# Patient Record
Sex: FEMALE | Race: WHITE | NOT HISPANIC OR LATINO | Employment: FULL TIME | URBAN - METROPOLITAN AREA
[De-identification: names, ages, dates, MRNs, and addresses within clinical notes are randomized per-mention and may not be internally consistent; named-entity substitution may affect disease eponyms.]

---

## 2017-01-16 RX ORDER — CLONAZEPAM 0.5 MG/1
TABLET ORAL
Qty: 30 TABLET | Refills: 0 | Status: SHIPPED | OUTPATIENT
Start: 2017-01-16 | End: 2017-02-17 | Stop reason: SDUPTHER

## 2017-02-03 RX ORDER — MOMETASONE FUROATE 50 UG/1
SPRAY, METERED NASAL
Qty: 17 G | Refills: 3 | Status: SHIPPED | OUTPATIENT
Start: 2017-02-03 | End: 2017-05-23 | Stop reason: SDUPTHER

## 2017-02-09 ENCOUNTER — HOSPITAL ENCOUNTER (OUTPATIENT)
Dept: RADIOLOGY | Facility: HOSPITAL | Age: 47
Discharge: HOME | End: 2017-02-09
Attending: OBSTETRICS & GYNECOLOGY
Payer: COMMERCIAL

## 2017-02-09 ENCOUNTER — OFFICE VISIT (OUTPATIENT)
Dept: OBSTETRICS AND GYNECOLOGY | Facility: CLINIC | Age: 47
End: 2017-02-09
Payer: COMMERCIAL

## 2017-02-09 VITALS
SYSTOLIC BLOOD PRESSURE: 124 MMHG | WEIGHT: 141.13 LBS | BODY MASS INDEX: 25.97 KG/M2 | HEIGHT: 62 IN | DIASTOLIC BLOOD PRESSURE: 92 MMHG

## 2017-02-09 DIAGNOSIS — Z12.31 SCREENING MAMMOGRAM, ENCOUNTER FOR: ICD-10-CM

## 2017-02-09 DIAGNOSIS — Z12.31 SCREENING MAMMOGRAM, ENCOUNTER FOR: Primary | ICD-10-CM

## 2017-02-09 DIAGNOSIS — Z01.419 WELL WOMAN EXAM WITH ROUTINE GYNECOLOGICAL EXAM: Primary | ICD-10-CM

## 2017-02-09 PROCEDURE — 77063 BREAST TOMOSYNTHESIS BI: CPT | Mod: 26,,, | Performed by: RADIOLOGY

## 2017-02-09 PROCEDURE — 77067 SCR MAMMO BI INCL CAD: CPT | Mod: 26,,, | Performed by: RADIOLOGY

## 2017-02-09 PROCEDURE — 77067 SCR MAMMO BI INCL CAD: CPT | Mod: TC

## 2017-02-09 PROCEDURE — 99999 PR PBB SHADOW E&M-EST. PATIENT-LVL II: CPT | Mod: PBBFAC,,, | Performed by: OBSTETRICS & GYNECOLOGY

## 2017-02-09 PROCEDURE — 99396 PREV VISIT EST AGE 40-64: CPT | Mod: S$GLB,,, | Performed by: OBSTETRICS & GYNECOLOGY

## 2017-02-09 NOTE — MR AVS SNAPSHOT
"    [facility] [address]  [address] [facility]  [address]  [facility] [address]  Phone: [phone]  Fax: [phone]                  Shawna Mayers   2017 8:45 AM   Office Visit    Description:  Female : 1970   Provider:  Nicky Nguyen MD   Department:  [facility] [address]           Reason for Visit     Well Woman                To Do List           Goals (5 Years of Data)     None      [facility]     [facility] On Call Nurse Care Line -  Assistance  Registered nurses in the [facility] provide clinical advisement, health education, appointment booking, and other advisory services.  Call for this free service at [phone].             Medications                Verify that the below list of medications is an accurate representation of the medications you are currently taking.  If none reported, the list may be blank. If incorrect, please contact your healthcare provider. Carry this list with you in case of emergency.           Current Medications     clonazePAM (KLONOPIN) 0.5 MG tablet TAKE 1 TABLET BY MOUTH TWICE A DAY    ipratropium (ATROVENT HFA) 17 mcg/actuation inhaler Inhale 2 puffs into the lungs every 6 (six) hours.    KERYDIN 5 % Nisa APPLY TO THE AFFECTED NAILS NIGHTLY    levothyroxine (SYNTHROID) 50 MCG tablet Take 1 tablet (50 mcg total) by mouth every morning.    meclizine (ANTIVERT) 25 mg tablet Take 1 tablet (25 mg total) by mouth 3 (three) times daily as needed.    mometasone (NASONEX) 50 mcg/actuation nasal spray INSTILL 2 SPRAYS INTO EACH NOSTRIL ONCE DAILY    NAFTIN 2 % Gel     ONEXTON 1.2 %(1 % base) -3.75 % GlwP APPLY A SMALL AMOUNT TO AFFECTED AREA DAILY    tretinoin (RETIN-A) 0.025 % cream APPLY A SMALL PEA-SIZED AMOUNT TO FACE EVENLY EVERY 2 TO 3 NIGHTS           Clinical Reference Information           Your Vitals Were     BP Height Weight Last Period BMI    124/92 5' 2" (1.575 m) 64 kg (141 lb 1.5 oz) 2017 25.81 kg/m2    "   Blood Pressure          Most Recent Value    BP  (!)  124/92      Allergies as of 2/9/2017     Anucort-hc [Hydrocortisone Acetate]    Demerol [Meperidine]    Sulfa (Sulfonamide Antibiotics)    Amoxicillin      Immunizations Administered on Date of Encounter - 2/9/2017     None      Language Assistance Services     ATTENTION: Language assistance services are available, free of charge. Please call [phone].      ATENCIÓN: Si habla español, tiene a dobbs disposición servicios gratuitos de asistencia lingüística. Llame al [phone].     [facility] Ý: N?u b?n nói Ti?ng Vi?t, có các d?ch v? h? tr? ngôn ng? mi?n phí dành cho b?n. G?i s? [phone].         [facility] [address] complies with applicable Federal civil rights laws and does not discriminate on the basis of race, color, national origin, age, disability, or sex.

## 2017-02-09 NOTE — PROGRESS NOTES
History & Physical  Gynecology      SUBJECTIVE:     Chief Complaint: Well Woman       History of Present Illness:  Annual Exam-Premenopausal  Patient presents for annual exam. The patient has no complaints today. Menstrual cycles are irregular-- between 21-34 days.  For the first two days, the cycles are heavy, then transition to light.  This has happened over the past few years.  No intermenstrual bleeding.  Starting to have hot flashes and nightsweats.  Patient is taking klonapin for sleep.  The patient is sexually active. GYN screening history: last pap: approximate date  and was normal. Last mammogram was one year ago.  The patient participates in regular exercise: yes.  The patient does not smoke.      Contraception: vasectomy    FH:  Breast cancer: none  Colon cancer: none  Ovarian cancer: great grandmother    Review of patient's allergies indicates:   Allergen Reactions    Anucort-hc [hydrocortisone acetate] Hives and Nausea And Vomiting    Demerol [meperidine] Nausea And Vomiting    Sulfa (sulfonamide antibiotics) Hives    Amoxicillin Nausea And Vomiting and Rash       Past Medical History   Diagnosis Date    Allergy     Anxiety     Asthma     Focal nodular hyperplasia of liver     GERD (gastroesophageal reflux disease)     Hyperlipidemia     Thyroid disease      hypothyroidism     Past Surgical History   Procedure Laterality Date    Cholecystectomy      Foot surgery       arc    [place] tooth extraction       OB History      Para Term  AB TAB SAB Ectopic Multiple Living    0                 Family History   Problem Relation Age of Onset    Arthritis Mother     Asthma Mother     Diabetes Mother     Hearing loss Mother     Hyperlipidemia Mother     Vision loss Mother     Early death Father     Heart disease Father       of MI at 43yo    Hyperlipidemia Father     Kidney disease Father     Breast cancer Neg Hx     Colon cancer Neg Hx     Ovarian cancer Neg Hx      Melanoma Neg Hx      Social History   Substance Use Topics    Smoking status: Never Smoker    Smokeless tobacco: None    Alcohol use Yes      Comment: socially       Current Outpatient Prescriptions   Medication Sig    clonazePAM (KLONOPIN) 0.5 MG tablet TAKE 1 TABLET BY MOUTH TWICE A DAY    ipratropium (ATROVENT HFA) 17 mcg/actuation inhaler Inhale 2 puffs into the lungs every 6 (six) hours.    KERYDIN 5 % Nisa APPLY TO THE AFFECTED NAILS NIGHTLY    levothyroxine (SYNTHROID) 50 MCG tablet Take 1 tablet (50 mcg total) by mouth every morning.    meclizine (ANTIVERT) 25 mg tablet Take 1 tablet (25 mg total) by mouth 3 (three) times daily as needed.    mometasone (NASONEX) 50 mcg/actuation nasal spray INSTILL 2 SPRAYS INTO EACH NOSTRIL ONCE DAILY    NAFTIN 2 % Gel     ONEXTON 1.2 %(1 % base) -3.75 % GlwP APPLY A SMALL AMOUNT TO AFFECTED AREA DAILY    tretinoin (RETIN-A) 0.025 % cream APPLY A SMALL PEA-SIZED AMOUNT TO FACE EVENLY EVERY 2 TO 3 NIGHTS     No current facility-administered medications for this visit.          Review of Systems:  Review of Systems   Constitutional: Negative for activity change, appetite change and fever.   Respiratory: Negative for shortness of breath.    Cardiovascular: Negative for chest pain.   Gastrointestinal: Negative for abdominal pain, constipation, diarrhea, nausea and vomiting.   Genitourinary: Negative for menorrhagia, menstrual problem, pelvic pain, vaginal bleeding, vaginal discharge and vaginal pain.   Neurological: Negative for numbness and headaches.   Breast: Negative for breast pain and nipple discharge       OBJECTIVE:     Physical Exam:  Physical Exam   Constitutional: She is oriented to person, place, and time. She appears well-developed and well-nourished.   Neck: Normal range of motion. Neck supple. No tracheal deviation present. No thyromegaly present.   Cardiovascular: Normal rate, regular rhythm and normal heart sounds.    Pulmonary/Chest: Effort normal  and breath sounds normal. Right breast exhibits no inverted nipple, no mass, no nipple discharge, no skin change and no tenderness. Left breast exhibits no inverted nipple, no mass, no nipple discharge, no skin change and no tenderness. Breasts are symmetrical.   Abdominal: Soft.   Genitourinary: Vagina normal and uterus normal. No labial fusion. There is no rash, tenderness, lesion or injury on the right labia. There is no rash, tenderness, lesion or injury on the left labia. Uterus is not deviated, not enlarged, not fixed and not tender. Cervix exhibits no motion tenderness, no discharge and no friability. Right adnexum displays no mass, no tenderness and no fullness. Left adnexum displays no mass, no tenderness and no fullness. No erythema, tenderness or bleeding in the vagina. No foreign body in the vagina. No signs of injury around the vagina. No vaginal discharge found.   Neurological: She is alert and oriented to person, place, and time.   Psychiatric: She has a normal mood and affect. Her behavior is normal. Judgment and thought content normal.   Nursing note and vitals reviewed.        ASSESSMENT:       ICD-10-CM ICD-9-CM    1. Well woman exam with routine gynecological exam Z01.419 V72.31           Plan:      Shawna was seen today for well woman.    Diagnoses and all orders for this visit:    Well woman exam with routine gynecological exam        No orders of the defined types were placed in this encounter.      Well Woman:  - Pap smear due 2019  - Birth control: vasectomy  - Mammogram: ordered today  - Smoking cessation: n/a  - Labs: up to date   - Vaccines: non required  - Calcium and Vitamin D counseling; Exercise counseling      Return in about 1 year (around 2/9/2018) for annual or prn.    Nicky Nguyen

## 2017-02-12 RX ORDER — IPRATROPIUM BROMIDE 17 UG/1
AEROSOL, METERED RESPIRATORY (INHALATION)
Qty: 12.9 INHALER | Refills: 2 | Status: SHIPPED | OUTPATIENT
Start: 2017-02-12 | End: 2018-03-05 | Stop reason: SDUPTHER

## 2017-02-17 RX ORDER — CLONAZEPAM 0.5 MG/1
TABLET ORAL
Qty: 30 TABLET | Refills: 0 | Status: SHIPPED | OUTPATIENT
Start: 2017-02-17 | End: 2017-03-16 | Stop reason: SDUPTHER

## 2017-03-16 RX ORDER — CLONAZEPAM 0.5 MG/1
TABLET ORAL
Qty: 30 TABLET | Refills: 0 | Status: SHIPPED | OUTPATIENT
Start: 2017-03-16 | End: 2017-04-17 | Stop reason: SDUPTHER

## 2017-04-17 RX ORDER — CLONAZEPAM 0.5 MG/1
TABLET ORAL
Qty: 30 TABLET | Refills: 0 | Status: SHIPPED | OUTPATIENT
Start: 2017-04-17 | End: 2017-05-18 | Stop reason: SDUPTHER

## 2017-05-16 ENCOUNTER — OFFICE VISIT (OUTPATIENT)
Dept: INTERNAL MEDICINE | Facility: CLINIC | Age: 47
End: 2017-05-16
Payer: COMMERCIAL

## 2017-05-16 VITALS
DIASTOLIC BLOOD PRESSURE: 72 MMHG | SYSTOLIC BLOOD PRESSURE: 126 MMHG | HEART RATE: 55 BPM | OXYGEN SATURATION: 98 % | HEIGHT: 62 IN | WEIGHT: 135.81 LBS | BODY MASS INDEX: 24.99 KG/M2

## 2017-05-16 DIAGNOSIS — E03.9 ACQUIRED HYPOTHYROIDISM: Chronic | ICD-10-CM

## 2017-05-16 DIAGNOSIS — Z00.00 WELLNESS EXAMINATION: Primary | ICD-10-CM

## 2017-05-16 PROCEDURE — 99999 PR PBB SHADOW E&M-EST. PATIENT-LVL III: CPT | Mod: PBBFAC,,, | Performed by: INTERNAL MEDICINE

## 2017-05-16 PROCEDURE — 3074F SYST BP LT 130 MM HG: CPT | Mod: S$GLB,,, | Performed by: INTERNAL MEDICINE

## 2017-05-16 PROCEDURE — 3078F DIAST BP <80 MM HG: CPT | Mod: S$GLB,,, | Performed by: INTERNAL MEDICINE

## 2017-05-16 PROCEDURE — 99396 PREV VISIT EST AGE 40-64: CPT | Mod: 25,S$GLB,, | Performed by: INTERNAL MEDICINE

## 2017-05-16 PROCEDURE — 90715 TDAP VACCINE 7 YRS/> IM: CPT | Mod: S$GLB,,, | Performed by: INTERNAL MEDICINE

## 2017-05-16 PROCEDURE — 90471 IMMUNIZATION ADMIN: CPT | Mod: S$GLB,,, | Performed by: INTERNAL MEDICINE

## 2017-05-16 RX ORDER — TRAZODONE HYDROCHLORIDE 50 MG/1
50 TABLET ORAL NIGHTLY PRN
Qty: 10 TABLET | Refills: 0 | Status: SHIPPED | OUTPATIENT
Start: 2017-05-16 | End: 2018-03-29

## 2017-05-16 NOTE — PROGRESS NOTES
Subjective:       Patient ID: Shawna Mayers is a 46 y.o. female.    Chief Complaint: Annual Exam    HPI Comments: Pt here for annual exam. Feels well except had some burning in epigastric/RUQ region yesterday and intermittently for a few days. This was associated with nausea but no vomiting. No changes in BM. No cp/sob. no blood in stool. She admits to having taken 400-600mg motrin bid for several weeks for muscle aches associated with intense workouts. She stopped this yesterday and feeling better. No dysphagia or wt loss.     She has previously been noted to have nodules on liver which was determined to be focal nodular hyperplasia. This has been stable.     She is clinically euthyroid on synthroid 50mcg daily.     She takes klonopin most nights for sleep. We discussed trying an alternative such as trazodone to which she is agreeable.     Up to date on [employer].     Abdominal Pain   Pertinent negatives include no arthralgias, constipation, diarrhea, dysuria, fever or headaches.     Review of Systems   Constitutional: Negative for fatigue, fever and unexpected weight change.   HENT: Negative for congestion and sore throat.    Eyes: Negative for visual disturbance.   Respiratory: Negative for shortness of breath.    Cardiovascular: Negative for chest pain, palpitations and leg swelling.   Gastrointestinal: Positive for abdominal pain. Negative for blood in stool, constipation and diarrhea.   Genitourinary: Negative for dysuria.   Musculoskeletal: Negative for arthralgias.   Skin: Negative for rash.   Neurological: Negative for dizziness, syncope and headaches.   Hematological: Negative for adenopathy.   Psychiatric/Behavioral: Negative for dysphoric mood.       Objective:      Physical Exam   Constitutional: She is oriented to person, place, and time. She appears well-developed and well-nourished.   HENT:   Head: Normocephalic.   Right Ear: Tympanic membrane, external ear and ear canal normal.   Left Ear:  Tympanic membrane, external ear and ear canal normal.   Nose: Nose normal.   Mouth/Throat: Uvula is midline and oropharynx is clear and moist.   Eyes: Conjunctivae, EOM and lids are normal. Pupils are equal, round, and reactive to light. Right conjunctiva is not injected. Left conjunctiva is not injected.   Neck: Neck supple. No JVD present. Carotid bruit is not present. No thyroid mass and no thyromegaly present.   Cardiovascular: Normal rate, regular rhythm, S1 normal, S2 normal and intact distal pulses.  PMI is not displaced.    No murmur heard.  Pulses:       Posterior tibial pulses are 2+ on the right side, and 2+ on the left side.   Pulmonary/Chest: Effort normal and breath sounds normal. She has no wheezes. She has no rhonchi. She has no rales.   Abdominal: Soft. Bowel sounds are normal. She exhibits no distension and no abdominal bruit. There is no hepatosplenomegaly. There is no tenderness.   Musculoskeletal: She exhibits no edema.   Lymphadenopathy:        Head (right side): No preauricular and no posterior auricular adenopathy present.        Head (left side): No preauricular and no posterior auricular adenopathy present.     She has no cervical adenopathy.     She has no axillary adenopathy.   Neurological: She is alert and oriented to person, place, and time. She has normal strength. No cranial nerve deficit or sensory deficit.   Skin: Skin is warm. No rash noted.   Psychiatric: She has a normal mood and affect. Her speech is normal and behavior is normal. Judgment and thought content normal.       Assessment:       1. Wellness examination    2. Acquired hypothyroidism        Plan:       1. Appropriate labs  2. Stop NSAIDs and take prilosec 20mg daily otc x4 wks; proper use of NSAIDs d/w pt and she understood; she also understands to let me know if GI/abd pain symptoms return to any degree  3. Trial of trazodone to replace klonopin for insomnia; she will let me know how she does; proper use d/w pt

## 2017-05-18 RX ORDER — CLONAZEPAM 0.5 MG/1
TABLET ORAL
Qty: 30 TABLET | Refills: 0 | Status: SHIPPED | OUTPATIENT
Start: 2017-05-18 | End: 2017-06-19 | Stop reason: SDUPTHER

## 2017-05-23 RX ORDER — MOMETASONE FUROATE 50 UG/1
SPRAY, METERED NASAL
Qty: 17 G | Refills: 0 | Status: SHIPPED | OUTPATIENT
Start: 2017-05-23 | End: 2017-06-19 | Stop reason: SDUPTHER

## 2017-05-27 ENCOUNTER — LAB VISIT (OUTPATIENT)
Dept: LAB | Facility: HOSPITAL | Age: 47
End: 2017-05-27
Attending: INTERNAL MEDICINE
Payer: COMMERCIAL

## 2017-05-27 DIAGNOSIS — Z00.00 WELLNESS EXAMINATION: ICD-10-CM

## 2017-05-27 LAB
ALBUMIN SERPL BCP-MCNC: 3.4 G/DL
ALP SERPL-CCNC: 64 U/L
ALT SERPL W/O P-5'-P-CCNC: 33 U/L
ANION GAP SERPL CALC-SCNC: 6 MMOL/L
AST SERPL-CCNC: 34 U/L
BASOPHILS # BLD AUTO: 0.02 K/UL
BASOPHILS NFR BLD: 0.4 %
BILIRUB SERPL-MCNC: 0.3 MG/DL
BUN SERPL-MCNC: 9 MG/DL
CALCIUM SERPL-MCNC: 8.7 MG/DL
CHLORIDE SERPL-SCNC: 105 MMOL/L
CHOLEST/HDLC SERPL: 2.8 {RATIO}
CO2 SERPL-SCNC: 27 MMOL/L
CREAT SERPL-MCNC: 0.7 MG/DL
DIFFERENTIAL METHOD: ABNORMAL
EOSINOPHIL # BLD AUTO: 0.1 K/UL
EOSINOPHIL NFR BLD: 2.4 %
ERYTHROCYTE [DISTWIDTH] IN BLOOD BY AUTOMATED COUNT: 12.8 %
EST. GFR  (AFRICAN AMERICAN): >60 ML/MIN/1.73 M^2
EST. GFR  (NON AFRICAN AMERICAN): >60 ML/MIN/1.73 M^2
GLUCOSE SERPL-MCNC: 83 MG/DL
HCT VFR BLD AUTO: 39.3 %
HDL/CHOLESTEROL RATIO: 35.3 %
HDLC SERPL-MCNC: 153 MG/DL
HDLC SERPL-MCNC: 54 MG/DL
HGB BLD-MCNC: 13.2 G/DL
LDLC SERPL CALC-MCNC: 77 MG/DL
LYMPHOCYTES # BLD AUTO: 1.5 K/UL
LYMPHOCYTES NFR BLD: 33.6 %
MCH RBC QN AUTO: 31.4 PG
MCHC RBC AUTO-ENTMCNC: 33.6 %
MCV RBC AUTO: 94 FL
MONOCYTES # BLD AUTO: 0.3 K/UL
MONOCYTES NFR BLD: 7.3 %
NEUTROPHILS # BLD AUTO: 2.5 K/UL
NEUTROPHILS NFR BLD: 56.3 %
NONHDLC SERPL-MCNC: 99 MG/DL
PLATELET # BLD AUTO: 259 K/UL
PMV BLD AUTO: 10.1 FL
POTASSIUM SERPL-SCNC: 3.9 MMOL/L
PROT SERPL-MCNC: 6.5 G/DL
RBC # BLD AUTO: 4.2 M/UL
SODIUM SERPL-SCNC: 138 MMOL/L
TRIGL SERPL-MCNC: 110 MG/DL
TSH SERPL DL<=0.005 MIU/L-ACNC: 2.08 UIU/ML
WBC # BLD AUTO: 4.5 K/UL

## 2017-05-27 PROCEDURE — 80061 LIPID PANEL: CPT

## 2017-05-27 PROCEDURE — 84443 ASSAY THYROID STIM HORMONE: CPT

## 2017-05-27 PROCEDURE — 85025 COMPLETE CBC W/AUTO DIFF WBC: CPT

## 2017-05-27 PROCEDURE — 36415 COLL VENOUS BLD VENIPUNCTURE: CPT

## 2017-05-27 PROCEDURE — 80053 COMPREHEN METABOLIC PANEL: CPT

## 2017-06-19 RX ORDER — LEVOTHYROXINE SODIUM 50 UG/1
50 TABLET ORAL EVERY MORNING
Qty: 90 TABLET | Refills: 3 | Status: SHIPPED | OUTPATIENT
Start: 2017-06-19 | End: 2018-06-20 | Stop reason: SDUPTHER

## 2017-06-19 RX ORDER — CLONAZEPAM 0.5 MG/1
TABLET ORAL
Qty: 30 TABLET | Refills: 0 | Status: SHIPPED | OUTPATIENT
Start: 2017-06-19 | End: 2017-07-18 | Stop reason: SDUPTHER

## 2017-06-19 RX ORDER — MOMETASONE FUROATE 50 UG/1
SPRAY, METERED NASAL
Qty: 17 G | Refills: 2 | Status: SHIPPED | OUTPATIENT
Start: 2017-06-19 | End: 2017-06-20

## 2017-06-20 RX ORDER — MOMETASONE FUROATE 50 UG/1
SPRAY, METERED NASAL
Qty: 17 G | Refills: 3 | Status: SHIPPED | OUTPATIENT
Start: 2017-06-20 | End: 2017-09-15

## 2017-07-19 RX ORDER — CLONAZEPAM 0.5 MG/1
TABLET ORAL
Qty: 30 TABLET | Refills: 0 | Status: SHIPPED | OUTPATIENT
Start: 2017-07-19 | End: 2017-08-15 | Stop reason: SDUPTHER

## 2017-08-15 RX ORDER — CLONAZEPAM 0.5 MG/1
TABLET ORAL
Qty: 30 TABLET | Refills: 0 | Status: SHIPPED | OUTPATIENT
Start: 2017-08-15 | End: 2017-09-15 | Stop reason: SDUPTHER

## 2017-09-15 RX ORDER — MOMETASONE FUROATE 50 UG/1
SPRAY, METERED NASAL
Qty: 17 G | Refills: 2 | Status: SHIPPED | OUTPATIENT
Start: 2017-09-15 | End: 2017-12-12 | Stop reason: SDUPTHER

## 2017-09-15 RX ORDER — CLONAZEPAM 0.5 MG/1
0.5 TABLET ORAL DAILY PRN
Qty: 30 TABLET | Refills: 0 | Status: SHIPPED | OUTPATIENT
Start: 2017-09-15 | End: 2017-10-14

## 2017-10-14 RX ORDER — CLONAZEPAM 0.5 MG/1
TABLET ORAL
Qty: 30 TABLET | Refills: 0 | Status: SHIPPED | OUTPATIENT
Start: 2017-10-14 | End: 2017-11-14 | Stop reason: SDUPTHER

## 2017-11-14 RX ORDER — CLONAZEPAM 0.5 MG/1
TABLET ORAL
Qty: 30 TABLET | Refills: 0 | Status: SHIPPED | OUTPATIENT
Start: 2017-11-14 | End: 2017-12-12 | Stop reason: SDUPTHER

## 2017-12-12 RX ORDER — MOMETASONE FUROATE 50 UG/1
SPRAY, METERED NASAL
Qty: 17 G | Refills: 2 | Status: SHIPPED | OUTPATIENT
Start: 2017-12-12 | End: 2018-03-09 | Stop reason: SDUPTHER

## 2017-12-13 RX ORDER — CLONAZEPAM 0.5 MG/1
TABLET ORAL
Qty: 30 TABLET | Refills: 0 | Status: SHIPPED | OUTPATIENT
Start: 2017-12-13 | End: 2018-01-12 | Stop reason: SDUPTHER

## 2018-01-12 RX ORDER — CLONAZEPAM 0.5 MG/1
TABLET ORAL
Qty: 30 TABLET | Refills: 0 | Status: SHIPPED | OUTPATIENT
Start: 2018-01-12 | End: 2018-02-11 | Stop reason: SDUPTHER

## 2018-02-12 RX ORDER — CLONAZEPAM 0.5 MG/1
TABLET ORAL
Qty: 30 TABLET | Refills: 0 | Status: SHIPPED | OUTPATIENT
Start: 2018-02-12 | End: 2018-03-16

## 2018-03-05 RX ORDER — IPRATROPIUM BROMIDE 17 UG/1
AEROSOL, METERED RESPIRATORY (INHALATION)
Qty: 12.9 INHALER | Refills: 1 | Status: SHIPPED | OUTPATIENT
Start: 2018-03-05 | End: 2018-08-10 | Stop reason: SDUPTHER

## 2018-03-09 RX ORDER — MOMETASONE FUROATE 50 UG/1
SPRAY, METERED NASAL
Qty: 17 G | Refills: 2 | Status: SHIPPED | OUTPATIENT
Start: 2018-03-09 | End: 2018-06-04 | Stop reason: SDUPTHER

## 2018-03-16 RX ORDER — CLONAZEPAM 0.5 MG/1
TABLET ORAL
Qty: 30 TABLET | Refills: 0 | Status: SHIPPED | OUTPATIENT
Start: 2018-03-16 | End: 2018-04-16 | Stop reason: SDUPTHER

## 2018-03-29 ENCOUNTER — OFFICE VISIT (OUTPATIENT)
Dept: OBSTETRICS AND GYNECOLOGY | Facility: CLINIC | Age: 48
End: 2018-03-29
Payer: COMMERCIAL

## 2018-03-29 VITALS — HEIGHT: 62 IN | WEIGHT: 146.19 LBS | BODY MASS INDEX: 26.9 KG/M2

## 2018-03-29 DIAGNOSIS — Z01.419 WELL WOMAN EXAM WITH ROUTINE GYNECOLOGICAL EXAM: Primary | ICD-10-CM

## 2018-03-29 PROCEDURE — 99396 PREV VISIT EST AGE 40-64: CPT | Mod: S$GLB,,, | Performed by: OBSTETRICS & GYNECOLOGY

## 2018-03-29 NOTE — PROGRESS NOTES
History & Physical  Gynecology      SUBJECTIVE:     Chief Complaint: Advice Only       History of Present Illness:  Annual Exam-Premenopausal  Patient presents for annual exam. The patient has no complaints today. Menstrual cycles are somewhat irregular, getting lighter.  Some hot flashes/night sweats.  The patient is sexually active. GYN screening history: last pap: approximate date  and was normal. The patient participates in regular exercise: yes.  The patient does not smoke.      FH:  Breast cancer: none  Colon cancer: none  Ovarian cancer: great grandmother    Review of patient's allergies indicates:   Allergen Reactions    Anucort-hc [hydrocortisone acetate] Hives and Nausea And Vomiting    Demerol [meperidine] Nausea And Vomiting    Sulfa (sulfonamide antibiotics) Hives    Amoxicillin Nausea And Vomiting and Rash       Past Medical History:   Diagnosis Date    Allergy     Anxiety     Asthma     Focal nodular hyperplasia of liver     GERD (gastroesophageal reflux disease)     Hyperlipidemia     Thyroid disease     hypothyroidism     Past Surgical History:   Procedure Laterality Date    CHOLECYSTECTOMY      FOOT SURGERY      arc    WISDOM TOOTH EXTRACTION       OB History      Para Term  AB Living    0              SAB TAB Ectopic Multiple Live Births                     Family History   Problem Relation Age of Onset    Arthritis Mother     Asthma Mother     Diabetes Mother     Hearing loss Mother     Hyperlipidemia Mother     Vision loss Mother     Early death Father     Heart disease Father       of MI at 41yo    Hyperlipidemia Father     Kidney disease Father     Breast cancer Neg Hx     Colon cancer Neg Hx     Ovarian cancer Neg Hx     Melanoma Neg Hx      Social History   Substance Use Topics    Smoking status: Never Smoker    Smokeless tobacco: Not on file    Alcohol use Yes      Comment: socially       Current Outpatient Prescriptions   Medication Sig     ATROVENT HFA 17 mcg/actuation inhaler INHALE 2 PUFFS INTO THE LUNGS EVERY 6 (SIX) HOURS.    clonazePAM (KLONOPIN) 0.5 MG tablet TAKE 1 TABLET BY MOUTH TWICE A DAY    KERYDIN 5 % Nisa APPLY TO THE AFFECTED NAILS NIGHTLY    levothyroxine (SYNTHROID) 50 MCG tablet TAKE 1 TABLET (50 MCG TOTAL) BY MOUTH EVERY MORNING.    meclizine (ANTIVERT) 25 mg tablet Take 1 tablet (25 mg total) by mouth 3 (three) times daily as needed.    mometasone (NASONEX) 50 mcg/actuation nasal spray INSTILL 2 SPRAYS INTO EACH NOSTRIL ONCE DAILY     No current facility-administered medications for this visit.          Review of Systems:  Review of Systems   Constitutional: Negative for activity change, appetite change and fever.   Respiratory: Negative for shortness of breath.    Cardiovascular: Negative for chest pain.   Gastrointestinal: Negative for abdominal pain, constipation, diarrhea, nausea and vomiting.   Genitourinary: Negative for menorrhagia, menstrual problem, pelvic pain, vaginal bleeding, vaginal discharge and vaginal pain.   Neurological: Negative for numbness and headaches.   Breast: Negative for breast pain and nipple discharge       OBJECTIVE:     Physical Exam:  Physical Exam   Constitutional: She is oriented to person, place, and time. She appears well-developed and well-nourished.   Neck: Normal range of motion. Neck supple. No tracheal deviation present. No thyromegaly present.   Cardiovascular: Normal rate, regular rhythm and normal heart sounds.    Pulmonary/Chest: Effort normal and breath sounds normal. Right breast exhibits no inverted nipple, no mass, no nipple discharge, no skin change and no tenderness. Left breast exhibits no inverted nipple, no mass, no nipple discharge, no skin change and no tenderness. Breasts are symmetrical.   Abdominal: Soft.   Genitourinary: Vagina normal and uterus normal. No labial fusion. There is no rash, tenderness, lesion or injury on the right labia. There is no rash,  tenderness, lesion or injury on the left labia. Uterus is not deviated, not enlarged, not fixed and not tender. Cervix exhibits no motion tenderness, no discharge and no friability. Right adnexum displays no mass, no tenderness and no fullness. Left adnexum displays no mass, no tenderness and no fullness. No erythema, tenderness or bleeding in the vagina. No foreign body in the vagina. No signs of injury around the vagina. No vaginal discharge found.   Neurological: She is alert and oriented to person, place, and time.   Psychiatric: She has a normal mood and affect. Her behavior is normal. Judgment and thought content normal.   Nursing note and vitals reviewed.      Chaperoned by: Lola    ASSESSMENT:       ICD-10-CM ICD-9-CM    1. Well woman exam with routine gynecological exam Z01.419 V72.31 Mammo Digital Screening Bilat with Tomosynthesis CAD          Plan:      Shawna was seen today for advice only.    Diagnoses and all orders for this visit:    Well woman exam with routine gynecological exam  -     Mammo Digital Screening Bilat with Tomosynthesis CAD; Future        Orders Placed This Encounter   Procedures    Mammo Digital Screening Bilat with Tomosynthesis CAD       Well Woman:  - Pap smear due next year  - Birth control: n/a  - GC/CT:n/a  - Mammogram: ordered  - Smoking cessation: n/a  - Labs: up to date with PCP   - Vaccines: n/a  - Exercise counseling      Follow up in one year for annual or prn.    Nicky Nguyen

## 2018-03-30 ENCOUNTER — PATIENT MESSAGE (OUTPATIENT)
Dept: OBSTETRICS AND GYNECOLOGY | Facility: CLINIC | Age: 48
End: 2018-03-30

## 2018-04-10 ENCOUNTER — HOSPITAL ENCOUNTER (OUTPATIENT)
Dept: RADIOLOGY | Facility: HOSPITAL | Age: 48
Discharge: HOME | End: 2018-04-10
Attending: OBSTETRICS & GYNECOLOGY
Payer: COMMERCIAL

## 2018-04-10 VITALS — BODY MASS INDEX: 26.87 KG/M2 | WEIGHT: 146 LBS | HEIGHT: 62 IN

## 2018-04-10 DIAGNOSIS — Z01.419 WELL WOMAN EXAM WITH ROUTINE GYNECOLOGICAL EXAM: ICD-10-CM

## 2018-04-10 DIAGNOSIS — Z12.31 VISIT FOR SCREENING MAMMOGRAM: ICD-10-CM

## 2018-04-10 PROCEDURE — 77063 BREAST TOMOSYNTHESIS BI: CPT | Mod: 26,,, | Performed by: RADIOLOGY

## 2018-04-10 PROCEDURE — 77067 SCR MAMMO BI INCL CAD: CPT | Mod: TC

## 2018-04-10 PROCEDURE — 77067 SCR MAMMO BI INCL CAD: CPT | Mod: 26,,, | Performed by: RADIOLOGY

## 2018-04-16 RX ORDER — CLONAZEPAM 0.5 MG/1
TABLET ORAL
Qty: 30 TABLET | Refills: 0 | Status: SHIPPED | OUTPATIENT
Start: 2018-04-16 | End: 2018-05-14 | Stop reason: SDUPTHER

## 2018-05-14 RX ORDER — CLONAZEPAM 0.5 MG/1
TABLET ORAL
Qty: 30 TABLET | Refills: 0 | Status: SHIPPED | OUTPATIENT
Start: 2018-05-14 | End: 2018-06-20 | Stop reason: SDUPTHER

## 2018-06-04 RX ORDER — MOMETASONE FUROATE 50 UG/1
SPRAY, METERED NASAL
Qty: 17 G | Refills: 0 | Status: SHIPPED | OUTPATIENT
Start: 2018-06-04 | End: 2018-07-05 | Stop reason: SDUPTHER

## 2018-06-04 RX ORDER — LEVOTHYROXINE SODIUM 50 UG/1
50 TABLET ORAL EVERY MORNING
Qty: 90 TABLET | Refills: 3 | OUTPATIENT
Start: 2018-06-04

## 2018-06-13 ENCOUNTER — PATIENT MESSAGE (OUTPATIENT)
Dept: INTERNAL MEDICINE | Facility: CLINIC | Age: 48
End: 2018-06-13

## 2018-06-13 RX ORDER — CLONAZEPAM 0.5 MG/1
TABLET ORAL
Qty: 30 TABLET | Refills: 0 | OUTPATIENT
Start: 2018-06-13

## 2018-06-13 RX ORDER — LEVOTHYROXINE SODIUM 50 UG/1
50 TABLET ORAL EVERY MORNING
Qty: 90 TABLET | Refills: 3 | OUTPATIENT
Start: 2018-06-13

## 2018-06-20 ENCOUNTER — OFFICE VISIT (OUTPATIENT)
Dept: INTERNAL MEDICINE | Facility: CLINIC | Age: 48
End: 2018-06-20
Payer: COMMERCIAL

## 2018-06-20 VITALS
HEART RATE: 67 BPM | SYSTOLIC BLOOD PRESSURE: 112 MMHG | DIASTOLIC BLOOD PRESSURE: 60 MMHG | HEIGHT: 62 IN | WEIGHT: 145.75 LBS | BODY MASS INDEX: 26.82 KG/M2

## 2018-06-20 DIAGNOSIS — Z00.00 WELLNESS EXAMINATION: Primary | ICD-10-CM

## 2018-06-20 DIAGNOSIS — K76.89 FOCAL NODULAR HYPERPLASIA OF LIVER: ICD-10-CM

## 2018-06-20 DIAGNOSIS — E03.9 ACQUIRED HYPOTHYROIDISM: Chronic | ICD-10-CM

## 2018-06-20 PROCEDURE — 99999 PR PBB SHADOW E&M-EST. PATIENT-LVL III: CPT | Mod: PBBFAC,,, | Performed by: INTERNAL MEDICINE

## 2018-06-20 PROCEDURE — 3074F SYST BP LT 130 MM HG: CPT | Mod: CPTII,S$GLB,, | Performed by: INTERNAL MEDICINE

## 2018-06-20 PROCEDURE — 3078F DIAST BP <80 MM HG: CPT | Mod: CPTII,S$GLB,, | Performed by: INTERNAL MEDICINE

## 2018-06-20 PROCEDURE — 99396 PREV VISIT EST AGE 40-64: CPT | Mod: S$GLB,,, | Performed by: INTERNAL MEDICINE

## 2018-06-20 RX ORDER — LEVOTHYROXINE SODIUM 50 UG/1
50 TABLET ORAL EVERY MORNING
Qty: 90 TABLET | Refills: 3 | Status: SHIPPED | OUTPATIENT
Start: 2018-06-20 | End: 2019-06-10 | Stop reason: SDUPTHER

## 2018-06-20 RX ORDER — CLONAZEPAM 0.5 MG/1
0.5 TABLET ORAL 2 TIMES DAILY
Qty: 30 TABLET | Refills: 0 | Status: SHIPPED | OUTPATIENT
Start: 2018-06-20 | End: 2018-07-27 | Stop reason: SDUPTHER

## 2018-06-20 NOTE — PROGRESS NOTES
Subjective:       Patient ID: Shawna Mayers is a 47 y.o. female.    Chief Complaint: Annual Exam    Pt here for annual exam. Feels well. Up to date on [employer].     She has previously been noted to have nodules on liver which was determined to be focal nodular hyperplasia. This has been stable.     She is clinically euthyroid on synthroid 50mcg daily.     She takes klonopin most nights for sleep. We reviewed sleep hygiene. She tried trazodone but didn't like the way it made her feel.      Review of Systems   Constitutional: Negative for fatigue, fever and unexpected weight change.   HENT: Negative for congestion and sore throat.    Eyes: Negative for visual disturbance.   Respiratory: Negative for shortness of breath.    Cardiovascular: Negative for chest pain, palpitations and leg swelling.   Gastrointestinal: Negative for abdominal pain, blood in stool, constipation and diarrhea.   Genitourinary: Negative for dysuria.   Musculoskeletal: Negative for arthralgias.   Skin: Negative for rash.   Neurological: Negative for dizziness, syncope and headaches.   Hematological: Negative for adenopathy.   Psychiatric/Behavioral: Negative for dysphoric mood.       Objective:      Physical Exam   Constitutional: She is oriented to person, place, and time. She appears well-developed and well-nourished.   HENT:   Head: Normocephalic.   Right Ear: Tympanic membrane, external ear and ear canal normal.   Left Ear: Tympanic membrane, external ear and ear canal normal.   Nose: Nose normal.   Mouth/Throat: Uvula is midline and oropharynx is clear and moist.   Eyes: Conjunctivae, EOM and lids are normal. Pupils are equal, round, and reactive to light. Right conjunctiva is not injected. Left conjunctiva is not injected.   Neck: Neck supple. No JVD present. Carotid bruit is not present. No thyroid mass and no thyromegaly present.   Cardiovascular: Normal rate, regular rhythm, S1 normal, S2 normal and intact distal pulses.  PMI is not  displaced.    No murmur heard.  Pulses:       Posterior tibial pulses are 2+ on the right side, and 2+ on the left side.   Pulmonary/Chest: Effort normal and breath sounds normal. She has no wheezes. She has no rhonchi. She has no rales.   Abdominal: Soft. Bowel sounds are normal. She exhibits no distension and no abdominal bruit. There is no hepatosplenomegaly. There is no tenderness.   Musculoskeletal: She exhibits no edema.   Lymphadenopathy:        Head (right side): No preauricular and no posterior auricular adenopathy present.        Head (left side): No preauricular and no posterior auricular adenopathy present.     She has no cervical adenopathy.     She has no axillary adenopathy.   Neurological: She is alert and oriented to person, place, and time. She has normal strength. No cranial nerve deficit or sensory deficit.   Skin: Skin is warm. No rash noted.   Psychiatric: She has a normal mood and affect. Her speech is normal and behavior is normal. Judgment and thought content normal.       Assessment:       1. Wellness examination    2. Acquired hypothyroidism    3. Focal nodular hyperplasia of liver        Plan:       1. Appropriate labs  2. Refill requested meds

## 2018-06-30 ENCOUNTER — LAB VISIT (OUTPATIENT)
Dept: LAB | Facility: HOSPITAL | Age: 48
End: 2018-06-30
Attending: INTERNAL MEDICINE
Payer: COMMERCIAL

## 2018-06-30 DIAGNOSIS — E03.9 ACQUIRED HYPOTHYROIDISM: Chronic | ICD-10-CM

## 2018-06-30 DIAGNOSIS — Z00.00 WELLNESS EXAMINATION: ICD-10-CM

## 2018-06-30 LAB
ALBUMIN SERPL BCP-MCNC: 4 G/DL
ALP SERPL-CCNC: 51 U/L
ALT SERPL W/O P-5'-P-CCNC: 23 U/L
ANION GAP SERPL CALC-SCNC: 7 MMOL/L
AST SERPL-CCNC: 29 U/L
BASOPHILS # BLD AUTO: 0.03 K/UL
BASOPHILS NFR BLD: 0.7 %
BILIRUB SERPL-MCNC: 0.7 MG/DL
BUN SERPL-MCNC: 12 MG/DL
CALCIUM SERPL-MCNC: 9.3 MG/DL
CHLORIDE SERPL-SCNC: 106 MMOL/L
CHOLEST SERPL-MCNC: 188 MG/DL
CHOLEST/HDLC SERPL: 2.9 {RATIO}
CO2 SERPL-SCNC: 26 MMOL/L
CREAT SERPL-MCNC: 0.7 MG/DL
DIFFERENTIAL METHOD: ABNORMAL
EOSINOPHIL # BLD AUTO: 0.2 K/UL
EOSINOPHIL NFR BLD: 4.5 %
ERYTHROCYTE [DISTWIDTH] IN BLOOD BY AUTOMATED COUNT: 12.5 %
EST. GFR  (AFRICAN AMERICAN): >60 ML/MIN/1.73 M^2
EST. GFR  (NON AFRICAN AMERICAN): >60 ML/MIN/1.73 M^2
GLUCOSE SERPL-MCNC: 93 MG/DL
HCT VFR BLD AUTO: 40.4 %
HDLC SERPL-MCNC: 65 MG/DL
HDLC SERPL: 34.6 %
HGB BLD-MCNC: 14.1 G/DL
LDLC SERPL CALC-MCNC: 109 MG/DL
LYMPHOCYTES # BLD AUTO: 1.9 K/UL
LYMPHOCYTES NFR BLD: 42.8 %
MCH RBC QN AUTO: 31.6 PG
MCHC RBC AUTO-ENTMCNC: 34.9 G/DL
MCV RBC AUTO: 91 FL
MONOCYTES # BLD AUTO: 0.3 K/UL
MONOCYTES NFR BLD: 7.7 %
NEUTROPHILS # BLD AUTO: 2 K/UL
NEUTROPHILS NFR BLD: 44.1 %
NONHDLC SERPL-MCNC: 123 MG/DL
PLATELET # BLD AUTO: 278 K/UL
PMV BLD AUTO: 10 FL
POTASSIUM SERPL-SCNC: 4.3 MMOL/L
PROT SERPL-MCNC: 6.6 G/DL
RBC # BLD AUTO: 4.46 M/UL
SODIUM SERPL-SCNC: 139 MMOL/L
TRIGL SERPL-MCNC: 70 MG/DL
TSH SERPL DL<=0.005 MIU/L-ACNC: 1.28 UIU/ML
WBC # BLD AUTO: 4.44 K/UL

## 2018-06-30 PROCEDURE — 85025 COMPLETE CBC W/AUTO DIFF WBC: CPT

## 2018-06-30 PROCEDURE — 36415 COLL VENOUS BLD VENIPUNCTURE: CPT

## 2018-06-30 PROCEDURE — 80061 LIPID PANEL: CPT

## 2018-06-30 PROCEDURE — 80053 COMPREHEN METABOLIC PANEL: CPT

## 2018-06-30 PROCEDURE — 84443 ASSAY THYROID STIM HORMONE: CPT

## 2018-07-05 RX ORDER — MOMETASONE FUROATE 50 UG/1
SPRAY, METERED NASAL
Qty: 17 G | Refills: 5 | Status: SHIPPED | OUTPATIENT
Start: 2018-07-05 | End: 2019-01-15 | Stop reason: SDUPTHER

## 2018-07-27 RX ORDER — CLONAZEPAM 0.5 MG/1
0.5 TABLET ORAL 2 TIMES DAILY
Qty: 30 TABLET | Refills: 0 | Status: SHIPPED | OUTPATIENT
Start: 2018-07-27 | End: 2018-08-27 | Stop reason: SDUPTHER

## 2018-08-06 ENCOUNTER — OFFICE VISIT (OUTPATIENT)
Dept: URGENT CARE | Facility: CLINIC | Age: 48
End: 2018-08-06
Payer: COMMERCIAL

## 2018-08-06 VITALS
HEART RATE: 69 BPM | DIASTOLIC BLOOD PRESSURE: 89 MMHG | WEIGHT: 145 LBS | RESPIRATION RATE: 16 BRPM | SYSTOLIC BLOOD PRESSURE: 137 MMHG | TEMPERATURE: 98 F | OXYGEN SATURATION: 97 % | BODY MASS INDEX: 26.68 KG/M2 | HEIGHT: 62 IN

## 2018-08-06 DIAGNOSIS — R42 VERTIGO: Primary | ICD-10-CM

## 2018-08-06 PROCEDURE — 3075F SYST BP GE 130 - 139MM HG: CPT | Mod: CPTII,S$GLB,, | Performed by: NURSE PRACTITIONER

## 2018-08-06 PROCEDURE — 3008F BODY MASS INDEX DOCD: CPT | Mod: CPTII,S$GLB,, | Performed by: NURSE PRACTITIONER

## 2018-08-06 PROCEDURE — 99214 OFFICE O/P EST MOD 30 MIN: CPT | Mod: 25,S$GLB,, | Performed by: NURSE PRACTITIONER

## 2018-08-06 PROCEDURE — 3079F DIAST BP 80-89 MM HG: CPT | Mod: CPTII,S$GLB,, | Performed by: NURSE PRACTITIONER

## 2018-08-06 PROCEDURE — 96372 THER/PROPH/DIAG INJ SC/IM: CPT | Mod: S$GLB,,, | Performed by: NURSE PRACTITIONER

## 2018-08-06 RX ORDER — MECLIZINE HYDROCHLORIDE 25 MG/1
25 TABLET ORAL 3 TIMES DAILY PRN
Qty: 30 TABLET | Refills: 0 | Status: SHIPPED | OUTPATIENT
Start: 2018-08-06 | End: 2021-02-01

## 2018-08-06 RX ORDER — ONDANSETRON 4 MG/1
4 TABLET, ORALLY DISINTEGRATING ORAL EVERY 6 HOURS PRN
Qty: 30 TABLET | Refills: 0 | Status: SHIPPED | OUTPATIENT
Start: 2018-08-06 | End: 2019-06-27

## 2018-08-06 RX ORDER — PROMETHAZINE HYDROCHLORIDE 25 MG/ML
25 INJECTION, SOLUTION INTRAMUSCULAR; INTRAVENOUS
Status: COMPLETED | OUTPATIENT
Start: 2018-08-06 | End: 2018-08-06

## 2018-08-06 RX ORDER — BETAMETHASONE SODIUM PHOSPHATE AND BETAMETHASONE ACETATE 3; 3 MG/ML; MG/ML
6 INJECTION, SUSPENSION INTRA-ARTICULAR; INTRALESIONAL; INTRAMUSCULAR; SOFT TISSUE
Status: COMPLETED | OUTPATIENT
Start: 2018-08-06 | End: 2018-08-06

## 2018-08-06 RX ORDER — MECLIZINE HYDROCHLORIDE 25 MG/1
25 TABLET ORAL
Status: COMPLETED | OUTPATIENT
Start: 2018-08-06 | End: 2018-08-06

## 2018-08-06 RX ORDER — METHYLPREDNISOLONE 4 MG/1
4 TABLET ORAL DAILY
Qty: 1 PACKAGE | Refills: 0 | Status: SHIPPED | OUTPATIENT
Start: 2018-08-06 | End: 2018-08-12

## 2018-08-06 RX ADMIN — BETAMETHASONE SODIUM PHOSPHATE AND BETAMETHASONE ACETATE 6 MG: 3; 3 INJECTION, SUSPENSION INTRA-ARTICULAR; INTRALESIONAL; INTRAMUSCULAR; SOFT TISSUE at 08:08

## 2018-08-06 RX ADMIN — MECLIZINE HYDROCHLORIDE 25 MG: 25 TABLET ORAL at 08:08

## 2018-08-06 RX ADMIN — PROMETHAZINE HYDROCHLORIDE 25 MG: 25 INJECTION, SOLUTION INTRAMUSCULAR; INTRAVENOUS at 08:08

## 2018-08-06 NOTE — PROGRESS NOTES
"Subjective:       Patient ID: Shawna Mayers is a 47 y.o. female.    Vitals:  height is 5' 2" (1.575 m) and weight is 65.8 kg (145 lb). Her oral temperature is 98.1 °F (36.7 °C). Her blood pressure is 137/89 and her pulse is 69. Her respiration is 16 and oxygen saturation is 97%.     Chief Complaint: Dizziness    Dizziness that started yesterday morning       Dizziness:   Chronicity:  Recurrent  Onset:  Yesterday  Progression since onset:  Unchanged  Frequency:  Hourly  Severity:  Mild  Duration:  Very brief  Dizziness characteristics:  Sensation of movement   Associated symptoms: nausea and vomiting.no fever, no headaches and no chest pain.  Aggravated by:  Lying down, getting up and position changes  Treatments tried:  Meclizine  Improvements on treatment:  No relief    Review of Systems   Constitution: Negative for chills and fever.   HENT: Negative for sore throat.    Eyes: Negative for blurred vision.   Cardiovascular: Negative for chest pain.   Respiratory: Negative for shortness of breath.    Skin: Negative for rash.   Musculoskeletal: Negative for back pain and joint pain.   Gastrointestinal: Positive for nausea and vomiting. Negative for abdominal pain and diarrhea.   Neurological: Positive for dizziness. Negative for headaches.   Psychiatric/Behavioral: The patient is not nervous/anxious.        Objective:      Physical Exam   Constitutional: She is oriented to person, place, and time. She appears well-developed and well-nourished. She is cooperative.  Non-toxic appearance. She does not appear ill. No distress.   HENT:   Head: Normocephalic and atraumatic.   Right Ear: Ear canal normal. No middle ear effusion (CLEAR). Decreased hearing is noted.   Left Ear: Ear canal normal. A middle ear effusion (CLEAR) is present. Decreased hearing is noted.   Nose: No mucosal edema, rhinorrhea or nasal deformity. No epistaxis. Right sinus exhibits no maxillary sinus tenderness and no frontal sinus tenderness. " Left sinus exhibits no maxillary sinus tenderness and no frontal sinus tenderness.   Mouth/Throat: Uvula is midline and mucous membranes are normal. No trismus in the jaw. Normal dentition. No uvula swelling. No posterior oropharyngeal erythema.   Eyes: Conjunctivae and lids are normal. Right eye exhibits no discharge. Left eye exhibits no discharge. No scleral icterus.   Sclera clear bilat   Neck: Trachea normal, normal range of motion, full passive range of motion without pain and phonation normal. Neck supple.   Cardiovascular: Normal rate, regular rhythm, normal heart sounds, intact distal pulses and normal pulses.    Pulmonary/Chest: Effort normal and breath sounds normal. No respiratory distress.   Abdominal: Soft. Normal appearance and bowel sounds are normal. She exhibits no distension, no pulsatile midline mass and no mass. There is no tenderness.   Musculoskeletal: Normal range of motion. She exhibits no edema or deformity.   Neurological: She is alert and oriented to person, place, and time. She exhibits normal muscle tone. Coordination normal.   CN II-XII grossly intact.   Gait smooth and steady.   Speech is clear.   Follows all commands.   Rapid, alternating hand movements intact.   Finger to nose intact.   Heel to shin intact.   Face is symmetrical   Hearing intact.   Strength 5/5 to all 4 extremities.   Moves all 4 extremities equally.   +horizontal nystagmus   Pupils are 3 mm and brisk     Skin: Skin is warm, dry and intact. She is not diaphoretic. No pallor.   Psychiatric: She has a normal mood and affect. Her speech is normal and behavior is normal. Judgment and thought content normal. Cognition and memory are normal.   Nursing note and vitals reviewed.      Assessment:       1. Vertigo        Plan:       Patient Instructions     STAY HYDRATED  Follow up with PCP or ENT in 5-7 days if not improved    Vertigo (Unknown Cause)    In addition to helping with hearing, the inner ear is part of the  balance center of your body. Problems with the inner ear can a false feeling of motion. This is called vertigo. Often, it feels as if you or the room is spinning. A vertigo attack may cause sudden nausea, vomiting and heavy sweating. Severe vertigo causes a loss of balance and can cause you to fall. During vertigo, small head movements and changes in body position will often make the symptoms worse. You may also have ringing in the ears called tinnitus.  An episode of vertigo may last seconds, minutes or hours. Once you are over the first episode, it may never come back. However, symptoms may return off and on.  The cause of your vertigo is not yet known. Possible causes of vertigo include:  · Inflammation of the inner ear  · Disease of the nerves to the inner ear  · Movement of calcium particles in the inner ear  · Poor blood flow to the balance centers of the brain  · Migraine headaches  Home care  · If symptoms are severe, rest quietly in bed. Change positions very slowly. There is usually one position that will feel best, such as lying on one side or lying on your back with your head slightly raised on pillows.  · Do not drive a car or work with dangerous machinery until symptoms have been gone for at least one week.  · Take medicine as prescribed to relieve your symptoms. Unless another medicine was prescribed for symptoms of nausea, vomiting, and dizziness, you may use over-the-counter motion sickness pills. Ask your pharmacist for suggestions.  Follow-up care  Follow up with your healthcare provider or as directed. If you are referred to a specialist or for testing, make the appointment promptly.  When to seek medical advice  Call your healthcare provider if any of the following occur:  · Fever of 100.4°F (38ºC) or higher, or as directed by your healthcare provider  · Vertigo worsens or is not controlled by prescribed medicine   · Repeated vomiting not relieved by prescribed medicine   · Severe  headache  · Confusion  · Weakness of an arm or leg or one side of the face  · Difficulty with speech or vision  · Loss of consciousness   · Seizure  Date Last Reviewed: 8/16/2015 © 2000-2017 [employer]. [address] [place] [id number], [place] [address]. All rights reserved. This information is not intended as a substitute for professional medical care. Always follow your healthcare professional's instructions.            Vertigo    Other orders  -     promethazine injection 25 mg; Inject 1 mL (25 mg total) into the muscle one time.  -     meclizine tablet 25 mg; Take 1 tablet (25 mg total) by mouth one time.  -     methylPREDNISolone (MEDROL DOSEPACK) 4 mg tablet; Take 1 tablet (4 mg total) by mouth once daily. use as directed for 6 days  Dispense: 1 Package; Refill: 0  -     ondansetron (ZOFRAN-ODT) 4 MG TbDL; Take 1 tablet (4 mg total) by mouth every 6 (six) hours as needed.  Dispense: 30 tablet; Refill: 0  -     meclizine (ANTIVERT) 25 mg tablet; Take 1 tablet (25 mg total) by mouth 3 (three) times daily as needed.  Dispense: 30 tablet; Refill: 0

## 2018-08-06 NOTE — PATIENT INSTRUCTIONS
STAY HYDRATED  Follow up with PCP or ENT in 5-7 days if not improved    Vertigo (Unknown Cause)    In addition to helping with hearing, the inner ear is part of the balance center of your body. Problems with the inner ear can a false feeling of motion. This is called vertigo. Often, it feels as if you or the room is spinning. A vertigo attack may cause sudden nausea, vomiting and heavy sweating. Severe vertigo causes a loss of balance and can cause you to fall. During vertigo, small head movements and changes in body position will often make the symptoms worse. You may also have ringing in the ears called tinnitus.  An episode of vertigo may last seconds, minutes or hours. Once you are over the first episode, it may never come back. However, symptoms may return off and on.  The cause of your vertigo is not yet known. Possible causes of vertigo include:  · Inflammation of the inner ear  · Disease of the nerves to the inner ear  · Movement of calcium particles in the inner ear  · Poor blood flow to the balance centers of the brain  · Migraine headaches  Home care  · If symptoms are severe, rest quietly in bed. Change positions very slowly. There is usually one position that will feel best, such as lying on one side or lying on your back with your head slightly raised on pillows.  · Do not drive a car or work with dangerous machinery until symptoms have been gone for at least one week.  · Take medicine as prescribed to relieve your symptoms. Unless another medicine was prescribed for symptoms of nausea, vomiting, and dizziness, you may use over-the-counter motion sickness pills. Ask your pharmacist for suggestions.  Follow-up care  Follow up with your healthcare provider or as directed. If you are referred to a specialist or for testing, make the appointment promptly.  When to seek medical advice  Call your healthcare provider if any of the following occur:  · Fever of 100.4°F (38ºC) or higher, or as directed by your  healthcare provider  · Vertigo worsens or is not controlled by prescribed medicine   · Repeated vomiting not relieved by prescribed medicine   · Severe headache  · Confusion  · Weakness of an arm or leg or one side of the face  · Difficulty with speech or vision  · Loss of consciousness   · Seizure  Date Last Reviewed: 8/16/2015  © 8504-9054 [employer]. [address] [place] [address], [place] [address]. All rights reserved. This information is not intended as a substitute for professional medical care. Always follow your healthcare professional's instructions.

## 2018-08-10 RX ORDER — IPRATROPIUM BROMIDE 17 UG/1
AEROSOL, METERED RESPIRATORY (INHALATION)
Qty: 12.9 INHALER | Refills: 1 | Status: SHIPPED | OUTPATIENT
Start: 2018-08-10 | End: 2019-10-12 | Stop reason: SDUPTHER

## 2018-08-27 RX ORDER — CLONAZEPAM 0.5 MG/1
TABLET ORAL
Qty: 30 TABLET | Refills: 0 | Status: SHIPPED | OUTPATIENT
Start: 2018-08-27 | End: 2018-09-27 | Stop reason: SDUPTHER

## 2018-09-27 RX ORDER — CLONAZEPAM 0.5 MG/1
TABLET ORAL
Qty: 30 TABLET | Refills: 0 | Status: SHIPPED | OUTPATIENT
Start: 2018-09-27 | End: 2018-10-29 | Stop reason: SDUPTHER

## 2018-10-29 RX ORDER — CLONAZEPAM 0.5 MG/1
0.5 TABLET ORAL 2 TIMES DAILY PRN
Qty: 30 TABLET | Refills: 0 | Status: SHIPPED | OUTPATIENT
Start: 2018-10-29 | End: 2018-10-31 | Stop reason: SDUPTHER

## 2018-10-30 ENCOUNTER — PATIENT MESSAGE (OUTPATIENT)
Dept: INTERNAL MEDICINE | Facility: CLINIC | Age: 48
End: 2018-10-30

## 2018-10-31 ENCOUNTER — PATIENT MESSAGE (OUTPATIENT)
Dept: PRIMARY CARE CLINIC | Facility: CLINIC | Age: 48
End: 2018-10-31

## 2018-10-31 RX ORDER — CLONAZEPAM 0.5 MG/1
0.5 TABLET ORAL 2 TIMES DAILY PRN
Qty: 30 TABLET | Refills: 0 | Status: SHIPPED | OUTPATIENT
Start: 2018-10-31 | End: 2018-11-29 | Stop reason: SDUPTHER

## 2018-11-19 ENCOUNTER — OFFICE VISIT (OUTPATIENT)
Dept: INTERNAL MEDICINE | Facility: CLINIC | Age: 48
End: 2018-11-19
Payer: COMMERCIAL

## 2018-11-19 ENCOUNTER — LAB VISIT (OUTPATIENT)
Dept: LAB | Facility: OTHER | Age: 48
End: 2018-11-19
Attending: INTERNAL MEDICINE
Payer: COMMERCIAL

## 2018-11-19 VITALS
HEART RATE: 74 BPM | BODY MASS INDEX: 27.83 KG/M2 | HEIGHT: 62 IN | DIASTOLIC BLOOD PRESSURE: 68 MMHG | SYSTOLIC BLOOD PRESSURE: 130 MMHG | WEIGHT: 151.25 LBS | OXYGEN SATURATION: 97 %

## 2018-11-19 DIAGNOSIS — M25.521 RIGHT ELBOW PAIN: ICD-10-CM

## 2018-11-19 DIAGNOSIS — E03.9 ACQUIRED HYPOTHYROIDISM: Primary | Chronic | ICD-10-CM

## 2018-11-19 DIAGNOSIS — E03.9 ACQUIRED HYPOTHYROIDISM: Chronic | ICD-10-CM

## 2018-11-19 DIAGNOSIS — M25.521 RIGHT ELBOW PAIN: Primary | ICD-10-CM

## 2018-11-19 LAB — TSH SERPL DL<=0.005 MIU/L-ACNC: 1.56 UIU/ML

## 2018-11-19 PROCEDURE — 3008F BODY MASS INDEX DOCD: CPT | Mod: CPTII,S$GLB,, | Performed by: INTERNAL MEDICINE

## 2018-11-19 PROCEDURE — 3078F DIAST BP <80 MM HG: CPT | Mod: CPTII,S$GLB,, | Performed by: INTERNAL MEDICINE

## 2018-11-19 PROCEDURE — 36415 COLL VENOUS BLD VENIPUNCTURE: CPT

## 2018-11-19 PROCEDURE — 84443 ASSAY THYROID STIM HORMONE: CPT

## 2018-11-19 PROCEDURE — 3075F SYST BP GE 130 - 139MM HG: CPT | Mod: CPTII,S$GLB,, | Performed by: INTERNAL MEDICINE

## 2018-11-19 PROCEDURE — 99999 PR PBB SHADOW E&M-EST. PATIENT-LVL III: CPT | Mod: PBBFAC,,, | Performed by: INTERNAL MEDICINE

## 2018-11-19 PROCEDURE — 99213 OFFICE O/P EST LOW 20 MIN: CPT | Mod: S$GLB,,, | Performed by: INTERNAL MEDICINE

## 2018-11-19 NOTE — PROGRESS NOTES
Subjective:       Patient ID: Shawna Mayers is a 48 y.o. female.    Chief Complaint: Elbow Injury    Pt c/o R elbow pain that started 2 mos ago when lifting up on a weight when changing weight on barbell. Motrin and ice have helped but motrin hurts stomach so doesn't take it much. She did acupuncture which helped some but pain does still linger. No swelling. Lifting weight and doing bicep curls or upright rows make it hurt.     She also reports some fatigue and wt gain and was concerned her thyroid may be abnormal. She does admit to some perimenopausal symptoms including irregular menses but has not yet d/w her GYN. She o/w feels well.               Review of Systems   Constitutional: Positive for unexpected weight change. Negative for activity change and fever.   HENT: Negative for hearing loss, rhinorrhea and trouble swallowing.    Eyes: Negative for discharge and visual disturbance.   Respiratory: Negative for chest tightness, shortness of breath and wheezing.    Cardiovascular: Positive for palpitations. Negative for chest pain.        Only occurs suddenly upon awakening in AM. Does not occur during exercise or throughout day. No associated sob/cp   Gastrointestinal: Positive for constipation. Negative for blood in stool, diarrhea and vomiting.   Endocrine: Negative for polydipsia and polyuria.   Genitourinary: Positive for menstrual problem. Negative for difficulty urinating, dysuria and hematuria.   Musculoskeletal: Positive for arthralgias and joint swelling. Negative for neck pain.   Neurological: Negative for weakness and headaches.   Psychiatric/Behavioral: Negative for confusion and dysphoric mood.       Objective:      Physical Exam   Constitutional: She is oriented to person, place, and time. She appears well-developed and well-nourished.   Neck: Neck supple. No thyromegaly present.   Cardiovascular: Normal rate, regular rhythm and normal heart sounds.   Pulmonary/Chest: Effort normal and breath  sounds normal.   Musculoskeletal: She exhibits no edema.        Right elbow: She exhibits normal range of motion and no swelling. Tenderness found. Medial epicondyle and lateral epicondyle tenderness noted.        Left elbow: Normal.   Lymphadenopathy:     She has no cervical adenopathy.   Neurological: She is alert and oriented to person, place, and time.   Psychiatric: She has a normal mood and affect. Her behavior is normal. Judgment and thought content normal.       Assessment:       1. Acquired hypothyroidism    2. Right elbow pain        Plan:       1. Check TSH but expect it to be normal as it was normal 4 mos ago; she will f/u with GYN to consider perimenopausal symptoms  2. Sports med referral  3. Call/rtc if GYN does not feel they can identify menopause as etiology of fatigue

## 2018-11-26 ENCOUNTER — APPOINTMENT (OUTPATIENT)
Dept: RADIOLOGY | Facility: OTHER | Age: 48
End: 2018-11-26
Attending: NEUROMUSCULOSKELETAL MEDICINE & OMM
Payer: COMMERCIAL

## 2018-11-26 ENCOUNTER — OFFICE VISIT (OUTPATIENT)
Dept: ORTHOPEDICS | Facility: CLINIC | Age: 48
End: 2018-11-26
Payer: COMMERCIAL

## 2018-11-26 VITALS
DIASTOLIC BLOOD PRESSURE: 90 MMHG | HEIGHT: 62 IN | SYSTOLIC BLOOD PRESSURE: 130 MMHG | WEIGHT: 153 LBS | BODY MASS INDEX: 28.16 KG/M2

## 2018-11-26 DIAGNOSIS — M25.521 RIGHT ELBOW PAIN: ICD-10-CM

## 2018-11-26 DIAGNOSIS — M99.07 UPPER EXTREMITY SOMATIC DYSFUNCTION: ICD-10-CM

## 2018-11-26 DIAGNOSIS — M77.11 RIGHT LATERAL EPICONDYLITIS: Primary | ICD-10-CM

## 2018-11-26 PROCEDURE — 3075F SYST BP GE 130 - 139MM HG: CPT | Mod: CPTII,S$GLB,, | Performed by: NEUROMUSCULOSKELETAL MEDICINE & OMM

## 2018-11-26 PROCEDURE — 3080F DIAST BP >= 90 MM HG: CPT | Mod: CPTII,S$GLB,, | Performed by: NEUROMUSCULOSKELETAL MEDICINE & OMM

## 2018-11-26 PROCEDURE — 99204 OFFICE O/P NEW MOD 45 MIN: CPT | Mod: 25,S$GLB,, | Performed by: NEUROMUSCULOSKELETAL MEDICINE & OMM

## 2018-11-26 PROCEDURE — 99999 PR PBB SHADOW E&M-EST. PATIENT-LVL III: CPT | Mod: PBBFAC,,, | Performed by: NEUROMUSCULOSKELETAL MEDICINE & OMM

## 2018-11-26 PROCEDURE — 98925 OSTEOPATH MANJ 1-2 REGIONS: CPT | Mod: S$GLB,,, | Performed by: NEUROMUSCULOSKELETAL MEDICINE & OMM

## 2018-11-26 PROCEDURE — 73080 X-RAY EXAM OF ELBOW: CPT | Mod: 26,RT,, | Performed by: RADIOLOGY

## 2018-11-26 PROCEDURE — 3008F BODY MASS INDEX DOCD: CPT | Mod: CPTII,S$GLB,, | Performed by: NEUROMUSCULOSKELETAL MEDICINE & OMM

## 2018-11-26 PROCEDURE — 73080 X-RAY EXAM OF ELBOW: CPT | Mod: TC,RT

## 2018-11-26 RX ORDER — DICLOFENAC SODIUM 10 MG/G
2 GEL TOPICAL 4 TIMES DAILY
Qty: 1 TUBE | Refills: 0 | Status: SHIPPED | OUTPATIENT
Start: 2018-11-26 | End: 2020-08-20

## 2018-11-26 NOTE — PROGRESS NOTES
Subjective:     Shawna Woodson [facility]     Chief Complaint   Patient presents with    Right Elbow - Pain       HPI    Shawna is a 48 y.o. female coming in today for right elbow pain that began 2 month(s) ago, referred by Dr. Braun. Pt. Is ambidextrous, witting with her right hand but doing most other activities with her left.  She was participating in a Body Pump class in September 2018 and the weight was too heavy When she grabbed the bar and attempted to pick it up, she felt a pull and pain in her elbow. She had had some mild elbow pain prior to this. Pt. describes the pain as a 5/10 sharp pain that does not radiate. There was not a fall/injury/ or trauma associated with the onset of symptoms. She has been doing acupuncture for the past 2-3 weeks with good improvement. The pain is better with acupuncture, ibuprofen (pt notes she can't take this all the time because it hurts her stomach) and worse with gripping activities (spray bottle), body pump, strength training. Pt. Denies any other musculoskeletal complaints at this time.     Joint instability? no  Mechanical locking/clicking? no  Affecting ADL's? Yes- pain with gripping activities ie spraying a spray bottle   Affecting sleep? Yes- pain when she lays on it, sometimes 4th and 5th fingers go numb at night (this has been going on for years)    Occupation: ,  spin class instructor    Review of Systems   Constitutional: Negative for chills and fever.   HENT: Negative for hearing loss and tinnitus.    Eyes: Negative for blurred vision and photophobia.   Respiratory: Negative for cough and shortness of breath.    Cardiovascular: Negative for chest pain and leg swelling.   Gastrointestinal: Negative for abdominal pain, heartburn, nausea and vomiting.   Genitourinary: Negative for dysuria and hematuria.   Musculoskeletal: Positive for joint pain (right elbow). Negative for back pain, falls, myalgias and neck pain.   Skin: Negative for rash.    Neurological: Negative for dizziness, tingling, focal weakness, weakness and headaches.   Endo/Heme/Allergies: Negative for environmental allergies. Does not bruise/bleed easily.   Psychiatric/Behavioral: Negative for depression. The patient is not nervous/anxious.        PAST MEDICAL HISTORY:   Past Medical History:   Diagnosis Date    Allergy     Anxiety     Asthma     Focal nodular hyperplasia of liver     GERD (gastroesophageal reflux disease)     Hyperlipidemia     Thyroid disease     hypothyroidism     PAST SURGICAL HISTORY:   Past Surgical History:   Procedure Laterality Date    CHOLECYSTECTOMY      FOOT SURGERY      arc    WISDOM TOOTH EXTRACTION       FAMILY HISTORY:   Family History   Problem Relation Age of Onset    Arthritis Mother     Asthma Mother     Diabetes Mother     Hearing loss Mother     Hyperlipidemia Mother     Vision loss Mother     Early death Father     Heart disease Father          of MI at 41yo    Hyperlipidemia Father     Kidney disease Father     Breast cancer Neg Hx     Colon cancer Neg Hx     Ovarian cancer Neg Hx     Melanoma Neg Hx      SOCIAL HISTORY:   Social History     Socioeconomic History    Marital status:      Spouse name: Not on file    Number of children: Not on file    Years of education: Not on file    Highest education level: Not on file   Social Needs    Financial resource strain: Not on file    Food insecurity - worry: Not on file    Food insecurity - inability: Not on file    Transportation needs - medical: Not on file    Transportation needs - non-medical: Not on file   Occupational History     Employer: [employer]    Tobacco Use    Smoking status: Never Smoker   Substance and Sexual Activity    Alcohol use: Yes     Comment: socially    Drug use: No    Sexual activity: Yes     Partners: Male     Birth control/protection: None   Other Topics Concern    Are you pregnant or think you may be? No     "Breast-feeding Not Asked   Social History Narrative    Not on file       MEDICATIONS:   Current Outpatient Medications:     ATROVENT HFA 17 mcg/actuation inhaler, INHALE 2 PUFFS INTO THE LUNGS EVERY 6 (SIX) HOURS., Disp: 12.9 Inhaler, Rfl: 1    clonazePAM (KLONOPIN) 0.5 MG tablet, Take 1 tablet (0.5 mg total) by mouth 2 (two) times daily as needed for Anxiety (sleep)., Disp: 30 tablet, Rfl: 0    KERYDIN 5 % Nisa, APPLY TO THE AFFECTED NAILS NIGHTLY, Disp: , Rfl: 6    levothyroxine (SYNTHROID) 50 MCG tablet, Take 1 tablet (50 mcg total) by mouth every morning., Disp: 90 tablet, Rfl: 3    meclizine (ANTIVERT) 25 mg tablet, Take 1 tablet (25 mg total) by mouth 3 (three) times daily as needed., Disp: 30 tablet, Rfl: 0    mometasone (NASONEX) 50 mcg/actuation nasal spray, INSTILL 2 SPRAYS INTO EACH NOSTRIL ONCE DAILY, Disp: 17 g, Rfl: 5    ondansetron (ZOFRAN-ODT) 4 MG TbDL, Take 1 tablet (4 mg total) by mouth every 6 (six) hours as needed., Disp: 30 tablet, Rfl: 0    diclofenac sodium (VOLTAREN) 1 % Gel, Apply 2 g topically 4 (four) times daily. Apply up to 4 times a day to affected area for 10 days, Disp: 1 Tube, Rfl: 0    meclizine (ANTIVERT) 25 mg tablet, Take 1 tablet (25 mg total) by mouth 3 (three) times daily as needed., Disp: 30 tablet, Rfl: 0  ALLERGIES:   Review of patient's allergies indicates:   Allergen Reactions    Anucort-hc [hydrocortisone acetate] Hives and Nausea And Vomiting    Demerol [meperidine] Nausea And Vomiting    Sulfa (sulfonamide antibiotics) Hives    Amoxicillin Nausea And Vomiting and Rash     Reviewed Dr. Braun office visit on 11/19: sport med referral for right elbow pain- both medial and lateral epicondyle tenderness noted at that time on PE    Objective:     VITAL SIGNS: BP (!) 130/90   Ht 5' 2" (1.575 m)   Wt 69.4 kg (152 lb 16 oz)   BMI 27.98 kg/m²    General    Nursing note and vitals reviewed.  Constitutional: She is oriented to person, place, and time. She appears " well-developed and well-nourished.   HENT:   Head: Normocephalic and atraumatic.   no nasal discharge, no external ear redness or discharge   Eyes:   EOM is full and smooth  No eye redness or discharge   Neck: Neck supple. No tracheal deviation present.   Cardiovascular: Normal rate.    2+ Radial pulse bilaterally  2+ Dorsalis Pedis pulse bilaterally  No LE edema appreciated   Pulmonary/Chest: Effort normal. No respiratory distress.   Abdominal: She exhibits no distension.   No rigidity   Neurological: She is alert and oriented to person, place, and time. She exhibits normal muscle tone. Coordination normal.   See details below   Psychiatric: She has a normal mood and affect. Her behavior is normal.               MUSCULOSKELETAL EXAM  Elbow: right ELBOW  The affected elbow is compared to the contralateral elbow.    Observation:    There is no edema, erythema, or ecchymosis.  There is no obvious muscle atrophy, hypertonicity, or hypotonicity of arm muscles.  There is no abnormal carrying angle or gunstock deformity noted.    ROM (* = with pain):  Active flexion to 150° on left and 150° on right.    Active extension to 0° on left and 0° on right. Without hyperextension bilaterally.   Active pronation to 80° on left and 75° on right.  Active supination to 80° on left and 80° on right.    Active radial deviation to 20° on left and 20° on right.   Active ulnar deviation to 30° on left and 30° on right.    Tenderness To Palpation:  No tenderness at the medial epicondyle. + tenderness at lateral epicondyle.  No tenderness at the olecranon.  No tenderness at the distal humerus or proximal radius/ulna.  No tenderness at the radial head.  No tenderness over the ulnar and radial collateral ligaments.  No tenderness over the posterior interosseous nerve or distal biceps tendon.    Strength Testing (* = with pain):  Deltoid - 5/5 on left and 5/5 on right  Biceps - 5/5 on left and 5/5 on right  Triceps - 5/5 on left and 5/5 on  right  Wrist extension - 5/5 on left and 5/5 on right  Wrist flexion - 5/5 on left and 5/5 on right   - 5/5 on left and 5/5 on right  Finger extension - 5/5 on left and 5/5 on right  Finger abduction - 5/5 on left and 5/5 on right    Special Tests:  No laxity of the MCL at 0 and 30 degrees.    Milking maneuver - negative  No laxity of the LCL at 0 and 30 degrees.  No laxity with posterolateral and posteromedial rotary testing.    3rd digit extension resistance test - negative  Resisted supination - negative  Resisted pronation - negative for forearm pain or parathesias, but reproduced lateral elbow pain  Resisted wrist extension - negative  Resisted wrist flexion - negative    Neurovascular Exam:  2+ radial pulses bilaterally.  Sensation to light touch intact in the forearm and hand.  Negative Tinnels at carpal tunnel.  Negative Tinnels at cubital tunnel.  2+/4 reflexes at triceps, biceps, and brachioradialis.    Posture:  Upright    TART (Tissue texture abnormality, Asymmetry,  Restriction of motion and/or Tenderness) changes:    Upper extremity:    Region Finding/resrtiction   SC joint Neutral   AC joint Neutral   GH joint Neutral   Radial head Anterior on right   Ulna ADDucted on right   Distal Radiocapral Right   DRUJ Neutral   TFCC Neutral   Proximal carpal row Neutral   Distal carpal row Neutral   Right lateral epicondyle Trigger band (TB) fascial distortion   and continual distortion (CD) fascial distortion            Key   F= Flexed   E = Extended   R = Rotated   S = Sidebent   TTA = tissue texture abnormality     IMAGIN. X-ray ordered due to right elbow pain. (AP, lateral, and radial capitellar views) taken today.   2. X-ray images were reviewed personally by me and then directly with patient.  3. FINDINGS: X-ray images obtained demonstrate alignment  within normal limits.  No fracture.  No marrow replacement process.  No displacement of the elbow fat pads.  4. IMPRESSION: No pathology or  irregularities appreciated.       Assessment:      Encounter Diagnoses   Name Primary?    Right lateral epicondylitis Yes    Right elbow pain     Upper extremity somatic dysfunction           Plan:     1. Right elbow pain secondary to lateral epicondylitis.    - OMT performed today to address elbow biomechanic restrictions.   - Pt. Given right forearm strap to wear during the day for all activities for the next 3-4 weeks  - Rx for Voltaren 1% gel to apply to lateral elbow up to 4 times a day prn for pain control. Also recommend Ice up to 20 minutes at a time  - Avoid right arm lifting in work-out class until pain resolves  - Pt. Given HEP of eccentric strengthening exercises. Hand out also given. The patient demonstrated understanding of the exercises and proper technique of their execution.  -  X-ray images of right elbow taken today (AP, lateral, and radial capitellar views) showed no abnormalities. Images were personally reviewed with patient.    2. OMT 1-2 regions. Oral consent obtained.  Reviewed benefits and potential side effects, including bruising at site of treatment and increased soreness for the next 24-48 hours. Pt. Instructed to increased water intake by 1 L today and tomorrow to help with any residual soreness.   - OMT indicated today due to signs and symptoms as well as local and remote somatic dysfunction findings and their related neurokinetic, lymphatic, fascial and/or arteriovenous body connections.   - OMT techniques used: Muscle Energy and Fascial Distortion Model   - Treatment was tolerated well. Improvement noted in segmental mobility post-treatment in dysfunctional regions. There were no adverse events and no complications immediately following treatment.     3. Follow-up in 4 weeks for reevaluation if pain persistent or deteriorates    4. Patient agreeable to today's plan and all questions were answered

## 2018-11-26 NOTE — LETTER
November 27, 2018      Baldev Braun MD  [address]  [place] [address]           [facility]  [address] [place] [address]  Phone: [phone]  Fax: [phone]          Patient: Shawna Mayers   MR Number: [id number]   YOB: 1970   Date of Visit: 11/26/2018       Dear Dr. Baldev Braun:    Thank you for referring Shawna Mayers to me for evaluation. Attached you will find relevant portions of my assessment and plan of care.    If you have questions, please do not hesitate to call me. I look forward to following Shawna Mayers along with you.    Sincerely,    Marivel Maharaj,     Enclosure  CC:  No Recipients    If you would like to receive this communication electronically, please contact [email or web address] or [phone] to request more information on [employer] Link access.    For providers and/or their staff who would like to refer a patient to [facility], please contact us through our one-stop-shop provider referral line, [facility], at [phone].    If you feel you have received this communication in error or would no longer like to receive these types of communications, please e-mail [email or web address]

## 2018-11-29 RX ORDER — CLONAZEPAM 0.5 MG/1
0.5 TABLET ORAL 2 TIMES DAILY PRN
Qty: 30 TABLET | Refills: 0 | Status: SHIPPED | OUTPATIENT
Start: 2018-11-29 | End: 2019-01-06 | Stop reason: SDUPTHER

## 2019-01-07 RX ORDER — CLONAZEPAM 0.5 MG/1
TABLET ORAL
Qty: 30 TABLET | Refills: 0 | Status: SHIPPED | OUTPATIENT
Start: 2019-01-07 | End: 2019-02-07 | Stop reason: SDUPTHER

## 2019-01-15 RX ORDER — MOMETASONE FUROATE 50 UG/1
SPRAY, METERED NASAL
Qty: 17 G | Refills: 5 | Status: SHIPPED | OUTPATIENT
Start: 2019-01-15 | End: 2019-08-14 | Stop reason: SDUPTHER

## 2019-02-08 RX ORDER — CLONAZEPAM 0.5 MG/1
TABLET ORAL
Qty: 30 TABLET | Refills: 0 | Status: SHIPPED | OUTPATIENT
Start: 2019-02-08 | End: 2019-03-07 | Stop reason: SDUPTHER

## 2019-03-08 RX ORDER — CLONAZEPAM 0.5 MG/1
TABLET ORAL
Qty: 30 TABLET | Refills: 0 | Status: SHIPPED | OUTPATIENT
Start: 2019-03-08 | End: 2019-04-09 | Stop reason: SDUPTHER

## 2019-04-10 RX ORDER — CLONAZEPAM 0.5 MG/1
TABLET ORAL
Qty: 30 TABLET | Refills: 0 | Status: SHIPPED | OUTPATIENT
Start: 2019-04-10 | End: 2019-05-13 | Stop reason: SDUPTHER

## 2019-05-13 RX ORDER — CLONAZEPAM 0.5 MG/1
TABLET ORAL
Qty: 30 TABLET | Refills: 0 | Status: SHIPPED | OUTPATIENT
Start: 2019-05-13 | End: 2019-06-13 | Stop reason: SDUPTHER

## 2019-06-05 DIAGNOSIS — Z12.31 ENCOUNTER FOR SCREENING MAMMOGRAM FOR MALIGNANT NEOPLASM OF BREAST: Primary | ICD-10-CM

## 2019-06-10 RX ORDER — LEVOTHYROXINE SODIUM 50 UG/1
50 TABLET ORAL EVERY MORNING
Qty: 90 TABLET | Refills: 3 | Status: SHIPPED | OUTPATIENT
Start: 2019-06-10 | End: 2020-06-03

## 2019-06-13 ENCOUNTER — OFFICE VISIT (OUTPATIENT)
Dept: OBSTETRICS AND GYNECOLOGY | Facility: CLINIC | Age: 49
End: 2019-06-13
Payer: COMMERCIAL

## 2019-06-13 VITALS
WEIGHT: 148.13 LBS | BODY MASS INDEX: 27.26 KG/M2 | HEIGHT: 62 IN | SYSTOLIC BLOOD PRESSURE: 110 MMHG | DIASTOLIC BLOOD PRESSURE: 74 MMHG

## 2019-06-13 DIAGNOSIS — Z12.39 SCREENING FOR BREAST CANCER: ICD-10-CM

## 2019-06-13 DIAGNOSIS — Z01.419 WELL WOMAN EXAM WITH ROUTINE GYNECOLOGICAL EXAM: Primary | ICD-10-CM

## 2019-06-13 PROCEDURE — 87624 HPV HI-RISK TYP POOLED RSLT: CPT

## 2019-06-13 PROCEDURE — 99396 PREV VISIT EST AGE 40-64: CPT | Mod: S$GLB,,, | Performed by: OBSTETRICS & GYNECOLOGY

## 2019-06-13 PROCEDURE — 3078F PR MOST RECENT DIASTOLIC BLOOD PRESSURE < 80 MM HG: ICD-10-PCS | Mod: CPTII,S$GLB,, | Performed by: OBSTETRICS & GYNECOLOGY

## 2019-06-13 PROCEDURE — 3078F DIAST BP <80 MM HG: CPT | Mod: CPTII,S$GLB,, | Performed by: OBSTETRICS & GYNECOLOGY

## 2019-06-13 PROCEDURE — 3074F SYST BP LT 130 MM HG: CPT | Mod: CPTII,S$GLB,, | Performed by: OBSTETRICS & GYNECOLOGY

## 2019-06-13 PROCEDURE — 99396 PR PREVENTIVE VISIT,EST,40-64: ICD-10-PCS | Mod: S$GLB,,, | Performed by: OBSTETRICS & GYNECOLOGY

## 2019-06-13 PROCEDURE — 3074F PR MOST RECENT SYSTOLIC BLOOD PRESSURE < 130 MM HG: ICD-10-PCS | Mod: CPTII,S$GLB,, | Performed by: OBSTETRICS & GYNECOLOGY

## 2019-06-13 PROCEDURE — 88175 CYTOPATH C/V AUTO FLUID REDO: CPT

## 2019-06-13 RX ORDER — CLONAZEPAM 0.5 MG/1
TABLET ORAL
Qty: 30 TABLET | Refills: 1 | Status: SHIPPED | OUTPATIENT
Start: 2019-06-13 | End: 2019-08-14

## 2019-06-13 NOTE — PROGRESS NOTES
History & Physical  Gynecology      SUBJECTIVE:     Chief Complaint: Well Woman       History of Present Illness:  Annual Exam-premenopausal  Patient presents for annual exam. The patient has no complaints today. Is still having monthly, regular cycles.  The patient is sexually active.  The patient participates in regular exercise: yes.  She does not smoke.     GYN screening history: last pap: approximate date  and was normal  Mammogram history: ordered, scheduled  Colonoscopy history: n/a  Dexa history: n/a    FH:   Breast cancer: neg  Colon cancer: neg  Ovarian cancer: neg    Review of patient's allergies indicates:   Allergen Reactions    Anucort-hc [hydrocortisone acetate] Hives and Nausea And Vomiting    Demerol [meperidine] Nausea And Vomiting    Sulfa (sulfonamide antibiotics) Hives    Amoxicillin Nausea And Vomiting and Rash       Past Medical History:   Diagnosis Date    Allergy     Anxiety     Asthma     Focal nodular hyperplasia of liver     GERD (gastroesophageal reflux disease)     Hyperlipidemia     Thyroid disease     hypothyroidism     Past Surgical History:   Procedure Laterality Date    CHOLECYSTECTOMY      FOOT SURGERY      arc    WISDOM TOOTH EXTRACTION       OB History        0    Para        Term   0            AB        Living           SAB        TAB        Ectopic        Multiple        Live Births                   Family History   Problem Relation Age of Onset    Arthritis Mother     Asthma Mother     Diabetes Mother     Hearing loss Mother     Hyperlipidemia Mother     Vision loss Mother     Early death Father     Heart disease Father          of MI at 41yo    Hyperlipidemia Father     Kidney disease Father     Breast cancer Neg Hx     Colon cancer Neg Hx     Ovarian cancer Neg Hx     Melanoma Neg Hx      Social History     Tobacco Use    Smoking status: Never Smoker   Substance Use Topics    Alcohol use: Yes     Comment: socially     Drug use: No       Current Outpatient Medications   Medication Sig    ATROVENT HFA 17 mcg/actuation inhaler INHALE 2 PUFFS INTO THE LUNGS EVERY 6 (SIX) HOURS.    clonazePAM (KLONOPIN) 0.5 MG tablet TAKE 1 TABLET (0.5 MG TOTAL) BY MOUTH 2 (TWO) TIMES DAILY AS NEEDED FOR ANXIETY    levothyroxine (SYNTHROID) 50 MCG tablet TAKE 1 TABLET (50 MCG TOTAL) BY MOUTH EVERY MORNING.    meclizine (ANTIVERT) 25 mg tablet Take 1 tablet (25 mg total) by mouth 3 (three) times daily as needed.    meclizine (ANTIVERT) 25 mg tablet Take 1 tablet (25 mg total) by mouth 3 (three) times daily as needed.    mometasone (NASONEX) 50 mcg/actuation nasal spray INSTILL 2 SPRAYS INTO EACH NOSTRIL ONCE DAILY    diclofenac sodium (VOLTAREN) 1 % Gel Apply 2 g topically 4 (four) times daily. Apply up to 4 times a day to affected area for 10 days    KERYDIN 5 % Nisa APPLY TO THE AFFECTED NAILS NIGHTLY    ondansetron (ZOFRAN-ODT) 4 MG TbDL Take 1 tablet (4 mg total) by mouth every 6 (six) hours as needed.     No current facility-administered medications for this visit.        Review of Systems:  Review of Systems   Constitutional: Negative for activity change, appetite change and fever.   Respiratory: Negative for shortness of breath.    Cardiovascular: Negative for chest pain.   Gastrointestinal: Negative for abdominal pain, constipation, diarrhea, nausea and vomiting.   Genitourinary: Negative for menorrhagia, menstrual problem, pelvic pain, vaginal bleeding, vaginal discharge and vaginal pain.   Integumentary:  Negative for nipple discharge.   Neurological: Negative for numbness and headaches.   Breast: Negative for mastodynia and nipple discharge       OBJECTIVE:     Physical Exam:  Physical Exam   Constitutional: She is oriented to person, place, and time. She appears well-developed and well-nourished.   Neck: Normal range of motion. Neck supple. No tracheal deviation present. No thyromegaly present.   Cardiovascular: Normal rate,  regular rhythm and normal heart sounds.   Pulmonary/Chest: Effort normal and breath sounds normal. Right breast exhibits no inverted nipple, no mass, no nipple discharge, no skin change and no tenderness. Left breast exhibits no inverted nipple, no mass, no nipple discharge, no skin change and no tenderness. Breasts are symmetrical.   Abdominal: Soft.   Genitourinary: Vagina normal and uterus normal. No labial fusion. There is no rash, tenderness, lesion or injury on the right labia. There is no rash, tenderness, lesion or injury on the left labia. Uterus is not deviated, not enlarged, not fixed and not tender. Cervix exhibits no motion tenderness, no discharge and no friability. Right adnexum displays no mass, no tenderness and no fullness. Left adnexum displays no mass, no tenderness and no fullness. No erythema, tenderness or bleeding in the vagina. No foreign body in the vagina. No signs of injury around the vagina. No vaginal discharge found.   Genitourinary Comments: Urethra: normal appearing urethra with no masses, tenderness or lesions  Urethral meatus: normal size, anterior vaginal wall with no prolapse, no lesions   Neurological: She is alert and oriented to person, place, and time.   Psychiatric: She has a normal mood and affect. Her behavior is normal. Judgment and thought content normal.   Nursing note and vitals reviewed.      Chaperoned by: Aneesh    ASSESSMENT:       ICD-10-CM ICD-9-CM    1. Well woman exam with routine gynecological exam Z01.419 V72.31 Liquid-based pap smear, screening      HPV High Risk Genotypes, PCR   2. Screening for breast cancer Z12.31 V76.10 Mammo Digital Screening Bilat w/ Joe          Plan:      Shawna was seen today for well woman.    Diagnoses and all orders for this visit:    Well woman exam with routine gynecological exam  -     Liquid-based pap smear, screening  -     HPV High Risk Genotypes, PCR    Screening for breast cancer  -     Mammo Digital Screening Bilat w/  Joe; Future        Orders Placed This Encounter   Procedures    HPV High Risk Genotypes, PCR    Mammo Digital Screening Bilat w/ Joe       Well Woman:   - Pap smear: and hpv today  - Mammogram: ordered/schedule  - Colonoscopy: due next year  - Dexa: due age 65  - Immunizations: n/a  - Labs: none required  - Exercise counseling provided      Follow up in one year for annual, or prn.    Nicky Nguyen

## 2019-06-14 ENCOUNTER — HOSPITAL ENCOUNTER (OUTPATIENT)
Dept: RADIOLOGY | Facility: HOSPITAL | Age: 49
Discharge: HOME | End: 2019-06-14
Attending: OBSTETRICS & GYNECOLOGY
Payer: COMMERCIAL

## 2019-06-14 DIAGNOSIS — Z12.39 SCREENING FOR BREAST CANCER: ICD-10-CM

## 2019-06-14 PROCEDURE — 77067 SCR MAMMO BI INCL CAD: CPT | Mod: 26,,, | Performed by: RADIOLOGY

## 2019-06-14 PROCEDURE — 77067 SCR MAMMO BI INCL CAD: CPT | Mod: TC

## 2019-06-14 PROCEDURE — 77067 MAMMO DIGITAL SCREENING BILAT WITH TOMOSYNTHESIS_CAD: ICD-10-PCS | Mod: 26,,, | Performed by: RADIOLOGY

## 2019-06-14 PROCEDURE — 77063 BREAST TOMOSYNTHESIS BI: CPT | Mod: 26,,, | Performed by: RADIOLOGY

## 2019-06-14 PROCEDURE — 77063 MAMMO DIGITAL SCREENING BILAT WITH TOMOSYNTHESIS_CAD: ICD-10-PCS | Mod: 26,,, | Performed by: RADIOLOGY

## 2019-06-19 LAB
HPV HR 12 DNA CVX QL NAA+PROBE: NEGATIVE
HPV16 AG SPEC QL: NEGATIVE
HPV18 DNA SPEC QL NAA+PROBE: NEGATIVE

## 2019-06-27 ENCOUNTER — OFFICE VISIT (OUTPATIENT)
Dept: INTERNAL MEDICINE | Facility: CLINIC | Age: 49
End: 2019-06-27
Payer: COMMERCIAL

## 2019-06-27 ENCOUNTER — HOSPITAL ENCOUNTER (OUTPATIENT)
Dept: RADIOLOGY | Facility: OTHER | Age: 49
Discharge: HOME | End: 2019-06-27
Attending: INTERNAL MEDICINE
Payer: COMMERCIAL

## 2019-06-27 VITALS
SYSTOLIC BLOOD PRESSURE: 100 MMHG | DIASTOLIC BLOOD PRESSURE: 78 MMHG | WEIGHT: 149.06 LBS | HEIGHT: 62 IN | BODY MASS INDEX: 27.43 KG/M2 | HEART RATE: 56 BPM | OXYGEN SATURATION: 98 %

## 2019-06-27 DIAGNOSIS — Z00.00 WELLNESS EXAMINATION: Primary | ICD-10-CM

## 2019-06-27 DIAGNOSIS — E03.9 ACQUIRED HYPOTHYROIDISM: Chronic | ICD-10-CM

## 2019-06-27 DIAGNOSIS — R10.9 RIGHT FLANK PAIN: ICD-10-CM

## 2019-06-27 PROCEDURE — 3078F DIAST BP <80 MM HG: CPT | Mod: CPTII,S$GLB,, | Performed by: INTERNAL MEDICINE

## 2019-06-27 PROCEDURE — 99396 PREV VISIT EST AGE 40-64: CPT | Mod: S$GLB,,, | Performed by: INTERNAL MEDICINE

## 2019-06-27 PROCEDURE — 72070 X-RAY EXAM THORAC SPINE 2VWS: CPT | Mod: TC,FY

## 2019-06-27 PROCEDURE — 72070 XR THORACIC SPINE AP LATERAL: ICD-10-PCS | Mod: 26,,, | Performed by: RADIOLOGY

## 2019-06-27 PROCEDURE — 72070 X-RAY EXAM THORAC SPINE 2VWS: CPT | Mod: 26,,, | Performed by: RADIOLOGY

## 2019-06-27 PROCEDURE — 99999 PR PBB SHADOW E&M-EST. PATIENT-LVL III: ICD-10-PCS | Mod: PBBFAC,,, | Performed by: INTERNAL MEDICINE

## 2019-06-27 PROCEDURE — 3074F PR MOST RECENT SYSTOLIC BLOOD PRESSURE < 130 MM HG: ICD-10-PCS | Mod: CPTII,S$GLB,, | Performed by: INTERNAL MEDICINE

## 2019-06-27 PROCEDURE — 3078F PR MOST RECENT DIASTOLIC BLOOD PRESSURE < 80 MM HG: ICD-10-PCS | Mod: CPTII,S$GLB,, | Performed by: INTERNAL MEDICINE

## 2019-06-27 PROCEDURE — 99999 PR PBB SHADOW E&M-EST. PATIENT-LVL III: CPT | Mod: PBBFAC,,, | Performed by: INTERNAL MEDICINE

## 2019-06-27 PROCEDURE — 3074F SYST BP LT 130 MM HG: CPT | Mod: CPTII,S$GLB,, | Performed by: INTERNAL MEDICINE

## 2019-06-27 PROCEDURE — 99396 PR PREVENTIVE VISIT,EST,40-64: ICD-10-PCS | Mod: S$GLB,,, | Performed by: INTERNAL MEDICINE

## 2019-06-27 NOTE — PROGRESS NOTES
Subjective:       Patient ID: Shawna Mayers is a 48 y.o. female.    Chief Complaint: Annual Exam and Rib Injury (managed with OTC ibuprofen)    Pt here for annual exam. Up to date on [employer].     She c/o 1 month pain in L flank starting mid lateral thoracic area and wrapping around R lateral chest. It is burning in nature but sometimes tingly. Sitting makes it worse as does wearing purse on R shoulder. Ibuprofen helps. No SOB and no pleuritic character to pain. No abd pain/n/v. No CP. No weakness in arms/legs. No paresthesias in arms/legs. No rash. She admits to having a regular vigorous exercise routine which, if anything, she has increased since pain started.     She has previously been noted to have nodules on liver which was determined to be focal nodular hyperplasia. This has been stable.     She is clinically euthyroid on synthroid 50mcg daily.     She takes klonopin most nights for sleep. We reviewed sleep hygiene. She tried trazodone but didn't like the way it made her feel.    Review of Systems   Constitutional: Negative for fatigue, fever and unexpected weight change.   HENT: Negative for congestion and sore throat.    Eyes: Negative for visual disturbance.   Respiratory: Negative for shortness of breath.    Cardiovascular: Negative for chest pain, palpitations and leg swelling.   Gastrointestinal: Negative for abdominal pain, blood in stool, constipation and diarrhea.   Genitourinary: Negative for dysuria.   Musculoskeletal: Negative for arthralgias.   Skin: Negative for rash.   Neurological: Negative for dizziness, syncope and headaches.   Hematological: Negative for adenopathy.   Psychiatric/Behavioral: Negative for dysphoric mood.       Objective:      Physical Exam   Constitutional: She is oriented to person, place, and time. She appears well-developed and well-nourished.   HENT:   Head: Normocephalic.   Right Ear: Tympanic membrane, external ear and ear canal normal.   Left Ear: Tympanic  membrane, external ear and ear canal normal.   Nose: Nose normal.   Mouth/Throat: Uvula is midline and oropharynx is clear and moist.   Eyes: Pupils are equal, round, and reactive to light. Conjunctivae, EOM and lids are normal. Right conjunctiva is not injected. Left conjunctiva is not injected.   Neck: Neck supple. No JVD present. Carotid bruit is not present. No thyroid mass and no thyromegaly present.   Cardiovascular: Normal rate, regular rhythm, S1 normal, S2 normal and intact distal pulses. PMI is not displaced.   No murmur heard.  Pulses:       Posterior tibial pulses are 2+ on the right side, and 2+ on the left side.   Pulmonary/Chest: Effort normal and breath sounds normal. She has no wheezes. She has no rhonchi. She has no rales.   Abdominal: Soft. Bowel sounds are normal. She exhibits no distension and no abdominal bruit. There is no hepatosplenomegaly. There is no tenderness.   Musculoskeletal: She exhibits no edema.        Cervical back: She exhibits normal range of motion, no tenderness and no bony tenderness.        Thoracic back: She exhibits normal range of motion, no tenderness and no bony tenderness.        Lumbar back: She exhibits normal range of motion, no tenderness and no bony tenderness.   Lymphadenopathy:        Head (right side): No preauricular and no posterior auricular adenopathy present.        Head (left side): No preauricular and no posterior auricular adenopathy present.     She has no cervical adenopathy.     She has no axillary adenopathy.   Neurological: She is alert and oriented to person, place, and time. She has normal strength. No cranial nerve deficit or sensory deficit.   Skin: Skin is warm. No rash noted.   Psychiatric: She has a normal mood and affect. Her speech is normal and behavior is normal. Judgment and thought content normal.       Assessment:       1. Wellness examination    2. Acquired hypothyroidism    3. Right flank pain        Plan:       1. Appropriate  labs  2. Thoracic spine xrays  3. Offered trial of gabapentin as pain seems to be radicular but she declines; if UA and xrays are normal she wants to pursue acupuncture which she will seek on her own; I have also advised to cut back on intensity of exercise until pain improves; minimize NSAIDs as she has already taken 400mg motrin bid for 1 month; call/rtc if not improving over next 2-4 weeks

## 2019-07-06 ENCOUNTER — LAB VISIT (OUTPATIENT)
Dept: LAB | Facility: HOSPITAL | Age: 49
End: 2019-07-06
Attending: INTERNAL MEDICINE
Payer: COMMERCIAL

## 2019-07-06 DIAGNOSIS — E03.9 ACQUIRED HYPOTHYROIDISM: Chronic | ICD-10-CM

## 2019-07-06 DIAGNOSIS — Z00.00 WELLNESS EXAMINATION: ICD-10-CM

## 2019-07-06 LAB
ALBUMIN SERPL BCP-MCNC: 4 G/DL (ref 3.5–5.2)
ALP SERPL-CCNC: 57 U/L (ref 55–135)
ALT SERPL W/O P-5'-P-CCNC: 35 U/L (ref 10–44)
ANION GAP SERPL CALC-SCNC: 7 MMOL/L (ref 8–16)
AST SERPL-CCNC: 37 U/L (ref 10–40)
BILIRUB SERPL-MCNC: 0.6 MG/DL (ref 0.1–1)
BUN SERPL-MCNC: 13 MG/DL (ref 6–20)
CALCIUM SERPL-MCNC: 9.4 MG/DL (ref 8.7–10.5)
CHLORIDE SERPL-SCNC: 104 MMOL/L (ref 95–110)
CHOLEST SERPL-MCNC: 206 MG/DL (ref 120–199)
CHOLEST/HDLC SERPL: 3.1 {RATIO} (ref 2–5)
CO2 SERPL-SCNC: 28 MMOL/L (ref 23–29)
CREAT SERPL-MCNC: 0.7 MG/DL (ref 0.5–1.4)
EST. GFR  (AFRICAN AMERICAN): >60 ML/MIN/1.73 M^2
EST. GFR  (NON AFRICAN AMERICAN): >60 ML/MIN/1.73 M^2
GLUCOSE SERPL-MCNC: 98 MG/DL (ref 70–110)
HDLC SERPL-MCNC: 67 MG/DL (ref 40–75)
HDLC SERPL: 32.5 % (ref 20–50)
LDLC SERPL CALC-MCNC: 110.4 MG/DL (ref 63–159)
NONHDLC SERPL-MCNC: 139 MG/DL
POTASSIUM SERPL-SCNC: 5.3 MMOL/L (ref 3.5–5.1)
PROT SERPL-MCNC: 6.9 G/DL (ref 6–8.4)
SODIUM SERPL-SCNC: 139 MMOL/L (ref 136–145)
TRIGL SERPL-MCNC: 143 MG/DL (ref 30–150)
TSH SERPL DL<=0.005 MIU/L-ACNC: 1.68 UIU/ML (ref 0.4–4)

## 2019-07-06 PROCEDURE — 84443 ASSAY THYROID STIM HORMONE: CPT

## 2019-07-06 PROCEDURE — 36415 COLL VENOUS BLD VENIPUNCTURE: CPT

## 2019-07-06 PROCEDURE — 85025 COMPLETE CBC W/AUTO DIFF WBC: CPT

## 2019-07-06 PROCEDURE — 80061 LIPID PANEL: CPT

## 2019-07-06 PROCEDURE — 80053 COMPREHEN METABOLIC PANEL: CPT

## 2019-07-09 LAB
BASOPHILS # BLD AUTO: 0.03 K/UL (ref 0–0.2)
BASOPHILS NFR BLD: 0.7 % (ref 0–1.9)
DIFFERENTIAL METHOD: ABNORMAL
EOSINOPHIL # BLD AUTO: 0.1 K/UL (ref 0–0.5)
EOSINOPHIL NFR BLD: 3 % (ref 0–8)
ERYTHROCYTE [DISTWIDTH] IN BLOOD BY AUTOMATED COUNT: 12.6 % (ref 11.5–14.5)
HCT VFR BLD AUTO: 41.8 % (ref 37–48.5)
HGB BLD-MCNC: 14.2 G/DL (ref 12–16)
LYMPHOCYTES # BLD AUTO: 1.6 K/UL (ref 1–4.8)
LYMPHOCYTES NFR BLD: 37.1 % (ref 18–48)
MCH RBC QN AUTO: 31.4 PG (ref 27–31)
MCHC RBC AUTO-ENTMCNC: 34 G/DL (ref 32–36)
MCV RBC AUTO: 93 FL (ref 82–98)
MONOCYTES # BLD AUTO: 0.4 K/UL (ref 0.3–1)
MONOCYTES NFR BLD: 9 % (ref 4–15)
NEUTROPHILS # BLD AUTO: 2.2 K/UL (ref 1.8–7.7)
NEUTROPHILS NFR BLD: 50.2 % (ref 38–73)
NRBC BLD-RTO: ABNORMAL /100 WBC
PLATELET # BLD AUTO: 313 K/UL (ref 150–350)
PMV BLD AUTO: 9.6 FL (ref 9.2–12.9)
RBC # BLD AUTO: 4.52 M/UL (ref 4–5.4)
WBC # BLD AUTO: 4.34 K/UL (ref 3.9–12.7)

## 2019-08-14 RX ORDER — MOMETASONE FUROATE 50 UG/1
SPRAY, METERED NASAL
Qty: 51 G | Refills: 1 | Status: SHIPPED | OUTPATIENT
Start: 2019-08-14 | End: 2019-11-21 | Stop reason: SDUPTHER

## 2019-08-14 RX ORDER — CLONAZEPAM 0.5 MG/1
TABLET ORAL
Qty: 30 TABLET | Refills: 0 | Status: SHIPPED | OUTPATIENT
Start: 2019-08-14 | End: 2019-09-13 | Stop reason: SDUPTHER

## 2019-09-13 RX ORDER — CLONAZEPAM 0.5 MG/1
TABLET ORAL
Qty: 30 TABLET | Refills: 0 | Status: SHIPPED | OUTPATIENT
Start: 2019-09-13 | End: 2019-10-12 | Stop reason: SDUPTHER

## 2019-10-14 RX ORDER — CLONAZEPAM 0.5 MG/1
TABLET ORAL
Qty: 30 TABLET | Refills: 0 | Status: SHIPPED | OUTPATIENT
Start: 2019-10-14 | End: 2019-11-15 | Stop reason: SDUPTHER

## 2019-11-15 RX ORDER — CLONAZEPAM 0.5 MG/1
TABLET ORAL
Qty: 30 TABLET | Refills: 0 | Status: SHIPPED | OUTPATIENT
Start: 2019-11-15 | End: 2019-12-16

## 2019-11-21 RX ORDER — MOMETASONE FUROATE 50 UG/1
2 SPRAY, METERED NASAL DAILY
Qty: 51 G | Refills: 1 | Status: SHIPPED | OUTPATIENT
Start: 2019-11-21 | End: 2020-03-29 | Stop reason: SDUPTHER

## 2019-12-02 ENCOUNTER — PATIENT MESSAGE (OUTPATIENT)
Dept: INTERNAL MEDICINE | Facility: CLINIC | Age: 49
End: 2019-12-02

## 2019-12-03 ENCOUNTER — TELEPHONE (OUTPATIENT)
Dept: INTERNAL MEDICINE | Facility: CLINIC | Age: 49
End: 2019-12-03

## 2019-12-04 NOTE — TELEPHONE ENCOUNTER
----- Message from Cony Fan sent at 12/3/2019  3:05 PM CST -----  Contact: Sharif    Name of Who is Calling:Sharif    What is the request in detail:  [employer]  States patient PA can not be process through Cover my Meds. The mometasone (NASONEX) 50 mcg/actuation nasal spray PA has to go  [employer] and she will also fax a form FAX: [phone] Please contact to further discuss and advise    Can the clinic reply by [facility]: no    What Number [facility] Back [facility]: [phone]

## 2019-12-10 ENCOUNTER — TELEPHONE (OUTPATIENT)
Dept: INTERNAL MEDICINE | Facility: CLINIC | Age: 49
End: 2019-12-10

## 2019-12-10 ENCOUNTER — PATIENT MESSAGE (OUTPATIENT)
Dept: INTERNAL MEDICINE | Facility: CLINIC | Age: 49
End: 2019-12-10

## 2019-12-10 NOTE — TELEPHONE ENCOUNTER
Submitted PA request through CoverMyMeds.     Key: [id number]    Sent reply to patient through the patient portal.

## 2019-12-10 NOTE — TELEPHONE ENCOUNTER
----- Message from Kirstie Baez MA sent at 12/6/2019  3:56 PM CST -----  Contact: Nicki(Pharm)      ----- Message -----  From: Nany Jackson  Sent: 12/6/2019   1:36 PM CST  To: Aparna Martinez Staff    Name of Who is Calling:Nicki     What is the request in detail:  [employer] Benefits  States patient PA can not be process through Cover my Meds. The mometasone (NASONEX) 50 mcg/actuation nasal spray PA has to go  [employer] and she will also fax a form FAX: [phone] Please contact to further discuss and advise     Can the clinic reply by [facility]: no     What Number to Call Back [facility]: [phone]  Fax Number [phone]

## 2019-12-13 ENCOUNTER — PATIENT MESSAGE (OUTPATIENT)
Dept: INTERNAL MEDICINE | Facility: CLINIC | Age: 49
End: 2019-12-13

## 2019-12-13 NOTE — TELEPHONE ENCOUNTER
After filling out the PA form online from the given site ([email or web address]) called [phone] spoke with katerin and was transferred to PA line    Spoke with Ms Thomas at [employer] Benefits directory's office about the PA submitted and name correction needed make for my error (wrote pt last name as Heatvirgie)  Faxed PA form with correct last name along with 2 last office visit notes.

## 2019-12-16 RX ORDER — CLONAZEPAM 0.5 MG/1
TABLET ORAL
Qty: 30 TABLET | Refills: 0 | Status: SHIPPED | OUTPATIENT
Start: 2019-12-16 | End: 2020-01-16

## 2019-12-17 ENCOUNTER — PATIENT MESSAGE (OUTPATIENT)
Dept: INTERNAL MEDICINE | Facility: CLINIC | Age: 49
End: 2019-12-17

## 2019-12-17 NOTE — TELEPHONE ENCOUNTER
Went to covermy meds entered key  [id number] and submitted lov  However pt states the PA needs to be resubmitted  Went back to covermymed and started a new request  Contact plan to follow up on [id number]

## 2019-12-20 ENCOUNTER — PATIENT MESSAGE (OUTPATIENT)
Dept: INTERNAL MEDICINE | Facility: CLINIC | Age: 49
End: 2019-12-20

## 2019-12-20 NOTE — TELEPHONE ENCOUNTER
Spoke with Maral at [employer] (called them at [phone]). She advised me the rx was denied, not becausee they didn't receive the visit notes, but because there was no documentation regarding trial and failure of fluticasone. Sent message to the patient explaining this. Once she replies, we can use her portal message as an addendum to her medical chart and fax it in to [employer] for an urgent appeal. Fax number is [phone].

## 2019-12-23 ENCOUNTER — TELEPHONE (OUTPATIENT)
Dept: INTERNAL MEDICINE | Facility: CLINIC | Age: 49
End: 2019-12-23

## 2019-12-23 DIAGNOSIS — J30.9 ALLERGIC RHINITIS, UNSPECIFIED SEASONALITY, UNSPECIFIED TRIGGER: Primary | ICD-10-CM

## 2019-12-23 NOTE — TELEPHONE ENCOUNTER
Faxed documentation regarding trial and failure of flonase. Spoke with representative with [employer]. States another appeal needs to be submitted. Message sent to provider to provide diagnosis for nasonex to finish PA.

## 2019-12-31 ENCOUNTER — TELEPHONE (OUTPATIENT)
Dept: INTERNAL MEDICINE | Facility: CLINIC | Age: 49
End: 2019-12-31

## 2020-01-16 RX ORDER — CLONAZEPAM 0.5 MG/1
TABLET ORAL
Qty: 30 TABLET | Refills: 0 | Status: SHIPPED | OUTPATIENT
Start: 2020-01-16 | End: 2020-02-17

## 2020-02-17 RX ORDER — CLONAZEPAM 0.5 MG/1
TABLET ORAL
Qty: 30 TABLET | Refills: 0 | Status: SHIPPED | OUTPATIENT
Start: 2020-02-17 | End: 2020-03-16

## 2020-03-16 RX ORDER — CLONAZEPAM 0.5 MG/1
TABLET ORAL
Qty: 30 TABLET | Refills: 0 | Status: SHIPPED | OUTPATIENT
Start: 2020-03-16 | End: 2020-04-16

## 2020-03-31 ENCOUNTER — PATIENT MESSAGE (OUTPATIENT)
Dept: INTERNAL MEDICINE | Facility: CLINIC | Age: 50
End: 2020-03-31

## 2020-03-31 NOTE — TELEPHONE ENCOUNTER
[facility] $100 as well with patient co-pay

## 2020-04-01 ENCOUNTER — PATIENT MESSAGE (OUTPATIENT)
Dept: INTERNAL MEDICINE | Facility: CLINIC | Age: 50
End: 2020-04-01

## 2020-04-01 DIAGNOSIS — J30.9 ALLERGIC RHINITIS, UNSPECIFIED SEASONALITY, UNSPECIFIED TRIGGER: Primary | ICD-10-CM

## 2020-04-16 RX ORDER — CLONAZEPAM 0.5 MG/1
TABLET ORAL
Qty: 30 TABLET | Refills: 0 | Status: SHIPPED | OUTPATIENT
Start: 2020-04-16 | End: 2020-10-16

## 2020-08-02 ENCOUNTER — PATIENT OUTREACH (OUTPATIENT)
Dept: ADMINISTRATIVE | Facility: OTHER | Age: 50
End: 2020-08-02

## 2020-08-02 DIAGNOSIS — Z12.31 ENCOUNTER FOR SCREENING MAMMOGRAM FOR MALIGNANT NEOPLASM OF BREAST: Primary | ICD-10-CM

## 2020-08-02 NOTE — PROGRESS NOTES
Care Everywhere:   Immunization: updated  Health Maintenance: updated  Media Review: reviewed for outside mammogram report  Legacy Review:   Order placed: mammogram   Upcoming appts:  Mammogram scheduling ticket sent to patient's portal

## 2020-08-04 ENCOUNTER — OFFICE VISIT (OUTPATIENT)
Dept: OBSTETRICS AND GYNECOLOGY | Facility: CLINIC | Age: 50
End: 2020-08-04
Payer: COMMERCIAL

## 2020-08-04 VITALS
HEIGHT: 62 IN | DIASTOLIC BLOOD PRESSURE: 74 MMHG | BODY MASS INDEX: 29.05 KG/M2 | SYSTOLIC BLOOD PRESSURE: 126 MMHG | WEIGHT: 157.88 LBS

## 2020-08-04 DIAGNOSIS — Z01.419 WELL WOMAN EXAM WITH ROUTINE GYNECOLOGICAL EXAM: Primary | ICD-10-CM

## 2020-08-04 PROCEDURE — 99999 PR PBB SHADOW E&M-EST. PATIENT-LVL III: ICD-10-PCS | Mod: PBBFAC,,, | Performed by: OBSTETRICS & GYNECOLOGY

## 2020-08-04 PROCEDURE — 99999 PR PBB SHADOW E&M-EST. PATIENT-LVL III: CPT | Mod: PBBFAC,,, | Performed by: OBSTETRICS & GYNECOLOGY

## 2020-08-04 PROCEDURE — 3074F PR MOST RECENT SYSTOLIC BLOOD PRESSURE < 130 MM HG: ICD-10-PCS | Mod: CPTII,S$GLB,, | Performed by: OBSTETRICS & GYNECOLOGY

## 2020-08-04 PROCEDURE — 3074F SYST BP LT 130 MM HG: CPT | Mod: CPTII,S$GLB,, | Performed by: OBSTETRICS & GYNECOLOGY

## 2020-08-04 PROCEDURE — 99396 PR PREVENTIVE VISIT,EST,40-64: ICD-10-PCS | Mod: S$GLB,,, | Performed by: OBSTETRICS & GYNECOLOGY

## 2020-08-04 PROCEDURE — 3008F BODY MASS INDEX DOCD: CPT | Mod: CPTII,S$GLB,, | Performed by: OBSTETRICS & GYNECOLOGY

## 2020-08-04 PROCEDURE — 3008F PR BODY MASS INDEX (BMI) DOCUMENTED: ICD-10-PCS | Mod: CPTII,S$GLB,, | Performed by: OBSTETRICS & GYNECOLOGY

## 2020-08-04 PROCEDURE — 3078F DIAST BP <80 MM HG: CPT | Mod: CPTII,S$GLB,, | Performed by: OBSTETRICS & GYNECOLOGY

## 2020-08-04 PROCEDURE — 99396 PREV VISIT EST AGE 40-64: CPT | Mod: S$GLB,,, | Performed by: OBSTETRICS & GYNECOLOGY

## 2020-08-04 PROCEDURE — 3078F PR MOST RECENT DIASTOLIC BLOOD PRESSURE < 80 MM HG: ICD-10-PCS | Mod: CPTII,S$GLB,, | Performed by: OBSTETRICS & GYNECOLOGY

## 2020-08-04 NOTE — PROGRESS NOTES
History & Physical  Gynecology      SUBJECTIVE:     Chief Complaint: Annual Exam       History of Present Illness:  Annual Exam-Premenopausal  Patient presents for annual exam. The patient has no complaints today. Menstrual cycles are monthly, occasionally late.  The patient is sexually active. GYN screening history: last pap: approximate date 2019 paphpv and was normal. The patient participates in regular exercise: yes.  Smoking status:  no    Contraception: vasectomy    FH:  Breast cancer: neg  Colon cancer: neg  Ovarian cancer: neg    Review of patient's allergies indicates:   Allergen Reactions    Anucort-hc [hydrocortisone acetate] Hives and Nausea And Vomiting    Demerol [meperidine] Nausea And Vomiting    Sulfa (sulfonamide antibiotics) Hives    Amoxicillin Nausea And Vomiting and Rash       Past Medical History:   Diagnosis Date    Allergy     Anxiety     Asthma     Focal nodular hyperplasia of liver     GERD (gastroesophageal reflux disease)     Hyperlipidemia     Thyroid disease     hypothyroidism     Past Surgical History:   Procedure Laterality Date    CHOLECYSTECTOMY      FOOT SURGERY      arc    WISDOM TOOTH EXTRACTION       OB History        0    Para        Term   0            AB        Living           SAB        TAB        Ectopic        Multiple        Live Births                   Family History   Problem Relation Age of Onset    Arthritis Mother     Asthma Mother     Diabetes Mother     Hearing loss Mother     Hyperlipidemia Mother     Vision loss Mother     Early death Father     Heart disease Father          of MI at 41yo    Hyperlipidemia Father     Kidney disease Father     Breast cancer Neg Hx     Colon cancer Neg Hx     Ovarian cancer Neg Hx     Melanoma Neg Hx      Social History     Tobacco Use    Smoking status: Never Smoker    Smokeless tobacco: Never Used   Substance Use Topics    Alcohol use: Yes     Frequency: 2-3 times a week      Drinks per session: 1 or 2     Binge frequency: Never     Comment: socially    Drug use: No       Current Outpatient Medications   Medication Sig    clonazePAM (KLONOPIN) 0.5 MG tablet TAKE 1 TABLET BY MOUTH 2 (TWO) TIMES DAILY AS NEEDED FOR ANXIETY    ipratropium (ATROVENT HFA) 17 mcg/actuation inhaler INHALE 2 PUFFS INTO THE LUNGS EVERY 6 (SIX) HOURS    levothyroxine (SYNTHROID) 50 MCG tablet TAKE 1 TABLET (50 MCG TOTAL) BY MOUTH EVERY MORNING.    meclizine (ANTIVERT) 25 mg tablet Take 1 tablet (25 mg total) by mouth 3 (three) times daily as needed.    mometasone (NASONEX) 50 mcg/actuation nasal spray 2 sprays by Nasal route once daily.    diclofenac sodium (VOLTAREN) 1 % Gel Apply 2 g topically 4 (four) times daily. Apply up to 4 times a day to affected area for 10 days     No current facility-administered medications for this visit.          Review of Systems:  Review of Systems   Constitutional: Negative for activity change, appetite change and fever.   Respiratory: Negative for shortness of breath.    Cardiovascular: Negative for chest pain.   Gastrointestinal: Negative for abdominal pain, constipation, diarrhea, nausea and vomiting.   Genitourinary: Negative for menorrhagia, menstrual problem, pelvic pain, vaginal bleeding, vaginal discharge and vaginal pain.   Integumentary:  Negative for nipple discharge.   Neurological: Negative for numbness and headaches.   Breast: Negative for mastodynia and nipple discharge       OBJECTIVE:     Physical Exam:  Physical Exam  Vitals signs and nursing note reviewed.   Constitutional:       Appearance: She is well-developed.   Neck:      Musculoskeletal: Normal range of motion and neck supple.      Thyroid: No thyromegaly.      Trachea: No tracheal deviation.   Cardiovascular:      Rate and Rhythm: Normal rate and regular rhythm.      Heart sounds: Normal heart sounds.   Pulmonary:      Effort: Pulmonary effort is normal.      Breath sounds: Normal breath sounds.    Chest:      Breasts: Breasts are symmetrical.         Right: No inverted nipple, mass, nipple discharge, skin change or tenderness.         Left: No inverted nipple, mass, nipple discharge, skin change or tenderness.   Abdominal:      Palpations: Abdomen is soft.   Genitourinary:     Labia:         Right: No rash, tenderness, lesion or injury.         Left: No rash, tenderness, lesion or injury.       Vagina: Normal. No signs of injury and foreign body. No vaginal discharge, erythema, tenderness or bleeding.      Cervix: No cervical motion tenderness, discharge or friability.      Uterus: Not deviated, not enlarged, not fixed and not tender.       Adnexa:         Right: No mass, tenderness or fullness.          Left: No mass, tenderness or fullness.        Comments: Urethra: normal appearing urethra with no masses, tenderness or lesions  Urethral meatus: normal size, anterior vaginal wall with no prolapse, no lesions  Neurological:      Mental Status: She is alert and oriented to person, place, and time.   Psychiatric:         Behavior: Behavior normal.         Thought Content: Thought content normal.         Judgment: Judgment normal.         Chaperoned by: Bob    ASSESSMENT:       ICD-10-CM ICD-9-CM    1. Well woman exam with routine gynecological exam  Z01.419 V72.31           Plan:      Shawna was seen today for annual exam.    Diagnoses and all orders for this visit:    Well woman exam with routine gynecological exam        No orders of the defined types were placed in this encounter.      Well Woman:  - Pap smear up to date  - Birth control: none  - GC/CT:n/a  - Mammogram: scheduled  - Smoking cessation: n/a  - Labs: with pcp   - Vaccines: none required  - Exercise counseling      Follow up in one year for annual or prn.    Nicky Nguyen

## 2020-08-08 ENCOUNTER — LAB VISIT (OUTPATIENT)
Dept: LAB | Facility: HOSPITAL | Age: 50
End: 2020-08-08
Attending: INTERNAL MEDICINE
Payer: COMMERCIAL

## 2020-08-08 DIAGNOSIS — E03.9 ACQUIRED HYPOTHYROIDISM: Chronic | ICD-10-CM

## 2020-08-08 DIAGNOSIS — Z00.00 WELLNESS EXAMINATION: ICD-10-CM

## 2020-08-08 DIAGNOSIS — Z01.84 ENCOUNTER FOR ANTIBODY RESPONSE EXAMINATION: ICD-10-CM

## 2020-08-08 LAB
ALBUMIN SERPL BCP-MCNC: 4 G/DL (ref 3.5–5.2)
ALP SERPL-CCNC: 58 U/L (ref 55–135)
ALT SERPL W/O P-5'-P-CCNC: 34 U/L (ref 10–44)
ANION GAP SERPL CALC-SCNC: 6 MMOL/L (ref 8–16)
AST SERPL-CCNC: 45 U/L (ref 10–40)
BASOPHILS # BLD AUTO: 0.05 K/UL (ref 0–0.2)
BASOPHILS NFR BLD: 1 % (ref 0–1.9)
BILIRUB SERPL-MCNC: 0.5 MG/DL (ref 0.1–1)
BUN SERPL-MCNC: 13 MG/DL (ref 6–20)
CALCIUM SERPL-MCNC: 9.3 MG/DL (ref 8.7–10.5)
CHLORIDE SERPL-SCNC: 105 MMOL/L (ref 95–110)
CHOLEST SERPL-MCNC: 224 MG/DL (ref 120–199)
CHOLEST/HDLC SERPL: 3.1 {RATIO} (ref 2–5)
CO2 SERPL-SCNC: 28 MMOL/L (ref 23–29)
CREAT SERPL-MCNC: 0.8 MG/DL (ref 0.5–1.4)
DIFFERENTIAL METHOD: ABNORMAL
EOSINOPHIL # BLD AUTO: 0.4 K/UL (ref 0–0.5)
EOSINOPHIL NFR BLD: 6.9 % (ref 0–8)
ERYTHROCYTE [DISTWIDTH] IN BLOOD BY AUTOMATED COUNT: 12.1 % (ref 11.5–14.5)
EST. GFR  (AFRICAN AMERICAN): >60 ML/MIN/1.73 M^2
EST. GFR  (NON AFRICAN AMERICAN): >60 ML/MIN/1.73 M^2
GLUCOSE SERPL-MCNC: 91 MG/DL (ref 70–110)
HCT VFR BLD AUTO: 45.9 % (ref 37–48.5)
HDLC SERPL-MCNC: 73 MG/DL (ref 40–75)
HDLC SERPL: 32.6 % (ref 20–50)
HGB BLD-MCNC: 14.9 G/DL (ref 12–16)
IMM GRANULOCYTES # BLD AUTO: 0 K/UL (ref 0–0.04)
IMM GRANULOCYTES NFR BLD AUTO: 0 % (ref 0–0.5)
LDLC SERPL CALC-MCNC: 132 MG/DL (ref 63–159)
LYMPHOCYTES # BLD AUTO: 2.5 K/UL (ref 1–4.8)
LYMPHOCYTES NFR BLD: 47.2 % (ref 18–48)
MCH RBC QN AUTO: 30.6 PG (ref 27–31)
MCHC RBC AUTO-ENTMCNC: 32.5 G/DL (ref 32–36)
MCV RBC AUTO: 94 FL (ref 82–98)
MONOCYTES # BLD AUTO: 0.4 K/UL (ref 0.3–1)
MONOCYTES NFR BLD: 8.4 % (ref 4–15)
NEUTROPHILS # BLD AUTO: 1.9 K/UL (ref 1.8–7.7)
NEUTROPHILS NFR BLD: 36.5 % (ref 38–73)
NONHDLC SERPL-MCNC: 151 MG/DL
NRBC BLD-RTO: 0 /100 WBC
PLATELET # BLD AUTO: 334 K/UL (ref 150–350)
PMV BLD AUTO: 9.7 FL (ref 9.2–12.9)
POTASSIUM SERPL-SCNC: 4.4 MMOL/L (ref 3.5–5.1)
PROT SERPL-MCNC: 7.1 G/DL (ref 6–8.4)
RBC # BLD AUTO: 4.87 M/UL (ref 4–5.4)
SARS-COV-2 IGG SERPLBLD QL IA.RAPID: NEGATIVE
SODIUM SERPL-SCNC: 139 MMOL/L (ref 136–145)
TRIGL SERPL-MCNC: 95 MG/DL (ref 30–150)
TSH SERPL DL<=0.005 MIU/L-ACNC: 1.38 UIU/ML (ref 0.4–4)
WBC # BLD AUTO: 5.23 K/UL (ref 3.9–12.7)

## 2020-08-08 PROCEDURE — 80061 LIPID PANEL: CPT

## 2020-08-08 PROCEDURE — 86769 SARS-COV-2 COVID-19 ANTIBODY: CPT

## 2020-08-08 PROCEDURE — 85025 COMPLETE CBC W/AUTO DIFF WBC: CPT

## 2020-08-08 PROCEDURE — 80053 COMPREHEN METABOLIC PANEL: CPT

## 2020-08-08 PROCEDURE — 84443 ASSAY THYROID STIM HORMONE: CPT

## 2020-08-08 PROCEDURE — 36415 COLL VENOUS BLD VENIPUNCTURE: CPT

## 2020-08-14 ENCOUNTER — HOSPITAL ENCOUNTER (OUTPATIENT)
Dept: RADIOLOGY | Facility: HOSPITAL | Age: 50
Discharge: HOME | End: 2020-08-14
Attending: INTERNAL MEDICINE
Payer: COMMERCIAL

## 2020-08-14 DIAGNOSIS — Z12.31 ENCOUNTER FOR SCREENING MAMMOGRAM FOR MALIGNANT NEOPLASM OF BREAST: ICD-10-CM

## 2020-08-14 PROCEDURE — 77067 SCR MAMMO BI INCL CAD: CPT | Mod: 26,,, | Performed by: RADIOLOGY

## 2020-08-14 PROCEDURE — 77063 BREAST TOMOSYNTHESIS BI: CPT | Mod: 26,,, | Performed by: RADIOLOGY

## 2020-08-14 PROCEDURE — 77067 MAMMO DIGITAL SCREENING BILAT WITH TOMOSYNTHESIS_CAD: ICD-10-PCS | Mod: 26,,, | Performed by: RADIOLOGY

## 2020-08-14 PROCEDURE — 77063 MAMMO DIGITAL SCREENING BILAT WITH TOMOSYNTHESIS_CAD: ICD-10-PCS | Mod: 26,,, | Performed by: RADIOLOGY

## 2020-08-14 PROCEDURE — 77067 SCR MAMMO BI INCL CAD: CPT | Mod: TC

## 2020-08-20 NOTE — PROGRESS NOTES
Subjective:       Patient ID: Shawna Mayers is a 49 y.o. female.    Chief Complaint: Annual Exam    Pt here for annual exam. Up to date on [employer].     She has previously been noted to have nodules on liver which was determined to be focal nodular hyperplasia. This has been stable.     She is clinically euthyroid on synthroid 50mcg daily.     She takes klonopin most nights for sleep. We reviewed sleep hygiene. She tried trazodone but didn't like the way it made her feel.    We discussed her weight and strategies to address. She has h/o eating disorders but no issues in some time. She understands to let me know if wanting to see nutrition and/or psychology.     The 10-year ASCVD risk score (Femisadiq FRANK Jr., et al., 2013) is: 0.7%    Values used to calculate the score:      Age: 49 years      Sex: Female      Is Non- : No      Diabetic: No      Tobacco smoker: No      Systolic Blood Pressure: 110 mmHg      Is BP treated: No      HDL Cholesterol: 73 mg/dL      Total Cholesterol: 224 mg/dL      Review of Systems   Constitutional: Negative for activity change, fatigue, fever and unexpected weight change.   HENT: Negative for congestion, hearing loss, rhinorrhea, sore throat and trouble swallowing.    Eyes: Negative for discharge and visual disturbance.   Respiratory: Negative for chest tightness, shortness of breath and wheezing.    Cardiovascular: Negative for chest pain, palpitations and leg swelling.   Gastrointestinal: Negative for abdominal pain, blood in stool, constipation, diarrhea and vomiting.   Endocrine: Negative for polydipsia and polyuria.   Genitourinary: Positive for menstrual problem. Negative for difficulty urinating, dysuria and hematuria.   Musculoskeletal: Negative for arthralgias, joint swelling and neck pain.   Skin: Negative for rash.   Neurological: Negative for dizziness, syncope, weakness and headaches.   Hematological: Negative for adenopathy.   Psychiatric/Behavioral:  Negative for confusion and dysphoric mood.       Objective:      Physical Exam   Constitutional: She is oriented to person, place, and time. She appears well-developed and well-nourished.   HENT:   Head: Normocephalic.   Right Ear: Tympanic membrane, external ear and ear canal normal.   Left Ear: Tympanic membrane, external ear and ear canal normal.   Nose: Nose normal.   Mouth/Throat: Uvula is midline and oropharynx is clear and moist.   Eyes: Pupils are equal, round, and reactive to light. Conjunctivae, EOM and lids are normal. Right conjunctiva is not injected. Left conjunctiva is not injected.   Neck: Neck supple. No JVD present. Carotid bruit is not present. No thyroid mass and no thyromegaly present.   Cardiovascular: Normal rate, regular rhythm, S1 normal, S2 normal and intact distal pulses. PMI is not displaced.   No murmur heard.  Pulses:       Posterior tibial pulses are 2+ on the right side and 2+ on the left side.   Pulmonary/Chest: Effort normal and breath sounds normal. She has no wheezes. She has no rhonchi. She has no rales.   Abdominal: Soft. Bowel sounds are normal. She exhibits no distension and no abdominal bruit. There is no hepatosplenomegaly. There is no abdominal tenderness.   Musculoskeletal:         General: No edema.      Cervical back: She exhibits normal range of motion, no tenderness and no bony tenderness.      Thoracic back: She exhibits normal range of motion, no tenderness and no bony tenderness.      Lumbar back: She exhibits normal range of motion, no tenderness and no bony tenderness.   Lymphadenopathy:        Head (right side): No preauricular and no posterior auricular adenopathy present.        Head (left side): No preauricular and no posterior auricular adenopathy present.     She has no cervical adenopathy.     She has no axillary adenopathy.   Neurological: She is alert and oriented to person, place, and time. She has normal strength. No cranial nerve deficit or sensory  deficit.   Skin: Skin is warm. No rash noted.   Psychiatric: She has a normal mood and affect. Her speech is normal and behavior is normal. Judgment and thought content normal.       Assessment:       1. Wellness examination    2. Acquired hypothyroidism    3. Focal nodular hyperplasia of liver        Plan:       1. Appropriate labs

## 2020-08-21 ENCOUNTER — OFFICE VISIT (OUTPATIENT)
Dept: INTERNAL MEDICINE | Facility: CLINIC | Age: 50
End: 2020-08-21
Payer: COMMERCIAL

## 2020-08-21 VITALS
OXYGEN SATURATION: 98 % | WEIGHT: 158.94 LBS | DIASTOLIC BLOOD PRESSURE: 74 MMHG | SYSTOLIC BLOOD PRESSURE: 110 MMHG | HEART RATE: 58 BPM | HEIGHT: 62 IN | BODY MASS INDEX: 29.25 KG/M2

## 2020-08-21 DIAGNOSIS — E03.9 ACQUIRED HYPOTHYROIDISM: ICD-10-CM

## 2020-08-21 DIAGNOSIS — Z00.00 WELLNESS EXAMINATION: Primary | ICD-10-CM

## 2020-08-21 DIAGNOSIS — K76.89 FOCAL NODULAR HYPERPLASIA OF LIVER: ICD-10-CM

## 2020-08-21 PROCEDURE — 3074F PR MOST RECENT SYSTOLIC BLOOD PRESSURE < 130 MM HG: ICD-10-PCS | Mod: CPTII,S$GLB,, | Performed by: INTERNAL MEDICINE

## 2020-08-21 PROCEDURE — 99396 PR PREVENTIVE VISIT,EST,40-64: ICD-10-PCS | Mod: S$GLB,,, | Performed by: INTERNAL MEDICINE

## 2020-08-21 PROCEDURE — 99999 PR PBB SHADOW E&M-EST. PATIENT-LVL III: ICD-10-PCS | Mod: PBBFAC,,, | Performed by: INTERNAL MEDICINE

## 2020-08-21 PROCEDURE — 3078F PR MOST RECENT DIASTOLIC BLOOD PRESSURE < 80 MM HG: ICD-10-PCS | Mod: CPTII,S$GLB,, | Performed by: INTERNAL MEDICINE

## 2020-08-21 PROCEDURE — 3008F PR BODY MASS INDEX (BMI) DOCUMENTED: ICD-10-PCS | Mod: CPTII,S$GLB,, | Performed by: INTERNAL MEDICINE

## 2020-08-21 PROCEDURE — 3074F SYST BP LT 130 MM HG: CPT | Mod: CPTII,S$GLB,, | Performed by: INTERNAL MEDICINE

## 2020-08-21 PROCEDURE — 99396 PREV VISIT EST AGE 40-64: CPT | Mod: S$GLB,,, | Performed by: INTERNAL MEDICINE

## 2020-08-21 PROCEDURE — 3008F BODY MASS INDEX DOCD: CPT | Mod: CPTII,S$GLB,, | Performed by: INTERNAL MEDICINE

## 2020-08-21 PROCEDURE — 3078F DIAST BP <80 MM HG: CPT | Mod: CPTII,S$GLB,, | Performed by: INTERNAL MEDICINE

## 2020-08-21 PROCEDURE — 99999 PR PBB SHADOW E&M-EST. PATIENT-LVL III: CPT | Mod: PBBFAC,,, | Performed by: INTERNAL MEDICINE

## 2020-09-18 DIAGNOSIS — Z12.11 COLON CANCER SCREENING: ICD-10-CM

## 2020-10-02 DIAGNOSIS — J30.9 ALLERGIC RHINITIS, UNSPECIFIED SEASONALITY, UNSPECIFIED TRIGGER: ICD-10-CM

## 2020-10-06 ENCOUNTER — PATIENT MESSAGE (OUTPATIENT)
Dept: INTERNAL MEDICINE | Facility: CLINIC | Age: 50
End: 2020-10-06

## 2020-10-06 DIAGNOSIS — J30.9 ALLERGIC RHINITIS, UNSPECIFIED SEASONALITY, UNSPECIFIED TRIGGER: ICD-10-CM

## 2020-10-09 ENCOUNTER — PATIENT MESSAGE (OUTPATIENT)
Dept: INTERNAL MEDICINE | Facility: CLINIC | Age: 50
End: 2020-10-09

## 2020-10-09 RX ORDER — ALBUTEROL SULFATE 90 UG/1
2 AEROSOL, METERED RESPIRATORY (INHALATION) EVERY 6 HOURS PRN
Qty: 18 G | Refills: 1 | Status: SHIPPED | OUTPATIENT
Start: 2020-10-09 | End: 2021-02-01

## 2020-12-04 ENCOUNTER — PATIENT MESSAGE (OUTPATIENT)
Dept: INTERNAL MEDICINE | Facility: CLINIC | Age: 50
End: 2020-12-04

## 2020-12-04 DIAGNOSIS — M54.30 SCIATICA, UNSPECIFIED LATERALITY: Primary | ICD-10-CM

## 2020-12-09 ENCOUNTER — PATIENT MESSAGE (OUTPATIENT)
Dept: ORTHOPEDICS | Facility: CLINIC | Age: 50
End: 2020-12-09

## 2020-12-14 ENCOUNTER — PATIENT MESSAGE (OUTPATIENT)
Dept: SPORTS MEDICINE | Facility: CLINIC | Age: 50
End: 2020-12-14

## 2020-12-21 ENCOUNTER — PATIENT OUTREACH (OUTPATIENT)
Dept: ADMINISTRATIVE | Facility: OTHER | Age: 50
End: 2020-12-21

## 2020-12-21 NOTE — PROGRESS NOTES
Health Maintenance Due   Topic Date Due    Hepatitis C Screening  1970    HIV Screening  09/08/1985    Pneumococcal Vaccine (Medium Risk) (1 of 1 - PPSV23) 09/08/1989    Shingles Vaccine (1 of 2) 09/08/2020    Colorectal Cancer Screening  09/08/2020     Updates were requested from care everywhere.  Chart was reviewed for overdue Proactive [facility] Encounters ([facility]) topics (CRS, Breast Cancer Screening, Eye exam)  Health Maintenance has been updated.  LINKS immunization registry triggered.  Immunizations were reconciled.

## 2020-12-28 ENCOUNTER — OFFICE VISIT (OUTPATIENT)
Dept: ORTHOPEDICS | Facility: CLINIC | Age: 50
End: 2020-12-28
Payer: COMMERCIAL

## 2020-12-28 VITALS
SYSTOLIC BLOOD PRESSURE: 124 MMHG | BODY MASS INDEX: 29.08 KG/M2 | HEIGHT: 62 IN | WEIGHT: 158 LBS | DIASTOLIC BLOOD PRESSURE: 80 MMHG

## 2020-12-28 DIAGNOSIS — M99.04 SACRAL REGION SOMATIC DYSFUNCTION: ICD-10-CM

## 2020-12-28 DIAGNOSIS — M99.05 SOMATIC DYSFUNCTION OF PELVIC REGION: ICD-10-CM

## 2020-12-28 DIAGNOSIS — M99.06 SOMATIC DYSFUNCTION OF LOWER EXTREMITY: ICD-10-CM

## 2020-12-28 DIAGNOSIS — M99.03 SOMATIC DYSFUNCTION OF LUMBAR REGION: ICD-10-CM

## 2020-12-28 DIAGNOSIS — M22.2X2 PATELLOFEMORAL PAIN SYNDROME OF LEFT KNEE: ICD-10-CM

## 2020-12-28 DIAGNOSIS — M79.10 MYALGIA: ICD-10-CM

## 2020-12-28 DIAGNOSIS — M54.59 ACUTE MECHANICAL LOW BACK PAIN WITH DURATION OF LESS THAN SIX WEEKS: Primary | ICD-10-CM

## 2020-12-28 DIAGNOSIS — M99.02 SOMATIC DYSFUNCTION OF THORACIC REGION: ICD-10-CM

## 2020-12-28 PROCEDURE — 98927 OSTEOPATH MANJ 5-6 REGIONS: CPT | Mod: S$GLB,,, | Performed by: NEUROMUSCULOSKELETAL MEDICINE & OMM

## 2020-12-28 PROCEDURE — 99999 PR PBB SHADOW E&M-EST. PATIENT-LVL III: CPT | Mod: PBBFAC,,, | Performed by: NEUROMUSCULOSKELETAL MEDICINE & OMM

## 2020-12-28 PROCEDURE — 3074F SYST BP LT 130 MM HG: CPT | Mod: CPTII,S$GLB,, | Performed by: NEUROMUSCULOSKELETAL MEDICINE & OMM

## 2020-12-28 PROCEDURE — 3008F PR BODY MASS INDEX (BMI) DOCUMENTED: ICD-10-PCS | Mod: CPTII,S$GLB,, | Performed by: NEUROMUSCULOSKELETAL MEDICINE & OMM

## 2020-12-28 PROCEDURE — 97110 PR THERAPEUTIC EXERCISES: ICD-10-PCS | Mod: S$GLB,,, | Performed by: NEUROMUSCULOSKELETAL MEDICINE & OMM

## 2020-12-28 PROCEDURE — 97110 THERAPEUTIC EXERCISES: CPT | Mod: S$GLB,,, | Performed by: NEUROMUSCULOSKELETAL MEDICINE & OMM

## 2020-12-28 PROCEDURE — 99999 PR PBB SHADOW E&M-EST. PATIENT-LVL III: ICD-10-PCS | Mod: PBBFAC,,, | Performed by: NEUROMUSCULOSKELETAL MEDICINE & OMM

## 2020-12-28 PROCEDURE — 3074F PR MOST RECENT SYSTOLIC BLOOD PRESSURE < 130 MM HG: ICD-10-PCS | Mod: CPTII,S$GLB,, | Performed by: NEUROMUSCULOSKELETAL MEDICINE & OMM

## 2020-12-28 PROCEDURE — 98927 PR OSTEOPATHIC MANIP,5-6 BODY REGN: ICD-10-PCS | Mod: S$GLB,,, | Performed by: NEUROMUSCULOSKELETAL MEDICINE & OMM

## 2020-12-28 PROCEDURE — 99214 OFFICE O/P EST MOD 30 MIN: CPT | Mod: 25,S$GLB,, | Performed by: NEUROMUSCULOSKELETAL MEDICINE & OMM

## 2020-12-28 PROCEDURE — 3008F BODY MASS INDEX DOCD: CPT | Mod: CPTII,S$GLB,, | Performed by: NEUROMUSCULOSKELETAL MEDICINE & OMM

## 2020-12-28 PROCEDURE — 3079F DIAST BP 80-89 MM HG: CPT | Mod: CPTII,S$GLB,, | Performed by: NEUROMUSCULOSKELETAL MEDICINE & OMM

## 2020-12-28 PROCEDURE — 3079F PR MOST RECENT DIASTOLIC BLOOD PRESSURE 80-89 MM HG: ICD-10-PCS | Mod: CPTII,S$GLB,, | Performed by: NEUROMUSCULOSKELETAL MEDICINE & OMM

## 2020-12-28 PROCEDURE — 99214 PR OFFICE/OUTPT VISIT, EST, LEVL IV, 30-39 MIN: ICD-10-PCS | Mod: 25,S$GLB,, | Performed by: NEUROMUSCULOSKELETAL MEDICINE & OMM

## 2020-12-28 NOTE — PROGRESS NOTES
Subjective:     Shawna Mayers    Chief Complaint   Patient presents with    Low-back Pain       HPI    Shawna is coming in today for low back pain that began about 2 month(s) ago. Pt. describes the pain as a 1/10 achy pain that does not radiate. There was not a fall/injury/ or trauma associated with the onset of symptoms. Pt started running on the treadmill when her PCP advised her to lose some weight. Notes back and left lateral and posterior thigh pain that began a couple of weeks later. She saw her chiropractor and had a massage with good relief. She continues to note stiffness and weakness in her left LE as well as associated anterior left knee pain which is worse with going down stairs. The pain is better with rest, chiropractic, dry needling, massage and worse with activity. Pt teaches spin classes and weight lifting. Pt. Denies any other musculoskeletal complaints at this time.     Review of Systems   Constitutional: Negative for chills and fever.   HENT: Negative for hearing loss and tinnitus.    Eyes: Negative for blurred vision and photophobia.   Respiratory: Negative for cough and shortness of breath.    Cardiovascular: Negative for chest pain and leg swelling.   Gastrointestinal: Negative for abdominal pain, heartburn, nausea and vomiting.   Genitourinary: Negative for dysuria and hematuria.   Musculoskeletal: Positive for back pain, joint pain and myalgias. Negative for falls and neck pain.   Skin: Negative for rash.   Neurological: Negative for dizziness, tingling, focal weakness, weakness and headaches.   Endo/Heme/Allergies: Negative for environmental allergies. Does not bruise/bleed easily.   Psychiatric/Behavioral: Negative for depression. The patient is not nervous/anxious.          PAST MEDICAL HISTORY:   Past Medical History:   Diagnosis Date    Allergy     Anxiety     Asthma     Focal nodular hyperplasia of liver     GERD (gastroesophageal reflux disease)     Hyperlipidemia      Thyroid disease     hypothyroidism     PAST SURGICAL HISTORY:   Past Surgical History:   Procedure Laterality Date    CHOLECYSTECTOMY      FOOT SURGERY      arc    WISDOM TOOTH EXTRACTION       FAMILY HISTORY:   Family History   Problem Relation Age of Onset    Arthritis Mother     Asthma Mother     Diabetes Mother     Hearing loss Mother     Hyperlipidemia Mother     Vision loss Mother     Early death Father     Heart disease Father          of MI at 41yo    Hyperlipidemia Father     Kidney disease Father     Breast cancer Neg Hx     Colon cancer Neg Hx     Ovarian cancer Neg Hx     Melanoma Neg Hx      SOCIAL HISTORY:   Social History     Socioeconomic History    Marital status:      Spouse name: Not on file    Number of children: Not on file    Years of education: Not on file    Highest education level: Not on file   Occupational History     Employer: [employer]    Social Needs    Financial resource strain: Not hard at all    Food insecurity     Worry: Never true     Inability: Never true    Transportation needs     Medical: No     Non-medical: No   Tobacco Use    Smoking status: Never Smoker    Smokeless tobacco: Never Used   Substance and Sexual Activity    Alcohol use: Yes     Frequency: 2-3 times a week     Drinks per session: 1 or 2     Binge frequency: Never     Comment: socially    Drug use: No    Sexual activity: Yes     Partners: Male     Birth control/protection: None   Lifestyle    Physical activity     Days per week: 6 days     Minutes per session: 60 min    Stress: To some extent   Relationships    Social connections     Talks on phone: Three times a week     Gets together: More than three times a week     Attends Baptism service: Not on file     Active member of club or organization: No     Attends meetings of clubs or organizations: Not on file     Relationship status:    Other Topics Concern    Are you pregnant or think you  "may be? No    Breast-feeding Not Asked   Social History Narrative    Not on file       MEDICATIONS:   Current Outpatient Medications:     clonazePAM (KLONOPIN) 0.5 MG tablet, TAKE 1 TABLET BY MOUTH 2 (TWO) TIMES DAILY AS NEEDED FOR ANXIETY, Disp: 30 tablet, Rfl: 0    levothyroxine (SYNTHROID) 50 MCG tablet, TAKE 1 TABLET BY MOUTH EVERY DAY IN THE MORNING, Disp: 90 tablet, Rfl: 3    meclizine (ANTIVERT) 25 mg tablet, Take 1 tablet (25 mg total) by mouth 3 (three) times daily as needed., Disp: 30 tablet, Rfl: 0    mometasone (NASONEX) 50 mcg/actuation nasal spray, USE 2 SPRAYS BY NASAL ROUTE ONCE DAILY, Disp: 51 g, Rfl: 1    albuterol (PROAIR HFA) 90 mcg/actuation inhaler, Inhale 2 puffs into the lungs every 6 (six) hours as needed for Wheezing or Shortness of Breath. Rescue (Patient not taking: Reported on 12/28/2020), Disp: 18 g, Rfl: 1  ALLERGIES:   Review of patient's allergies indicates:   Allergen Reactions    Anucort-hc [hydrocortisone acetate] Hives and Nausea And Vomiting    Demerol [meperidine] Nausea And Vomiting    Sulfa (sulfonamide antibiotics) Hives    Amoxicillin Nausea And Vomiting and Rash         Objective:     VITAL SIGNS: /80   Ht 5' 2" (1.575 m)   Wt 71.7 kg (158 lb)   BMI 28.90 kg/m²    General    Vitals reviewed.  Constitutional: She is oriented to person, place, and time. She appears well-developed and well-nourished.   Neurological: She is alert and oriented to person, place, and time.   Psychiatric: She has a normal mood and affect. Her behavior is normal.                 MUSCULOSKELETAL EXAM:     Lumbar Spine: left lumbar region    Observation:    Posture:  Posterior pelvis tilt with loss of lumbar lordosis  No obvious pelvic obliquity while standing.    No edema, erythema, or ecchymosis noted in lumbosacral region.    No midline skin abnormalities.    No atrophy of lower limb musculature.  Leg lengths symmetric.  Gait: Non-antalgic with Neutral ankle mechanics and " Neutral medial arch. Gait without trendelenberg, heel walking, toe walking, and tandem walking.    Tenderness:  No tenderness throughout the lumbar spine, iliolumbar region, posterior pelvis.  No tenderness over the sacrum, piriformis, greater/lesser trochanters.  No bony deformities or step-offs palpated.     Range of Motion:  Active flexion to 60°.  Active extension to 25°.   Active rotation to 30° on left and 30° on right.  Active sidebending to 25° on left and 25° on right.   Passive hip flexion to 135° on left and 135° on right.    Passive hip internal rotation to 45° on left and 45° on right.   Passive hip external rotation to 45° on left and 45° on right.   All ROM testing is without pain.    Strength Testing (* = with pain):  Hip flexion - 5/5 on left and 5/5 on right  Hip extension - 5/5 on left and 5/5 on right  Knee flexion - 5/5 on left and 5/5 on right  Knee extension - 5/5 on left and 5/5 on right  Dorsiflexion - 5/5 on left and 5/5 on right  Plantarflexion - 5/5 on left and 5/5 on right  Great toe extension - 5/5 on left and 5/5 on right    Special Tests:    Seated straight leg raise - negative on left and negative on right  Supine straight leg raise - negative on left and negative on right   Slump test - negative on left and negative on right  Provocation maneuvers exhibit no worsening of symptoms.    BOB test - negative  FADIR test - negative  Log roll test - negative    left KNEE EXAMINATION   Affected side is compared to contralateral knee     Observation:  No edema, erythema, ecchymosis, or effusion noted.  No muscle atrophy of the thighs and calves noted.  No obvious bony deformities noted.   No Genu valgus/varum noted.  No recurvatum noted.    No tibial internal/external torsion.      Tenderness:  Patella - + patellofemoral pain       Lateral joint line - none  Quad tendon - none   Medial joint line - none  Patellar tendon - +   Medial plica - none  Tibial tubercle - none   Lateral plica -  none  Pes anserine - none   MCL prox - none  Distal ITB - none   MCL distal - none  MFC - none    LCL prox - none  LFC - none    LCL distal - none  Tibia - none    Fibula - +    No obvious bursae, plicae, popliteal cysts, or tendon derangement palpated.          ROM (* = with pain):   Active extension to 0° on left* without hyperextension, lag, crepitus, or patellar J sign.   Active extension to 0° on right without hyperextension, lag, crepitus, or patellar J sign.  Active flexion to 135° on left* and 135° on right    Strength(* = with pain):  Knee Flexion - 5/5 on left and 5/5 on right  Knee Extension - 5/5 on left and 5/5 on right  Hip Flexion - 5/5 on left and 5/5 on right  Hip Extension - 5/5 on left and 5/5 on right  Ankle dorsiflexion - 5/5 on left and 5/5 on right  Ankle Plantarflexion - 5/5 on left and 5/5 on right  glutaeus medius - 4+/5 on left and 5/5 on right    Patellofemoral Exam:   Patellar ballottement - negative  Bulge sign - negative  Patellar grind - negative    No patellar laxity with medial and lateral translation   No apprehension with medial and lateral patellar translation.     Meniscus Testing:     No pain with terminal extension and flexion at joint lines.  Rommels test - negative   Bounce home test - negative    Ligament Testing:  Lachman's test - negative  No laxity with anterior drawer.  No laxity with posterior drawer.    No posterior sag sign.   Prone dial testing - negative  No laxity with varus testing at 0 and 30 degrees.  No laxity with valgus testing at 0 and 30 degrees.    IT band testing:  Kels test - negative   Noble Compression test - negative    Neurovascular Examination:   Normal gait without antalgia.  Sensation intact to light touch in the obturator, lateral/intermediate/medial/posterior femoral cutaneous, saphenous, and common peroneal nerves bilaterally.  Hamstring/gluteal firing pattern abnormal and asymmetric. Compensatory muscle firing pattern to hamstrings on  left and contralateral upper thoracic and parascapular musculature bilaterally  Motor Function:    Fully intact motor function at hip, knee, foot and ankle.  DTRs: 2+/4 reflexes at L4 and S1 dermatomes.   Negative seated straight leg raise bilaterally.    Pulses intact at the DP and PT arteries bilaterally.    Capillary refill intact <2 seconds in all toes bilaterally.      Structural Exam:  TART (Tissue texture abnormality, Asymmetry,  Restriction of motion and/or Tenderness) changes:       Thoracic Spine   T1 Neutral   T2 Neutral   T3 Neutral   T4 Neutral   T5 Neutral   T6 Neutral   T7 Neutral   T8 Neutral   T9 Neutral   T10 Neutral   T11 Neutral   T12 NS-left,R-right     Rib cage: neutral     Lumbar Spine   L1 NS-left,R-right   L2 NS-left,R-right   L3 Neutral   L4 FRS RIGHT   L5 FRS RIGHT       Pelvis:  · Innominate:Right anterior rotation  · Pubic bone:Right inferior pubic shear    Sacrum:Right unilateral extension    Lower Extremity:  · Leg lengths symmetric following OMT to the pelvis    Location/joint Finding/restriction   Fibular head Posterior on left   Tibia Neutral   Talocrural joint Bilateral   Subtalar Joint Left   Cuboid Neutral   Talo-navicular joint Neutral   Navicular-cuneiform joint Neutral   1st, 2nd, 3rd, 4th, 5th Cuneiform-metatarsal joint Neutral   1st, 2nd, 3rd, 4th, 5th metatarsal Neutral   1st, 2nd, 3rd, 4th, 5th phalange Neutral       Key   F= Flexed   E = Extended   R = Rotated   S = Sidebent   TTA = tissue texture abnormality           Assessment:      Encounter Diagnoses   Name Primary?    Acute mechanical low back pain with duration of less than six weeks Yes    Patellofemoral pain syndrome of left knee     Myalgia     Somatic dysfunction of thoracic region     Somatic dysfunction of lumbar region     Sacral region somatic dysfunction     Somatic dysfunction of pelvic region     Somatic dysfunction of lower extremity           Plan:      1.  Acute left mechanical low back pain  likely secondary to biomechanical restrictions of the lower kinetic chain from recent running with underlying pelvic instability and compensatory firing patterns  - OMT performed today to address associated biomechanical restrictions  and HEP started.     2. Left knee pain secondary to patellofemoral maltracking stemming from weak gluteal muscles and poor pelvic stability with compensatory muscle firing patterns.   - HME order for left Michael-pull light patellar brace fitted for today by Isidra ANGULO - wear with work and activity  - Recommend OTC Voltaren 1% gel x 14 days then prn for pain control  - Recommend Ice up to 20 minutes at a time prn for pain control  - OMT performed today to address biomechanical contributions to maltracking   - Plan for left knee x-rays at next visit if symptoms persist    3. OMT 5-6 regions. Oral consent obtained.  Reviewed benefits and potential side effects.   - OMT indicated today due to signs and symptoms as well as local and remote somatic dysfunction findings and their related neurokinetic, lymphatic, fascial and/or arteriovenous body connections.   - OMT techniques used: Myofascial Release, Muscle Energy and Articulatory   - Treatment was tolerated well. Improvement noted in segmental mobility post-treatment in dysfunctional regions. There were no adverse events and no complications immediately following treatment.     4. Pt. Given the following HEP:  A)  Pelvic clock exercises given to do from the 6-12 o'clock positions:10-15 reps, twice daily. Hand out of exercise also given.   B) Clam shell exercises bilaterally: hold leg in abducted and externally rotated position for 5-10 seconds, repeat 5-10 times  C) Prone leg extension exercise: firing glut max, then extend leg straight 5 inches then lower leg back down, then relax fired glut max. Repeat 5-10 times on each side, BID    [address]11 HOME EXERCISE PROGRAM (HEP):  The patient was taught a homegoing physical therapy regimen as  described above. The patient demonstrated understanding of the exercises and proper technique of their execution. This interaction took 15 minutes.     5. Follow-up in 4 weeks for reevaluation    6. Patient agreeable to today's plan and all questions were answered    This note is dictated using the M*Modal Fluency Direct word recognition program. There are word recognition mistakes that are occasionally missed on review.

## 2021-01-05 ENCOUNTER — PATIENT MESSAGE (OUTPATIENT)
Dept: ADMINISTRATIVE | Facility: HOSPITAL | Age: 51
End: 2021-01-05

## 2021-01-14 ENCOUNTER — PATIENT OUTREACH (OUTPATIENT)
Dept: ADMINISTRATIVE | Facility: HOSPITAL | Age: 51
End: 2021-01-14

## 2021-01-14 RX ORDER — MOMETASONE FUROATE 50 UG/1
SPRAY, METERED NASAL
Qty: 51 G | Refills: 1 | Status: SHIPPED | OUTPATIENT
Start: 2021-01-14 | End: 2021-02-01

## 2021-01-29 ENCOUNTER — TELEPHONE (OUTPATIENT)
Dept: INTERNAL MEDICINE | Facility: CLINIC | Age: 51
End: 2021-01-29

## 2021-02-01 ENCOUNTER — OFFICE VISIT (OUTPATIENT)
Dept: SPORTS MEDICINE | Facility: CLINIC | Age: 51
End: 2021-02-01
Payer: COMMERCIAL

## 2021-02-01 ENCOUNTER — APPOINTMENT (OUTPATIENT)
Dept: RADIOLOGY | Facility: OTHER | Age: 51
End: 2021-02-01
Attending: NEUROMUSCULOSKELETAL MEDICINE & OMM
Payer: COMMERCIAL

## 2021-02-01 VITALS
HEIGHT: 62 IN | SYSTOLIC BLOOD PRESSURE: 110 MMHG | DIASTOLIC BLOOD PRESSURE: 80 MMHG | BODY MASS INDEX: 29.08 KG/M2 | WEIGHT: 158.06 LBS

## 2021-02-01 DIAGNOSIS — M54.59 MECHANICAL LOW BACK PAIN: ICD-10-CM

## 2021-02-01 DIAGNOSIS — M62.89 TIGHTNESS OF LEFT GASTROCNEMIUS MUSCLE: ICD-10-CM

## 2021-02-01 DIAGNOSIS — M22.2X2 PATELLOFEMORAL PAIN SYNDROME OF LEFT KNEE: ICD-10-CM

## 2021-02-01 DIAGNOSIS — M99.06 SOMATIC DYSFUNCTION OF LOWER EXTREMITY: ICD-10-CM

## 2021-02-01 DIAGNOSIS — M79.10 MYALGIA: ICD-10-CM

## 2021-02-01 DIAGNOSIS — M99.05 SOMATIC DYSFUNCTION OF PELVIC REGION: ICD-10-CM

## 2021-02-01 DIAGNOSIS — M99.03 SOMATIC DYSFUNCTION OF LUMBAR REGION: ICD-10-CM

## 2021-02-01 DIAGNOSIS — M22.2X2 PATELLOFEMORAL PAIN SYNDROME OF LEFT KNEE: Primary | ICD-10-CM

## 2021-02-01 PROCEDURE — 73562 X-RAY EXAM OF KNEE 3: CPT | Mod: 26,RT,, | Performed by: RADIOLOGY

## 2021-02-01 PROCEDURE — 3074F SYST BP LT 130 MM HG: CPT | Mod: CPTII,S$GLB,, | Performed by: NEUROMUSCULOSKELETAL MEDICINE & OMM

## 2021-02-01 PROCEDURE — 3008F PR BODY MASS INDEX (BMI) DOCUMENTED: ICD-10-PCS | Mod: CPTII,S$GLB,, | Performed by: NEUROMUSCULOSKELETAL MEDICINE & OMM

## 2021-02-01 PROCEDURE — 73562 XR KNEE ORTHO LEFT WITH FLEXION: ICD-10-PCS | Mod: 26,RT,, | Performed by: RADIOLOGY

## 2021-02-01 PROCEDURE — 97110 PR THERAPEUTIC EXERCISES: ICD-10-PCS | Mod: S$GLB,,, | Performed by: NEUROMUSCULOSKELETAL MEDICINE & OMM

## 2021-02-01 PROCEDURE — 73564 XR KNEE ORTHO LEFT WITH FLEXION: ICD-10-PCS | Mod: 26,LT,, | Performed by: RADIOLOGY

## 2021-02-01 PROCEDURE — 99214 PR OFFICE/OUTPT VISIT, EST, LEVL IV, 30-39 MIN: ICD-10-PCS | Mod: 25,S$GLB,, | Performed by: NEUROMUSCULOSKELETAL MEDICINE & OMM

## 2021-02-01 PROCEDURE — 99214 OFFICE O/P EST MOD 30 MIN: CPT | Mod: 25,S$GLB,, | Performed by: NEUROMUSCULOSKELETAL MEDICINE & OMM

## 2021-02-01 PROCEDURE — 73564 X-RAY EXAM KNEE 4 OR MORE: CPT | Mod: 26,LT,, | Performed by: RADIOLOGY

## 2021-02-01 PROCEDURE — 73564 X-RAY EXAM KNEE 4 OR MORE: CPT | Mod: TC,LT

## 2021-02-01 PROCEDURE — 98926 PR OSTEOPATHIC MANIP,3-4 BODY REGN: ICD-10-PCS | Mod: S$GLB,,, | Performed by: NEUROMUSCULOSKELETAL MEDICINE & OMM

## 2021-02-01 PROCEDURE — 3079F DIAST BP 80-89 MM HG: CPT | Mod: CPTII,S$GLB,, | Performed by: NEUROMUSCULOSKELETAL MEDICINE & OMM

## 2021-02-01 PROCEDURE — 97110 THERAPEUTIC EXERCISES: CPT | Mod: S$GLB,,, | Performed by: NEUROMUSCULOSKELETAL MEDICINE & OMM

## 2021-02-01 PROCEDURE — 99999 PR PBB SHADOW E&M-EST. PATIENT-LVL III: CPT | Mod: PBBFAC,,, | Performed by: NEUROMUSCULOSKELETAL MEDICINE & OMM

## 2021-02-01 PROCEDURE — 98926 OSTEOPATH MANJ 3-4 REGIONS: CPT | Mod: S$GLB,,, | Performed by: NEUROMUSCULOSKELETAL MEDICINE & OMM

## 2021-02-01 PROCEDURE — 3079F PR MOST RECENT DIASTOLIC BLOOD PRESSURE 80-89 MM HG: ICD-10-PCS | Mod: CPTII,S$GLB,, | Performed by: NEUROMUSCULOSKELETAL MEDICINE & OMM

## 2021-02-01 PROCEDURE — 99999 PR PBB SHADOW E&M-EST. PATIENT-LVL III: ICD-10-PCS | Mod: PBBFAC,,, | Performed by: NEUROMUSCULOSKELETAL MEDICINE & OMM

## 2021-02-01 PROCEDURE — 3074F PR MOST RECENT SYSTOLIC BLOOD PRESSURE < 130 MM HG: ICD-10-PCS | Mod: CPTII,S$GLB,, | Performed by: NEUROMUSCULOSKELETAL MEDICINE & OMM

## 2021-02-01 PROCEDURE — 3008F BODY MASS INDEX DOCD: CPT | Mod: CPTII,S$GLB,, | Performed by: NEUROMUSCULOSKELETAL MEDICINE & OMM

## 2021-02-04 ENCOUNTER — PATIENT MESSAGE (OUTPATIENT)
Dept: INTERNAL MEDICINE | Facility: CLINIC | Age: 51
End: 2021-02-04

## 2021-02-04 ENCOUNTER — PATIENT MESSAGE (OUTPATIENT)
Dept: SPORTS MEDICINE | Facility: CLINIC | Age: 51
End: 2021-02-04

## 2021-02-04 ENCOUNTER — PATIENT MESSAGE (OUTPATIENT)
Dept: SPINE | Facility: CLINIC | Age: 51
End: 2021-02-04

## 2021-02-11 ENCOUNTER — TELEPHONE (OUTPATIENT)
Dept: INTERNAL MEDICINE | Facility: CLINIC | Age: 51
End: 2021-02-11

## 2021-02-11 ENCOUNTER — PATIENT MESSAGE (OUTPATIENT)
Dept: FAMILY MEDICINE | Facility: CLINIC | Age: 51
End: 2021-02-11

## 2021-02-15 ENCOUNTER — OFFICE VISIT (OUTPATIENT)
Dept: SPORTS MEDICINE | Facility: CLINIC | Age: 51
End: 2021-02-15
Payer: COMMERCIAL

## 2021-02-15 VITALS — SYSTOLIC BLOOD PRESSURE: 123 MMHG | DIASTOLIC BLOOD PRESSURE: 76 MMHG

## 2021-02-15 DIAGNOSIS — M99.05 SOMATIC DYSFUNCTION OF PELVIC REGION: ICD-10-CM

## 2021-02-15 DIAGNOSIS — M79.10 MYALGIA: ICD-10-CM

## 2021-02-15 DIAGNOSIS — M54.59 MECHANICAL LOW BACK PAIN: ICD-10-CM

## 2021-02-15 DIAGNOSIS — M99.06 SOMATIC DYSFUNCTION OF LOWER EXTREMITY: ICD-10-CM

## 2021-02-15 DIAGNOSIS — M99.02 SOMATIC DYSFUNCTION OF THORACIC REGION: ICD-10-CM

## 2021-02-15 DIAGNOSIS — M22.2X2 PATELLOFEMORAL PAIN SYNDROME OF LEFT KNEE: Primary | ICD-10-CM

## 2021-02-15 DIAGNOSIS — M99.03 SOMATIC DYSFUNCTION OF LUMBAR REGION: ICD-10-CM

## 2021-02-15 DIAGNOSIS — M62.89 TIGHTNESS OF LEFT GASTROCNEMIUS MUSCLE: ICD-10-CM

## 2021-02-15 DIAGNOSIS — M99.04 SACRAL REGION SOMATIC DYSFUNCTION: ICD-10-CM

## 2021-02-15 PROCEDURE — 98927 PR OSTEOPATHIC MANIP,5-6 BODY REGN: ICD-10-PCS | Mod: S$GLB,,, | Performed by: NEUROMUSCULOSKELETAL MEDICINE & OMM

## 2021-02-15 PROCEDURE — 99999 PR PBB SHADOW E&M-EST. PATIENT-LVL II: ICD-10-PCS | Mod: PBBFAC,,, | Performed by: NEUROMUSCULOSKELETAL MEDICINE & OMM

## 2021-02-15 PROCEDURE — 3078F DIAST BP <80 MM HG: CPT | Mod: CPTII,S$GLB,, | Performed by: NEUROMUSCULOSKELETAL MEDICINE & OMM

## 2021-02-15 PROCEDURE — 97110 THERAPEUTIC EXERCISES: CPT | Mod: S$GLB,,, | Performed by: NEUROMUSCULOSKELETAL MEDICINE & OMM

## 2021-02-15 PROCEDURE — 99214 OFFICE O/P EST MOD 30 MIN: CPT | Mod: 25,S$GLB,, | Performed by: NEUROMUSCULOSKELETAL MEDICINE & OMM

## 2021-02-15 PROCEDURE — 98927 OSTEOPATH MANJ 5-6 REGIONS: CPT | Mod: S$GLB,,, | Performed by: NEUROMUSCULOSKELETAL MEDICINE & OMM

## 2021-02-15 PROCEDURE — 3078F PR MOST RECENT DIASTOLIC BLOOD PRESSURE < 80 MM HG: ICD-10-PCS | Mod: CPTII,S$GLB,, | Performed by: NEUROMUSCULOSKELETAL MEDICINE & OMM

## 2021-02-15 PROCEDURE — 3074F SYST BP LT 130 MM HG: CPT | Mod: CPTII,S$GLB,, | Performed by: NEUROMUSCULOSKELETAL MEDICINE & OMM

## 2021-02-15 PROCEDURE — 97110 PR THERAPEUTIC EXERCISES: ICD-10-PCS | Mod: S$GLB,,, | Performed by: NEUROMUSCULOSKELETAL MEDICINE & OMM

## 2021-02-15 PROCEDURE — 3074F PR MOST RECENT SYSTOLIC BLOOD PRESSURE < 130 MM HG: ICD-10-PCS | Mod: CPTII,S$GLB,, | Performed by: NEUROMUSCULOSKELETAL MEDICINE & OMM

## 2021-02-15 PROCEDURE — 99214 PR OFFICE/OUTPT VISIT, EST, LEVL IV, 30-39 MIN: ICD-10-PCS | Mod: 25,S$GLB,, | Performed by: NEUROMUSCULOSKELETAL MEDICINE & OMM

## 2021-02-15 PROCEDURE — 99999 PR PBB SHADOW E&M-EST. PATIENT-LVL II: CPT | Mod: PBBFAC,,, | Performed by: NEUROMUSCULOSKELETAL MEDICINE & OMM

## 2021-02-15 RX ORDER — MELOXICAM 7.5 MG/1
7.5 TABLET ORAL DAILY
Qty: 30 TABLET | Refills: 0 | Status: SHIPPED | OUTPATIENT
Start: 2021-02-15 | End: 2021-03-08

## 2021-02-20 ENCOUNTER — LAB VISIT (OUTPATIENT)
Dept: LAB | Facility: HOSPITAL | Age: 51
End: 2021-02-20
Attending: INTERNAL MEDICINE
Payer: COMMERCIAL

## 2021-02-20 DIAGNOSIS — Z12.11 COLON CANCER SCREENING: ICD-10-CM

## 2021-02-20 PROCEDURE — 82274 ASSAY TEST FOR BLOOD FECAL: CPT

## 2021-02-25 LAB — HEMOCCULT STL QL IA: NEGATIVE

## 2021-03-03 ENCOUNTER — PATIENT OUTREACH (OUTPATIENT)
Dept: ADMINISTRATIVE | Facility: OTHER | Age: 51
End: 2021-03-03

## 2021-03-05 ENCOUNTER — OFFICE VISIT (OUTPATIENT)
Dept: DERMATOLOGY | Facility: CLINIC | Age: 51
End: 2021-03-05
Payer: COMMERCIAL

## 2021-03-05 DIAGNOSIS — B07.9 VERRUCA VULGARIS: Primary | ICD-10-CM

## 2021-03-05 DIAGNOSIS — B35.3 TINEA PEDIS OF BOTH FEET: ICD-10-CM

## 2021-03-05 DIAGNOSIS — L60.3 ONYCHODYSTROPHY: ICD-10-CM

## 2021-03-05 PROCEDURE — 1126F AMNT PAIN NOTED NONE PRSNT: CPT | Mod: S$GLB,,, | Performed by: PHYSICIAN ASSISTANT

## 2021-03-05 PROCEDURE — 87107 FUNGI IDENTIFICATION MOLD: CPT | Mod: 59 | Performed by: PHYSICIAN ASSISTANT

## 2021-03-05 PROCEDURE — 99999 PR PBB SHADOW E&M-EST. PATIENT-LVL II: ICD-10-PCS | Mod: PBBFAC,,, | Performed by: PHYSICIAN ASSISTANT

## 2021-03-05 PROCEDURE — 87101 SKIN FUNGI CULTURE: CPT | Performed by: PHYSICIAN ASSISTANT

## 2021-03-05 PROCEDURE — 99204 OFFICE O/P NEW MOD 45 MIN: CPT | Mod: S$GLB,,, | Performed by: PHYSICIAN ASSISTANT

## 2021-03-05 PROCEDURE — 99204 PR OFFICE/OUTPT VISIT, NEW, LEVL IV, 45-59 MIN: ICD-10-PCS | Mod: S$GLB,,, | Performed by: PHYSICIAN ASSISTANT

## 2021-03-05 PROCEDURE — 1126F PR PAIN SEVERITY QUANTIFIED, NO PAIN PRESENT: ICD-10-PCS | Mod: S$GLB,,, | Performed by: PHYSICIAN ASSISTANT

## 2021-03-05 PROCEDURE — 99999 PR PBB SHADOW E&M-EST. PATIENT-LVL II: CPT | Mod: PBBFAC,,, | Performed by: PHYSICIAN ASSISTANT

## 2021-03-05 RX ORDER — KETOCONAZOLE 20 MG/G
CREAM TOPICAL
Qty: 60 G | Refills: 3 | Status: SHIPPED | OUTPATIENT
Start: 2021-03-05 | End: 2023-08-30

## 2021-03-08 ENCOUNTER — OFFICE VISIT (OUTPATIENT)
Dept: SPORTS MEDICINE | Facility: CLINIC | Age: 51
End: 2021-03-08
Payer: COMMERCIAL

## 2021-03-08 VITALS
WEIGHT: 158.06 LBS | BODY MASS INDEX: 29.08 KG/M2 | HEIGHT: 62 IN | SYSTOLIC BLOOD PRESSURE: 128 MMHG | DIASTOLIC BLOOD PRESSURE: 80 MMHG

## 2021-03-08 DIAGNOSIS — M22.2X2 PATELLOFEMORAL PAIN SYNDROME OF LEFT KNEE: Primary | ICD-10-CM

## 2021-03-08 DIAGNOSIS — M99.02 SOMATIC DYSFUNCTION OF THORACIC REGION: ICD-10-CM

## 2021-03-08 DIAGNOSIS — M79.10 MYALGIA: ICD-10-CM

## 2021-03-08 DIAGNOSIS — M54.59 MECHANICAL LOW BACK PAIN: ICD-10-CM

## 2021-03-08 DIAGNOSIS — M99.03 SOMATIC DYSFUNCTION OF LUMBAR REGION: ICD-10-CM

## 2021-03-08 DIAGNOSIS — M62.89 TIGHTNESS OF LEFT GASTROCNEMIUS MUSCLE: ICD-10-CM

## 2021-03-08 DIAGNOSIS — M99.06 SOMATIC DYSFUNCTION OF LOWER EXTREMITY: ICD-10-CM

## 2021-03-08 PROCEDURE — 99213 PR OFFICE/OUTPT VISIT, EST, LEVL III, 20-29 MIN: ICD-10-PCS | Mod: 25,S$GLB,, | Performed by: NEUROMUSCULOSKELETAL MEDICINE & OMM

## 2021-03-08 PROCEDURE — 3079F PR MOST RECENT DIASTOLIC BLOOD PRESSURE 80-89 MM HG: ICD-10-PCS | Mod: CPTII,S$GLB,, | Performed by: NEUROMUSCULOSKELETAL MEDICINE & OMM

## 2021-03-08 PROCEDURE — 3074F PR MOST RECENT SYSTOLIC BLOOD PRESSURE < 130 MM HG: ICD-10-PCS | Mod: CPTII,S$GLB,, | Performed by: NEUROMUSCULOSKELETAL MEDICINE & OMM

## 2021-03-08 PROCEDURE — 98926 PR OSTEOPATHIC MANIP,3-4 BODY REGN: ICD-10-PCS | Mod: S$GLB,,, | Performed by: NEUROMUSCULOSKELETAL MEDICINE & OMM

## 2021-03-08 PROCEDURE — 98926 OSTEOPATH MANJ 3-4 REGIONS: CPT | Mod: S$GLB,,, | Performed by: NEUROMUSCULOSKELETAL MEDICINE & OMM

## 2021-03-08 PROCEDURE — 3074F SYST BP LT 130 MM HG: CPT | Mod: CPTII,S$GLB,, | Performed by: NEUROMUSCULOSKELETAL MEDICINE & OMM

## 2021-03-08 PROCEDURE — 99213 OFFICE O/P EST LOW 20 MIN: CPT | Mod: 25,S$GLB,, | Performed by: NEUROMUSCULOSKELETAL MEDICINE & OMM

## 2021-03-08 PROCEDURE — 3008F BODY MASS INDEX DOCD: CPT | Mod: CPTII,S$GLB,, | Performed by: NEUROMUSCULOSKELETAL MEDICINE & OMM

## 2021-03-08 PROCEDURE — 3079F DIAST BP 80-89 MM HG: CPT | Mod: CPTII,S$GLB,, | Performed by: NEUROMUSCULOSKELETAL MEDICINE & OMM

## 2021-03-08 PROCEDURE — 3008F PR BODY MASS INDEX (BMI) DOCUMENTED: ICD-10-PCS | Mod: CPTII,S$GLB,, | Performed by: NEUROMUSCULOSKELETAL MEDICINE & OMM

## 2021-03-08 PROCEDURE — 99999 PR PBB SHADOW E&M-EST. PATIENT-LVL III: CPT | Mod: PBBFAC,,, | Performed by: NEUROMUSCULOSKELETAL MEDICINE & OMM

## 2021-03-08 PROCEDURE — 99999 PR PBB SHADOW E&M-EST. PATIENT-LVL III: ICD-10-PCS | Mod: PBBFAC,,, | Performed by: NEUROMUSCULOSKELETAL MEDICINE & OMM

## 2021-03-13 ENCOUNTER — IMMUNIZATION (OUTPATIENT)
Dept: PRIMARY CARE CLINIC | Facility: CLINIC | Age: 51
End: 2021-03-13
Payer: COMMERCIAL

## 2021-03-13 DIAGNOSIS — Z23 NEED FOR VACCINATION: Primary | ICD-10-CM

## 2021-03-13 PROCEDURE — 91300 PR SARS-COV- 2 COVID-19 VACCINE, NO PRSV, 30MCG/0.3ML, IM: CPT | Mod: S$GLB,,, | Performed by: INTERNAL MEDICINE

## 2021-03-13 PROCEDURE — 0001A PR IMMUNIZ ADMIN, SARS-COV-2 COVID-19 VACC, 30MCG/0.3ML, 1ST DOSE: CPT | Mod: CV19,S$GLB,, | Performed by: INTERNAL MEDICINE

## 2021-03-13 PROCEDURE — 91300 PR SARS-COV- 2 COVID-19 VACCINE, NO PRSV, 30MCG/0.3ML, IM: ICD-10-PCS | Mod: S$GLB,,, | Performed by: INTERNAL MEDICINE

## 2021-03-13 PROCEDURE — 0001A PR IMMUNIZ ADMIN, SARS-COV-2 COVID-19 VACC, 30MCG/0.3ML, 1ST DOSE: ICD-10-PCS | Mod: CV19,S$GLB,, | Performed by: INTERNAL MEDICINE

## 2021-03-13 RX ADMIN — Medication 0.3 ML: at 08:03

## 2021-03-25 ENCOUNTER — PATIENT MESSAGE (OUTPATIENT)
Dept: DERMATOLOGY | Facility: CLINIC | Age: 51
End: 2021-03-25

## 2021-03-25 DIAGNOSIS — B35.1 ONYCHOMYCOSIS DUE TO DERMATOPHYTE: Primary | ICD-10-CM

## 2021-03-25 RX ORDER — EFINACONAZOLE 100 MG/ML
SOLUTION TOPICAL
Qty: 4 ML | Refills: 3 | Status: SHIPPED | OUTPATIENT
Start: 2021-03-25 | End: 2023-08-30

## 2021-04-03 ENCOUNTER — IMMUNIZATION (OUTPATIENT)
Dept: PRIMARY CARE CLINIC | Facility: CLINIC | Age: 51
End: 2021-04-03
Payer: COMMERCIAL

## 2021-04-03 DIAGNOSIS — Z23 NEED FOR VACCINATION: Primary | ICD-10-CM

## 2021-04-03 PROCEDURE — 0002A PR IMMUNIZ ADMIN, SARS-COV-2 COVID-19 VACC, 30MCG/0.3ML, 2ND DOSE: CPT | Mod: CV19,S$GLB,, | Performed by: INTERNAL MEDICINE

## 2021-04-03 PROCEDURE — 0002A PR IMMUNIZ ADMIN, SARS-COV-2 COVID-19 VACC, 30MCG/0.3ML, 2ND DOSE: ICD-10-PCS | Mod: CV19,S$GLB,, | Performed by: INTERNAL MEDICINE

## 2021-04-03 PROCEDURE — 91300 PR SARS-COV- 2 COVID-19 VACCINE, NO PRSV, 30MCG/0.3ML, IM: ICD-10-PCS | Mod: S$GLB,,, | Performed by: INTERNAL MEDICINE

## 2021-04-03 PROCEDURE — 91300 PR SARS-COV- 2 COVID-19 VACCINE, NO PRSV, 30MCG/0.3ML, IM: CPT | Mod: S$GLB,,, | Performed by: INTERNAL MEDICINE

## 2021-04-03 RX ADMIN — Medication 0.3 ML: at 08:04

## 2021-04-06 LAB — FUNGUS BLD CULT: ABNORMAL

## 2021-04-08 ENCOUNTER — OFFICE VISIT (OUTPATIENT)
Dept: PRIMARY CARE CLINIC | Facility: CLINIC | Age: 51
End: 2021-04-08
Payer: COMMERCIAL

## 2021-04-08 VITALS
OXYGEN SATURATION: 97 % | HEART RATE: 67 BPM | SYSTOLIC BLOOD PRESSURE: 126 MMHG | BODY MASS INDEX: 28.93 KG/M2 | HEIGHT: 62 IN | WEIGHT: 157.19 LBS | TEMPERATURE: 98 F | DIASTOLIC BLOOD PRESSURE: 78 MMHG

## 2021-04-08 DIAGNOSIS — B34.9 VIRAL INFECTION: Primary | ICD-10-CM

## 2021-04-08 DIAGNOSIS — R05.9 COUGH: ICD-10-CM

## 2021-04-08 PROCEDURE — 99999 PR PBB SHADOW E&M-EST. PATIENT-LVL IV: ICD-10-PCS | Mod: PBBFAC,,, | Performed by: FAMILY MEDICINE

## 2021-04-08 PROCEDURE — 99213 PR OFFICE/OUTPT VISIT, EST, LEVL III, 20-29 MIN: ICD-10-PCS | Mod: S$GLB,,, | Performed by: FAMILY MEDICINE

## 2021-04-08 PROCEDURE — 3008F BODY MASS INDEX DOCD: CPT | Mod: CPTII,S$GLB,, | Performed by: FAMILY MEDICINE

## 2021-04-08 PROCEDURE — 3008F PR BODY MASS INDEX (BMI) DOCUMENTED: ICD-10-PCS | Mod: CPTII,S$GLB,, | Performed by: FAMILY MEDICINE

## 2021-04-08 PROCEDURE — 1125F PR PAIN SEVERITY QUANTIFIED, PAIN PRESENT: ICD-10-PCS | Mod: S$GLB,,, | Performed by: FAMILY MEDICINE

## 2021-04-08 PROCEDURE — U0003 INFECTIOUS AGENT DETECTION BY NUCLEIC ACID (DNA OR RNA); SEVERE ACUTE RESPIRATORY SYNDROME CORONAVIRUS 2 (SARS-COV-2) (CORONAVIRUS DISEASE [COVID-19]), AMPLIFIED PROBE TECHNIQUE, MAKING USE OF HIGH THROUGHPUT TECHNOLOGIES AS DESCRIBED BY CMS-2020-01-R: HCPCS | Performed by: FAMILY MEDICINE

## 2021-04-08 PROCEDURE — 99999 PR PBB SHADOW E&M-EST. PATIENT-LVL IV: CPT | Mod: PBBFAC,,, | Performed by: FAMILY MEDICINE

## 2021-04-08 PROCEDURE — 1125F AMNT PAIN NOTED PAIN PRSNT: CPT | Mod: S$GLB,,, | Performed by: FAMILY MEDICINE

## 2021-04-08 PROCEDURE — 99213 OFFICE O/P EST LOW 20 MIN: CPT | Mod: S$GLB,,, | Performed by: FAMILY MEDICINE

## 2021-04-08 PROCEDURE — U0005 INFEC AGEN DETEC AMPLI PROBE: HCPCS | Performed by: FAMILY MEDICINE

## 2021-04-08 RX ORDER — AZITHROMYCIN 250 MG/1
TABLET, FILM COATED ORAL
Qty: 6 TABLET | Refills: 0 | Status: SHIPPED | OUTPATIENT
Start: 2021-04-08 | End: 2021-04-13

## 2021-04-08 RX ORDER — IBUPROFEN 800 MG/1
800 TABLET ORAL 3 TIMES DAILY PRN
Qty: 30 TABLET | Refills: 0 | Status: SHIPPED | OUTPATIENT
Start: 2021-04-08 | End: 2021-08-27

## 2021-04-08 RX ORDER — PROMETHAZINE HYDROCHLORIDE AND CODEINE PHOSPHATE 6.25; 1 MG/5ML; MG/5ML
5 SOLUTION ORAL NIGHTLY PRN
Qty: 118 ML | Refills: 0 | Status: SHIPPED | OUTPATIENT
Start: 2021-04-08 | End: 2021-08-27

## 2021-04-08 RX ORDER — FLUCONAZOLE 150 MG/1
150 TABLET ORAL ONCE
Qty: 1 TABLET | Refills: 1 | Status: SHIPPED | OUTPATIENT
Start: 2021-04-08 | End: 2021-04-08

## 2021-04-08 RX ORDER — GUAIFENESIN 100 MG/5ML
200 SOLUTION ORAL 3 TIMES DAILY PRN
COMMUNITY
End: 2021-08-27

## 2021-04-09 ENCOUNTER — TELEPHONE (OUTPATIENT)
Dept: PRIMARY CARE CLINIC | Facility: CLINIC | Age: 51
End: 2021-04-09

## 2021-04-09 LAB — SARS-COV-2 RNA RESP QL NAA+PROBE: NOT DETECTED

## 2021-08-14 ENCOUNTER — LAB VISIT (OUTPATIENT)
Dept: LAB | Facility: HOSPITAL | Age: 51
End: 2021-08-14
Attending: INTERNAL MEDICINE
Payer: COMMERCIAL

## 2021-08-14 DIAGNOSIS — Z00.00 WELLNESS EXAMINATION: ICD-10-CM

## 2021-08-14 DIAGNOSIS — E03.9 ACQUIRED HYPOTHYROIDISM: ICD-10-CM

## 2021-08-14 LAB
ALBUMIN SERPL BCP-MCNC: 3.8 G/DL (ref 3.5–5.2)
ALP SERPL-CCNC: 54 U/L (ref 55–135)
ALT SERPL W/O P-5'-P-CCNC: 24 U/L (ref 10–44)
ANION GAP SERPL CALC-SCNC: 10 MMOL/L (ref 8–16)
AST SERPL-CCNC: 31 U/L (ref 10–40)
BASOPHILS # BLD AUTO: 0.04 K/UL (ref 0–0.2)
BASOPHILS NFR BLD: 0.8 % (ref 0–1.9)
BILIRUB SERPL-MCNC: 0.6 MG/DL (ref 0.1–1)
BUN SERPL-MCNC: 10 MG/DL (ref 6–20)
CALCIUM SERPL-MCNC: 9.4 MG/DL (ref 8.7–10.5)
CHLORIDE SERPL-SCNC: 101 MMOL/L (ref 95–110)
CHOLEST SERPL-MCNC: 187 MG/DL (ref 120–199)
CHOLEST/HDLC SERPL: 2.7 {RATIO} (ref 2–5)
CO2 SERPL-SCNC: 24 MMOL/L (ref 23–29)
CREAT SERPL-MCNC: 0.7 MG/DL (ref 0.5–1.4)
DIFFERENTIAL METHOD: NORMAL
EOSINOPHIL # BLD AUTO: 0.2 K/UL (ref 0–0.5)
EOSINOPHIL NFR BLD: 3.8 % (ref 0–8)
ERYTHROCYTE [DISTWIDTH] IN BLOOD BY AUTOMATED COUNT: 11.9 % (ref 11.5–14.5)
EST. GFR  (AFRICAN AMERICAN): >60 ML/MIN/1.73 M^2
EST. GFR  (NON AFRICAN AMERICAN): >60 ML/MIN/1.73 M^2
GLUCOSE SERPL-MCNC: 86 MG/DL (ref 70–110)
HCT VFR BLD AUTO: 42.1 % (ref 37–48.5)
HDLC SERPL-MCNC: 69 MG/DL (ref 40–75)
HDLC SERPL: 36.9 % (ref 20–50)
HGB BLD-MCNC: 13.9 G/DL (ref 12–16)
IMM GRANULOCYTES # BLD AUTO: 0.01 K/UL (ref 0–0.04)
IMM GRANULOCYTES NFR BLD AUTO: 0.2 % (ref 0–0.5)
LDLC SERPL CALC-MCNC: 104.2 MG/DL (ref 63–159)
LYMPHOCYTES # BLD AUTO: 2 K/UL (ref 1–4.8)
LYMPHOCYTES NFR BLD: 42 % (ref 18–48)
MCH RBC QN AUTO: 30.4 PG (ref 27–31)
MCHC RBC AUTO-ENTMCNC: 33 G/DL (ref 32–36)
MCV RBC AUTO: 92 FL (ref 82–98)
MONOCYTES # BLD AUTO: 0.3 K/UL (ref 0.3–1)
MONOCYTES NFR BLD: 6.8 % (ref 4–15)
NEUTROPHILS # BLD AUTO: 2.2 K/UL (ref 1.8–7.7)
NEUTROPHILS NFR BLD: 46.4 % (ref 38–73)
NONHDLC SERPL-MCNC: 118 MG/DL
NRBC BLD-RTO: 0 /100 WBC
PLATELET # BLD AUTO: 316 K/UL (ref 150–450)
PMV BLD AUTO: 10.1 FL (ref 9.2–12.9)
POTASSIUM SERPL-SCNC: 4.8 MMOL/L (ref 3.5–5.1)
PROT SERPL-MCNC: 6.8 G/DL (ref 6–8.4)
RBC # BLD AUTO: 4.57 M/UL (ref 4–5.4)
SODIUM SERPL-SCNC: 135 MMOL/L (ref 136–145)
TRIGL SERPL-MCNC: 69 MG/DL (ref 30–150)
TSH SERPL DL<=0.005 MIU/L-ACNC: 2.08 UIU/ML (ref 0.4–4)
WBC # BLD AUTO: 4.71 K/UL (ref 3.9–12.7)

## 2021-08-14 PROCEDURE — 80053 COMPREHEN METABOLIC PANEL: CPT | Performed by: INTERNAL MEDICINE

## 2021-08-14 PROCEDURE — 84443 ASSAY THYROID STIM HORMONE: CPT | Performed by: INTERNAL MEDICINE

## 2021-08-14 PROCEDURE — 86803 HEPATITIS C AB TEST: CPT | Performed by: INTERNAL MEDICINE

## 2021-08-14 PROCEDURE — 85025 COMPLETE CBC W/AUTO DIFF WBC: CPT | Performed by: INTERNAL MEDICINE

## 2021-08-14 PROCEDURE — 87389 HIV-1 AG W/HIV-1&-2 AB AG IA: CPT | Performed by: INTERNAL MEDICINE

## 2021-08-14 PROCEDURE — 80061 LIPID PANEL: CPT | Performed by: INTERNAL MEDICINE

## 2021-08-14 PROCEDURE — 36415 COLL VENOUS BLD VENIPUNCTURE: CPT | Performed by: INTERNAL MEDICINE

## 2021-08-16 LAB
HCV AB SERPL QL IA: NEGATIVE
HIV 1+2 AB+HIV1 P24 AG SERPL QL IA: NEGATIVE

## 2021-08-27 ENCOUNTER — OFFICE VISIT (OUTPATIENT)
Dept: INTERNAL MEDICINE | Facility: CLINIC | Age: 51
End: 2021-08-27
Payer: COMMERCIAL

## 2021-08-27 ENCOUNTER — TELEPHONE (OUTPATIENT)
Dept: INTERNAL MEDICINE | Facility: CLINIC | Age: 51
End: 2021-08-27

## 2021-08-27 VITALS
OXYGEN SATURATION: 98 % | HEIGHT: 62 IN | HEART RATE: 68 BPM | SYSTOLIC BLOOD PRESSURE: 118 MMHG | DIASTOLIC BLOOD PRESSURE: 82 MMHG | WEIGHT: 153.44 LBS | BODY MASS INDEX: 28.24 KG/M2

## 2021-08-27 DIAGNOSIS — K76.89 FOCAL NODULAR HYPERPLASIA OF LIVER: ICD-10-CM

## 2021-08-27 DIAGNOSIS — Z00.00 WELLNESS EXAMINATION: Primary | ICD-10-CM

## 2021-08-27 DIAGNOSIS — Z12.31 ENCOUNTER FOR SCREENING MAMMOGRAM FOR BREAST CANCER: ICD-10-CM

## 2021-08-27 DIAGNOSIS — E03.9 ACQUIRED HYPOTHYROIDISM: ICD-10-CM

## 2021-08-27 PROCEDURE — 3074F PR MOST RECENT SYSTOLIC BLOOD PRESSURE < 130 MM HG: ICD-10-PCS | Mod: CPTII,S$GLB,, | Performed by: INTERNAL MEDICINE

## 2021-08-27 PROCEDURE — 3079F DIAST BP 80-89 MM HG: CPT | Mod: CPTII,S$GLB,, | Performed by: INTERNAL MEDICINE

## 2021-08-27 PROCEDURE — 1159F PR MEDICATION LIST DOCUMENTED IN MEDICAL RECORD: ICD-10-PCS | Mod: CPTII,S$GLB,, | Performed by: INTERNAL MEDICINE

## 2021-08-27 PROCEDURE — 1159F MED LIST DOCD IN RCRD: CPT | Mod: CPTII,S$GLB,, | Performed by: INTERNAL MEDICINE

## 2021-08-27 PROCEDURE — 99396 PREV VISIT EST AGE 40-64: CPT | Mod: S$GLB,,, | Performed by: INTERNAL MEDICINE

## 2021-08-27 PROCEDURE — 1126F PR PAIN SEVERITY QUANTIFIED, NO PAIN PRESENT: ICD-10-PCS | Mod: CPTII,S$GLB,, | Performed by: INTERNAL MEDICINE

## 2021-08-27 PROCEDURE — 1126F AMNT PAIN NOTED NONE PRSNT: CPT | Mod: CPTII,S$GLB,, | Performed by: INTERNAL MEDICINE

## 2021-08-27 PROCEDURE — 99999 PR PBB SHADOW E&M-EST. PATIENT-LVL III: CPT | Mod: PBBFAC,,, | Performed by: INTERNAL MEDICINE

## 2021-08-27 PROCEDURE — 3074F SYST BP LT 130 MM HG: CPT | Mod: CPTII,S$GLB,, | Performed by: INTERNAL MEDICINE

## 2021-08-27 PROCEDURE — 3008F PR BODY MASS INDEX (BMI) DOCUMENTED: ICD-10-PCS | Mod: CPTII,S$GLB,, | Performed by: INTERNAL MEDICINE

## 2021-08-27 PROCEDURE — 3008F BODY MASS INDEX DOCD: CPT | Mod: CPTII,S$GLB,, | Performed by: INTERNAL MEDICINE

## 2021-08-27 PROCEDURE — 99396 PR PREVENTIVE VISIT,EST,40-64: ICD-10-PCS | Mod: S$GLB,,, | Performed by: INTERNAL MEDICINE

## 2021-08-27 PROCEDURE — 99999 PR PBB SHADOW E&M-EST. PATIENT-LVL III: ICD-10-PCS | Mod: PBBFAC,,, | Performed by: INTERNAL MEDICINE

## 2021-08-27 PROCEDURE — 1160F RVW MEDS BY RX/DR IN RCRD: CPT | Mod: CPTII,S$GLB,, | Performed by: INTERNAL MEDICINE

## 2021-08-27 PROCEDURE — 1160F PR REVIEW ALL MEDS BY PRESCRIBER/CLIN PHARMACIST DOCUMENTED: ICD-10-PCS | Mod: CPTII,S$GLB,, | Performed by: INTERNAL MEDICINE

## 2021-08-27 PROCEDURE — 3079F PR MOST RECENT DIASTOLIC BLOOD PRESSURE 80-89 MM HG: ICD-10-PCS | Mod: CPTII,S$GLB,, | Performed by: INTERNAL MEDICINE

## 2021-08-27 RX ORDER — FLUTICASONE PROPIONATE 50 MCG
1 SPRAY, SUSPENSION (ML) NASAL DAILY
COMMUNITY
End: 2021-08-27 | Stop reason: SDUPTHER

## 2021-08-27 RX ORDER — FLUTICASONE PROPIONATE 50 MCG
2 SPRAY, SUSPENSION (ML) NASAL DAILY
Qty: 16 G | Refills: 3 | Status: SHIPPED | OUTPATIENT
Start: 2021-08-27 | End: 2021-11-18

## 2021-09-01 ENCOUNTER — TELEPHONE (OUTPATIENT)
Dept: RADIOLOGY | Facility: HOSPITAL | Age: 51
End: 2021-09-01

## 2021-09-01 ENCOUNTER — PATIENT OUTREACH (OUTPATIENT)
Dept: ADMINISTRATIVE | Facility: OTHER | Age: 51
End: 2021-09-01

## 2021-09-03 ENCOUNTER — TELEPHONE (OUTPATIENT)
Dept: OBSTETRICS AND GYNECOLOGY | Facility: CLINIC | Age: 51
End: 2021-09-03

## 2021-09-11 ENCOUNTER — TELEPHONE (OUTPATIENT)
Dept: OBSTETRICS AND GYNECOLOGY | Facility: CLINIC | Age: 51
End: 2021-09-11

## 2021-09-16 ENCOUNTER — HOSPITAL ENCOUNTER (OUTPATIENT)
Dept: RADIOLOGY | Facility: HOSPITAL | Age: 51
Discharge: HOME | End: 2021-09-16
Attending: INTERNAL MEDICINE
Payer: COMMERCIAL

## 2021-09-16 VITALS — WEIGHT: 153 LBS | BODY MASS INDEX: 28.16 KG/M2 | HEIGHT: 62 IN

## 2021-09-16 DIAGNOSIS — Z12.31 ENCOUNTER FOR SCREENING MAMMOGRAM FOR BREAST CANCER: ICD-10-CM

## 2021-09-16 PROCEDURE — 77067 MAMMO DIGITAL SCREENING BILAT WITH TOMO: ICD-10-PCS | Mod: 26,,, | Performed by: RADIOLOGY

## 2021-09-16 PROCEDURE — 77067 SCR MAMMO BI INCL CAD: CPT | Mod: TC

## 2021-09-16 PROCEDURE — 77063 BREAST TOMOSYNTHESIS BI: CPT | Mod: 26,,, | Performed by: RADIOLOGY

## 2021-09-16 PROCEDURE — 77063 MAMMO DIGITAL SCREENING BILAT WITH TOMO: ICD-10-PCS | Mod: 26,,, | Performed by: RADIOLOGY

## 2021-09-16 PROCEDURE — 77067 SCR MAMMO BI INCL CAD: CPT | Mod: 26,,, | Performed by: RADIOLOGY

## 2021-09-28 ENCOUNTER — PATIENT MESSAGE (OUTPATIENT)
Dept: INTERNAL MEDICINE | Facility: CLINIC | Age: 51
End: 2021-09-28

## 2021-09-28 ENCOUNTER — OFFICE VISIT (OUTPATIENT)
Dept: OBSTETRICS AND GYNECOLOGY | Facility: CLINIC | Age: 51
End: 2021-09-28
Payer: COMMERCIAL

## 2021-09-28 VITALS
DIASTOLIC BLOOD PRESSURE: 72 MMHG | HEIGHT: 62 IN | BODY MASS INDEX: 28.32 KG/M2 | WEIGHT: 153.88 LBS | SYSTOLIC BLOOD PRESSURE: 118 MMHG

## 2021-09-28 DIAGNOSIS — Z01.419 WELL WOMAN EXAM WITH ROUTINE GYNECOLOGICAL EXAM: Primary | ICD-10-CM

## 2021-09-28 PROCEDURE — 99999 PR PBB SHADOW E&M-EST. PATIENT-LVL III: CPT | Mod: PBBFAC,,, | Performed by: OBSTETRICS & GYNECOLOGY

## 2021-09-28 PROCEDURE — 1160F RVW MEDS BY RX/DR IN RCRD: CPT | Mod: CPTII,S$GLB,, | Performed by: OBSTETRICS & GYNECOLOGY

## 2021-09-28 PROCEDURE — 1160F PR REVIEW ALL MEDS BY PRESCRIBER/CLIN PHARMACIST DOCUMENTED: ICD-10-PCS | Mod: CPTII,S$GLB,, | Performed by: OBSTETRICS & GYNECOLOGY

## 2021-09-28 PROCEDURE — 1159F MED LIST DOCD IN RCRD: CPT | Mod: CPTII,S$GLB,, | Performed by: OBSTETRICS & GYNECOLOGY

## 2021-09-28 PROCEDURE — 99999 PR PBB SHADOW E&M-EST. PATIENT-LVL III: ICD-10-PCS | Mod: PBBFAC,,, | Performed by: OBSTETRICS & GYNECOLOGY

## 2021-09-28 PROCEDURE — 3074F PR MOST RECENT SYSTOLIC BLOOD PRESSURE < 130 MM HG: ICD-10-PCS | Mod: CPTII,S$GLB,, | Performed by: OBSTETRICS & GYNECOLOGY

## 2021-09-28 PROCEDURE — 3008F PR BODY MASS INDEX (BMI) DOCUMENTED: ICD-10-PCS | Mod: CPTII,S$GLB,, | Performed by: OBSTETRICS & GYNECOLOGY

## 2021-09-28 PROCEDURE — 3008F BODY MASS INDEX DOCD: CPT | Mod: CPTII,S$GLB,, | Performed by: OBSTETRICS & GYNECOLOGY

## 2021-09-28 PROCEDURE — 3078F DIAST BP <80 MM HG: CPT | Mod: CPTII,S$GLB,, | Performed by: OBSTETRICS & GYNECOLOGY

## 2021-09-28 PROCEDURE — 3074F SYST BP LT 130 MM HG: CPT | Mod: CPTII,S$GLB,, | Performed by: OBSTETRICS & GYNECOLOGY

## 2021-09-28 PROCEDURE — 99396 PREV VISIT EST AGE 40-64: CPT | Mod: S$GLB,,, | Performed by: OBSTETRICS & GYNECOLOGY

## 2021-09-28 PROCEDURE — 1159F PR MEDICATION LIST DOCUMENTED IN MEDICAL RECORD: ICD-10-PCS | Mod: CPTII,S$GLB,, | Performed by: OBSTETRICS & GYNECOLOGY

## 2021-09-28 PROCEDURE — 99396 PR PREVENTIVE VISIT,EST,40-64: ICD-10-PCS | Mod: S$GLB,,, | Performed by: OBSTETRICS & GYNECOLOGY

## 2021-09-28 PROCEDURE — 3078F PR MOST RECENT DIASTOLIC BLOOD PRESSURE < 80 MM HG: ICD-10-PCS | Mod: CPTII,S$GLB,, | Performed by: OBSTETRICS & GYNECOLOGY

## 2021-10-15 ENCOUNTER — IMMUNIZATION (OUTPATIENT)
Dept: INTERNAL MEDICINE | Facility: CLINIC | Age: 51
End: 2021-10-15
Payer: COMMERCIAL

## 2021-10-15 DIAGNOSIS — Z23 NEED FOR VACCINATION: Primary | ICD-10-CM

## 2021-10-15 PROCEDURE — 91300 COVID-19, MRNA, LNP-S, PF, 30 MCG/0.3 ML DOSE VACCINE: CPT | Mod: PBBFAC | Performed by: INTERNAL MEDICINE

## 2021-10-15 PROCEDURE — 0003A COVID-19, MRNA, LNP-S, PF, 30 MCG/0.3 ML DOSE VACCINE: CPT | Mod: CV19,PBBFAC | Performed by: INTERNAL MEDICINE

## 2021-10-23 ENCOUNTER — CLINICAL SUPPORT (OUTPATIENT)
Dept: URGENT CARE | Facility: CLINIC | Age: 51
End: 2021-10-23
Payer: COMMERCIAL

## 2021-10-23 ENCOUNTER — PATIENT MESSAGE (OUTPATIENT)
Dept: INTERNAL MEDICINE | Facility: CLINIC | Age: 51
End: 2021-10-23
Payer: COMMERCIAL

## 2021-10-23 DIAGNOSIS — Z11.59 ENCOUNTER FOR SCREENING FOR OTHER VIRAL DISEASES: Primary | ICD-10-CM

## 2021-10-23 LAB
CTP QC/QA: YES
SARS-COV-2 RDRP RESP QL NAA+PROBE: NEGATIVE

## 2021-10-23 PROCEDURE — U0002 COVID-19 LAB TEST NON-CDC: HCPCS | Mod: QW,S$GLB,, | Performed by: NURSE PRACTITIONER

## 2021-10-23 PROCEDURE — U0002: ICD-10-PCS | Mod: QW,S$GLB,, | Performed by: NURSE PRACTITIONER

## 2021-10-24 ENCOUNTER — PATIENT MESSAGE (OUTPATIENT)
Dept: INTERNAL MEDICINE | Facility: CLINIC | Age: 51
End: 2021-10-24
Payer: COMMERCIAL

## 2021-10-24 DIAGNOSIS — U07.1 COVID-19 VIRUS INFECTION: Primary | ICD-10-CM

## 2021-10-26 ENCOUNTER — INFUSION (OUTPATIENT)
Dept: INFECTIOUS DISEASES | Facility: HOSPITAL | Age: 51
End: 2021-10-26
Attending: INTERNAL MEDICINE
Payer: COMMERCIAL

## 2021-10-26 VITALS
TEMPERATURE: 98 F | WEIGHT: 150 LBS | SYSTOLIC BLOOD PRESSURE: 157 MMHG | HEART RATE: 60 BPM | OXYGEN SATURATION: 99 % | RESPIRATION RATE: 16 BRPM | HEIGHT: 62 IN | BODY MASS INDEX: 27.6 KG/M2 | DIASTOLIC BLOOD PRESSURE: 81 MMHG

## 2021-10-26 DIAGNOSIS — U07.1 COVID-19 VIRUS INFECTION: Primary | ICD-10-CM

## 2021-10-26 PROCEDURE — 25000003 PHARM REV CODE 250: Performed by: INTERNAL MEDICINE

## 2021-10-26 PROCEDURE — 63600175 PHARM REV CODE 636 W HCPCS: Performed by: INTERNAL MEDICINE

## 2021-10-26 PROCEDURE — M0243 CASIRIVI AND IMDEVI INFUSION: HCPCS | Performed by: INTERNAL MEDICINE

## 2021-10-26 RX ORDER — ONDANSETRON 4 MG/1
4 TABLET, ORALLY DISINTEGRATING ORAL ONCE AS NEEDED
Status: DISCONTINUED | OUTPATIENT
Start: 2021-10-26 | End: 2023-08-30

## 2021-10-26 RX ORDER — ALBUTEROL SULFATE 90 UG/1
2 AEROSOL, METERED RESPIRATORY (INHALATION)
Status: DISCONTINUED | OUTPATIENT
Start: 2021-10-26 | End: 2023-08-30

## 2021-10-26 RX ORDER — SODIUM CHLORIDE 0.9 % (FLUSH) 0.9 %
10 SYRINGE (ML) INJECTION
Status: DISCONTINUED | OUTPATIENT
Start: 2021-10-26 | End: 2023-08-30

## 2021-10-26 RX ORDER — EPINEPHRINE 0.3 MG/.3ML
0.3 INJECTION SUBCUTANEOUS
Status: DISCONTINUED | OUTPATIENT
Start: 2021-10-26 | End: 2023-08-30

## 2021-10-26 RX ORDER — ACETAMINOPHEN 325 MG/1
650 TABLET ORAL ONCE AS NEEDED
Status: DISCONTINUED | OUTPATIENT
Start: 2021-10-26 | End: 2023-08-30

## 2021-10-26 RX ORDER — DIPHENHYDRAMINE HYDROCHLORIDE 50 MG/ML
25 INJECTION INTRAMUSCULAR; INTRAVENOUS ONCE AS NEEDED
Status: DISCONTINUED | OUTPATIENT
Start: 2021-10-26 | End: 2023-08-30

## 2021-10-26 RX ADMIN — CASIRIVIMAB AND IMDEVIMAB 600 MG: 600; 600 INJECTION, SOLUTION, CONCENTRATE INTRAVENOUS at 09:10

## 2022-01-20 ENCOUNTER — PATIENT OUTREACH (OUTPATIENT)
Dept: ADMINISTRATIVE | Facility: OTHER | Age: 52
End: 2022-01-20
Payer: COMMERCIAL

## 2022-01-21 ENCOUNTER — OFFICE VISIT (OUTPATIENT)
Dept: DERMATOLOGY | Facility: CLINIC | Age: 52
End: 2022-01-21
Payer: COMMERCIAL

## 2022-01-21 DIAGNOSIS — Z12.83 SCREENING EXAM FOR SKIN CANCER: ICD-10-CM

## 2022-01-21 DIAGNOSIS — D22.70 MULTIPLE BENIGN MELANOCYTIC NEVI OF UPPER EXTREMITY, LOWER EXTREMITY, AND TRUNK: ICD-10-CM

## 2022-01-21 DIAGNOSIS — B07.9 VERRUCA VULGARIS: Primary | ICD-10-CM

## 2022-01-21 DIAGNOSIS — D22.5 MULTIPLE BENIGN MELANOCYTIC NEVI OF UPPER EXTREMITY, LOWER EXTREMITY, AND TRUNK: ICD-10-CM

## 2022-01-21 DIAGNOSIS — B35.1 ONYCHOMYCOSIS: ICD-10-CM

## 2022-01-21 DIAGNOSIS — D22.60 MULTIPLE BENIGN MELANOCYTIC NEVI OF UPPER EXTREMITY, LOWER EXTREMITY, AND TRUNK: ICD-10-CM

## 2022-01-21 DIAGNOSIS — L81.4 LENTIGINES: ICD-10-CM

## 2022-01-21 PROCEDURE — 99214 PR OFFICE/OUTPT VISIT, EST, LEVL IV, 30-39 MIN: ICD-10-PCS | Mod: 25,S$GLB,, | Performed by: DERMATOLOGY

## 2022-01-21 PROCEDURE — 17110 PR DESTRUCTION BENIGN LESIONS UP TO 14: ICD-10-PCS | Mod: S$GLB,,, | Performed by: DERMATOLOGY

## 2022-01-21 PROCEDURE — 1159F MED LIST DOCD IN RCRD: CPT | Mod: CPTII,S$GLB,, | Performed by: DERMATOLOGY

## 2022-01-21 PROCEDURE — 17110 DESTRUCTION B9 LES UP TO 14: CPT | Mod: S$GLB,,, | Performed by: DERMATOLOGY

## 2022-01-21 PROCEDURE — 99214 OFFICE O/P EST MOD 30 MIN: CPT | Mod: 25,S$GLB,, | Performed by: DERMATOLOGY

## 2022-01-21 PROCEDURE — 1160F RVW MEDS BY RX/DR IN RCRD: CPT | Mod: CPTII,S$GLB,, | Performed by: DERMATOLOGY

## 2022-01-21 PROCEDURE — 1159F PR MEDICATION LIST DOCUMENTED IN MEDICAL RECORD: ICD-10-PCS | Mod: CPTII,S$GLB,, | Performed by: DERMATOLOGY

## 2022-01-21 PROCEDURE — 1160F PR REVIEW ALL MEDS BY PRESCRIBER/CLIN PHARMACIST DOCUMENTED: ICD-10-PCS | Mod: CPTII,S$GLB,, | Performed by: DERMATOLOGY

## 2022-01-21 RX ORDER — SALICYLIC ACID
POWDER (GRAM) MISCELLANEOUS
Qty: 15 G | Refills: 1 | Status: SHIPPED | OUTPATIENT
Start: 2022-01-21 | End: 2022-03-07 | Stop reason: SDUPTHER

## 2022-01-21 NOTE — PROGRESS NOTES
"  Patient Information  Name: Shawna Mayers  : 1970  MRN: [id number]     Referring Physician:  Self   Primary Care Physician:  Baldev Braun MD   Date of Visit: 2022      Subjective:     History of Present lllness:    Shawna Mayers is a 51 y.o. female who presents with a chief complaint of moles, wart, and scar.    Patient is here today for a "mole" check. Spots around shoulders have gotten darker over the past year.  Pt has a history of moderate sun exposure in the past.   Pt recalls several blistering sunburns in the past: yes  Pt has history of tanning bed use: yes  Pt has had moles removed in the past: yes-atypical  Pt has personal history of skin cancer: no  Pt has family history of melanoma in first degree relatives: no    Today, patient complains of lesion(s):  Location: left foot  Duration: 1 year  Symptoms: looks like a scar  Relieving factors/Previous treatments: area was previously treated with cryo for a wart    Location: right foot  Duration: months  Signs/Symptoms: possible wart  Relieving factors/Prior treatments: none    Location: left big toenail  Duration: over a year  Symptoms: discolored, infected  Relieving factors/Previous treatments: Jublia helped at first, now seeing minimal change; penlac did not help in past    Clinical documentation obtained by nursing staff reviewed.    Review of Systems   Skin: Positive for daily sunscreen use and activity-related sunscreen use. Negative for itching, rash and wears hat.   Hematologic/Lymphatic: Does not bruise/bleed easily.       Objective:   Physical Exam   Constitutional: She appears well-developed and well-nourished. No distress.   Neurological: She is alert and oriented to person, place, and time. She is not disoriented.   Psychiatric: She has a normal mood and affect.   Skin:   Areas Examined (abnormalities noted in diagram):   Scalp / Hair Palpated and Inspected  Head / Face Inspection Performed  Neck Inspection " Performed  Chest / Axilla Inspection Performed  Abdomen Inspection Performed  Genitals / Buttocks / Groin Inspection Performed  Back Inspection Performed  RUE Inspected  LUE Inspection Performed  RLE Inspected  LLE Inspection Performed  Nails and Digits Inspection Performed                Diagram Legend     Erythematous scaling macule/papule c/w actinic keratosis       Vascular papule c/w angioma      Pigmented verrucoid papule/plaque c/w seborrheic keratosis      Yellow umbilicated papule c/w sebaceous hyperplasia      Irregularly shaped tan macule c/w lentigo     1-2 mm smooth white papules consistent with Milia      Movable subcutaneous cyst with punctum c/w epidermal inclusion cyst      Subcutaneous movable cyst c/w pilar cyst      Firm pink to brown papule c/w dermatofibroma      Pedunculated fleshy papule(s) c/w skin tag(s)      Evenly pigmented macule c/w junctional nevus     Mildly variegated pigmented, slightly irregular-bordered macule c/w mildly atypical nevus      Flesh colored to evenly pigmented papule c/w intradermal nevus       Pink pearly papule/plaque c/w basal cell carcinoma      Erythematous hyperkeratotic cursted plaque c/w SCC      Surgical scar with no sign of skin cancer recurrence      Open and closed comedones      Inflammatory papules and pustules      Verrucoid papule consistent consistent with wart     Erythematous eczematous patches and plaques     Dystrophic onycholytic nail with subungual debris c/w onychomycosis     Umbilicated papule    Erythematous-base heme-crusted tan verrucoid plaque consistent with inflamed seborrheic keratosis     Erythematous Silvery Scaling Plaque c/w Psoriasis     See annotation    No images are attached to the encounter or orders placed in the encounter.      [] Data reviewed  [] Prior external notes reviewed  [] Independent review of test  [] Management discussed with another provider  [] Independent historian    Assessment / Plan:        Ian  vulgaris  Discussed the viral etiology of warts and their treatment options. Discussed the potential need for multiple treatments.  Recommend starting an Rx product containing Salicylic Acid to treat warts at home. Instructions are provided in AVS.  -     salicylic acid, bulk, Powd; Compound salicylic acid 60% paste. Apply a thin layer to warts qhs and occlude with tape.  Dispense: 15 g; Refill: 1    Cryosurgery procedure note:  Risk, benefits, and alternatives of cryosurgery are discussed with the patient, including but not limited to the risks of hypopigmentation, hyperpigmentation, scar, infection, recurrence of lesion(s), development of new lesion(s), and need for additional treatment of the lesion(s). Verbal consent obtained from patient. Liquid nitrogen cryosurgery applied to 2 lesion(s) to produce a freeze injury. Counseled patient that blisters may form, and instructed patient on wound care with gentle cleansing and use of Vaseline ointment to keep moist until healed. Handout was provided, and patient was instructed to return to clinic in 1-2 months if lesions do not completely resolve.    Onychomycosis  - chronic problem, not at treatment goal  Discussed treatment options with the patient, including risks, benefits, and side effects of oral fluconazole vs oral Lamisil  vs. topical treatments.   Discussed common side effects may include headache, skin rash, GI upset, and taste disturbances. Rare side effects may include severe skin rash or hepatitis. Discussed with patient that condition may recur after clearance with the medication.  Fluconazole may increase levels of clonazepam which she takes nightly to sleep, so will avoid. Pt does not want to take Lamisil due to liver nodules.  Patient elected to proceed with topical treatment with Rx Jublia and with home tx with listerine/vinegar/water (instructions in AVS).    Lentigines  These are benign sun spots which should be monitored for changes. Daily sun  protection will reduce the number of new lesions.   Recommend using a broad-spectrum, water-resistant sunscreen with SPF of 30 or higher--reapply every 2 hours. Seek shade, wear sun-protective clothing, and perform regular skin self-exams.    Multiple benign melanocytic nevi of upper extremity, lower extremity, and trunk  Multiple benign-appearing nevi present on exam today. Reassurance provided. Counseled patient to periodically examine moles and return to clinic if any changes in size, shape, or color are noted or if it becomes symptomatic (bleeding, itching, pain, etc).  Recommend using a broad-spectrum, water-resistant sunscreen with SPF of 30 or higher--reapply every 2 hours. Seek shade, wear sun-protective clothing, and perform regular skin self-exams.    Screening exam for skin cancer  Total body skin examination performed today as noted in physical exam. No lesions suspicious for malignancy were seen.  Recommend using a broad-spectrum, water-resistant sunscreen with SPF of 30 or higher--reapply every 2 hours. Seek shade, wear sun-protective clothing, and perform regular skin self-exams.    Follow up in about 6 weeks (around 3/4/2022).      Salma Adair MD, FAAD  [facility]

## 2022-01-21 NOTE — PATIENT INSTRUCTIONS
Mix 1/3 part yellow Listerine (blue turns nails green), 1/3 part vinegar, and 1/3 part water in a Tupperware.  Soak toenails for 15 minutes 2 times per week. Put the top on the Tupperware and re-use for 2 weeks.   Mix a fresh batch every 2 weeks.  ---------------------------------------------    Your prescription has been sent to the compounding [facility]  They are located in [place] at [address] just before the [address].  If you have any questions, please call the pharmacy at [phone].  Website: [email or web address]      Home treatment for warts    Topical products containing Salicylic Acid are recommended to treat warts at home. Treatment with these products can makes the wart smaller and less uncomfortable.     Application:  1. First, soften the wart by soaking in a bath or bowl of hot soapy water for at least 5 minutes.  2. Protect surrounding normal skin and nails if necessary, with Vaseline, to avoid spreading the wart virus.  3. Pare the wart by rubbing the wart surface with an emery board or a pumice stone.  4. Apply the salicylic acid product to the affected area and allow to dry.  5. Cover with a simple dressing (such as medical tape or duct tape) or a bandaid overnight.  6. Next day remove the old salicylic acid product and dead surface skin layer with a pumice stone, and reapply the salicylic acid product.  7. This can be applied initially for 3 nights and then used every night if it is not causing any problems.  8. If the wart becomes too sore, stop treatment for a few days and then restart using it every other night.

## 2022-01-29 ENCOUNTER — OFFICE VISIT (OUTPATIENT)
Dept: URGENT CARE | Facility: CLINIC | Age: 52
End: 2022-01-29
Payer: COMMERCIAL

## 2022-01-29 VITALS
WEIGHT: 150 LBS | HEART RATE: 77 BPM | HEIGHT: 62 IN | DIASTOLIC BLOOD PRESSURE: 90 MMHG | SYSTOLIC BLOOD PRESSURE: 147 MMHG | BODY MASS INDEX: 27.6 KG/M2 | OXYGEN SATURATION: 98 % | TEMPERATURE: 98 F | RESPIRATION RATE: 18 BRPM

## 2022-01-29 DIAGNOSIS — S92.515A CLOSED NONDISPLACED FRACTURE OF PROXIMAL PHALANX OF LESSER TOE OF LEFT FOOT, INITIAL ENCOUNTER: Primary | ICD-10-CM

## 2022-01-29 DIAGNOSIS — S99.922A TOE INJURY, LEFT, INITIAL ENCOUNTER: ICD-10-CM

## 2022-01-29 PROCEDURE — 3008F BODY MASS INDEX DOCD: CPT | Mod: CPTII,S$GLB,, | Performed by: NURSE PRACTITIONER

## 2022-01-29 PROCEDURE — 99213 OFFICE O/P EST LOW 20 MIN: CPT | Mod: S$GLB,,, | Performed by: NURSE PRACTITIONER

## 2022-01-29 PROCEDURE — 3080F DIAST BP >= 90 MM HG: CPT | Mod: CPTII,S$GLB,, | Performed by: NURSE PRACTITIONER

## 2022-01-29 PROCEDURE — 3080F PR MOST RECENT DIASTOLIC BLOOD PRESSURE >= 90 MM HG: ICD-10-PCS | Mod: CPTII,S$GLB,, | Performed by: NURSE PRACTITIONER

## 2022-01-29 PROCEDURE — 3077F SYST BP >= 140 MM HG: CPT | Mod: CPTII,S$GLB,, | Performed by: NURSE PRACTITIONER

## 2022-01-29 PROCEDURE — 73630 X-RAY EXAM OF FOOT: CPT | Mod: LT,S$GLB,, | Performed by: RADIOLOGY

## 2022-01-29 PROCEDURE — 99213 PR OFFICE/OUTPT VISIT, EST, LEVL III, 20-29 MIN: ICD-10-PCS | Mod: S$GLB,,, | Performed by: NURSE PRACTITIONER

## 2022-01-29 PROCEDURE — 1159F PR MEDICATION LIST DOCUMENTED IN MEDICAL RECORD: ICD-10-PCS | Mod: CPTII,S$GLB,, | Performed by: NURSE PRACTITIONER

## 2022-01-29 PROCEDURE — 3008F PR BODY MASS INDEX (BMI) DOCUMENTED: ICD-10-PCS | Mod: CPTII,S$GLB,, | Performed by: NURSE PRACTITIONER

## 2022-01-29 PROCEDURE — 3077F PR MOST RECENT SYSTOLIC BLOOD PRESSURE >= 140 MM HG: ICD-10-PCS | Mod: CPTII,S$GLB,, | Performed by: NURSE PRACTITIONER

## 2022-01-29 PROCEDURE — 1159F MED LIST DOCD IN RCRD: CPT | Mod: CPTII,S$GLB,, | Performed by: NURSE PRACTITIONER

## 2022-01-29 PROCEDURE — 73630 XR FOOT COMPLETE 3 VIEW LEFT: ICD-10-PCS | Mod: LT,S$GLB,, | Performed by: RADIOLOGY

## 2022-01-29 NOTE — PROGRESS NOTES
"Subjective:       Patient ID: Shawna Mayers is a 51 y.o. female.    Vitals:  height is 5' 2" (1.575 m) and weight is 68 kg (150 lb). Her temperature is 98.1 °F (36.7 °C). Her blood pressure is 147/90 (abnormal) and her pulse is 77. Her respiration is 18 and oxygen saturation is 98%.     Chief Complaint: Toe Injury (left)    Patient states she hit left foot on a spin cycle machine.    Toe Injury  This is a new problem. The current episode started in the past 7 days. Pertinent negatives include no abdominal pain, arthralgias, chest pain, chills, fatigue, rash, sore throat, swollen glands, urinary symptoms, visual change or vomiting. The symptoms are aggravated by bending. She has tried nothing for the symptoms.       Constitution: Negative for chills and fatigue.   HENT: Negative for sore throat.    Cardiovascular: Negative for chest pain.   Gastrointestinal: Negative for abdominal pain and vomiting.   Musculoskeletal: Negative for joint pain.   Skin: Negative for rash.       Objective:      Physical Exam   Musculoskeletal:        Feet:        XR FOOT COMPLETE 3 VIEW LEFT    Result Date: 1/29/2022  EXAMINATION: XR FOOT COMPLETE 3 VIEW LEFT CLINICAL HISTORY: .  Unspecified injury of left foot, initial encounter TECHNIQUE: AP, lateral and oblique views of the left foot were performed. COMPARISON: None FINDINGS: Bones are well mineralized.  Acute minimally displaced and minimally angulated fracture involving the proximal and mid shaft of the 5th proximal phalanx.  Lisfranc articulation is congruent.  No dislocation or destructive osseous process.  Minimal baseline DJD.  No subcutaneous emphysema or radiodense retained foreign body.     Fifth proximal phalanx acute fracture, as above. Electronically signed by: Amadou Em MD Date:    01/29/2022 Time:    13:53  Assessment:       1. Closed nondisplaced fracture of proximal phalanx of lesser toe of left foot, initial encounter    2. Toe injury, left, initial " "encounter          Plan:         Closed nondisplaced fracture of proximal phalanx of lesser toe of left foot, initial encounter  -     Ambulatory referral/consult to Orthopedics    Toe injury, left, initial encounter  -     XR FOOT COMPLETE 3 VIEW LEFT; Future; Expected date: 01/29/2022      Patient Instructions   Rest, ice, and elevation    Keep buddy taped for stabilization. May obtain surgical shoe.     Over the counter you can use Tylenol (acetominophen) or Ibuprofen for your minor aches and pains as long as you have no contraindications.    Follow up with orthopedics if symptoms persist or worsen.     You must understand that you've received an Urgent Care treatment only and that you may be released before all your medical problems are known or treated. You, the patient, will arrange for follow up care as instructed.  Follow up with your PCP or specialty clinic as directed in the next 1-2 weeks if not improved or as needed.  You can call [phone] to schedule an appointment with the appropriate provider.  If your condition worsens we recommend that you receive another evaluation at the emergency room immediately or contact your primary medical clinics after hours call service to discuss your concerns.  Please return here or go to the Emergency Department for any concerns or worsening of condition.    Patient Education       Toe Fracture   The Basics   Written by the doctors and editors at [facility]   What is a toe fracture? -- A "fracture" is another word for a broken bone. A toe fracture is when a person breaks a bone in the toe (figure 1).  There are different types of toe fractures. The type of toe fracture depends on which toe bone breaks and how it breaks.  What are the symptoms of a toe fracture? -- Symptoms of a toe fracture can include:  · Pain  · Swelling  · Bruising  · Trouble walking  · A crooked-looking toe  Is there a test for a toe fracture? -- Yes. Your doctor or nurse will ask about your " symptoms, do an exam, and order an X-ray of your toe.  How is a toe fracture treated? -- Treatment depends on the type of toe fracture you have and how severe it is.  Treatment for a toe fracture usually involves:  · Resting your foot  · Raising your foot above the level of your heart, for example, by propping it up on pillows - This is helpful only for the first few days after an injury.  · Putting ice on your toe - Put a cold gel pack, bag of ice, or bag of frozen vegetables on the toe every 1 to 2 hours, for 15 minutes each time. Put a thin towel between the ice (or other cold object) and the skin. Use the ice (or other cold object) for at least 6 hours after the injury. Some people find it helpful to ice up to 2 days after the injury.  · Theodore taping (figure 2) - This involves taping the injured toe to the toe next to it. The doctor or nurse will put some cotton or other soft material between your toes so they don't rub together.  · Wearing a cast or special hard-soled shoe - Most people can put weight on their foot and walk around while wearing a cast. Before your doctor puts a cast on your foot, they will make sure your toe bones are in the correct position. If your bones are not in the correct position, they might need to do a procedure to put your bones back in the correct position.  Your doctor will also treat your pain. If you have a lot of pain or a severe fracture, your doctor will prescribe a strong pain medicine. If your fracture is mild, your doctor might recommend that you take an over-the-counter pain medicine. Over-the-counter pain medicines include acetaminophen (sample brand name: Tylenol), ibuprofen (sample brand names: Advil, Motrin), and naproxen (sample brand name: Aleve).  How long does a toe fracture take to heal? -- A toe fracture usually takes weeks to heal, depending on the type of fracture.  Healing time also depends on the person. Healthy children usually heal much more quickly than  "older adults or adults with other medical problems.  Can I do anything to improve the healing process? -- Yes. It's important to follow all of your doctor's instructions while your fracture is healing. This includes instructions about when you can put weight on your foot.  Doctors also usually recommend that people with a fracture:  · Eat a healthy diet that includes getting enough calcium, vitamin D, and protein (figure 3).  · Avoid getting the cast wet, if it's a cast that shouldn't get wet.  · Stop smoking. Fractures can take longer to heal if people smoke.  When should I call my doctor or nurse? -- After treatment, your doctor or nurse will tell you when to call them. In general, you should call them if:  · You have severe pain.  · Your pain, swelling, or bruising gets worse.  · You have numbness or tingling in your toes.  · You damage your cast or get it wet, and it's not supposed to get wet.  · Your cast is too tight or too loose.  All topics are updated as new evidence becomes available and our peer review process is complete.  This topic retrieved from [employer] on: Sep 21, 2021.  Topic 61150 Version 5.0  Release: 29.4.2 - C29.263  © 2021 UpToDate, Inc. and/or its affiliates. All rights reserved.  figure 1: Foot and toe bones     This drawing shows the foot and toe bones. The foot bones include the tarsals and metatarsals.  Graphic 84217 Version 1.0    figure 2: Theodore taping of the toes     Theodore taping involves taping the injured toe to the toe next to it. Some cotton or other soft material should be put between the toes so they don't rub together.  Graphic 47035 Version 1.0    figure 3: Foods and drinks with calcium and vitamin D     Foods rich in calcium include ice cream, soy milk, breads, kale, broccoli, milk, cheese, cottage cheese, almonds, yogurt, ready-to-eat cereals, beans, and tofu. Foods rich in vitamin D include milk, fortified plant-based "milks" (soy, almond), canned tuna fish, cod liver oil, " yogurt, ready-to-eat-cereals, cooked salmon, canned sardines, mackerel, and eggs. Some of these foods are rich in both.  Graphic 43222 Version 4.0    Consumer Information Use and Disclaimer   This information is not specific medical advice and does not replace information you receive from your health care provider. This is only a brief summary of general information. It does NOT include all information about conditions, illnesses, injuries, tests, procedures, treatments, therapies, discharge instructions or life-style choices that may apply to you. You must talk with your health care provider for complete information about your health and treatment options. This information should not be used to decide whether or not to accept your health care provider's advice, instructions or recommendations. Only your health care provider has the knowledge and training to provide advice that is right for you. The use of this information is governed by the [employer] End User License Agreement, available at https://www.[employer]os.ScaleArc/en/solutions/InThrMa/about/farrah.The use of [employer] content is governed by the [employer] Terms of Use. ©2021 UpToDate, Inc. All rights reserved.  Copyright   © 2021 UpToDate, Inc. and/or its affiliates. All rights reserved.

## 2022-01-29 NOTE — PATIENT INSTRUCTIONS
"Rest, ice, and elevation    Keep buddy taped for stabilization. May obtain surgical shoe.     Over the counter you can use Tylenol (acetominophen) or Ibuprofen for your minor aches and pains as long as you have no contraindications.    Follow up with orthopedics if symptoms persist or worsen. [phone]    You must understand that you've received an Urgent Care treatment only and that you may be released before all your medical problems are known or treated. You, the patient, will arrange for follow up care as instructed.  Follow up with your PCP or specialty clinic as directed in the next 1-2 weeks if not improved or as needed.  You can call [phone] to schedule an appointment with the appropriate provider.  If your condition worsens we recommend that you receive another evaluation at the emergency room immediately or contact your primary medical clinics after hours call service to discuss your concerns.  Please return here or go to the Emergency Department for any concerns or worsening of condition.    Patient Education       Toe Fracture   The Basics   Written by the doctors and editors at [facility]   What is a toe fracture? -- A "fracture" is another word for a broken bone. A toe fracture is when a person breaks a bone in the toe (figure 1).  There are different types of toe fractures. The type of toe fracture depends on which toe bone breaks and how it breaks.  What are the symptoms of a toe fracture? -- Symptoms of a toe fracture can include:  · Pain  · Swelling  · Bruising  · Trouble walking  · A crooked-looking toe  Is there a test for a toe fracture? -- Yes. Your doctor or nurse will ask about your symptoms, do an exam, and order an X-ray of your toe.  How is a toe fracture treated? -- Treatment depends on the type of toe fracture you have and how severe it is.  Treatment for a toe fracture usually involves:  · Resting your foot  · Raising your foot above the level of your heart, for example, by " propping it up on pillows - This is helpful only for the first few days after an injury.  · Putting ice on your toe - Put a cold gel pack, bag of ice, or bag of frozen vegetables on the toe every 1 to 2 hours, for 15 minutes each time. Put a thin towel between the ice (or other cold object) and the skin. Use the ice (or other cold object) for at least 6 hours after the injury. Some people find it helpful to ice up to 2 days after the injury.  · Theodore taping (figure 2) - This involves taping the injured toe to the toe next to it. The doctor or nurse will put some cotton or other soft material between your toes so they don't rub together.  · Wearing a cast or special hard-soled shoe - Most people can put weight on their foot and walk around while wearing a cast. Before your doctor puts a cast on your foot, they will make sure your toe bones are in the correct position. If your bones are not in the correct position, they might need to do a procedure to put your bones back in the correct position.  Your doctor will also treat your pain. If you have a lot of pain or a severe fracture, your doctor will prescribe a strong pain medicine. If your fracture is mild, your doctor might recommend that you take an over-the-counter pain medicine. Over-the-counter pain medicines include acetaminophen (sample brand name: Tylenol), ibuprofen (sample brand names: Advil, Motrin), and naproxen (sample brand name: Aleve).  How long does a toe fracture take to heal? -- A toe fracture usually takes weeks to heal, depending on the type of fracture.  Healing time also depends on the person. Healthy children usually heal much more quickly than older adults or adults with other medical problems.  Can I do anything to improve the healing process? -- Yes. It's important to follow all of your doctor's instructions while your fracture is healing. This includes instructions about when you can put weight on your foot.  Doctors also usually recommend  "that people with a fracture:  · Eat a healthy diet that includes getting enough calcium, vitamin D, and protein (figure 3).  · Avoid getting the cast wet, if it's a cast that shouldn't get wet.  · Stop smoking. Fractures can take longer to heal if people smoke.  When should I call my doctor or nurse? -- After treatment, your doctor or nurse will tell you when to call them. In general, you should call them if:  · You have severe pain.  · Your pain, swelling, or bruising gets worse.  · You have numbness or tingling in your toes.  · You damage your cast or get it wet, and it's not supposed to get wet.  · Your cast is too tight or too loose.  All topics are updated as new evidence becomes available and our peer review process is complete.  This topic retrieved from [employer] on: Sep 21, 2021.  Topic 32022 Version 5.0  Release: 29.4.2 - C29.263  © 2021 UpToDate, Inc. and/or its affiliates. All rights reserved.  figure 1: Foot and toe bones     This drawing shows the foot and toe bones. The foot bones include the tarsals and metatarsals.  Graphic 50788 Version 1.0    figure 2: Theodore taping of the toes     Theodore taping involves taping the injured toe to the toe next to it. Some cotton or other soft material should be put between the toes so they don't rub together.  Graphic 72545 Version 1.0    figure 3: Foods and drinks with calcium and vitamin D     Foods rich in calcium include ice cream, soy milk, breads, kale, broccoli, milk, cheese, cottage cheese, almonds, yogurt, ready-to-eat cereals, beans, and tofu. Foods rich in vitamin D include milk, fortified plant-based "milks" (soy, almond), canned tuna fish, cod liver oil, yogurt, ready-to-eat-cereals, cooked salmon, canned sardines, mackerel, and eggs. Some of these foods are rich in both.  Graphic 00221 Version 4.0    Consumer Information Use and Disclaimer   This information is not specific medical advice and does not replace information you receive from your health care " provider. This is only a brief summary of general information. It does NOT include all information about conditions, illnesses, injuries, tests, procedures, treatments, therapies, discharge instructions or life-style choices that may apply to you. You must talk with your health care provider for complete information about your health and treatment options. This information should not be used to decide whether or not to accept your health care provider's advice, instructions or recommendations. Only your health care provider has the knowledge and training to provide advice that is right for you. The use of this information is governed by the [employer] End User License Agreement, available at [email or web address]://[email or web address]/en/[email or web address].The use of [employer] content is governed by the [employer] Terms of Use. ©2021 [employer] All rights reserved.  Copyright   © 2021 UpToDate, Inc. and/or its affiliates. All rights reserved.

## 2022-02-07 ENCOUNTER — OFFICE VISIT (OUTPATIENT)
Dept: ORTHOPEDICS | Facility: CLINIC | Age: 52
End: 2022-02-07
Payer: COMMERCIAL

## 2022-02-07 VITALS — BODY MASS INDEX: 29.01 KG/M2 | WEIGHT: 157.63 LBS | HEIGHT: 62 IN

## 2022-02-07 DIAGNOSIS — S92.512A CLOSED DISPLACED FRACTURE OF PROXIMAL PHALANX OF LESSER TOE OF LEFT FOOT, INITIAL ENCOUNTER: Primary | ICD-10-CM

## 2022-02-07 PROCEDURE — 1160F RVW MEDS BY RX/DR IN RCRD: CPT | Mod: CPTII,S$GLB,, | Performed by: PHYSICIAN ASSISTANT

## 2022-02-07 PROCEDURE — 1160F PR REVIEW ALL MEDS BY PRESCRIBER/CLIN PHARMACIST DOCUMENTED: ICD-10-PCS | Mod: CPTII,S$GLB,, | Performed by: PHYSICIAN ASSISTANT

## 2022-02-07 PROCEDURE — 99999 PR PBB SHADOW E&M-EST. PATIENT-LVL III: CPT | Mod: PBBFAC,,, | Performed by: PHYSICIAN ASSISTANT

## 2022-02-07 PROCEDURE — 99999 PR PBB SHADOW E&M-EST. PATIENT-LVL III: ICD-10-PCS | Mod: PBBFAC,,, | Performed by: PHYSICIAN ASSISTANT

## 2022-02-07 PROCEDURE — 1159F MED LIST DOCD IN RCRD: CPT | Mod: CPTII,S$GLB,, | Performed by: PHYSICIAN ASSISTANT

## 2022-02-07 PROCEDURE — 99203 PR OFFICE/OUTPT VISIT, NEW, LEVL III, 30-44 MIN: ICD-10-PCS | Mod: S$GLB,,, | Performed by: PHYSICIAN ASSISTANT

## 2022-02-07 PROCEDURE — 99203 OFFICE O/P NEW LOW 30 MIN: CPT | Mod: S$GLB,,, | Performed by: PHYSICIAN ASSISTANT

## 2022-02-07 PROCEDURE — 1159F PR MEDICATION LIST DOCUMENTED IN MEDICAL RECORD: ICD-10-PCS | Mod: CPTII,S$GLB,, | Performed by: PHYSICIAN ASSISTANT

## 2022-02-07 PROCEDURE — 3008F BODY MASS INDEX DOCD: CPT | Mod: CPTII,S$GLB,, | Performed by: PHYSICIAN ASSISTANT

## 2022-02-07 PROCEDURE — 3008F PR BODY MASS INDEX (BMI) DOCUMENTED: ICD-10-PCS | Mod: CPTII,S$GLB,, | Performed by: PHYSICIAN ASSISTANT
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
## 2022-02-28 ENCOUNTER — HOSPITAL ENCOUNTER (OUTPATIENT)
Dept: RADIOLOGY | Facility: HOSPITAL | Age: 52
Discharge: HOME | End: 2022-02-28
Attending: PHYSICIAN ASSISTANT
Payer: COMMERCIAL

## 2022-02-28 ENCOUNTER — OFFICE VISIT (OUTPATIENT)
Dept: ORTHOPEDICS | Facility: CLINIC | Age: 52
End: 2022-02-28
Payer: COMMERCIAL

## 2022-02-28 VITALS — WEIGHT: 159.06 LBS | HEIGHT: 62 IN | BODY MASS INDEX: 29.27 KG/M2

## 2022-02-28 DIAGNOSIS — S92.512A CLOSED DISPLACED FRACTURE OF PROXIMAL PHALANX OF LESSER TOE OF LEFT FOOT, INITIAL ENCOUNTER: Primary | ICD-10-CM

## 2022-02-28 DIAGNOSIS — S92.512A CLOSED DISPLACED FRACTURE OF PROXIMAL PHALANX OF LESSER TOE OF LEFT FOOT, INITIAL ENCOUNTER: ICD-10-CM

## 2022-02-28 PROCEDURE — 1159F PR MEDICATION LIST DOCUMENTED IN MEDICAL RECORD: ICD-10-PCS | Mod: CPTII,S$GLB,, | Performed by: PHYSICIAN ASSISTANT

## 2022-02-28 PROCEDURE — 73630 X-RAY EXAM OF FOOT: CPT | Mod: 26,LT,, | Performed by: RADIOLOGY

## 2022-02-28 PROCEDURE — 99213 PR OFFICE/OUTPT VISIT, EST, LEVL III, 20-29 MIN: ICD-10-PCS | Mod: S$GLB,,, | Performed by: PHYSICIAN ASSISTANT

## 2022-02-28 PROCEDURE — 1159F MED LIST DOCD IN RCRD: CPT | Mod: CPTII,S$GLB,, | Performed by: PHYSICIAN ASSISTANT

## 2022-02-28 PROCEDURE — 3008F BODY MASS INDEX DOCD: CPT | Mod: CPTII,S$GLB,, | Performed by: PHYSICIAN ASSISTANT

## 2022-02-28 PROCEDURE — 73630 XR FOOT COMPLETE 3 VIEW LEFT: ICD-10-PCS | Mod: 26,LT,, | Performed by: RADIOLOGY

## 2022-02-28 PROCEDURE — 99999 PR PBB SHADOW E&M-EST. PATIENT-LVL IV: CPT | Mod: PBBFAC,,, | Performed by: PHYSICIAN ASSISTANT

## 2022-02-28 PROCEDURE — 73630 X-RAY EXAM OF FOOT: CPT | Mod: TC,LT

## 2022-02-28 PROCEDURE — 99999 PR PBB SHADOW E&M-EST. PATIENT-LVL IV: ICD-10-PCS | Mod: PBBFAC,,, | Performed by: PHYSICIAN ASSISTANT

## 2022-02-28 PROCEDURE — 3008F PR BODY MASS INDEX (BMI) DOCUMENTED: ICD-10-PCS | Mod: CPTII,S$GLB,, | Performed by: PHYSICIAN ASSISTANT

## 2022-02-28 PROCEDURE — 99213 OFFICE O/P EST LOW 20 MIN: CPT | Mod: S$GLB,,, | Performed by: PHYSICIAN ASSISTANT

## 2022-02-28 PROCEDURE — 1160F RVW MEDS BY RX/DR IN RCRD: CPT | Mod: CPTII,S$GLB,, | Performed by: PHYSICIAN ASSISTANT

## 2022-02-28 PROCEDURE — 1160F PR REVIEW ALL MEDS BY PRESCRIBER/CLIN PHARMACIST DOCUMENTED: ICD-10-PCS | Mod: CPTII,S$GLB,, | Performed by: PHYSICIAN ASSISTANT

## 2022-02-28 NOTE — PROGRESS NOTES
"Patient ID: Shawna Mayers is a 51 y.o. female.    Chief Complaint: Pain of the Left Foot and Foot Injury (pinky left)      HISTORY:  Shawna Mayers is a 51 y.o. female who returns to me today for follow up of left toe pain. DOI 1/29/22.  She was last seen by me 2/7/2022.  Today she is doing well and reports her pain has improved.  There is no numbness or tingling. She still has some tenderness and soreness in the little toe.  She has been doing low impact activity with no problem.  She has no other complaints at this time.       PMH/PSH/FamHx/SocHx:    Unchanged from prior visit.    ROS:  Constitution: Negative for chills, fever and weakness.   Respiratory: Negative for cough and shortness of breath.   Musculoskeletal: Positive for left foot pain  Psychiatric/Behavioral: The patient is not nervous/anxious.       PHYSICAL EXAM:   Ht 5' 2" (1.575 m)   Wt 72.1 kg (159 lb 1 oz)   BMI 29.09 kg/m²   Left foot  Skin intact  No swelling or warmth  Mild TTP base fifth toe  FROM foot and ankle  Sensation intact  2+ DP    IMAGING: X-rays of the left foot, personally reviewed by me, demonstrate healing fracture proximal phalanx fifth toe, satisfactory position.     ASSESSMENT/PLAN:    Shawna was seen today for foot injury and pain.    Diagnoses and all orders for this visit:    Closed displaced fracture of proximal phalanx of lesser toe of left foot, initial encounter  -     Cancel: X-Ray Foot Complete Left; Future  -     X-Ray Foot Complete Left; Future    - Activity as tolerated  - Follow up as needed        "

## 2022-03-04 ENCOUNTER — PATIENT OUTREACH (OUTPATIENT)
Dept: ADMINISTRATIVE | Facility: OTHER | Age: 52
End: 2022-03-04
Payer: COMMERCIAL

## 2022-03-04 DIAGNOSIS — Z12.11 ENCOUNTER FOR FIT (FECAL IMMUNOCHEMICAL TEST) SCREENING: Primary | ICD-10-CM

## 2022-03-04 NOTE — PROGRESS NOTES
Care Everywhere: updated  Immunization: updated  Health Maintenance: updated  Media Review: review for outside colon cancer report   Legacy Review:   DIS:  Order placed: fit kit   Upcoming appts:  EFAX:  Task Tickets:  Referrals:

## 2022-03-07 ENCOUNTER — OFFICE VISIT (OUTPATIENT)
Dept: DERMATOLOGY | Facility: CLINIC | Age: 52
End: 2022-03-07
Payer: COMMERCIAL

## 2022-03-07 DIAGNOSIS — B07.9 VERRUCA VULGARIS: Primary | ICD-10-CM

## 2022-03-07 DIAGNOSIS — B35.1 ONYCHOMYCOSIS: ICD-10-CM

## 2022-03-07 PROCEDURE — 99213 OFFICE O/P EST LOW 20 MIN: CPT | Mod: 25,S$GLB,, | Performed by: DERMATOLOGY

## 2022-03-07 PROCEDURE — 99213 PR OFFICE/OUTPT VISIT, EST, LEVL III, 20-29 MIN: ICD-10-PCS | Mod: 25,S$GLB,, | Performed by: DERMATOLOGY

## 2022-03-07 PROCEDURE — 1160F PR REVIEW ALL MEDS BY PRESCRIBER/CLIN PHARMACIST DOCUMENTED: ICD-10-PCS | Mod: CPTII,S$GLB,, | Performed by: DERMATOLOGY

## 2022-03-07 PROCEDURE — 1160F RVW MEDS BY RX/DR IN RCRD: CPT | Mod: CPTII,S$GLB,, | Performed by: DERMATOLOGY

## 2022-03-07 PROCEDURE — 17110 PR DESTRUCTION BENIGN LESIONS UP TO 14: ICD-10-PCS | Mod: S$GLB,,, | Performed by: DERMATOLOGY

## 2022-03-07 PROCEDURE — 17110 DESTRUCTION B9 LES UP TO 14: CPT | Mod: S$GLB,,, | Performed by: DERMATOLOGY

## 2022-03-07 PROCEDURE — 1159F PR MEDICATION LIST DOCUMENTED IN MEDICAL RECORD: ICD-10-PCS | Mod: CPTII,S$GLB,, | Performed by: DERMATOLOGY

## 2022-03-07 PROCEDURE — 1159F MED LIST DOCD IN RCRD: CPT | Mod: CPTII,S$GLB,, | Performed by: DERMATOLOGY

## 2022-03-07 RX ORDER — SALICYLIC ACID
POWDER (GRAM) MISCELLANEOUS
Qty: 15 G | Refills: 1 | Status: SHIPPED | OUTPATIENT
Start: 2022-03-07 | End: 2022-08-30

## 2022-03-07 NOTE — PROGRESS NOTES
Patient Information  Name: Shawna Mayers  : 1970  MRN: [id number]     Referring Physician:  No ref. provider found   Primary Care Physician:  Baldev Braun MD   Date of Visit: 2022      Subjective:     History of Present lllness:    Shawna Mayers is a 51 y.o. female who presents with a chief complaint of warts and nail issue.    Diagnosis: Verruca vulgaris  Location: left ankle  Signs/Symptoms: seem to be cleared  Symptom course: improving  Current treatment: Cryo    Diagnosis: Onychomycosis  Location: right big toe  Signs/Symptoms: clearing at base of nail  Symptom course: improving  Current treatment: Jublia solution    Patient was last seen: 2022.  Prior notes by myself reviewed.   Clinical documentation obtained by nursing staff reviewed.    Review of Systems   Skin: Negative for itching and rash.       Objective:   Physical Exam   Constitutional: She appears well-developed and well-nourished. No distress.   Neurological: She is alert and oriented to person, place, and time. She is not disoriented.   Psychiatric: She has a normal mood and affect.   Skin:   Areas Examined (abnormalities noted in diagram):   RLE Inspected  LLE Inspection Performed  Nails and Digits Inspection Performed           Diagram Legend     Erythematous scaling macule/papule c/w actinic keratosis       Vascular papule c/w angioma      Pigmented verrucoid papule/plaque c/w seborrheic keratosis      Yellow umbilicated papule c/w sebaceous hyperplasia      Irregularly shaped tan macule c/w lentigo     1-2 mm smooth white papules consistent with Milia      Movable subcutaneous cyst with punctum c/w epidermal inclusion cyst      Subcutaneous movable cyst c/w pilar cyst      Firm pink to brown papule c/w dermatofibroma      Pedunculated fleshy papule(s) c/w skin tag(s)      Evenly pigmented macule c/w junctional nevus     Mildly variegated pigmented, slightly irregular-bordered macule c/w mildly atypical  nevus      Flesh colored to evenly pigmented papule c/w intradermal nevus       Pink pearly papule/plaque c/w basal cell carcinoma      Erythematous hyperkeratotic cursted plaque c/w SCC      Surgical scar with no sign of skin cancer recurrence      Open and closed comedones      Inflammatory papules and pustules      Verrucoid papule consistent consistent with wart     Erythematous eczematous patches and plaques     Dystrophic onycholytic nail with subungual debris c/w onychomycosis     Umbilicated papule    Erythematous-base heme-crusted tan verrucoid plaque consistent with inflamed seborrheic keratosis     Erythematous Silvery Scaling Plaque c/w Psoriasis     See annotation    No images are attached to the encounter or orders placed in the encounter.      [] Data reviewed  [] Prior external notes reviewed  [] Independent review of test  [] Management discussed with another provider  [] Independent historian    Assessment / Plan:        Verruca vulgaris  Cryosurgery procedure note:  Risk, benefits, and alternatives of cryosurgery are discussed with the patient, including but not limited to the risks of hypopigmentation, hyperpigmentation, scar, infection, recurrence of lesion(s), development of new lesion(s), and need for additional treatment of the lesion(s). Verbal consent obtained from patient. Liquid nitrogen cryosurgery applied to 1 lesion(s) to produce a freeze injury. Counseled patient that blisters may form, and instructed patient on wound care with gentle cleansing and use of Vaseline ointment to keep moist until healed. Handout was provided, and patient was instructed to return to clinic in 1-2 months if lesions do not completely resolve.    -     salicylic acid, bulk, Powd; Compound salicylic acid 60% paste. Apply a thin layer to warts qhs and occlude with tape.  Dispense: 15 g; Refill: 1    Onychomycosis   - stable and chronic  Improving. Continue Jublia.    Follow up if symptoms worsen or fail to  improve.      Salma Adair MD, FAAD  [facility]

## 2022-03-21 ENCOUNTER — PATIENT MESSAGE (OUTPATIENT)
Dept: ADMINISTRATIVE | Facility: HOSPITAL | Age: 52
End: 2022-03-21
Payer: COMMERCIAL

## 2022-03-23 DIAGNOSIS — Z12.11 SCREENING FOR COLON CANCER: ICD-10-CM

## 2022-04-08 ENCOUNTER — IMMUNIZATION (OUTPATIENT)
Dept: INTERNAL MEDICINE | Facility: CLINIC | Age: 52
End: 2022-04-08
Payer: COMMERCIAL

## 2022-04-08 DIAGNOSIS — Z23 NEED FOR VACCINATION: Primary | ICD-10-CM

## 2022-04-08 PROCEDURE — 91305 COVID-19, MRNA, LNP-S, PF, 30 MCG/0.3 ML DOSE VACCINE (PFIZER): CPT | Mod: PBBFAC | Performed by: INTERNAL MEDICINE

## 2022-05-01 ENCOUNTER — PATIENT MESSAGE (OUTPATIENT)
Dept: INTERNAL MEDICINE | Facility: CLINIC | Age: 52
End: 2022-05-01
Payer: COMMERCIAL

## 2022-05-09 RX ORDER — FLUTICASONE PROPIONATE 50 MCG
SPRAY, SUSPENSION (ML) NASAL
Qty: 48 ML | Refills: 1 | Status: SHIPPED | OUTPATIENT
Start: 2022-05-09 | End: 2022-11-02

## 2022-05-09 NOTE — TELEPHONE ENCOUNTER
No new care gaps identified.  [facility] Embedded Care Gaps. Reference number: [id number]. 5/09/2022   10:50:21 AM CDT

## 2022-05-10 RX ORDER — CLONAZEPAM 0.5 MG/1
TABLET ORAL
Qty: 30 TABLET | Refills: 1 | Status: SHIPPED | OUTPATIENT
Start: 2022-05-10 | End: 2022-07-11

## 2022-05-10 NOTE — TELEPHONE ENCOUNTER
Refill Routing Note   Medication(s) are not appropriate for processing by [facility] for the following reason(s):      - Outside of protocol    [facility] action(s):  Route  Approve       Medication Therapy Plan: Route klonopin - OOS. Approve flonase - sig matches.  Medication reconciliation completed: No     Appointments  past 12m or future 3m with PCP    Date Provider   Last Visit   8/27/2021 Baldev Braun MD   Next Visit   8/30/2022 Baldev Braun MD   ED visits in past 90 days: 0        Note composed:8:02 PM 05/09/2022

## 2022-05-30 ENCOUNTER — PATIENT MESSAGE (OUTPATIENT)
Dept: ADMINISTRATIVE | Facility: HOSPITAL | Age: 52
End: 2022-05-30
Payer: COMMERCIAL

## 2022-06-10 LAB — HEMOCCULT STL QL IA: NEGATIVE

## 2022-07-21 ENCOUNTER — LAB VISIT (OUTPATIENT)
Dept: LAB | Facility: HOSPITAL | Age: 52
End: 2022-07-21
Attending: INTERNAL MEDICINE
Payer: COMMERCIAL

## 2022-07-21 ENCOUNTER — PATIENT MESSAGE (OUTPATIENT)
Dept: INTERNAL MEDICINE | Facility: CLINIC | Age: 52
End: 2022-07-21
Payer: COMMERCIAL

## 2022-07-21 DIAGNOSIS — R30.0 DYSURIA: ICD-10-CM

## 2022-07-21 DIAGNOSIS — R30.0 DYSURIA: Primary | ICD-10-CM

## 2022-07-21 LAB
BACTERIA #/AREA URNS AUTO: ABNORMAL /HPF
BILIRUB UR QL STRIP: NEGATIVE
CLARITY UR REFRACT.AUTO: ABNORMAL
COLOR UR AUTO: YELLOW
GLUCOSE UR QL STRIP: NEGATIVE
HGB UR QL STRIP: NEGATIVE
KETONES UR QL STRIP: ABNORMAL
LEUKOCYTE ESTERASE UR QL STRIP: ABNORMAL
MICROSCOPIC COMMENT: ABNORMAL
NITRITE UR QL STRIP: NEGATIVE
NON-SQ EPI CELLS #/AREA URNS AUTO: 1 /HPF
PH UR STRIP: 6 [PH] (ref 5–8)
PROT UR QL STRIP: NEGATIVE
RBC #/AREA URNS AUTO: 2 /HPF (ref 0–4)
SP GR UR STRIP: 1.02 (ref 1–1.03)
SQUAMOUS #/AREA URNS AUTO: 2 /HPF
URN SPEC COLLECT METH UR: ABNORMAL
WBC #/AREA URNS AUTO: 23 /HPF (ref 0–5)
WBC CLUMPS UR QL AUTO: ABNORMAL

## 2022-07-21 PROCEDURE — 87088 URINE BACTERIA CULTURE: CPT | Performed by: INTERNAL MEDICINE

## 2022-07-21 PROCEDURE — 87186 SC STD MICRODIL/AGAR DIL: CPT | Performed by: INTERNAL MEDICINE

## 2022-07-21 PROCEDURE — 87077 CULTURE AEROBIC IDENTIFY: CPT | Performed by: INTERNAL MEDICINE

## 2022-07-21 PROCEDURE — 81001 URINALYSIS AUTO W/SCOPE: CPT | Performed by: INTERNAL MEDICINE

## 2022-07-21 PROCEDURE — 87086 URINE CULTURE/COLONY COUNT: CPT | Performed by: INTERNAL MEDICINE

## 2022-07-21 RX ORDER — FLUCONAZOLE 150 MG/1
150 TABLET ORAL DAILY
Qty: 1 TABLET | Refills: 0 | Status: SHIPPED | OUTPATIENT
Start: 2022-07-21 | End: 2022-07-22

## 2022-07-21 RX ORDER — NITROFURANTOIN 25; 75 MG/1; MG/1
100 CAPSULE ORAL 2 TIMES DAILY
Qty: 14 CAPSULE | Refills: 0 | Status: SHIPPED | OUTPATIENT
Start: 2022-07-21 | End: 2022-08-30

## 2022-07-25 LAB — BACTERIA UR CULT: ABNORMAL

## 2022-07-29 RX ORDER — LEVOTHYROXINE SODIUM 50 UG/1
TABLET ORAL
Qty: 90 TABLET | Refills: 0 | Status: SHIPPED | OUTPATIENT
Start: 2022-07-29 | End: 2022-10-24

## 2022-07-29 NOTE — TELEPHONE ENCOUNTER
No new care gaps identified.  [facility] Embedded Care Gaps. Reference number: [id number]. 7/29/2022   1:52:50 AM CDT

## 2022-07-29 NOTE — TELEPHONE ENCOUNTER
Refill Decision Note   Shawna Mayers  is requesting a refill authorization.  Brief Assessment and Rationale for Refill:  Approve     Medication Therapy Plan:       Medication Reconciliation Completed: No   Comments:     No Care Gaps recommended.     Note composed:11:14 AM 07/29/2022

## 2022-08-20 ENCOUNTER — LAB VISIT (OUTPATIENT)
Dept: LAB | Facility: HOSPITAL | Age: 52
End: 2022-08-20
Attending: INTERNAL MEDICINE
Payer: COMMERCIAL

## 2022-08-20 DIAGNOSIS — Z00.00 WELLNESS EXAMINATION: ICD-10-CM

## 2022-08-20 DIAGNOSIS — E03.9 ACQUIRED HYPOTHYROIDISM: ICD-10-CM

## 2022-08-20 LAB
ALBUMIN SERPL BCP-MCNC: 4 G/DL (ref 3.5–5.2)
ALP SERPL-CCNC: 53 U/L (ref 55–135)
ALT SERPL W/O P-5'-P-CCNC: 33 U/L (ref 10–44)
ANION GAP SERPL CALC-SCNC: 11 MMOL/L (ref 8–16)
AST SERPL-CCNC: 51 U/L (ref 10–40)
BASOPHILS # BLD AUTO: 0.04 K/UL (ref 0–0.2)
BASOPHILS NFR BLD: 0.9 % (ref 0–1.9)
BILIRUB SERPL-MCNC: 0.6 MG/DL (ref 0.1–1)
BUN SERPL-MCNC: 13 MG/DL (ref 6–20)
CALCIUM SERPL-MCNC: 9.6 MG/DL (ref 8.7–10.5)
CHLORIDE SERPL-SCNC: 102 MMOL/L (ref 95–110)
CHOLEST SERPL-MCNC: 205 MG/DL (ref 120–199)
CHOLEST/HDLC SERPL: 3 {RATIO} (ref 2–5)
CO2 SERPL-SCNC: 22 MMOL/L (ref 23–29)
CREAT SERPL-MCNC: 0.7 MG/DL (ref 0.5–1.4)
DIFFERENTIAL METHOD: NORMAL
EOSINOPHIL # BLD AUTO: 0.2 K/UL (ref 0–0.5)
EOSINOPHIL NFR BLD: 3.3 % (ref 0–8)
ERYTHROCYTE [DISTWIDTH] IN BLOOD BY AUTOMATED COUNT: 12.4 % (ref 11.5–14.5)
EST. GFR  (NO RACE VARIABLE): >60 ML/MIN/1.73 M^2
GLUCOSE SERPL-MCNC: 83 MG/DL (ref 70–110)
HCT VFR BLD AUTO: 39.9 % (ref 37–48.5)
HDLC SERPL-MCNC: 68 MG/DL (ref 40–75)
HDLC SERPL: 33.2 % (ref 20–50)
HGB BLD-MCNC: 13 G/DL (ref 12–16)
IMM GRANULOCYTES # BLD AUTO: 0.01 K/UL (ref 0–0.04)
IMM GRANULOCYTES NFR BLD AUTO: 0.2 % (ref 0–0.5)
LDLC SERPL CALC-MCNC: 122.4 MG/DL (ref 63–159)
LYMPHOCYTES # BLD AUTO: 2.1 K/UL (ref 1–4.8)
LYMPHOCYTES NFR BLD: 46 % (ref 18–48)
MCH RBC QN AUTO: 30.9 PG (ref 27–31)
MCHC RBC AUTO-ENTMCNC: 32.6 G/DL (ref 32–36)
MCV RBC AUTO: 95 FL (ref 82–98)
MONOCYTES # BLD AUTO: 0.3 K/UL (ref 0.3–1)
MONOCYTES NFR BLD: 7 % (ref 4–15)
NEUTROPHILS # BLD AUTO: 1.9 K/UL (ref 1.8–7.7)
NEUTROPHILS NFR BLD: 42.6 % (ref 38–73)
NONHDLC SERPL-MCNC: 137 MG/DL
NRBC BLD-RTO: 0 /100 WBC
PLATELET # BLD AUTO: 360 K/UL (ref 150–450)
PMV BLD AUTO: 10.4 FL (ref 9.2–12.9)
POTASSIUM SERPL-SCNC: 4.8 MMOL/L (ref 3.5–5.1)
PROT SERPL-MCNC: 6.8 G/DL (ref 6–8.4)
RBC # BLD AUTO: 4.21 M/UL (ref 4–5.4)
SODIUM SERPL-SCNC: 135 MMOL/L (ref 136–145)
TRIGL SERPL-MCNC: 73 MG/DL (ref 30–150)
TSH SERPL DL<=0.005 MIU/L-ACNC: 0.99 UIU/ML (ref 0.4–4)
WBC # BLD AUTO: 4.54 K/UL (ref 3.9–12.7)

## 2022-08-20 PROCEDURE — 80061 LIPID PANEL: CPT | Performed by: INTERNAL MEDICINE

## 2022-08-20 PROCEDURE — 84443 ASSAY THYROID STIM HORMONE: CPT | Performed by: INTERNAL MEDICINE

## 2022-08-20 PROCEDURE — 85025 COMPLETE CBC W/AUTO DIFF WBC: CPT | Performed by: INTERNAL MEDICINE

## 2022-08-20 PROCEDURE — 80053 COMPREHEN METABOLIC PANEL: CPT | Performed by: INTERNAL MEDICINE

## 2022-08-20 PROCEDURE — 36415 COLL VENOUS BLD VENIPUNCTURE: CPT | Performed by: INTERNAL MEDICINE

## 2022-08-22 ENCOUNTER — PATIENT MESSAGE (OUTPATIENT)
Dept: INTERNAL MEDICINE | Facility: CLINIC | Age: 52
End: 2022-08-22
Payer: COMMERCIAL

## 2022-08-22 DIAGNOSIS — R30.0 DYSURIA: Primary | ICD-10-CM

## 2022-08-22 NOTE — TELEPHONE ENCOUNTER
Repeat UA. Re: her questions about labs, there are no major concerns that warrant any attention before we see her.

## 2022-08-23 ENCOUNTER — LAB VISIT (OUTPATIENT)
Dept: LAB | Facility: HOSPITAL | Age: 52
End: 2022-08-23
Attending: INTERNAL MEDICINE
Payer: COMMERCIAL

## 2022-08-23 DIAGNOSIS — R30.0 DYSURIA: ICD-10-CM

## 2022-08-23 LAB
BACTERIA #/AREA URNS AUTO: NORMAL /HPF
BILIRUB UR QL STRIP: NEGATIVE
CLARITY UR REFRACT.AUTO: CLEAR
COLOR UR AUTO: COLORLESS
GLUCOSE UR QL STRIP: NEGATIVE
HGB UR QL STRIP: ABNORMAL
KETONES UR QL STRIP: NEGATIVE
LEUKOCYTE ESTERASE UR QL STRIP: NEGATIVE
MICROSCOPIC COMMENT: NORMAL
NITRITE UR QL STRIP: NEGATIVE
PH UR STRIP: 7 [PH] (ref 5–8)
PROT UR QL STRIP: NEGATIVE
RBC #/AREA URNS AUTO: 0 /HPF (ref 0–4)
SP GR UR STRIP: 1 (ref 1–1.03)
SQUAMOUS #/AREA URNS AUTO: 0 /HPF
URN SPEC COLLECT METH UR: ABNORMAL
WBC #/AREA URNS AUTO: 0 /HPF (ref 0–5)

## 2022-08-23 PROCEDURE — 81001 URINALYSIS AUTO W/SCOPE: CPT | Performed by: INTERNAL MEDICINE

## 2022-08-26 NOTE — PROGRESS NOTES
Subjective:       Patient ID: Shawna Mayers is a 51 y.o. female.    Chief Complaint: Annual Exam    Pt here for annual exam. Up to date on WWE with GYN.     She has previously been noted to have nodules on liver which was determined to be focal nodular hyperplasia. This has been stable.     She is clinically euthyroid on synthroid.     She takes klonopin most nights for sleep. We reviewed sleep hygiene. She tried trazodone but didn't like the way it made her feel.    She has h/o eating disorders but no issues in some time. She understands to let me know if wanting to see nutrition and/or psychology.         Review of Systems   Constitutional:  Negative for activity change, fatigue, fever and unexpected weight change.   HENT:  Negative for congestion, hearing loss, rhinorrhea, sore throat and trouble swallowing.    Eyes:  Negative for discharge and visual disturbance.   Respiratory:  Negative for chest tightness, shortness of breath and wheezing.    Cardiovascular:  Negative for chest pain, palpitations and leg swelling.   Gastrointestinal:  Negative for abdominal pain, blood in stool, constipation, diarrhea and vomiting.   Endocrine: Negative for polydipsia and polyuria.   Genitourinary:  Negative for difficulty urinating, dysuria, hematuria and menstrual problem.   Musculoskeletal:  Negative for arthralgias, joint swelling and neck pain.   Skin:  Negative for rash.   Neurological:  Negative for dizziness, syncope, weakness and headaches.   Hematological:  Negative for adenopathy.   Psychiatric/Behavioral:  Negative for confusion and dysphoric mood.      Objective:      Physical Exam  Constitutional:       Appearance: Normal appearance. She is well-developed.   HENT:      Head: Normocephalic.      Right Ear: Tympanic membrane, ear canal and external ear normal.      Left Ear: Tympanic membrane, ear canal and external ear normal.      Nose: Nose normal.      Mouth/Throat:      Pharynx: Uvula midline.   Eyes:       General: Lids are normal.      Extraocular Movements: Extraocular movements intact.      Conjunctiva/sclera: Conjunctivae normal.      Right eye: Right conjunctiva is not injected.      Left eye: Left conjunctiva is not injected.      Pupils: Pupils are equal, round, and reactive to light.   Neck:      Thyroid: No thyroid mass or thyromegaly.      Vascular: No carotid bruit or JVD.   Cardiovascular:      Rate and Rhythm: Normal rate and regular rhythm.      Chest Wall: PMI is not displaced.      Pulses:           Posterior tibial pulses are 2+ on the right side and 2+ on the left side.      Heart sounds: S1 normal and S2 normal. No murmur heard.  Pulmonary:      Effort: Pulmonary effort is normal.      Breath sounds: Normal breath sounds. No wheezing, rhonchi or rales.   Abdominal:      General: Bowel sounds are normal. There is no distension or abdominal bruit.      Palpations: Abdomen is soft.      Tenderness: There is no abdominal tenderness.   Musculoskeletal:      Cervical back: Neck supple. No tenderness or bony tenderness.      Thoracic back: No tenderness or bony tenderness. Normal range of motion.      Lumbar back: No tenderness or bony tenderness. Normal range of motion.      Right lower leg: No edema.      Left lower leg: No edema.   Lymphadenopathy:      Head:      Right side of head: No preauricular or posterior auricular adenopathy.      Left side of head: No preauricular or posterior auricular adenopathy.      Cervical: No cervical adenopathy.   Skin:     General: Skin is warm.      Findings: No rash.   Neurological:      Mental Status: She is alert and oriented to person, place, and time.      Cranial Nerves: No cranial nerve deficit.      Sensory: No sensory deficit.   Psychiatric:         Mood and Affect: Mood normal.         Speech: Speech normal.         Behavior: Behavior normal.         Thought Content: Thought content normal.         Judgment: Judgment normal.       Assessment:       1.  Wellness examination    2. Acquired hypothyroidism    3. Focal nodular hyperplasia of liver    4. Encounter for screening mammogram for breast cancer        Plan:       1. Recent labs reviewed  2. mammogram

## 2022-08-30 ENCOUNTER — OFFICE VISIT (OUTPATIENT)
Dept: INTERNAL MEDICINE | Facility: CLINIC | Age: 52
End: 2022-08-30
Payer: COMMERCIAL

## 2022-08-30 VITALS
OXYGEN SATURATION: 98 % | BODY MASS INDEX: 29.4 KG/M2 | DIASTOLIC BLOOD PRESSURE: 86 MMHG | HEART RATE: 75 BPM | SYSTOLIC BLOOD PRESSURE: 126 MMHG | WEIGHT: 159.75 LBS | HEIGHT: 62 IN

## 2022-08-30 DIAGNOSIS — Z00.00 WELLNESS EXAMINATION: Primary | ICD-10-CM

## 2022-08-30 DIAGNOSIS — K76.89 FOCAL NODULAR HYPERPLASIA OF LIVER: ICD-10-CM

## 2022-08-30 DIAGNOSIS — E03.9 ACQUIRED HYPOTHYROIDISM: ICD-10-CM

## 2022-08-30 DIAGNOSIS — Z12.31 ENCOUNTER FOR SCREENING MAMMOGRAM FOR BREAST CANCER: ICD-10-CM

## 2022-08-30 PROCEDURE — 1160F PR REVIEW ALL MEDS BY PRESCRIBER/CLIN PHARMACIST DOCUMENTED: ICD-10-PCS | Mod: CPTII,S$GLB,, | Performed by: INTERNAL MEDICINE

## 2022-08-30 PROCEDURE — 99396 PREV VISIT EST AGE 40-64: CPT | Mod: S$GLB,,, | Performed by: INTERNAL MEDICINE

## 2022-08-30 PROCEDURE — 3008F BODY MASS INDEX DOCD: CPT | Mod: CPTII,S$GLB,, | Performed by: INTERNAL MEDICINE

## 2022-08-30 PROCEDURE — 3074F SYST BP LT 130 MM HG: CPT | Mod: CPTII,S$GLB,, | Performed by: INTERNAL MEDICINE

## 2022-08-30 PROCEDURE — 3074F PR MOST RECENT SYSTOLIC BLOOD PRESSURE < 130 MM HG: ICD-10-PCS | Mod: CPTII,S$GLB,, | Performed by: INTERNAL MEDICINE

## 2022-08-30 PROCEDURE — 1160F RVW MEDS BY RX/DR IN RCRD: CPT | Mod: CPTII,S$GLB,, | Performed by: INTERNAL MEDICINE

## 2022-08-30 PROCEDURE — 3079F PR MOST RECENT DIASTOLIC BLOOD PRESSURE 80-89 MM HG: ICD-10-PCS | Mod: CPTII,S$GLB,, | Performed by: INTERNAL MEDICINE

## 2022-08-30 PROCEDURE — 1159F PR MEDICATION LIST DOCUMENTED IN MEDICAL RECORD: ICD-10-PCS | Mod: CPTII,S$GLB,, | Performed by: INTERNAL MEDICINE

## 2022-08-30 PROCEDURE — 3079F DIAST BP 80-89 MM HG: CPT | Mod: CPTII,S$GLB,, | Performed by: INTERNAL MEDICINE

## 2022-08-30 PROCEDURE — 3008F PR BODY MASS INDEX (BMI) DOCUMENTED: ICD-10-PCS | Mod: CPTII,S$GLB,, | Performed by: INTERNAL MEDICINE

## 2022-08-30 PROCEDURE — 99999 PR PBB SHADOW E&M-EST. PATIENT-LVL V: CPT | Mod: PBBFAC,,, | Performed by: INTERNAL MEDICINE

## 2022-08-30 PROCEDURE — 1159F MED LIST DOCD IN RCRD: CPT | Mod: CPTII,S$GLB,, | Performed by: INTERNAL MEDICINE

## 2022-08-30 PROCEDURE — 99999 PR PBB SHADOW E&M-EST. PATIENT-LVL V: ICD-10-PCS | Mod: PBBFAC,,, | Performed by: INTERNAL MEDICINE

## 2022-08-30 PROCEDURE — 99396 PR PREVENTIVE VISIT,EST,40-64: ICD-10-PCS | Mod: S$GLB,,, | Performed by: INTERNAL MEDICINE

## 2022-08-30 RX ORDER — AMOXICILLIN 500 MG
CAPSULE ORAL DAILY
COMMUNITY
End: 2023-08-30

## 2022-09-01 NOTE — MR AVS SNAPSHOT
[facility]  [address]  [place] [address]  Phone: [phone]  Fax: [phone]                  Shawna Mayers   2017 2:40 PM   Office Visit    Description:  Female : 1970   Provider:  Baldev Braun MD   Department:  [facility]           Reason for Visit     Annual Exam           Diagnoses this Visit        Comments    Wellness examination    -  Primary     Acquired hypothyroidism                To Do List           Future Appointments        Provider Department Dept Phone    2017 8:30 AM LAB, APPOINTMENT [facility] [phone]      Goals (5 Years of Data)     None      Follow-Up and Disposition     Return in about 1 year (around 2018) for PE.       These Medications        Disp Refills Start End    trazodone (DESYREL) 50 MG tablet 10 tablet 0 2017    Take 1 tablet (50 mg total) by mouth nightly as needed for Insomnia. - Oral    Pharmacy: [id number] [place] [facility] [address] Ph #: [phone]         [facility]     [facility] -  Assistance  Unless otherwise directed by your provider, please contact [facility], our nurse care line that is available for  assistance.     Registered nurses in the [facility] provide: appointment scheduling, clinical advisement, health education, and other advisory services.  Call: [phone] (toll free)               Medications           START taking these NEW medications        Refills    trazodone (DESYREL) 50 MG tablet 0    Sig: Take 1 tablet (50 mg total) by mouth nightly as needed for Insomnia.    Class: Normal    Route: Oral      STOP taking these medications     FLUARIX QUAD 3909-1775, PF, 60 mcg (15 mcg x 4)/0.5 mL Syrg TO BE ADMINISTERED BY PHARMACIST FOR IMMUNIZATION           Verify that the below list of medications is an accurate representation of the  "medications you are currently taking.  If none reported, the list may be blank. If incorrect, please contact your healthcare provider. Carry this list with you in case of emergency.           Current Medications     ATROVENT HFA 17 mcg/actuation inhaler INHALE 2 PUFFS INTO THE LUNGS EVERY 6 (SIX) HOURS.    clonazePAM (KLONOPIN) 0.5 MG tablet TAKE 1 TABLET BY MOUTH TWICE A DAY    KERYDIN 5 % Nisa APPLY TO THE AFFECTED NAILS NIGHTLY    levothyroxine (SYNTHROID) 50 MCG tablet Take 1 tablet (50 mcg total) by mouth every morning.    meclizine (ANTIVERT) 25 mg tablet Take 1 tablet (25 mg total) by mouth 3 (three) times daily as needed.    mometasone (NASONEX) 50 mcg/actuation nasal spray INSTILL 2 SPRAYS INTO EACH NOSTRIL ONCE DAILY    NAFTIN 2 % Gel     ONEXTON 1.2 %(1 % base) -3.75 % GlwP APPLY A SMALL AMOUNT TO AFFECTED AREA DAILY    tretinoin (RETIN-A) 0.025 % cream APPLY A SMALL PEA-SIZED AMOUNT TO FACE EVENLY EVERY 2 TO 3 NIGHTS    trazodone (DESYREL) 50 MG tablet Take 1 tablet (50 mg total) by mouth nightly as needed for Insomnia.           Clinical Reference Information           Your Vitals Were     BP Pulse Height Weight SpO2 BMI    126/72 55 5' 2" (1.575 m) 61.6 kg (135 lb 12.9 oz) 98% 24.84 kg/m2      Blood Pressure          Most Recent Value    BP  126/72      Allergies as of 5/16/2017     Anucort-hc [Hydrocortisone Acetate]    Demerol [Meperidine]    Sulfa (Sulfonamide Antibiotics)    Amoxicillin      Immunizations Administered on Date of Encounter - 5/16/2017     Name Date Dose VIS Date Route    TDAP 5/16/2017 0.5 mL 2/24/2015 Intramuscular      Orders Placed During Today's Visit      Normal Orders This Visit    Tdap Vaccine     Future Labs/Procedures Expected by Expires    CBC auto differential  5/16/2017 5/16/2018    Comprehensive metabolic panel  5/16/2017 5/16/2018    Lipid panel  5/16/2017 5/16/2018    TSH  5/16/2017 5/16/2018      Language Assistance Services     ATTENTION: Language assistance " services are available, free of charge. Please call [phone].      ATENCIÓN: Si habla joselyn, tiene a dobbs disposición servicios gratuitos de asistencia lingüística. Llame al [phone].     CHÚ Ý: N?u b?n nói Ti?ng Vi?t, có các d?ch v? h? tr? ngôn ng? mi?n phí dành cho b?n. G?i s? [phone].         [facility] complies with applicable Federal civil rights laws and does not discriminate on the basis of race, color, national origin, age, disability, or sex.         Ivermectin Pregnancy And Lactation Text: This medication is Pregnancy Category C and it isn't known if it is safe during pregnancy. It is also excreted in breast milk.

## 2022-09-17 ENCOUNTER — IMMUNIZATION (OUTPATIENT)
Dept: INTERNAL MEDICINE | Facility: CLINIC | Age: 52
End: 2022-09-17
Payer: COMMERCIAL

## 2022-09-17 DIAGNOSIS — Z23 NEED FOR VACCINATION: Primary | ICD-10-CM

## 2022-09-17 PROCEDURE — 0124A PR IMMUNIZ ADMIN, SARS-COV- 2 COVID-19 VACCINE, 30MCG/0.3ML, BIVALENT, BOOSTER DOSE: ICD-10-PCS | Mod: S$GLB,,, | Performed by: INTERNAL MEDICINE

## 2022-09-17 PROCEDURE — 0124A COVID-19, MRNA, LNP-S, BIVALENT BOOSTER, PF, 30 MCG/0.3 ML DOSE: CPT | Mod: CV19,PBBFAC | Performed by: INTERNAL MEDICINE

## 2022-09-17 PROCEDURE — 91312 COVID-19, MRNA, LNP-S, BIVALENT BOOSTER, PF, 30 MCG/0.3 ML DOSE: ICD-10-PCS | Mod: S$GLB,,, | Performed by: INTERNAL MEDICINE

## 2022-09-17 PROCEDURE — 0124A PR IMMUNIZ ADMIN, SARS-COV- 2 COVID-19 VACCINE, 30MCG/0.3ML, BIVALENT, BOOSTER DOSE: CPT | Mod: S$GLB,,, | Performed by: INTERNAL MEDICINE

## 2022-09-17 PROCEDURE — 91312 COVID-19, MRNA, LNP-S, BIVALENT BOOSTER, PF, 30 MCG/0.3 ML DOSE: CPT | Mod: S$GLB,,, | Performed by: INTERNAL MEDICINE

## 2022-10-18 ENCOUNTER — HOSPITAL ENCOUNTER (OUTPATIENT)
Dept: RADIOLOGY | Facility: HOSPITAL | Age: 52
Discharge: HOME | End: 2022-10-18
Attending: INTERNAL MEDICINE
Payer: COMMERCIAL

## 2022-10-18 VITALS — WEIGHT: 155 LBS | BODY MASS INDEX: 28.35 KG/M2

## 2022-10-18 DIAGNOSIS — Z12.31 ENCOUNTER FOR SCREENING MAMMOGRAM FOR BREAST CANCER: ICD-10-CM

## 2022-10-18 PROCEDURE — 77067 SCR MAMMO BI INCL CAD: CPT | Mod: TC

## 2022-10-18 PROCEDURE — 77063 MAMMO DIGITAL SCREENING BILAT WITH TOMO: ICD-10-PCS | Mod: 26,,, | Performed by: RADIOLOGY

## 2022-10-18 PROCEDURE — 77063 BREAST TOMOSYNTHESIS BI: CPT | Mod: 26,,, | Performed by: RADIOLOGY

## 2022-10-18 PROCEDURE — 77063 BREAST TOMOSYNTHESIS BI: CPT | Mod: TC

## 2022-10-18 PROCEDURE — 77067 SCR MAMMO BI INCL CAD: CPT | Mod: 26,,, | Performed by: RADIOLOGY

## 2022-10-18 PROCEDURE — 77067 MAMMO DIGITAL SCREENING BILAT WITH TOMO: ICD-10-PCS | Mod: 26,,, | Performed by: RADIOLOGY

## 2022-10-21 ENCOUNTER — PATIENT MESSAGE (OUTPATIENT)
Dept: INTERNAL MEDICINE | Facility: CLINIC | Age: 52
End: 2022-10-21
Payer: COMMERCIAL

## 2022-10-24 ENCOUNTER — PATIENT MESSAGE (OUTPATIENT)
Dept: INTERNAL MEDICINE | Facility: CLINIC | Age: 52
End: 2022-10-24
Payer: COMMERCIAL

## 2022-11-02 ENCOUNTER — OFFICE VISIT (OUTPATIENT)
Dept: OBSTETRICS AND GYNECOLOGY | Facility: CLINIC | Age: 52
End: 2022-11-02
Payer: COMMERCIAL

## 2022-11-02 VITALS
WEIGHT: 160.69 LBS | BODY MASS INDEX: 29.57 KG/M2 | SYSTOLIC BLOOD PRESSURE: 136 MMHG | DIASTOLIC BLOOD PRESSURE: 78 MMHG | HEIGHT: 62 IN

## 2022-11-02 DIAGNOSIS — Z01.419 WELL WOMAN EXAM WITH ROUTINE GYNECOLOGICAL EXAM: Primary | ICD-10-CM

## 2022-11-02 PROCEDURE — 99999 PR PBB SHADOW E&M-EST. PATIENT-LVL III: CPT | Mod: PBBFAC,,, | Performed by: OBSTETRICS & GYNECOLOGY

## 2022-11-02 PROCEDURE — 99396 PR PREVENTIVE VISIT,EST,40-64: ICD-10-PCS | Mod: S$GLB,,, | Performed by: OBSTETRICS & GYNECOLOGY

## 2022-11-02 PROCEDURE — 3008F BODY MASS INDEX DOCD: CPT | Mod: CPTII,S$GLB,, | Performed by: OBSTETRICS & GYNECOLOGY

## 2022-11-02 PROCEDURE — 3078F PR MOST RECENT DIASTOLIC BLOOD PRESSURE < 80 MM HG: ICD-10-PCS | Mod: CPTII,S$GLB,, | Performed by: OBSTETRICS & GYNECOLOGY

## 2022-11-02 PROCEDURE — 3008F PR BODY MASS INDEX (BMI) DOCUMENTED: ICD-10-PCS | Mod: CPTII,S$GLB,, | Performed by: OBSTETRICS & GYNECOLOGY

## 2022-11-02 PROCEDURE — 1160F RVW MEDS BY RX/DR IN RCRD: CPT | Mod: CPTII,S$GLB,, | Performed by: OBSTETRICS & GYNECOLOGY

## 2022-11-02 PROCEDURE — 1159F MED LIST DOCD IN RCRD: CPT | Mod: CPTII,S$GLB,, | Performed by: OBSTETRICS & GYNECOLOGY

## 2022-11-02 PROCEDURE — 1159F PR MEDICATION LIST DOCUMENTED IN MEDICAL RECORD: ICD-10-PCS | Mod: CPTII,S$GLB,, | Performed by: OBSTETRICS & GYNECOLOGY

## 2022-11-02 PROCEDURE — 3075F PR MOST RECENT SYSTOLIC BLOOD PRESS GE 130-139MM HG: ICD-10-PCS | Mod: CPTII,S$GLB,, | Performed by: OBSTETRICS & GYNECOLOGY

## 2022-11-02 PROCEDURE — 3075F SYST BP GE 130 - 139MM HG: CPT | Mod: CPTII,S$GLB,, | Performed by: OBSTETRICS & GYNECOLOGY

## 2022-11-02 PROCEDURE — 99999 PR PBB SHADOW E&M-EST. PATIENT-LVL III: ICD-10-PCS | Mod: PBBFAC,,, | Performed by: OBSTETRICS & GYNECOLOGY

## 2022-11-02 PROCEDURE — 1160F PR REVIEW ALL MEDS BY PRESCRIBER/CLIN PHARMACIST DOCUMENTED: ICD-10-PCS | Mod: CPTII,S$GLB,, | Performed by: OBSTETRICS & GYNECOLOGY

## 2022-11-02 PROCEDURE — 3078F DIAST BP <80 MM HG: CPT | Mod: CPTII,S$GLB,, | Performed by: OBSTETRICS & GYNECOLOGY

## 2022-11-02 PROCEDURE — 99396 PREV VISIT EST AGE 40-64: CPT | Mod: S$GLB,,, | Performed by: OBSTETRICS & GYNECOLOGY

## 2022-11-02 NOTE — PROGRESS NOTES
History & Physical  Gynecology      SUBJECTIVE:     Chief Complaint: Well Woman       History of Present Illness:  Annual Exam-Postmenopausal  Patient presents for annual exam. She has no complaints today. She is perimenopausal.  Last regular periods were earlier this year.  Now she has occasional one day of full periods, and occasional spotting.  She is having some hot flashes and night sweats.  Does have problems with sleep lately.  Stopped clonazepam a few months ago and this has not helped.   She is sexually active. She is not taking hormone replacement therapy.  She participates in regular exercise: yes.  She does not smoke.     GYN screening history:  paphpv  Mammogram history:   Colonoscopy history: - FIT  Dexa history: n/a    FH:   Breast cancer: neg  Colon cancer: neg  Ovarian cancer: neg    Review of patient's allergies indicates:   Allergen Reactions    Anucort-hc [hydrocortisone acetate] Hives and Nausea And Vomiting    Demerol [meperidine] Nausea And Vomiting    Sulfa (sulfonamide antibiotics) Hives    Amoxicillin Nausea And Vomiting and Rash    Macrobid [nitrofurantoin monohyd/m-cryst] Nausea Only     dizziness       Past Medical History:   Diagnosis Date    Allergy     Anxiety     Asthma     Focal nodular hyperplasia of liver     GERD (gastroesophageal reflux disease)     Hyperlipidemia     Thyroid disease     hypothyroidism     Past Surgical History:   Procedure Laterality Date    CHOLECYSTECTOMY      FOOT SURGERY      arc    WISDOM TOOTH EXTRACTION       OB History          0    Para        Term   0            AB        Living             SAB        IAB        Ectopic        Multiple        Live Births                   Family History   Problem Relation Age of Onset    Arthritis Mother     Asthma Mother     Diabetes Mother     Hearing loss Mother     Hyperlipidemia Mother     Vision loss Mother     Early death Father         42yrs old    Heart disease Father          of  MI at 41yo    Hyperlipidemia Father     Kidney disease Father     Alcohol abuse Brother     Diabetes Maternal Grandmother     Breast cancer Neg Hx     Colon cancer Neg Hx     Ovarian cancer Neg Hx     Melanoma Neg Hx      Social History     Tobacco Use    Smoking status: Never    Smokeless tobacco: Never   Substance Use Topics    Alcohol use: Yes     Alcohol/week: 4.0 standard drinks     Types: 2 Glasses of wine, 2 Cans of beer per week     Comment: weekend/social drinker    Drug use: Never       Current Outpatient Medications   Medication Sig    efinaconazole (JUBLIA) 10 % Nisa Apply to affected nail(s) qday    fluticasone propionate (FLONASE) 50 mcg/actuation nasal spray SPRAY 2 SPRAYS BY EACH NOSTRIL ROUTE ONCE DAILY.    ketoconazole (NIZORAL) 2 % cream AAA feet bid x 2-4 wks.    Lactobacillus rhamnosus GG (CULTURELLE) 10 billion cell capsule Take 1 capsule by mouth once daily.    levothyroxine (SYNTHROID) 50 MCG tablet TAKE 1 TABLET BY MOUTH EVERY DAY IN THE MORNING    multivitamin capsule Take 1 capsule by mouth once daily.    omega-3 fatty acids/fish oil (FISH OIL-OMEGA-3 FATTY ACIDS) 300-1,000 mg capsule Take by mouth once daily.     Current Facility-Administered Medications   Medication    acetaminophen tablet 650 mg    albuterol inhaler 2 puff    diphenhydrAMINE injection 25 mg    EPINEPHrine (EPIPEN) 0.3 mg/0.3 mL pen injection 0.3 mg    methylPREDNISolone sodium succinate injection 40 mg    ondansetron disintegrating tablet 4 mg    sodium chloride 0.9% 500 mL flush bag    sodium chloride 0.9% flush 10 mL       Review of Systems:  Review of Systems   Constitutional:  Negative for appetite change, fever and unexpected weight change.   Respiratory:  Negative for shortness of breath.    Cardiovascular:  Negative for chest pain.   Gastrointestinal:  Negative for nausea and vomiting.   Genitourinary:  Positive for hot flashes and menstrual problem (perimenopausal bleeding). Negative for menorrhagia, pelvic  pain, vaginal bleeding, vaginal discharge and vaginal pain.   Integumentary:  Negative for breast mass.   Psychiatric/Behavioral:  Positive for sleep disturbance.    Breast: Negative for lump and mass     OBJECTIVE:     Physical Exam:  Physical Exam  Vitals and nursing note reviewed.   Constitutional:       Appearance: She is well-developed.   Cardiovascular:      Rate and Rhythm: Normal rate and regular rhythm.      Heart sounds: Normal heart sounds.   Pulmonary:      Effort: Pulmonary effort is normal.      Breath sounds: Normal breath sounds.   Chest:   Breasts:     Breasts are symmetrical.      Right: No inverted nipple, mass, nipple discharge, skin change or tenderness.      Left: No inverted nipple, mass, nipple discharge, skin change or tenderness.   Abdominal:      Palpations: Abdomen is soft.   Genitourinary:     General: Normal vulva.      Labia:         Right: No rash, tenderness, lesion or injury.         Left: No rash, tenderness, lesion or injury.       Urethra: No prolapse, urethral pain, urethral swelling or urethral lesion.      Vagina: Normal. No signs of injury and foreign body. No vaginal discharge, erythema, tenderness or bleeding.      Cervix: No cervical motion tenderness, discharge or friability.      Uterus: Not deviated, not enlarged, not fixed and not tender.       Adnexa:         Right: No mass, tenderness or fullness.          Left: No mass, tenderness or fullness.        Rectum: No anal fissure or external hemorrhoid.      Comments: Urethral meatus: normal size, anterior vaginal wall with no prolapse, no lesions  Bladder: no fullness, masses or tenderness  Musculoskeletal:      Cervical back: Normal range of motion.   Neurological:      Mental Status: She is alert and oriented to person, place, and time.   Psychiatric:         Behavior: Behavior normal.         Thought Content: Thought content normal.         Judgment: Judgment normal.       Chaperoned by: Reshanda    ASSESSMENT:        ICD-10-CM ICD-9-CM    1. Well woman exam with routine gynecological exam  Z01.419 V72.31              Plan:      Shawna was seen today for well woman.    Diagnoses and all orders for this visit:    Well woman exam with routine gynecological exam        No orders of the defined types were placed in this encounter.      Well Woman:   - Pap smear: up to date  - Mammogram: up to date  - Colonoscopy: FIT kit done  - Dexa: due age 65  - Immunizations: n/a  - Labs: none required  - Exercise recommended  - discussed unisom as an option for sleep    Follow up in one year for annual, or prn.    Nicky Nguyen

## 2023-04-09 ENCOUNTER — PATIENT MESSAGE (OUTPATIENT)
Dept: INTERNAL MEDICINE | Facility: CLINIC | Age: 53
End: 2023-04-09
Payer: COMMERCIAL

## 2023-04-10 NOTE — TELEPHONE ENCOUNTER
Routing to provider to advise if pt should be seen by us via VV to discuss sleep, or should wait for new GYN appt

## 2023-04-11 ENCOUNTER — PATIENT MESSAGE (OUTPATIENT)
Dept: INTERNAL MEDICINE | Facility: CLINIC | Age: 53
End: 2023-04-11
Payer: COMMERCIAL

## 2023-05-06 ENCOUNTER — PATIENT MESSAGE (OUTPATIENT)
Dept: ADMINISTRATIVE | Facility: HOSPITAL | Age: 53
End: 2023-05-06
Payer: COMMERCIAL

## 2023-05-13 DIAGNOSIS — Z12.11 SCREENING FOR COLON CANCER: ICD-10-CM

## 2023-05-30 ENCOUNTER — TELEPHONE (OUTPATIENT)
Dept: SPORTS MEDICINE | Facility: CLINIC | Age: 53
End: 2023-05-30
Payer: COMMERCIAL

## 2023-05-30 DIAGNOSIS — M25.561 PAIN IN BOTH KNEES, UNSPECIFIED CHRONICITY: Primary | ICD-10-CM

## 2023-05-30 DIAGNOSIS — M25.562 PAIN IN BOTH KNEES, UNSPECIFIED CHRONICITY: Primary | ICD-10-CM

## 2023-05-30 NOTE — TELEPHONE ENCOUNTER
Spoke to the Pt about her upcoming appt and discussed with her that this is a new condition and the Dr will need more time for XR and eval.  Asked her to come in 30 mins early so that we will have enough time.  She stated understanding and confirmed the appt details.

## 2023-06-01 ENCOUNTER — OFFICE VISIT (OUTPATIENT)
Dept: SPORTS MEDICINE | Facility: CLINIC | Age: 53
End: 2023-06-01
Payer: COMMERCIAL

## 2023-06-01 ENCOUNTER — HOSPITAL ENCOUNTER (OUTPATIENT)
Dept: RADIOLOGY | Facility: HOSPITAL | Age: 53
Discharge: HOME | End: 2023-06-01
Attending: NEUROMUSCULOSKELETAL MEDICINE & OMM
Payer: COMMERCIAL

## 2023-06-01 VITALS — DIASTOLIC BLOOD PRESSURE: 82 MMHG | SYSTOLIC BLOOD PRESSURE: 140 MMHG | HEART RATE: 84 BPM

## 2023-06-01 DIAGNOSIS — M25.561 MECHANICAL KNEE PAIN, RIGHT: Primary | ICD-10-CM

## 2023-06-01 DIAGNOSIS — S83.411A SPRAIN OF MEDIAL COLLATERAL LIGAMENT OF RIGHT KNEE, INITIAL ENCOUNTER: ICD-10-CM

## 2023-06-01 DIAGNOSIS — M25.562 PAIN IN BOTH KNEES, UNSPECIFIED CHRONICITY: ICD-10-CM

## 2023-06-01 DIAGNOSIS — M25.561 PAIN IN BOTH KNEES, UNSPECIFIED CHRONICITY: ICD-10-CM

## 2023-06-01 DIAGNOSIS — M99.04 SACRAL REGION SOMATIC DYSFUNCTION: ICD-10-CM

## 2023-06-01 DIAGNOSIS — M99.03 SOMATIC DYSFUNCTION OF LUMBAR REGION: ICD-10-CM

## 2023-06-01 DIAGNOSIS — M25.461 EFFUSION OF RIGHT KNEE: ICD-10-CM

## 2023-06-01 DIAGNOSIS — M99.05 SOMATIC DYSFUNCTION OF PELVIC REGION: ICD-10-CM

## 2023-06-01 PROCEDURE — 3079F PR MOST RECENT DIASTOLIC BLOOD PRESSURE 80-89 MM HG: ICD-10-PCS | Mod: CPTII,S$GLB,, | Performed by: NEUROMUSCULOSKELETAL MEDICINE & OMM

## 2023-06-01 PROCEDURE — 99214 PR OFFICE/OUTPT VISIT, EST, LEVL IV, 30-39 MIN: ICD-10-PCS | Mod: 25,S$GLB,, | Performed by: NEUROMUSCULOSKELETAL MEDICINE & OMM

## 2023-06-01 PROCEDURE — 1159F MED LIST DOCD IN RCRD: CPT | Mod: CPTII,S$GLB,, | Performed by: NEUROMUSCULOSKELETAL MEDICINE & OMM

## 2023-06-01 PROCEDURE — 1160F RVW MEDS BY RX/DR IN RCRD: CPT | Mod: CPTII,S$GLB,, | Performed by: NEUROMUSCULOSKELETAL MEDICINE & OMM

## 2023-06-01 PROCEDURE — 99999 PR PBB SHADOW E&M-EST. PATIENT-LVL III: ICD-10-PCS | Mod: PBBFAC,,, | Performed by: NEUROMUSCULOSKELETAL MEDICINE & OMM

## 2023-06-01 PROCEDURE — 3077F PR MOST RECENT SYSTOLIC BLOOD PRESSURE >= 140 MM HG: ICD-10-PCS | Mod: CPTII,S$GLB,, | Performed by: NEUROMUSCULOSKELETAL MEDICINE & OMM

## 2023-06-01 PROCEDURE — 73564 X-RAY EXAM KNEE 4 OR MORE: CPT | Mod: TC,50,PO

## 2023-06-01 PROCEDURE — 1160F PR REVIEW ALL MEDS BY PRESCRIBER/CLIN PHARMACIST DOCUMENTED: ICD-10-PCS | Mod: CPTII,S$GLB,, | Performed by: NEUROMUSCULOSKELETAL MEDICINE & OMM

## 2023-06-01 PROCEDURE — 3079F DIAST BP 80-89 MM HG: CPT | Mod: CPTII,S$GLB,, | Performed by: NEUROMUSCULOSKELETAL MEDICINE & OMM

## 2023-06-01 PROCEDURE — 99214 OFFICE O/P EST MOD 30 MIN: CPT | Mod: 25,S$GLB,, | Performed by: NEUROMUSCULOSKELETAL MEDICINE & OMM

## 2023-06-01 PROCEDURE — 1159F PR MEDICATION LIST DOCUMENTED IN MEDICAL RECORD: ICD-10-PCS | Mod: CPTII,S$GLB,, | Performed by: NEUROMUSCULOSKELETAL MEDICINE & OMM

## 2023-06-01 PROCEDURE — 73564 X-RAY EXAM KNEE 4 OR MORE: CPT | Mod: 26,,, | Performed by: RADIOLOGY

## 2023-06-01 PROCEDURE — 99999 PR PBB SHADOW E&M-EST. PATIENT-LVL III: CPT | Mod: PBBFAC,,, | Performed by: NEUROMUSCULOSKELETAL MEDICINE & OMM

## 2023-06-01 PROCEDURE — 73564 XR KNEE ORTHO BILAT WITH FLEXION: ICD-10-PCS | Mod: 26,,, | Performed by: RADIOLOGY

## 2023-06-01 PROCEDURE — 3077F SYST BP >= 140 MM HG: CPT | Mod: CPTII,S$GLB,, | Performed by: NEUROMUSCULOSKELETAL MEDICINE & OMM

## 2023-06-01 RX ORDER — CELECOXIB 200 MG/1
200 CAPSULE ORAL DAILY
Qty: 30 CAPSULE | Refills: 1 | Status: SHIPPED | OUTPATIENT
Start: 2023-06-01 | End: 2023-07-31

## 2023-06-01 NOTE — PROGRESS NOTES
"Subjective:     Shawna Mayers     Chief Complaint   Patient presents with    Right Knee - Pain         HPI    Shawna is a 52 y.o. female coming in today for right knee pain that began about 5-6 month(s) ago, referred by self. She was seen by me previously for left knee pain. Pt. describes the pain as a 5/10 achy pain that does radiate to her medial knee and heel. There was not a fall/injury/ or trauma associated with the onset of symptoms. Pt reports she started a "couch to 5k" and noted pain in her posterior knee that eventually started radiating to her heel and medial knee. Treating with rest and ice. The pain is better with rest, ice, ibuprofen (limits NSAIDs due to gastric upset) and worse with stairs, lunges, squats, standing from sitting, mornings. Pt. Denies any other musculoskeletal complaints at this time.     Joint instability? no  Mechanical locking/clicking? yes  Affecting ADL's? yes  Affecting sleep? yes    Occupation: spin/body pump teacher    PAST MEDICAL HISTORY:   Past Medical History:   Diagnosis Date    Allergy     Anxiety     Asthma     Focal nodular hyperplasia of liver     GERD (gastroesophageal reflux disease)     Hyperlipidemia     Thyroid disease     hypothyroidism     PAST SURGICAL HISTORY:   Past Surgical History:   Procedure Laterality Date    CHOLECYSTECTOMY      FOOT SURGERY      arc    WISDOM TOOTH EXTRACTION       FAMILY HISTORY:   Family History   Problem Relation Age of Onset    Arthritis Mother     Asthma Mother     Diabetes Mother     Hearing loss Mother     Hyperlipidemia Mother     Vision loss Mother     Early death Father         42yrs old    Heart disease Father          of MI at 41yo    Hyperlipidemia Father     Kidney disease Father     Alcohol abuse Brother     Diabetes Maternal Grandmother     Breast cancer Neg Hx     Colon cancer Neg Hx     Ovarian cancer Neg Hx     Melanoma Neg Hx      SOCIAL HISTORY:   Social History     Socioeconomic History    Marital " status:    Occupational History     Employer: [employer]    Tobacco Use    Smoking status: Never    Smokeless tobacco: Never   Substance and Sexual Activity    Alcohol use: Yes     Alcohol/week: 4.0 standard drinks     Types: 2 Glasses of wine, 2 Cans of beer per week     Comment: weekend/social drinker    Drug use: Never    Sexual activity: Yes     Partners: Male     Birth control/protection: Partner-Vasectomy, None   Other Topics Concern    Are you pregnant or think you may be? No     Social Determinants of Health     Financial Resource Strain: Low Risk     Difficulty of Paying Living Expenses: Not hard at all   Food Insecurity: No Food Insecurity    Worried About Running Out of Food in the Last Year: Never true    Ran Out of Food in the Last Year: Never true   Transportation Needs: No Transportation Needs    Lack of Transportation (Medical): No    Lack of Transportation (Non-Medical): No   Physical Activity: Sufficiently Active    Days of Exercise per Week: 5 days    Minutes of Exercise per Session: 60 min   Stress: No Stress Concern Present    Feeling of Stress : Only a little   Social Connections: Unknown    Frequency of Communication with Friends and Family: Three times a week    Frequency of Social Gatherings with Friends and Family: More than three times a week    Active Member of Clubs or Organizations: No    Attends Club or Organization Meetings: Never    Marital Status:    Housing Stability: Low Risk     Unable to Pay for Housing in the Last Year: No    Number of Places Lived in the Last Year: 1    Unstable Housing in the Last Year: No     MEDICATIONS:     Current Outpatient Medications:     celecoxib (CELEBREX) 200 MG capsule, Take 1 capsule (200 mg total) by mouth once daily. With food for 60 doses, Disp: 30 capsule, Rfl: 1    efinaconazole (JUBLIA) 10 % Nisa, Apply to affected nail(s) qday, Disp: 4 mL, Rfl: 3    fluticasone propionate (FLONASE) 50 mcg/actuation nasal  spray, SPRAY 2 SPRAYS BY EACH NOSTRIL ROUTE ONCE DAILY., Disp: 48 g, Rfl: 3    ketoconazole (NIZORAL) 2 % cream, AAA feet bid x 2-4 wks., Disp: 60 g, Rfl: 3    Lactobacillus rhamnosus GG (CULTURELLE) 10 billion cell capsule, Take 1 capsule by mouth once daily., Disp: , Rfl:     levothyroxine (SYNTHROID) 50 MCG tablet, TAKE 1 TABLET BY MOUTH EVERY DAY IN THE MORNING, Disp: 90 tablet, Rfl: 3    multivitamin capsule, Take 1 capsule by mouth once daily., Disp: , Rfl:     omega-3 fatty acids/fish oil (FISH OIL-OMEGA-3 FATTY ACIDS) 300-1,000 mg capsule, Take by mouth once daily., Disp: , Rfl:     Current Facility-Administered Medications:     acetaminophen tablet 650 mg, 650 mg, Oral, Once PRN, Silvestre Sylvester MD    albuterol inhaler 2 puff, 2 puff, Inhalation, Q20 Min PRN, Silvestre Sylvester MD    diphenhydrAMINE injection 25 mg, 25 mg, Intravenous, Once PRN, Silvestre Sylvester MD    EPINEPHrine (EPIPEN) 0.3 mg/0.3 mL pen injection 0.3 mg, 0.3 mg, Intramuscular, PRN, Silvestre Sylvester MD    methylPREDNISolone sodium succinate injection 40 mg, 40 mg, Intravenous, Once PRN, Silvestre Sylvester MD    ondansetron disintegrating tablet 4 mg, 4 mg, Oral, Once PRN, Silvestre Sylvester MD    sodium chloride 0.9% 500 mL flush bag, , Intravenous, PRN, Silvestre Sylvester MD    sodium chloride 0.9% flush 10 mL, 10 mL, Intravenous, PRN, Silvestre Sylvester MD  ALLERGIES:   Review of patient's allergies indicates:   Allergen Reactions    Anucort-hc [hydrocortisone acetate] Hives and Nausea And Vomiting    Demerol [meperidine] Nausea And Vomiting    Sulfa (sulfonamide antibiotics) Hives    Amoxicillin Nausea And Vomiting and Rash    Macrobid [nitrofurantoin monohyd/m-cryst] Nausea Only     dizziness       Objective:     VITAL SIGNS: BP (!) 140/82   Pulse 84    General    Vitals reviewed.  Constitutional: She is oriented to person, place, and time. She appears well-developed and well-nourished.   Neurological: She is alert and  oriented to person, place, and time.   Psychiatric: She has a normal mood and affect. Her behavior is normal.           MUSCULOSKELETAL EXAM    right KNEE EXAMINATION   Affected side is compared to contralateral knee     Observation:  No edema, erythema, ecchymosis, or effusion noted.  No muscle atrophy of the thighs and calves noted.  No obvious bony deformities noted.   No Genu valgus/varum noted.  No recurvatum noted.    No tibial internal/external torsion.    Posture:  Upright  Gait: Right antalgic with Neutral ankle mechanics and Neutral medial arch    Tenderness:  Patella - none   Lateral joint line - none  Quad tendon - none   Medial joint line - +  Patellar tendon - none   Medial plica - none  Tibial tubercle - none   Lateral plica - none  Pes anserine - none   MCL prox - +  Distal ITB - none   MCL distal - +  MFC - none    LCL prox - none  LFC - none    LCL distal - none  Tibia - none    Fibula - none    No obvious bursae, plicae, popliteal cysts, or tendon derangement palpated.          ROM (* = with pain):   Active extension to 0° on left without hyperextension, lag, crepitus, or patellar J sign.   Active extension to 0° on right without hyperextension, lag, crepitus, or patellar J sign.  Active flexion to 135° on left and 125° on right*    Strength(* = with pain):  Knee Flexion - 5/5 on left and 5/5 on right  Knee Extension - 5/5 on left and 5/5 on right  Hip Flexion - 5/5 on left and 5/5 on right  Hip Extension - 5/5 on left and 5/5 on right  Ankle dorsiflexion - 5/5 on left and 5/5 on right  Ankle Plantarflexion - 5/5 on left and 5/5 on right    Patellofemoral Exam:   Patellar ballottement -positive  Bulge sign - positive  Patellar grind - negative    No patellar laxity with medial and lateral translation   No apprehension with medial and lateral patellar translation.     Meniscus Testing:     + pain with terminal extension and flexion at medial joint line.  Rommels test - positive  Thesaly test -  positive  Bounce home test - negative    Ligament Testing:  Lachman's test - negative  No laxity with anterior drawer.  No laxity with posterior drawer.    No posterior sag sign.   No laxity with varus testing at 0 and 30 degrees.  No laxity with valgus testing at 0* and mild laxity at 30 degrees* compared to left.    IT band testing:  Kels test - negative   Noble Compression test - negative    Neurovascular Examination:   Normal gait without antalgia.  Sensation intact to light touch in the obturator, lateral/intermediate/medial/posterior femoral cutaneous, saphenous, and common peroneal nerves bilaterally.  Hamstring/gluteal firing pattern normal and symmetric. Compensatory muscle firing pattern to    Motor Function:    Fully intact motor function at hip, knee, foot and ankle.  DTRs: 2+/4 reflexes at L4 and S1 dermatomes. No clonus. Downgoing Babinski.   Negative seated straight leg raise bilaterally.    Pulses intact at the DP and PT arteries bilaterally.    Capillary refill intact <2 seconds in all toes bilaterally.    TART (Tissue texture abnormality, Asymmetry,  Restriction of motion and/or Tenderness) changes:     Lumbar Spine   L1 Neutral   L2 Neutral   L3 FRS RIGHT   L4 FRS RIGHT   L5 FRS RIGHT       Pelvis:  Innominate:Right anterior rotation  Pubic bone:Right inferior pubic shear    Sacrum:Right on Right sacral torsion    Key   F= Flexed   E = Extended   R = Rotated   S = Sidebent   TTA = tissue texture abnormality     IMAGIN. X-ray ordered due to right knee pain. (AP bilateral standing, PA bilateral standing in flexion, bilateral merchants, and  bilateral lateral views) taken today.   2. X-ray images were reviewed personally by me and then directly with patient.  3. FINDINGS: X-ray images obtained demonstrate that the joint spaces are maintained.  Bony structures are intact.  No joint effusion is seen.  4. IMPRESSION: No pathology or irregularities appreciated.     Assessment:      Encounter  Diagnoses   Name Primary?    Mechanical knee pain, right Yes    Effusion of right knee     Sprain of medial collateral ligament of right knee, initial encounter     Somatic dysfunction of lumbar region     Sacral region somatic dysfunction     Somatic dysfunction of pelvic region           Plan:   1. Persistent right knee pain with associated effusion and mechanical symptoms concerning for internal derangement, specifically a medial meniscus tear and a MCL sprain.  Right knee MRI ordered for further evaluation  - HME order for right hinged knee brace fitted for today by Isidra ANGULO - wear with work and activity  - Rx given for Celebrex 200 mg po qday x 14 days then prn for pain control. Pt. Advised to avoid all other NSAIDS while on this medication.  - Recommend Ice up to 20 minutes at a time prn for pain control  - OMT performed today to address biomechanical contributions     -  X-ray images of bilateral knee taken today (AP bilateral standing, PA bilateral standing in flexion, bilateral merchants, and  bilateral lateral views) showed no abnormalities. Images were personally reviewed with patient.    2. OMT 3-4 regions. Oral consent obtained.  Reviewed benefits and potential side effects.   - OMT indicated today due to signs and symptoms as well as local and remote somatic dysfunction findings and their related neurokinetic, lymphatic, fascial and/or arteriovenous body connections.   - OMT techniques used: Myofascial Release and Muscle Energy   - Treatment was tolerated well. Improvement noted in segmental mobility post-treatment in dysfunctional regions. There were no adverse events and no complications immediately following treatment.     3. Follow-up in 2 weeks for reevaluation and review of right knee MRI    4. Patient agreeable to today's plan and all questions were answered    This note is dictated using the M*Modal Fluency Direct word recognition program. There are word recognition mistakes that are  occasionally missed on review.

## 2023-06-09 ENCOUNTER — PATIENT MESSAGE (OUTPATIENT)
Dept: SPORTS MEDICINE | Facility: CLINIC | Age: 53
End: 2023-06-09
Payer: COMMERCIAL

## 2023-06-13 LAB — HEMOCCULT STL QL IA: NEGATIVE

## 2023-06-15 ENCOUNTER — HOSPITAL ENCOUNTER (OUTPATIENT)
Dept: RADIOLOGY | Facility: HOSPITAL | Age: 53
Discharge: HOME | End: 2023-06-15
Attending: NEUROMUSCULOSKELETAL MEDICINE & OMM
Payer: COMMERCIAL

## 2023-06-15 ENCOUNTER — TELEPHONE (OUTPATIENT)
Dept: SPORTS MEDICINE | Facility: CLINIC | Age: 53
End: 2023-06-15
Payer: COMMERCIAL

## 2023-06-15 DIAGNOSIS — S83.411A SPRAIN OF MEDIAL COLLATERAL LIGAMENT OF RIGHT KNEE, INITIAL ENCOUNTER: ICD-10-CM

## 2023-06-15 DIAGNOSIS — M25.461 EFFUSION OF RIGHT KNEE: ICD-10-CM

## 2023-06-15 DIAGNOSIS — M25.561 MECHANICAL KNEE PAIN, RIGHT: ICD-10-CM

## 2023-06-15 PROCEDURE — 73721 MRI KNEE WITHOUT CONTRAST RIGHT: ICD-10-PCS | Mod: 26,RT,, | Performed by: INTERNAL MEDICINE

## 2023-06-15 PROCEDURE — 73721 MRI JNT OF LWR EXTRE W/O DYE: CPT | Mod: TC,RT

## 2023-06-15 PROCEDURE — 73721 MRI JNT OF LWR EXTRE W/O DYE: CPT | Mod: 26,RT,, | Performed by: INTERNAL MEDICINE

## 2023-06-15 NOTE — TELEPHONE ENCOUNTER
Teodoro f/u appt. Confirmed appt date, time and location and patient states understanding.     Isidra Patricia MS, OTC  Clinical Assistant to Dr. Marivel Maharaj

## 2023-06-15 NOTE — TELEPHONE ENCOUNTER
----- Message from Sarah Moncada sent at 6/15/2023  1:35 PM CDT -----  Regarding: returning call  Contact: pt  Pt returning call to office , please call     Confirmed patient's contact info below:  Contact Name: Shawna Mayers  Phone Number: [phone]

## 2023-06-15 NOTE — PROGRESS NOTES
Subjective:     Shawna Woodson Hermigel     Chief Complaint   Patient presents with    Right Knee - Pain         HPI      Shawna is a 52 y.o. female coming in today for right knee pain. Since last visit the pain has remained unchanged.  The pain is better with rest, ice, Voltaren 1% gel, ibuprofen (limits NSAIDs due to gastric upset) and worse with stairs, lunges, squats, standing from sitting, mornings.  Pt. describes the pain as a 6/10 achy pain that does not radiate. There has not been any new a fall/injury/ or traumas since last visit.  Pt. denies any new musculoskeletal complaints at this time.     Office note from 6/15/23 reviewed    Joint instability? no  Mechanical locking/clicking? yes  Affecting ADL's? yes  Affecting sleep? yes    Occupation: spin/body pump teacher    PAST MEDICAL HISTORY:   Past Medical History:   Diagnosis Date    Allergy     Anxiety     Asthma     Focal nodular hyperplasia of liver     GERD (gastroesophageal reflux disease)     Hyperlipidemia     Thyroid disease     hypothyroidism     PAST SURGICAL HISTORY:   Past Surgical History:   Procedure Laterality Date    CHOLECYSTECTOMY      FOOT SURGERY      arc    WISDOM TOOTH EXTRACTION       FAMILY HISTORY:   Family History   Problem Relation Age of Onset    Arthritis Mother     Asthma Mother     Diabetes Mother     Hearing loss Mother     Hyperlipidemia Mother     Vision loss Mother     Early death Father         42yrs old    Heart disease Father          of MI at 43yo    Hyperlipidemia Father     Kidney disease Father     Alcohol abuse Brother     Diabetes Maternal Grandmother     Breast cancer Neg Hx     Colon cancer Neg Hx     Ovarian cancer Neg Hx     Melanoma Neg Hx      SOCIAL HISTORY:   Social History     Socioeconomic History    Marital status:    Occupational History     Employer: [employer]    Tobacco Use    Smoking status: Never    Smokeless tobacco: Never   Substance and Sexual Activity    Alcohol use:  Yes     Alcohol/week: 4.0 standard drinks     Types: 2 Glasses of wine, 2 Cans of beer per week     Comment: weekend/social drinker    Drug use: Never    Sexual activity: Yes     Partners: Male     Birth control/protection: Partner-Vasectomy, None   Other Topics Concern    Are you pregnant or think you may be? No     Social Determinants of Health     Financial Resource Strain: Low Risk     Difficulty of Paying Living Expenses: Not hard at all   Food Insecurity: No Food Insecurity    Worried About Running Out of Food in the Last Year: Never true    Ran Out of Food in the Last Year: Never true   Transportation Needs: No Transportation Needs    Lack of Transportation (Medical): No    Lack of Transportation (Non-Medical): No   Physical Activity: Sufficiently Active    Days of Exercise per Week: 5 days    Minutes of Exercise per Session: 60 min   Stress: No Stress Concern Present    Feeling of Stress : Only a little   Social Connections: Unknown    Frequency of Communication with Friends and Family: Three times a week    Frequency of Social Gatherings with Friends and Family: More than three times a week    Active Member of Clubs or Organizations: No    Attends Club or Organization Meetings: Never    Marital Status:    Housing Stability: Low Risk     Unable to Pay for Housing in the Last Year: No    Number of Places Lived in the Last Year: 1    Unstable Housing in the Last Year: No     MEDICATIONS:     Current Outpatient Medications:     celecoxib (CELEBREX) 200 MG capsule, Take 1 capsule (200 mg total) by mouth once daily. With food for 60 doses, Disp: 30 capsule, Rfl: 1    efinaconazole (JUBLIA) 10 % Nisa, Apply to affected nail(s) qday, Disp: 4 mL, Rfl: 3    fluticasone propionate (FLONASE) 50 mcg/actuation nasal spray, SPRAY 2 SPRAYS BY EACH NOSTRIL ROUTE ONCE DAILY., Disp: 48 g, Rfl: 3    ketoconazole (NIZORAL) 2 % cream, AAA feet bid x 2-4 wks., Disp: 60 g, Rfl: 3    Lactobacillus rhamnosus GG (CULTURELLE)  "10 billion cell capsule, Take 1 capsule by mouth once daily., Disp: , Rfl:     levothyroxine (SYNTHROID) 50 MCG tablet, TAKE 1 TABLET BY MOUTH EVERY DAY IN THE MORNING, Disp: 90 tablet, Rfl: 3    multivitamin capsule, Take 1 capsule by mouth once daily., Disp: , Rfl:     omega-3 fatty acids/fish oil (FISH OIL-OMEGA-3 FATTY ACIDS) 300-1,000 mg capsule, Take by mouth once daily., Disp: , Rfl:     Current Facility-Administered Medications:     acetaminophen tablet 650 mg, 650 mg, Oral, Once PRN, Silvestre Sylvester MD    albuterol inhaler 2 puff, 2 puff, Inhalation, Q20 Min PRN, Silvestre Sylvester MD    diphenhydrAMINE injection 25 mg, 25 mg, Intravenous, Once PRN, Silvestre Sylvester MD    EPINEPHrine (EPIPEN) 0.3 mg/0.3 mL pen injection 0.3 mg, 0.3 mg, Intramuscular, PRN, Silvestre Sylvester MD    methylPREDNISolone sodium succinate injection 40 mg, 40 mg, Intravenous, Once PRN, Silvestre Sylvester MD    ondansetron disintegrating tablet 4 mg, 4 mg, Oral, Once PRN, Silvestre Sylvester MD    sodium chloride 0.9% 500 mL flush bag, , Intravenous, PRN, Silvestre Sylvester MD    sodium chloride 0.9% flush 10 mL, 10 mL, Intravenous, PRN, Silvestre Sylvester MD  ALLERGIES:   Review of patient's allergies indicates:   Allergen Reactions    Anucort-hc [hydrocortisone acetate] Hives and Nausea And Vomiting    Demerol [meperidine] Nausea And Vomiting    Sulfa (sulfonamide antibiotics) Hives    Amoxicillin Nausea And Vomiting and Rash    Macrobid [nitrofurantoin monohyd/m-cryst] Nausea Only     dizziness       Objective:     VITAL SIGNS: Ht 5' 2" (1.575 m)   Wt 74.8 kg (165 lb)   BMI 30.18 kg/m²    General    Vitals reviewed.  Constitutional: She is oriented to person, place, and time. She appears well-developed and well-nourished.   Neurological: She is alert and oriented to person, place, and time.   Psychiatric: She has a normal mood and affect. Her behavior is normal.           MUSCULOSKELETAL EXAM    right KNEE EXAMINATION "   Affected side is compared to contralateral knee     Observation:  No edema, erythema, ecchymosis, or effusion noted.  No muscle atrophy of the thighs and calves noted.  No obvious bony deformities noted.   No Genu valgus/varum noted.  No recurvatum noted.    No tibial internal/external torsion.    Posture:  Upright  Gait: Right antalgic with Neutral ankle mechanics and Neutral medial arch    Tenderness:  Patella - none   Lateral joint line - none  Quad tendon - none   Medial joint line - +  Patellar tendon - none   Medial plica - none  Tibial tubercle - none   Lateral plica - none  Pes anserine - none   MCL prox - +  Distal ITB - none   MCL distal - +  MFC - none    LCL prox - none  LFC - none    LCL distal - none  Tibia - none    Fibula - none    No obvious bursae, plicae, popliteal cysts, or tendon derangement palpated.          ROM (* = with pain):   Active extension to 0° on left without hyperextension, lag, crepitus, or patellar J sign.   Active extension to +1° on right* without hyperextension, lag, crepitus, or patellar J sign.  Active flexion to 135° on left and 125° on right*    Strength(* = with pain):  Knee Flexion - 5/5 on left and 5/5 on right  Knee Extension - 5/5 on left and 5/5 on right  Hip Flexion - 5/5 on left and 5/5 on right  Hip Extension - 5/5 on left and 5/5 on right  Ankle dorsiflexion - 5/5 on left and 5/5 on right  Ankle Plantarflexion - 5/5 on left and 5/5 on right    Patellofemoral Exam:   Patellar ballottement -mildly positive  Bulge sign - mildly positive  Patellar grind - negative    No patellar laxity with medial and lateral translation   No apprehension with medial and lateral patellar translation.     Meniscus Testing:     + pain with terminal extension and flexion at medial joint line.  Rommels test - positive  Thesaly test - positive  Bounce home test - positive    Ligament Testing:  Lachman's test - negative  No laxity with anterior drawer.  No laxity with posterior drawer.     No posterior sag sign.   No laxity with varus testing at 0 and 30 degrees.  No laxity with valgus testing at 0* and 30 degrees* but medial joint line pain reproduced    IT band testing:  Kels test - negative   Noble Compression test - negative    Neurovascular Examination:   Normal gait without antalgia.  Sensation intact to light touch in the obturator, lateral/intermediate/medial/posterior femoral cutaneous, saphenous, and common peroneal nerves bilaterally.  Hamstring/gluteal firing pattern normal and symmetric. Compensatory muscle firing pattern to    Motor Function:    Fully intact motor function at hip, knee, foot and ankle.  DTRs: 2+/4 reflexes at L4 and S1 dermatomes. No clonus. Downgoing Babinski.   Negative seated straight leg raise bilaterally.    Pulses intact at the DP and PT arteries bilaterally.    Capillary refill intact <2 seconds in all toes bilaterally.    IMAGIN. MRI ordered due to right knee pain taken 6/15/23.   2. MRI images were reviewed personally by me and then directly with patient.  3. FINDINGS:   Medial compartment: Oblique vertical tear involving the inner 3rd of the posterior horn medial meniscus (images 18-19 of series 6).  No cartilage defect.  Mild subchondral bone marrow edema in the posteromedial tibial plateau.  Lateral compartment: No meniscal tear. Small full-thickness cartilage defect over the weight-bearing femoral condyle (08:18).  Partial-thickness cartilage fissures over the opposing tibial plateau.  No subchondral bone marrow edema.  Patellofemoral compartment: Large region of cartilage heterogeneity, partial-thickness fissuring, and possible flap formation centered over the patellar ridge.  Full-thickness cartilage fissures in the medial trochlea, with mild subchondral bone marrow edema.  Tendons: Quadriceps, patellar, and popliteus tendons are intact.  Ligaments: Cruciate ligaments are intact.  There is posteromedial joint line edema extending along MCL in  the posterior oblique ligament.  The LCL complex is intact.  Bone: No fracture, osteonecrosis, or focal lesion.  Joint: Small joint effusions/synovitis.  Small Baker's cyst.  4. IMPRESSION: Medial meniscal tear, with reactive bone marrow and joint line edema. Posteromedial capsuloligamentous sprain cannot be excluded. Mild tricompartmental chondromalacia. Small joint effusion/synovitis and small Baker's cyst.    Assessment:      Encounter Diagnoses   Name Primary?    Other tear of medial meniscus of right knee as current injury, initial encounter Yes    Mechanical knee pain, right     Effusion of right knee     Sprain of medial collateral ligament of right knee, subsequent encounter             Plan:   1. Persistent right knee pain with associated improving effusion and persistent mechanical symptoms from an oblique vertical  of the posterior horn of the medial meniscus tear, as noted on recent MRI  - referral placed to Dr. Michelle for surgical consultation  - continue right hinged knee brace  wear as needed for comfort  - continue Voltaren 1% gel up to 4 times a day prn for pain control  - Recommend Ice up to 20 minutes at a time prn for pain control  - discussed conservative underlying mild OA options with future injection therapy (CSI vs. VSI vs. PRP vs. Adipose) and formal PT if knee pain persistent following possible meniscectomy.   -  X-ray images of bilateral knee taken 6/15/23 (AP bilateral standing, PA bilateral standing in flexion, bilateral merchants, and  bilateral lateral views) showed no abnormalities. Images were personally reviewed with patient.  - Right knee MRI from 6/15/23 showed a medial meniscal tear, with reactive bone marrow and joint line edema. Posteromedial capsuloligamentous sprain cannot be excluded. Mild tricompartmental chondromalacia. Small joint effusion/synovitis and small Baker's cyst.Images were personally reviewed with patient.    2.  Follow-up in 1-2 weeks for surgical  consultation with Dr. Michelle.     3. Patient agreeable to today's plan and all questions were answered    This note is dictated using the M*Modal Fluency Direct word recognition program. There are word recognition mistakes that are occasionally missed on review.

## 2023-06-17 ENCOUNTER — OFFICE VISIT (OUTPATIENT)
Dept: SPORTS MEDICINE | Facility: CLINIC | Age: 53
End: 2023-06-17
Payer: COMMERCIAL

## 2023-06-17 VITALS — BODY MASS INDEX: 30.36 KG/M2 | WEIGHT: 165 LBS | HEIGHT: 62 IN

## 2023-06-17 DIAGNOSIS — S83.241A OTHER TEAR OF MEDIAL MENISCUS OF RIGHT KNEE AS CURRENT INJURY, INITIAL ENCOUNTER: Primary | ICD-10-CM

## 2023-06-17 DIAGNOSIS — M25.461 EFFUSION OF RIGHT KNEE: ICD-10-CM

## 2023-06-17 DIAGNOSIS — M25.561 MECHANICAL KNEE PAIN, RIGHT: ICD-10-CM

## 2023-06-17 DIAGNOSIS — S83.411D SPRAIN OF MEDIAL COLLATERAL LIGAMENT OF RIGHT KNEE, SUBSEQUENT ENCOUNTER: ICD-10-CM

## 2023-06-17 PROCEDURE — 1159F MED LIST DOCD IN RCRD: CPT | Mod: CPTII,S$GLB,, | Performed by: NEUROMUSCULOSKELETAL MEDICINE & OMM

## 2023-06-17 PROCEDURE — 99214 PR OFFICE/OUTPT VISIT, EST, LEVL IV, 30-39 MIN: ICD-10-PCS | Mod: S$GLB,,, | Performed by: NEUROMUSCULOSKELETAL MEDICINE & OMM

## 2023-06-17 PROCEDURE — 3008F BODY MASS INDEX DOCD: CPT | Mod: CPTII,S$GLB,, | Performed by: NEUROMUSCULOSKELETAL MEDICINE & OMM

## 2023-06-17 PROCEDURE — 1159F PR MEDICATION LIST DOCUMENTED IN MEDICAL RECORD: ICD-10-PCS | Mod: CPTII,S$GLB,, | Performed by: NEUROMUSCULOSKELETAL MEDICINE & OMM

## 2023-06-17 PROCEDURE — 99999 PR PBB SHADOW E&M-EST. PATIENT-LVL IV: CPT | Mod: PBBFAC,,, | Performed by: NEUROMUSCULOSKELETAL MEDICINE & OMM

## 2023-06-17 PROCEDURE — 99214 OFFICE O/P EST MOD 30 MIN: CPT | Mod: S$GLB,,, | Performed by: NEUROMUSCULOSKELETAL MEDICINE & OMM

## 2023-06-17 PROCEDURE — 1160F PR REVIEW ALL MEDS BY PRESCRIBER/CLIN PHARMACIST DOCUMENTED: ICD-10-PCS | Mod: CPTII,S$GLB,, | Performed by: NEUROMUSCULOSKELETAL MEDICINE & OMM

## 2023-06-17 PROCEDURE — 99999 PR PBB SHADOW E&M-EST. PATIENT-LVL IV: ICD-10-PCS | Mod: PBBFAC,,, | Performed by: NEUROMUSCULOSKELETAL MEDICINE & OMM

## 2023-06-17 PROCEDURE — 1160F RVW MEDS BY RX/DR IN RCRD: CPT | Mod: CPTII,S$GLB,, | Performed by: NEUROMUSCULOSKELETAL MEDICINE & OMM

## 2023-06-17 PROCEDURE — 3008F PR BODY MASS INDEX (BMI) DOCUMENTED: ICD-10-PCS | Mod: CPTII,S$GLB,, | Performed by: NEUROMUSCULOSKELETAL MEDICINE & OMM

## 2023-06-22 ENCOUNTER — OFFICE VISIT (OUTPATIENT)
Dept: SPORTS MEDICINE | Facility: CLINIC | Age: 53
End: 2023-06-22
Payer: COMMERCIAL

## 2023-06-22 VITALS — BODY MASS INDEX: 30.18 KG/M2 | WEIGHT: 165 LBS

## 2023-06-22 DIAGNOSIS — S83.241A OTHER TEAR OF MEDIAL MENISCUS OF RIGHT KNEE AS CURRENT INJURY, INITIAL ENCOUNTER: ICD-10-CM

## 2023-06-22 DIAGNOSIS — M25.561 MECHANICAL KNEE PAIN, RIGHT: ICD-10-CM

## 2023-06-22 PROCEDURE — 99999 PR PBB SHADOW E&M-EST. PATIENT-LVL II: ICD-10-PCS | Mod: PBBFAC,,, | Performed by: ORTHOPAEDIC SURGERY

## 2023-06-22 PROCEDURE — 99214 OFFICE O/P EST MOD 30 MIN: CPT | Mod: S$GLB,,, | Performed by: ORTHOPAEDIC SURGERY

## 2023-06-22 PROCEDURE — 99999 PR PBB SHADOW E&M-EST. PATIENT-LVL II: CPT | Mod: PBBFAC,,, | Performed by: ORTHOPAEDIC SURGERY

## 2023-06-22 PROCEDURE — 3008F PR BODY MASS INDEX (BMI) DOCUMENTED: ICD-10-PCS | Mod: CPTII,S$GLB,, | Performed by: ORTHOPAEDIC SURGERY

## 2023-06-22 PROCEDURE — 3008F BODY MASS INDEX DOCD: CPT | Mod: CPTII,S$GLB,, | Performed by: ORTHOPAEDIC SURGERY

## 2023-06-22 PROCEDURE — 99214 PR OFFICE/OUTPT VISIT, EST, LEVL IV, 30-39 MIN: ICD-10-PCS | Mod: S$GLB,,, | Performed by: ORTHOPAEDIC SURGERY

## 2023-06-22 NOTE — PROGRESS NOTES
"CC: Right knee pain    52 y.o.female presents to clinic today with right knee pain. She is referred to clinic by Dr. Marivel Maharaj with an MRI and is here to discuss surgical options. See history below.     Initial Hx (Dr. Marivel Maharaj):   Shawna is a 52 y.o. female coming in today for right knee pain that began about 5-6 month(s) ago, referred by self. She was seen by me previously for left knee pain. Pt. describes the pain as a 5/10 achy pain that does radiate to her medial knee and heel. There was not a fall/injury/ or trauma associated with the onset of symptoms. Pt reports she started a "couch to 5k" and noted pain in her posterior knee that eventually started radiating to her heel and medial knee. Treating with rest and ice. The pain is better with rest, ice, ibuprofen (limits NSAIDs due to gastric upset) and worse with stairs, lunges, squats, standing from sitting, mornings. Pt. Denies any other musculoskeletal complaints at this time.      Joint instability? no  Mechanical locking/clicking? yes  Affecting ADL's? yes  Affecting sleep? yes     Occupation: spin/body pump teacher    REVIEW OF SYSTEMS:   Constitution: Negative. Negative for chills, fever and night sweats.   HENT: Negative for congestion and headaches.    Eyes: Negative for blurred vision, left vision loss and right vision loss.   Cardiovascular: Negative for chest pain and syncope.   Respiratory: Negative for cough and shortness of breath.    Endocrine: Negative for polydipsia, polyphagia and polyuria.   Hematologic/Lymphatic: Negative for bleeding problem. Does not bruise/bleed easily.   Skin: Negative for dry skin, itching and rash.   Musculoskeletal: Negative for falls. Positive for right knee pain and  muscle weakness.   Gastrointestinal: Negative for abdominal pain and bowel incontinence.   Genitourinary: Negative for bladder incontinence and nocturia.   Neurological: Negative for disturbances in coordination, loss of balance and seizures. "   Psychiatric/Behavioral: Negative for depression. The patient does not have insomnia.    Allergic/Immunologic: Negative for hives and persistent infections.     PAST MEDICAL HISTORY:   Past Medical History:   Diagnosis Date    Allergy     Anxiety     Asthma     Focal nodular hyperplasia of liver     GERD (gastroesophageal reflux disease)     Hyperlipidemia     Thyroid disease     hypothyroidism       PAST SURGICAL HISTORY:   Past Surgical History:   Procedure Laterality Date    CHOLECYSTECTOMY      FOOT SURGERY      arc    WISDOM TOOTH EXTRACTION         FAMILY HISTORY:   Family History   Problem Relation Age of Onset    Arthritis Mother     Asthma Mother     Diabetes Mother     Hearing loss Mother     Hyperlipidemia Mother     Vision loss Mother     Early death Father         42yrs old    Heart disease Father          of MI at 43yo    Hyperlipidemia Father     Kidney disease Father     Alcohol abuse Brother     Diabetes Maternal Grandmother     Breast cancer Neg Hx     Colon cancer Neg Hx     Ovarian cancer Neg Hx     Melanoma Neg Hx        SOCIAL HISTORY:   Social History     Socioeconomic History    Marital status:    Occupational History     Employer: [employer]    Tobacco Use    Smoking status: Never    Smokeless tobacco: Never   Substance and Sexual Activity    Alcohol use: Yes     Alcohol/week: 4.0 standard drinks     Types: 2 Glasses of wine, 2 Cans of beer per week     Comment: weekend/social drinker    Drug use: Never    Sexual activity: Yes     Partners: Male     Birth control/protection: Partner-Vasectomy, None   Other Topics Concern    Are you pregnant or think you may be? No     Social Determinants of Health     Financial Resource Strain: Low Risk     Difficulty of Paying Living Expenses: Not hard at all   Food Insecurity: No Food Insecurity    Worried About Running Out of Food in the Last Year: Never true    Ran Out of Food in the Last Year: Never true   Transportation  Needs: No Transportation Needs    Lack of Transportation (Medical): No    Lack of Transportation (Non-Medical): No   Physical Activity: Sufficiently Active    Days of Exercise per Week: 5 days    Minutes of Exercise per Session: 60 min   Stress: No Stress Concern Present    Feeling of Stress : Only a little   Social Connections: Unknown    Frequency of Communication with Friends and Family: More than three times a week    Frequency of Social Gatherings with Friends and Family: More than three times a week    Active Member of Clubs or Organizations: No    Attends Club or Organization Meetings: Never    Marital Status:    Housing Stability: Low Risk     Unable to Pay for Housing in the Last Year: No    Number of Places Lived in the Last Year: 1    Unstable Housing in the Last Year: No       MEDICATIONS:     Current Outpatient Medications:     celecoxib (CELEBREX) 200 MG capsule, Take 1 capsule (200 mg total) by mouth once daily. With food for 60 doses, Disp: 30 capsule, Rfl: 1    efinaconazole (JUBLIA) 10 % Nisa, Apply to affected nail(s) qday, Disp: 4 mL, Rfl: 3    fluticasone propionate (FLONASE) 50 mcg/actuation nasal spray, SPRAY 2 SPRAYS BY EACH NOSTRIL ROUTE ONCE DAILY., Disp: 48 g, Rfl: 3    ketoconazole (NIZORAL) 2 % cream, AAA feet bid x 2-4 wks., Disp: 60 g, Rfl: 3    Lactobacillus rhamnosus GG (CULTURELLE) 10 billion cell capsule, Take 1 capsule by mouth once daily., Disp: , Rfl:     levothyroxine (SYNTHROID) 50 MCG tablet, TAKE 1 TABLET BY MOUTH EVERY DAY IN THE MORNING, Disp: 90 tablet, Rfl: 3    multivitamin capsule, Take 1 capsule by mouth once daily., Disp: , Rfl:     omega-3 fatty acids/fish oil (FISH OIL-OMEGA-3 FATTY ACIDS) 300-1,000 mg capsule, Take by mouth once daily., Disp: , Rfl:     Current Facility-Administered Medications:     acetaminophen tablet 650 mg, 650 mg, Oral, Once PRN, Silvestre Sylvester MD    albuterol inhaler 2 puff, 2 puff, Inhalation, Q20 Min PRN, Silvestre Sylvester MD     diphenhydrAMINE injection 25 mg, 25 mg, Intravenous, Once PRN, Silvestre Sylvester MD    EPINEPHrine (EPIPEN) 0.3 mg/0.3 mL pen injection 0.3 mg, 0.3 mg, Intramuscular, PRN, Silvestre Sylvester MD    methylPREDNISolone sodium succinate injection 40 mg, 40 mg, Intravenous, Once PRN, Silvestre Sylvester MD    ondansetron disintegrating tablet 4 mg, 4 mg, Oral, Once PRN, Silvestre Sylvester MD    sodium chloride 0.9% 500 mL flush bag, , Intravenous, PRN, Silvestre Sylvester MD    sodium chloride 0.9% flush 10 mL, 10 mL, Intravenous, PRN, Silvestre Sylvester MD    ALLERGIES:   Review of patient's allergies indicates:   Allergen Reactions    Anucort-hc [hydrocortisone acetate] Hives and Nausea And Vomiting    Demerol [meperidine] Nausea And Vomiting    Sulfa (sulfonamide antibiotics) Hives    Amoxicillin Nausea And Vomiting and Rash    Macrobid [nitrofurantoin monohyd/m-cryst] Nausea Only     dizziness       VITAL SIGNS:   There were no vitals taken for this visit.     PHYSICAL EXAMINATION:  General:  The patient is alert and oriented x 3.  Mood is pleasant.  Observation of ears, eyes and nose reveal no gross abnormalities.  No labored breathing observed.    RIGHT KNEE EXAMINATION     OBSERVATION / INSPECTION   Gait:   Nonantalgic   Alignment:  Neutral   Scars:   None   Muscle atrophy: Mild  Effusion:  None   Warmth:  None   Discoloration:   none     TENDERNESS / CREPITUS (T / C):          T / C      T / C   Patella   - / -   Lateral joint line   - / -   Peripatellar medial  -  Medial joint line     + / -   Peripatellar lateral -  Medial plica   - / -   Patellar tendon -   Popliteal fossa  - / -   Quad tendon   -   Gastrocnemius   -   Prepatellar Bursa - / -   Quadricep   -   Tibial tubercle  -  Thigh/hamstring  -   Pes anserine/HS -  Fibula    -   ITB   - / -  Tibia     -   Tib/fib joint  - / -  LCL    -     MFC   - / -   MCL: Proximal  -    LFC   - / -    Distal   -          ROM: (* = pain)  PASSIVE   ACTIVE    Left :   5  / 0 / 145   5 / 0 / 145     Right :    5 / 0 / 145   5 / 0 / 145    Patellofemoral examination:  See above noted areas of tenderness.   Patella position    Subluxation / dislocation: Centered           Sup. / Inf;   Normal   Crepitus (PF):    Absent   Patellar Mobility:       Medial-lateral:   Normal    Superior-inferior:  Normal    Inferior tilt   Normal    Patellar tendon:  Normal   Lateral tilt:    Normal   J-sign:     None   Patellofemoral grind:   No pain       MENISCAL SIGNS:     Pain on terminal extension:  +  Pain on terminal flexion:  +  Rommels maneuver:  + (for pain)  Squat     + (for pain)    LIGAMENT EXAMINATION:  ACL / Lachman:  normal (-1 to 2mm)    PCL-Post.  drawer: normal 0 to 2mm  MCL- Valgus:  normal 0 to 2mm  LCL- Varus:  normal 0 to 2mm  Pivot shift: normal (Equal)   Dial Test: difference c/w other side   At 30° flexion: normal (< 5°)    At 90° flexion: normal (< 5°)   Reverse Pivot Shift:   normal (Equal)     STRENGTH: (* = with pain) PAINFUL SIDE   Quadricep   5/5   Hamstrin/5    EXTREMITY NEURO-VASCULAR EXAMINATION:   Sensation:  Grossly intact to light touch all dermatomal regions.   Motor Function:  Fully intact motor function at hip, knee, foot and ankle    DTRs;  quadriceps and  achilles 2+.  No clonus and downgoing Babinski.    Vascular status:  DP and PT pulses 2+, brisk capillary refill, symmetric.     OTHER FINDINGS:      IMAGING:    X-rays including standing, weight bearing AP and flexion bilateral knees, lateral and merchant views ordered and images reviewed by me show:    No fracture, dislocation or other pathology   Medial compartment: no degenerative changes   Lateral compartment: no degenerative changes   Patellofemoral compartment: no degenerative changes    MRI KNEE WITHOUT CONTRAST RIGHT     CLINICAL HISTORY:  Meniscal tear, untreated, new symptoms;right knee effusion, MCL sprain, mechanical symptoms;Effusion, right knee     TECHNIQUE:  Multiplanar, multisequence  images were performed about the right knee.  No contrast was administered.     COMPARISON:  Knee radiographs 06/01/2023     FINDINGS:  Medial compartment: Oblique vertical tear involving the inner 3rd of the posterior horn medial meniscus (images 18-19 of series 6).  No cartilage defect.  Mild subchondral bone marrow edema in the posteromedial tibial plateau.     Lateral compartment: No meniscal tear. Small full-thickness cartilage defect over the weight-bearing femoral condyle (08:18).  Partial-thickness cartilage fissures over the opposing tibial plateau.  No subchondral bone marrow edema.     Patellofemoral compartment: Large region of cartilage heterogeneity, partial-thickness fissuring, and possible flap formation centered over the patellar ridge.  Full-thickness cartilage fissures in the medial trochlea, with mild subchondral bone marrow edema.     Tendons: Quadriceps, patellar, and popliteus tendons are intact.     Ligaments: Cruciate ligaments are intact.  There is posteromedial joint line edema extending along MCL in the posterior oblique ligament.  The LCL complex is intact.     Bone: No fracture, osteonecrosis, or focal lesion.     Joint: Small joint effusions/synovitis.  Small Baker's cyst.     Impression:     Medial meniscal tear, with reactive bone marrow and joint line edema.     Posteromedial capsuloligamentous sprain cannot be excluded.     Mild tricompartmental chondromalacia.     Small joint effusion/synovitis and small Baker's cyst.      ASSESSMENT:    Right Knee Pain  1. Mechanical knee pain, right    2. Other tear of medial meniscus of right knee as current injury, initial encounter        PLAN:   Treatment options were discussed with the patient about his knee.  I reviewed the MRI images with her, including the relevant anatomy, and what this means for his knee.    We discussed both non-operative and operative options for her knee and the risks and benefits of each.    I feel like she would  benefit from surgery for her knee.  After a discussion of specific risks and benefits, she would like to proceed with setting this up.    These risks include: bleeding, infection, scarring, damage to neurovascular structures, damage to cartilage, stiffness, blood clots, pulmonary embolism, swelling, compartment syndrome, need for further surgery, and the risks of anesthesia.      She verbalized her understanding of these risks and wished to proceed with surgery.    A total of 25 minutes were spent face-to-face with the patient during this encounter and over half of that time was spent on counseling about treatment options including his choice for surgery and coordination of her care for her preoperative visits, surgery and post-operative rehab.  We also had a detailed discussion of her expectation level for this procedure as well as any limitations at home or work and with exercising following surgery.     The operative plan is:    right   1. Arthroscopic partial meniscectomy  2. Possible arthroscopic synovectomy  3. Possible arthroscopic loose body removal  4. Possible arthroscopic chondroplasty    Position: Supine    Patient will not  need medical clearance prior to the pre-operative appointment.    If she wishes to proceed, we'll get her scheduled for this at her convenience around her work schedule.        All questions were answered, pt will contact us for questions or concerns in the interim.

## 2023-06-23 DIAGNOSIS — M23.203 OLD TEAR OF MEDIAL MENISCUS OF RIGHT KNEE, UNSPECIFIED TEAR TYPE: Primary | ICD-10-CM

## 2023-06-23 DIAGNOSIS — M94.261 CHONDROMALACIA OF RIGHT KNEE: ICD-10-CM

## 2023-06-23 DIAGNOSIS — M94.269 TIBIAL PLATEAU CHONDROMALACIA: ICD-10-CM

## 2023-06-26 ENCOUNTER — PATIENT MESSAGE (OUTPATIENT)
Dept: INTERNAL MEDICINE | Facility: CLINIC | Age: 53
End: 2023-06-26
Payer: COMMERCIAL

## 2023-06-26 ENCOUNTER — PATIENT MESSAGE (OUTPATIENT)
Dept: SURGERY | Facility: HOSPITAL | Age: 53
End: 2023-06-26
Payer: COMMERCIAL

## 2023-07-06 ENCOUNTER — TELEPHONE (OUTPATIENT)
Dept: SPORTS MEDICINE | Facility: CLINIC | Age: 53
End: 2023-07-06
Payer: COMMERCIAL

## 2023-07-06 DIAGNOSIS — M23.203 OLD TEAR OF MEDIAL MENISCUS OF RIGHT KNEE, UNSPECIFIED TEAR TYPE: Primary | ICD-10-CM

## 2023-07-06 NOTE — TELEPHONE ENCOUNTER
POST OP PT -     MRI Impression:   MRI Knee Without Contrast Right  Narrative: EXAMINATION:  MRI KNEE WITHOUT CONTRAST RIGHT    CLINICAL HISTORY:  Meniscal tear, untreated, new symptoms;right knee effusion, MCL sprain, mechanical symptoms;Effusion, right knee    TECHNIQUE:  Multiplanar, multisequence images were performed about the right knee.  No contrast was administered.    COMPARISON:  Knee radiographs 06/01/2023    FINDINGS:  Medial compartment: Oblique vertical tear involving the inner 3rd of the posterior horn medial meniscus (images 18-19 of series 6).  No cartilage defect.  Mild subchondral bone marrow edema in the posteromedial tibial plateau.    Lateral compartment: No meniscal tear. Small full-thickness cartilage defect over the weight-bearing femoral condyle (08:18).  Partial-thickness cartilage fissures over the opposing tibial plateau.  No subchondral bone marrow edema.    Patellofemoral compartment: Large region of cartilage heterogeneity, partial-thickness fissuring, and possible flap formation centered over the patellar ridge.  Full-thickness cartilage fissures in the medial trochlea, with mild subchondral bone marrow edema.    Tendons: Quadriceps, patellar, and popliteus tendons are intact.    Ligaments: Cruciate ligaments are intact.  There is posteromedial joint line edema extending along MCL in the posterior oblique ligament.  The LCL complex is intact.    Bone: No fracture, osteonecrosis, or focal lesion.    Joint: Small joint effusions/synovitis.  Small Baker's cyst.  Impression: Medial meniscal tear, with reactive bone marrow and joint line edema.    Posteromedial capsuloligamentous sprain cannot be excluded.    Mild tricompartmental chondromalacia.    Small joint effusion/synovitis and small Baker's cyst.    Electronically signed by: Efrain Andino  Date:    06/15/2023  Time:    11:32      Surgical Plan:   right   1. Arthroscopic partial meniscectomy  2. Possible arthroscopic synovectomy  3.  Possible arthroscopic loose body removal  4. Possible arthroscopic chondroplasty    DOS 7/26/23    Start PT on 7/27/23      ----- Message from Ellen Ko MA sent at 7/6/2023 10:37 AM CDT -----  Patient will do PT at [facility]    Pre op - 7/25/23    Date of surgery - 7/26/23

## 2023-07-10 ENCOUNTER — HOSPITAL ENCOUNTER (OUTPATIENT)
Dept: CARDIOLOGY | Facility: OTHER | Age: 53
Discharge: HOME | End: 2023-07-10
Attending: PHYSICIAN ASSISTANT
Payer: COMMERCIAL

## 2023-07-10 ENCOUNTER — OFFICE VISIT (OUTPATIENT)
Dept: INTERNAL MEDICINE | Facility: CLINIC | Age: 53
End: 2023-07-10
Payer: COMMERCIAL

## 2023-07-10 VITALS
WEIGHT: 166.44 LBS | HEART RATE: 65 BPM | BODY MASS INDEX: 30.63 KG/M2 | HEIGHT: 62 IN | OXYGEN SATURATION: 95 % | SYSTOLIC BLOOD PRESSURE: 130 MMHG | DIASTOLIC BLOOD PRESSURE: 78 MMHG

## 2023-07-10 DIAGNOSIS — E03.9 ACQUIRED HYPOTHYROIDISM: ICD-10-CM

## 2023-07-10 DIAGNOSIS — Z01.818 PRE-OP EVALUATION: Primary | ICD-10-CM

## 2023-07-10 DIAGNOSIS — Z01.818 PRE-OP EVALUATION: ICD-10-CM

## 2023-07-10 PROCEDURE — 99999 PR PBB SHADOW E&M-EST. PATIENT-LVL IV: CPT | Mod: PBBFAC,,, | Performed by: PHYSICIAN ASSISTANT

## 2023-07-10 PROCEDURE — 99213 OFFICE O/P EST LOW 20 MIN: CPT | Mod: S$GLB,,, | Performed by: PHYSICIAN ASSISTANT

## 2023-07-10 PROCEDURE — 1159F MED LIST DOCD IN RCRD: CPT | Mod: CPTII,S$GLB,, | Performed by: PHYSICIAN ASSISTANT

## 2023-07-10 PROCEDURE — 99999 PR PBB SHADOW E&M-EST. PATIENT-LVL IV: ICD-10-PCS | Mod: PBBFAC,,, | Performed by: PHYSICIAN ASSISTANT

## 2023-07-10 PROCEDURE — 3078F PR MOST RECENT DIASTOLIC BLOOD PRESSURE < 80 MM HG: ICD-10-PCS | Mod: CPTII,S$GLB,, | Performed by: PHYSICIAN ASSISTANT

## 2023-07-10 PROCEDURE — 3075F PR MOST RECENT SYSTOLIC BLOOD PRESS GE 130-139MM HG: ICD-10-PCS | Mod: CPTII,S$GLB,, | Performed by: PHYSICIAN ASSISTANT

## 2023-07-10 PROCEDURE — 3075F SYST BP GE 130 - 139MM HG: CPT | Mod: CPTII,S$GLB,, | Performed by: PHYSICIAN ASSISTANT

## 2023-07-10 PROCEDURE — 3078F DIAST BP <80 MM HG: CPT | Mod: CPTII,S$GLB,, | Performed by: PHYSICIAN ASSISTANT

## 2023-07-10 PROCEDURE — 99213 PR OFFICE/OUTPT VISIT, EST, LEVL III, 20-29 MIN: ICD-10-PCS | Mod: S$GLB,,, | Performed by: PHYSICIAN ASSISTANT

## 2023-07-10 PROCEDURE — 3008F BODY MASS INDEX DOCD: CPT | Mod: CPTII,S$GLB,, | Performed by: PHYSICIAN ASSISTANT

## 2023-07-10 PROCEDURE — 93010 EKG 12-LEAD: ICD-10-PCS | Mod: ,,, | Performed by: INTERNAL MEDICINE

## 2023-07-10 PROCEDURE — 93005 ELECTROCARDIOGRAM TRACING: CPT

## 2023-07-10 PROCEDURE — 1159F PR MEDICATION LIST DOCUMENTED IN MEDICAL RECORD: ICD-10-PCS | Mod: CPTII,S$GLB,, | Performed by: PHYSICIAN ASSISTANT

## 2023-07-10 PROCEDURE — 3008F PR BODY MASS INDEX (BMI) DOCUMENTED: ICD-10-PCS | Mod: CPTII,S$GLB,, | Performed by: PHYSICIAN ASSISTANT

## 2023-07-10 PROCEDURE — 93010 ELECTROCARDIOGRAM REPORT: CPT | Mod: ,,, | Performed by: INTERNAL MEDICINE

## 2023-07-10 NOTE — PROGRESS NOTES
INTERNAL MEDICINE PROGRESS NOTE  Pre-Op    CHIEF COMPLAINT     Chief Complaint   Patient presents with    Pre-op Exam       HPI     Shawna Mayers is a 52 y.o. female who presents for a follow-up visit today.    PCP is Baldev Braun MD, patient is new to me.     She will be having right knee arthroscopy on 7/26/2023 by Dr. Michelle.   Today in clinic she is feeling well -no complaints.       Cr: normal baseline   DM: no history   CHF: no history   MI: no history   CVA: no history   TIA: no history   Trouble with anesthesia in the past?  Family history of sudden cardiac death? Father with MI 41  ADLs: able to ascend a flight of stairs without CP or SOB.   Anticoagulation: none   SOULEYMANE: no history   Cigarettes: never  Opioids: none   Dental implants/hardware that is removable: none     RCRI% 3.9%        Past Medical History:  Past Medical History:   Diagnosis Date    Allergy     Anxiety     Asthma     Focal nodular hyperplasia of liver     GERD (gastroesophageal reflux disease)     Hyperlipidemia     Thyroid disease     hypothyroidism       Home Medications:  Prior to Admission medications    Medication Sig Start Date End Date Taking? Authorizing Provider   celecoxib (CELEBREX) 200 MG capsule Take 1 capsule (200 mg total) by mouth once daily. With food for 60 doses 6/1/23 7/31/23  Marivel Maharaj,    efinaconazole (JUBLIA) 10 % Nisa Apply to affected nail(s) qday 3/25/21   Sumi Villegas PA-C   fluticasone propionate (FLONASE) 50 mcg/actuation nasal spray SPRAY 2 SPRAYS BY EACH NOSTRIL ROUTE ONCE DAILY. 11/2/22   Baldev Braun MD   ketoconazole (NIZORAL) 2 % cream AAA feet bid x 2-4 wks. 3/5/21   Sumi Villegas PA-C   Lactobacillus rhamnosus GG (CULTURELLE) 10 billion cell capsule Take 1 capsule by mouth once daily.    Historical Provider   levothyroxine (SYNTHROID) 50 MCG tablet TAKE 1 TABLET BY MOUTH EVERY DAY IN THE MORNING 10/24/22   Baldev Braun MD   multivitamin capsule Take 1  "capsule by mouth once daily.    Historical Provider   omega-3 fatty acids/fish oil (FISH OIL-OMEGA-3 FATTY ACIDS) 300-1,000 mg capsule Take by mouth once daily.    Historical Provider       Review of Systems:  Review of Systems   Constitutional:  Negative for chills and fever.   HENT:  Negative for sore throat and trouble swallowing.    Eyes:  Negative for visual disturbance.   Respiratory:  Negative for cough and shortness of breath.    Cardiovascular:  Negative for chest pain.   Gastrointestinal:  Negative for abdominal pain, constipation, diarrhea, nausea and vomiting.   Genitourinary:  Negative for dysuria and flank pain.   Musculoskeletal:  Negative for back pain, neck pain and neck stiffness.   Skin:  Negative for rash.   Neurological:  Negative for dizziness, syncope, weakness and headaches.   Psychiatric/Behavioral:  Negative for confusion.      Health Maintainence:   Immunizations:  Health Maintenance         Date Due Completion Date    Pneumococcal Vaccines (Age 0-64) (1 - PCV) Never done ---    Hemoglobin A1c (Diabetic Prevention Screening) 04/25/2018 4/25/2015    Influenza Vaccine (1) 09/01/2023 9/17/2022    Mammogram 10/18/2023 10/18/2022    Colorectal Cancer Screening 06/05/2024 6/5/2023    Cervical Cancer Screening 06/13/2024 6/13/2019    TETANUS VACCINE 05/16/2027 5/16/2017    Lipid Panel 08/20/2027 8/20/2022             PHYSICAL EXAM     /78 (BP Location: Left arm, Patient Position: Sitting, BP Method: Large (Manual))   Pulse 65   Ht 5' 2" (1.575 m)   Wt 75.5 kg (166 lb 7.2 oz)   SpO2 95%   BMI 30.44 kg/m²     Physical Exam  Vitals and nursing note reviewed.   Constitutional:       Appearance: Normal appearance.      Comments: Healthy appearing female in NAD or apparent pain. She makes good eye contact, speaks in clear full sentences and ambulates with ease.        HENT:      Head: Normocephalic and atraumatic.      Nose: Nose normal.      Mouth/Throat:      Pharynx: Oropharynx is clear. "   Eyes:      Conjunctiva/sclera: Conjunctivae normal.   Cardiovascular:      Rate and Rhythm: Normal rate and regular rhythm.      Pulses: Normal pulses.   Pulmonary:      Effort: No respiratory distress.   Abdominal:      Tenderness: There is no abdominal tenderness.   Musculoskeletal:         General: Normal range of motion.      Cervical back: No rigidity.      Comments: Wearing knee brace on the right knee    Skin:     General: Skin is warm and dry.      Capillary Refill: Capillary refill takes less than 2 seconds.      Findings: No rash.   Neurological:      General: No focal deficit present.      Mental Status: She is alert.      Gait: Gait normal.   Psychiatric:         Mood and Affect: Mood normal.       LABS     Lab Results   Component Value Date    HGBA1C 5.3 04/25/2015     CMP  Sodium   Date Value Ref Range Status   08/20/2022 135 (L) 136 - 145 mmol/L Final     Potassium   Date Value Ref Range Status   08/20/2022 4.8 3.5 - 5.1 mmol/L Final     Chloride   Date Value Ref Range Status   08/20/2022 102 95 - 110 mmol/L Final     CO2   Date Value Ref Range Status   08/20/2022 22 (L) 23 - 29 mmol/L Final     Glucose   Date Value Ref Range Status   08/20/2022 83 70 - 110 mg/dL Final     BUN   Date Value Ref Range Status   08/20/2022 13 6 - 20 mg/dL Final     Creatinine   Date Value Ref Range Status   08/20/2022 0.7 0.5 - 1.4 mg/dL Final     Calcium   Date Value Ref Range Status   08/20/2022 9.6 8.7 - 10.5 mg/dL Final     Total Protein   Date Value Ref Range Status   08/20/2022 6.8 6.0 - 8.4 g/dL Final     Albumin   Date Value Ref Range Status   08/20/2022 4.0 3.5 - 5.2 g/dL Final     Total Bilirubin   Date Value Ref Range Status   08/20/2022 0.6 0.1 - 1.0 mg/dL Final     Comment:     For infants and newborns, interpretation of results should be based  on gestational age, weight and in agreement with clinical  observations.    Premature Infant recommended reference ranges:  Up to 24 hours.............<8.0  mg/dL  Up to 48 hours............<12.0 mg/dL  3-5 days..................<15.0 mg/dL  6-29 days.................<15.0 mg/dL       Alkaline Phosphatase   Date Value Ref Range Status   08/20/2022 53 (L) 55 - 135 U/L Final     AST   Date Value Ref Range Status   08/20/2022 51 (H) 10 - 40 U/L Final     Comment:     *Result may be interfered by visible hemolysis     ALT   Date Value Ref Range Status   08/20/2022 33 10 - 44 U/L Final     Anion Gap   Date Value Ref Range Status   08/20/2022 11 8 - 16 mmol/L Final     eGFR if    Date Value Ref Range Status   08/14/2021 >60.0 >60 mL/min/1.73 m^2 Final     eGFR if non    Date Value Ref Range Status   08/14/2021 >60.0 >60 mL/min/1.73 m^2 Final     Comment:     Calculation used to obtain the estimated glomerular filtration  rate (eGFR) is the CKD-EPI equation.        Lab Results   Component Value Date    WBC 4.54 08/20/2022    HGB 13.0 08/20/2022    HCT 39.9 08/20/2022    MCV 95 08/20/2022     08/20/2022     Lab Results   Component Value Date    CHOL 205 (H) 08/20/2022    CHOL 187 08/14/2021    CHOL 224 (H) 08/08/2020     Lab Results   Component Value Date    HDL 68 08/20/2022    HDL 69 08/14/2021    HDL 73 08/08/2020     Lab Results   Component Value Date    LDLCALC 122.4 08/20/2022    LDLCALC 104.2 08/14/2021    LDLCALC 132.0 08/08/2020     Lab Results   Component Value Date    TRIG 73 08/20/2022    TRIG 69 08/14/2021    TRIG 95 08/08/2020     Lab Results   Component Value Date    CHOLHDL 33.2 08/20/2022    CHOLHDL 36.9 08/14/2021    CHOLHDL 32.6 08/08/2020     Lab Results   Component Value Date    TSH 0.995 08/20/2022       ASSESSMENT/PLAN     Shawna Mayers is a 52 y.o. female     Shawna was seen today for pre-op exam. She is doing well, no complaints. Will get pre-op labs, UA and EKG. Expect that she will be medically optimized with RCRI of 3.9%.     Diagnoses and all orders for this visit:    Pre-op evaluation  -     CBC Auto  Differential; Future  -     COMPREHENSIVE METABOLIC PANEL; Future  -     Urinalysis, Reflex to Urine Culture; Future  -     EKG 12-lead; Future    Acquired hypothyroidism          Isidra Ovalle PA-C   Department of Internal Medicine - [facility]   8:06 AM      UPDATE:   Patient is medically optimized for surgery with RCRI of 3.9%.   -ACM

## 2023-07-12 ENCOUNTER — PATIENT MESSAGE (OUTPATIENT)
Dept: SURGERY | Facility: HOSPITAL | Age: 53
End: 2023-07-12
Payer: COMMERCIAL

## 2023-07-14 ENCOUNTER — PATIENT MESSAGE (OUTPATIENT)
Dept: PREADMISSION TESTING | Facility: HOSPITAL | Age: 53
End: 2023-07-14
Payer: COMMERCIAL

## 2023-07-17 NOTE — ANESTHESIA PAT ROS NOTE
07/17/2023  Shawna Mayers is a 52 y.o., female.      Pre-op Assessment    I have reviewed the Patient Summary Reports.       I have reviewed the Medications.     Review of Systems  Anesthesia Hx:  No problems with previous Anesthesia               Denies Personal Hx of Anesthesia complications.                    Social:  Non-Smoker, Social Alcohol Use       Hematology/Oncology:  Hematology Normal   Oncology Normal                                   EENT/Dental:  chronic allergic rhinitis Allergy, Harrison Tooth Extraction          Cardiovascular:  Exercise tolerance: good       Denies CAD.       Denies Angina.     hyperlipidemia  Denies CHI.  ECG has been reviewed.                          Pulmonary:    Asthma asymptomatic  Denies Shortness of breath.                  Renal/:  Renal/ Normal  Denies Chronic Renal Disease.                Hepatic/GI:     GERD, well controlled Liver Disease,  Focal nodular hyperplasia of liver, cholecystectomy          Musculoskeletal:     Old tear of medial meniscus of right knee,   Tibial plateau chondromalacia,  Chondromalacia of right knee,  H/O Foot surgery              Neurological:  Neurology Normal      Denies Headaches. Denies Seizures.                                Endocrine:  Denies Diabetes. Hypothyroidism        Obesity / BMI > 30  Psych:   anxiety                Past Medical History:   Diagnosis Date    Allergy     Anxiety     Asthma     Focal nodular hyperplasia of liver     GERD (gastroesophageal reflux disease)     Hyperlipidemia     Thyroid disease     hypothyroidism     Past Surgical History:   Procedure Laterality Date    CHOLECYSTECTOMY      FOOT SURGERY      arc    WISDOM TOOTH EXTRACTION         Anesthesia Assessment: Preoperative EQUATION    Planned Procedure: Procedure(s) (LRB):  ARTHROSCOPY, KNEE, WITH MENISCECTOMY (Right)  ARTHROSCOPY, KNEE,  WITH CHONDROPLASTY (Right)  Requested Anesthesia Type:General  Surgeon: Fidel Michelle MD  Service: Orthopedics  Known or anticipated Date of Surgery:7/26/2023    Surgeon notes: reviewed    Electronic QUestionnaire Assessment completed via nurse interview with patient.        Triage considerations:     The patient has no apparent active cardiac condition (No unstable coronary Syndrome such as severe unstable angina or recent [<1 month] myocardial infarction, decompensated CHF, severe valvular   disease or significant arrhythmia)    Previous anesthesia records:No problems and Not available    Last PCP note: within 1 month , within [facility]     Patient is cleared/ maximized by PCP for surgery.     Other important co-morbidities: GERD, HLD, Hypothyroid, and Obesity       EKG 7/10/2023:  Vent. Rate : 078 BPM     Atrial Rate : 078 BPM      P-R Int : 156 ms          QRS Dur : 106 ms       QT Int : 380 ms       P-R-T Axes : 067 071 062 degrees      QTc Int : 433 ms   Sinus rhythm with sinus arrhythmia with occasional Premature ventricular   complexes   Otherwise normal ECG   Confirmed by Claudine NUNEZ, [facility] ([id number]) on 7/11/2023 9:56:21 PM                Instructions given. (See in Nurse's note)      Ht: 5'2  Wt: 166 lb  BMI: 30.44  Vaccinated

## 2023-07-25 ENCOUNTER — ANESTHESIA EVENT (OUTPATIENT)
Dept: SURGERY | Facility: HOSPITAL | Age: 53
End: 2023-07-25
Payer: COMMERCIAL

## 2023-07-25 ENCOUNTER — OFFICE VISIT (OUTPATIENT)
Dept: SPORTS MEDICINE | Facility: CLINIC | Age: 53
End: 2023-07-25
Payer: COMMERCIAL

## 2023-07-25 VITALS
WEIGHT: 164.38 LBS | BODY MASS INDEX: 30.06 KG/M2 | DIASTOLIC BLOOD PRESSURE: 91 MMHG | HEART RATE: 72 BPM | SYSTOLIC BLOOD PRESSURE: 140 MMHG

## 2023-07-25 DIAGNOSIS — M23.203 OLD TEAR OF MEDIAL MENISCUS OF RIGHT KNEE, UNSPECIFIED TEAR TYPE: Primary | ICD-10-CM

## 2023-07-25 PROCEDURE — 99999 PR PBB SHADOW E&M-EST. PATIENT-LVL III: ICD-10-PCS | Mod: PBBFAC,,, | Performed by: PHYSICIAN ASSISTANT

## 2023-07-25 PROCEDURE — 99999 PR PBB SHADOW E&M-EST. PATIENT-LVL III: CPT | Mod: PBBFAC,,, | Performed by: PHYSICIAN ASSISTANT

## 2023-07-25 PROCEDURE — 99499 UNLISTED E&M SERVICE: CPT | Mod: S$GLB,,, | Performed by: PHYSICIAN ASSISTANT

## 2023-07-25 PROCEDURE — 99499 NO LOS: ICD-10-PCS | Mod: S$GLB,,, | Performed by: PHYSICIAN ASSISTANT

## 2023-07-25 RX ORDER — TRAMADOL HYDROCHLORIDE 50 MG/1
50 TABLET ORAL EVERY 6 HOURS PRN
Qty: 20 TABLET | Refills: 0 | Status: SHIPPED | OUTPATIENT
Start: 2023-07-25 | End: 2023-08-30

## 2023-07-25 RX ORDER — HYDROCODONE BITARTRATE AND ACETAMINOPHEN 7.5; 325 MG/1; MG/1
1 TABLET ORAL EVERY 6 HOURS PRN
Qty: 20 TABLET | Refills: 0 | Status: SHIPPED | OUTPATIENT
Start: 2023-07-25 | End: 2023-08-30

## 2023-07-25 RX ORDER — ASPIRIN 325 MG
325 TABLET ORAL DAILY
Qty: 21 TABLET | Refills: 0 | Status: SHIPPED | OUTPATIENT
Start: 2023-07-25 | End: 2023-08-30

## 2023-07-25 RX ORDER — SODIUM CHLORIDE 9 MG/ML
INJECTION, SOLUTION INTRAVENOUS CONTINUOUS
Status: CANCELLED | OUTPATIENT
Start: 2023-07-25

## 2023-07-25 RX ORDER — CLINDAMYCIN PHOSPHATE 900 MG/50ML
900 INJECTION, SOLUTION INTRAVENOUS
Status: CANCELLED | OUTPATIENT
Start: 2023-07-25

## 2023-07-25 RX ORDER — PROMETHAZINE HYDROCHLORIDE 25 MG/1
25 TABLET ORAL EVERY 6 HOURS PRN
Qty: 20 TABLET | Refills: 0 | Status: SHIPPED | OUTPATIENT
Start: 2023-07-25 | End: 2023-08-30

## 2023-07-25 NOTE — H&P (VIEW-ONLY)
Shawna Mayers  is here for a completion of her perioperative paperwork. she  Is scheduled to undergo:    right   1. Arthroscopic partial meniscectomy  2. Possible arthroscopic synovectomy  3. Possible arthroscopic loose body removal  4. Possible arthroscopic chondroplasty    on 2023.      She is a healthy individual but does need clearance for this procedure.    Patient has been cleared to proceed with surgery.      PAST MEDICAL HISTORY:   Past Medical History:   Diagnosis Date    Allergy     Anxiety     Asthma     Focal nodular hyperplasia of liver     GERD (gastroesophageal reflux disease)     Hyperlipidemia     Thyroid disease     hypothyroidism     PAST SURGICAL HISTORY:   Past Surgical History:   Procedure Laterality Date    CHOLECYSTECTOMY      FOOT SURGERY      arc    WISDOM TOOTH EXTRACTION       FAMILY HISTORY:   Family History   Problem Relation Age of Onset    Arthritis Mother     Asthma Mother     Diabetes Mother     Hearing loss Mother     Hyperlipidemia Mother     Vision loss Mother     Early death Father         42yrs old    Heart disease Father          of MI at 43yo    Hyperlipidemia Father     Kidney disease Father     Alcohol abuse Brother     Diabetes Maternal Grandmother     Breast cancer Neg Hx     Colon cancer Neg Hx     Ovarian cancer Neg Hx     Melanoma Neg Hx      SOCIAL HISTORY:   Social History     Socioeconomic History    Marital status:    Occupational History     Employer: [employer]    Tobacco Use    Smoking status: Never    Smokeless tobacco: Never   Substance and Sexual Activity    Alcohol use: Yes     Alcohol/week: 4.0 standard drinks     Types: 2 Glasses of wine, 2 Cans of beer per week     Comment: weekend/social drinker    Drug use: Never    Sexual activity: Yes     Partners: Male     Birth control/protection: Partner-Vasectomy, None   Other Topics Concern    Are you pregnant or think you may be? No     Social Determinants of Health      Financial Resource Strain: Low Risk     Difficulty of Paying Living Expenses: Not hard at all   Food Insecurity: No Food Insecurity    Worried About Running Out of Food in the Last Year: Never true    Ran Out of Food in the Last Year: Never true   Transportation Needs: No Transportation Needs    Lack of Transportation (Medical): No    Lack of Transportation (Non-Medical): No   Physical Activity: Sufficiently Active    Days of Exercise per Week: 6 days    Minutes of Exercise per Session: 60 min   Stress: No Stress Concern Present    Feeling of Stress : Only a little   Social Connections: Unknown    Frequency of Communication with Friends and Family: More than three times a week    Frequency of Social Gatherings with Friends and Family: Once a week    Active Member of Clubs or Organizations: No    Attends Club or Organization Meetings: Never    Marital Status:    Housing Stability: Low Risk     Unable to Pay for Housing in the Last Year: No    Number of Places Lived in the Last Year: 1    Unstable Housing in the Last Year: No       MEDICATIONS:   Current Outpatient Medications:     aspirin 325 MG tablet, Take 1 tablet (325 mg total) by mouth once daily. for 21 days, Disp: 21 tablet, Rfl: 0    celecoxib (CELEBREX) 200 MG capsule, Take 1 capsule (200 mg total) by mouth once daily. With food for 60 doses, Disp: 30 capsule, Rfl: 1    efinaconazole (JUBLIA) 10 % Nisa, Apply to affected nail(s) qday, Disp: 4 mL, Rfl: 3    fluticasone propionate (FLONASE) 50 mcg/actuation nasal spray, SPRAY 2 SPRAYS BY EACH NOSTRIL ROUTE ONCE DAILY., Disp: 48 g, Rfl: 3    HYDROcodone-acetaminophen (NORCO) 7.5-325 mg per tablet, Take 1 tablet by mouth every 6 (six) hours as needed for Pain., Disp: 20 tablet, Rfl: 0    ketoconazole (NIZORAL) 2 % cream, AAA feet bid x 2-4 wks., Disp: 60 g, Rfl: 3    Lactobacillus rhamnosus GG (CULTURELLE) 10 billion cell capsule, Take 1 capsule by mouth once daily., Disp: , Rfl:     levothyroxine  (SYNTHROID) 50 MCG tablet, TAKE 1 TABLET BY MOUTH EVERY DAY IN THE MORNING, Disp: 90 tablet, Rfl: 3    multivitamin capsule, Take 1 capsule by mouth once daily., Disp: , Rfl:     omega-3 fatty acids/fish oil (FISH OIL-OMEGA-3 FATTY ACIDS) 300-1,000 mg capsule, Take by mouth once daily., Disp: , Rfl:     promethazine (PHENERGAN) 25 MG tablet, Take 1 tablet (25 mg total) by mouth every 6 (six) hours as needed for Nausea., Disp: 20 tablet, Rfl: 0    traMADoL (ULTRAM) 50 mg tablet, Take 1 tablet (50 mg total) by mouth every 6 (six) hours as needed for Pain., Disp: 20 tablet, Rfl: 0    Current Facility-Administered Medications:     acetaminophen tablet 650 mg, 650 mg, Oral, Once PRN, Silvestre Sylvester MD    albuterol inhaler 2 puff, 2 puff, Inhalation, Q20 Min PRN, Silvestre Sylvester MD    diphenhydrAMINE injection 25 mg, 25 mg, Intravenous, Once PRN, Silvestre Sylvester MD    EPINEPHrine (EPIPEN) 0.3 mg/0.3 mL pen injection 0.3 mg, 0.3 mg, Intramuscular, PRN, Silvestre Sylvester MD    methylPREDNISolone sodium succinate injection 40 mg, 40 mg, Intravenous, Once PRN, Silvestre Sylvester MD    ondansetron disintegrating tablet 4 mg, 4 mg, Oral, Once PRN, Silvestre Sylvester MD    sodium chloride 0.9% 500 mL flush bag, , Intravenous, PRN, Silvestre Sylvester MD    sodium chloride 0.9% flush 10 mL, 10 mL, Intravenous, PRN, Silvestre Sylvester MD  ALLERGIES:   Review of patient's allergies indicates:   Allergen Reactions    Anucort-hc [hydrocortisone acetate] Hives and Nausea And Vomiting    Demerol [meperidine] Nausea And Vomiting    Sulfa (sulfonamide antibiotics) Hives    Amoxicillin Nausea And Vomiting and Rash    Macrobid [nitrofurantoin monohyd/m-cryst] Nausea Only     dizziness       VITAL SIGNS: BP (!) 140/91   Pulse 72   Wt 74.6 kg (164 lb 5.7 oz)   BMI 30.06 kg/m²      Risks, indications and benefits of the surgical procedure were discussed with the patient. All questions with regard to surgery, rehab, expected  return to functional activities, activities of daily living and recreational endeavors were answered to her satisfaction.    It was explained to the patient that there may be an increase in surgical risks if the patient has certain co-morbidities such as but not limited to: Obesity, Cardiovascular issues (CHF, CAD, Arrhythmias), chronic pulmonary issues, previous or current neurovascular/neurological issues, previous strokes, diabetes mellitus, previous wound healing issues, previous wound or skin infections, PVD, clotting disorders, if the patient uses chronic steroids, if the patient takes or has immune compromising medications or diseases, or has previously or currently used tobacco products.     The patient verbalized that he/she does not have any additional clotting, bleeding, or blood disorders, other than what is list in her chart on today's review.     Then a brief history and physical exam were performed.    Review of Systems   Constitution: Negative. Negative for chills, fever and night sweats.   HENT: Negative for congestion and headaches.    Eyes: Negative for blurred vision, left vision loss and right vision loss.   Cardiovascular: Negative for chest pain and syncope.   Respiratory: Negative for cough and shortness of breath.    Endocrine: Negative for polydipsia, polyphagia and polyuria.   Hematologic/Lymphatic: Negative for bleeding problem. Does not bruise/bleed easily.   Skin: Negative for dry skin, itching and rash.   Musculoskeletal: Negative for falls and muscle weakness.   Gastrointestinal: Negative for abdominal pain and bowel incontinence.   Genitourinary: Negative for bladder incontinence and nocturia.   Neurological: Negative for disturbances in coordination, loss of balance and seizures.   Psychiatric/Behavioral: Negative for depression. The patient does not have insomnia.    Allergic/Immunologic: Negative for hives and persistent infections.     PHYSICAL EXAM:  GEN: A&Ox3, WD WN  NAD  HEENT: WNL  CHEST: CTAB, no W/R/R  HEART: RRR, no M/R/G  ABD: Soft, NT ND, BS x4 QUADS  MS; See Epic  NEURO: CN II-XII intact       The surgical consent was then reviewed with the patient, who agreed with all the contents of the consent form and it was signed. she was then given the [facility] surgery packet to bring with her to surgery for the anesthesia portion of her perioperative paperwork.   For all physicians except for Dr. Michelle, we will email and possibly fax the consent forms and booking sheets to [facility] pre-admit.    The patient was given the opportunity to ask questions about the surgical plan and consent form, and once no other questions were asked, I proceeded with the pre-op appointment.    PHYSICAL THERAPY:  She was also instructed regarding physical therapy and will begin on POD#1. She was given a copy of the original prescription to schedule. Another copy of this prescription was also faxed to [facility].    POST OP CARE:instructions were reviewed including care of the wound and dressing after surgery and when she can shower.     CRUTCHES OR WALKER: It was explained to the patient that if they are having a lower extremity surgery that they will require either a walker or crutches to ambulate safely with after surgery. It was explained that a cane or other assistive devices are not sufficient to safely ambulate with after surgery. I explained to the patient that I will place an order for them to receive either crutches or a walker after surgery to go home with. It was explained that if they have crutches or a walker at home already, that they are REQUIRED to bring them to the hospital on the day of surgery. It was explained that if they do not have them at the hospital on the day of surgery that they WILL be provided a new pair or crutches or a walker to go home with to ensure ambulation will be safe if the patient needs to stop somewhere on the way  home.      PAIN MANAGEMENT: Shawna Mayers was also given their pain management regimen, which includes the TENS unit given to her by [facility] along with the education required for its use.     PAIN MEDICATION:  Norco 7.5/325mg 1 po q 4-6 hours prn pain  Ultram 50 mg Take 1-2 p.o. q.6 hours p.r.n. breakthrough pain,   Phenergan 25 mg one p.o. q.6 hours p.r.n. nausea and vomiting.    Post op meds to be delivered bedside prior to discharge. Deliver to family if patient is in surgery at 5pm.    The patient was told that narcotic pain medications may make them drowsy and instructions were given to not sign legal documents, drive or operate heavy machinery, cars, or equipment while under the influence of narcotic medications. The patient was told and understands that narcotic pain medications should only be used as needed to control pain and that other options of pain control include TENs unit and ice packs/unit.     Patient was instructed to purchase and take Colace to counter possible GI side effects of taking opiates.     DVT prophylaxis was discussed with the patient today including risk factors for developing DVTs and history of DVTs. The patient was asked if any specific recommendations were given from the doctor/s that did pre-operative surgical clearance. The patient was then given an education sheet about DVTs and PE with warning signs and symptoms of both and steps to take if they suspect either of these.    Patient was asked if they were taking or using OCP pills or devices. If they answered yes, then they were instructed to stop using OCPs at this pre-operative appointment until 2 months post-op to help prevent DVT development. They understand that there are other forms of birth control that do not involve hormones. They expressed understanding that ignoring/not following this instruction could result in a DVT which could turn into a deadly pulmonary embolism.     This along with the Modified  Caprini risk assessment model for VTE in general surgical patients was used to determine the patient's DVT risk.     From: Desire MK, Emil DA, Daphne SM, et al. Prevention of VTE in nonorthopedic surgical patients: antithrombotic therapy and prevention of thrombosis, 9th ed: American College of Chest Physicians evidence-based clinical practical guidelines. Chest 2012; 141:e227S. Copyright © 2012. Reproduced with permission from the American College of Chest Physicians.    The below listed DVT prophylaxis regimen was discussed along with SCDs during surgery and bilateral SUSIE compression stockings to be used post-op. Length of treatment has been determined to be 10-42 days post-op by the above noted Caprini assessment model. Early ambulation post-op was also discussed and emphasized with the patient.     Patient was instructed to buy and take:  Aspirin 325mg QD x 3 weeks for DVT prophylaxis starting on the evening after surgery.  Patient will also use bilateral TEDs on lower extremities, SCDs during surgery, and early ambulation post-op. If the patient was previously taking 81mg baby aspirin, they were told to not take it will using the above stated aspirin and to restart the 81mg aspirin after completion of the aspirin dose.      Patient was also told to buy over the counter Prilosec medication and take it once daily for GI protection as long as they are taking NSAIDs or Aspirin.     Published data in Wyatt SEBASTIAN, et al. J Arthroplasty. Oct; 31(10):2237-40, 2016; showed that aspirin use as prophylaxis during revision total joint arthroplasty was more effective than warfarin in preventing symptomatic venous thromboembolic events and was associated with lower complications.  Patients in the study were also treated with intermittent pneumatic compression devices. Compression stockings would be our method of mechanical prophylaxis, which has been shown to be similar to pneumatic compression in the systematic review,  Noble BARAHONA, et al. Sada Surg. Feb; 239(2): 162-171, 2004.     Results showed a significantly higher incidence of symptomatic venous thromboembolic events among patients in the warfarin group vs. the aspirin group (1.75% vs. 0.56%). Researchers also noted a bleeding event rate of 1.5% among patients who received warfarin compared with a rate of 0.4% among patients who received aspirin.    Patient denies history of seizures.     I explained to following and the patient expressed understanding:  The patient is currently aware of the COVID19 pandemic and that proceeding with their surgical procedure could potentially increase exposure to coronavirus in the community. The patient understands that there is the possibility of delayed or cancelled appts or PT visits in the future. They understand that infection with the coronavirus could complicate their surgery recovery. They are aware of the current policies and procedures of [facility] and the government regarding the pandemic and they were given the option of delaying my surgery. The patient elects to proceed with surgery at this time.     The patient was instructed to practice strict social distancing, hand washing/hygiene, respiratory hygiene, and cough etiquette from now until 6 weeks following surgery to reduce the risk of pelon coronavirus.    As there were no other questions to be asked, she was given my business card along with Fidel Michelle MD business card if she has any questions or concerns prior to surgery or in the postop period.

## 2023-07-25 NOTE — H&P
Shawna Mayers  is here for a completion of her perioperative paperwork. she  Is scheduled to undergo:    right   1. Arthroscopic partial meniscectomy  2. Possible arthroscopic synovectomy  3. Possible arthroscopic loose body removal  4. Possible arthroscopic chondroplasty    on 2023.      She is a healthy individual but does need clearance for this procedure.    Patient has been cleared to proceed with surgery.      PAST MEDICAL HISTORY:   Past Medical History:   Diagnosis Date    Allergy     Anxiety     Asthma     Focal nodular hyperplasia of liver     GERD (gastroesophageal reflux disease)     Hyperlipidemia     Thyroid disease     hypothyroidism     PAST SURGICAL HISTORY:   Past Surgical History:   Procedure Laterality Date    CHOLECYSTECTOMY      FOOT SURGERY      arc    WISDOM TOOTH EXTRACTION       FAMILY HISTORY:   Family History   Problem Relation Age of Onset    Arthritis Mother     Asthma Mother     Diabetes Mother     Hearing loss Mother     Hyperlipidemia Mother     Vision loss Mother     Early death Father         42yrs old    Heart disease Father          of MI at 41yo    Hyperlipidemia Father     Kidney disease Father     Alcohol abuse Brother     Diabetes Maternal Grandmother     Breast cancer Neg Hx     Colon cancer Neg Hx     Ovarian cancer Neg Hx     Melanoma Neg Hx      SOCIAL HISTORY:   Social History     Socioeconomic History    Marital status:    Occupational History     Employer: [employer]    Tobacco Use    Smoking status: Never    Smokeless tobacco: Never   Substance and Sexual Activity    Alcohol use: Yes     Alcohol/week: 4.0 standard drinks     Types: 2 Glasses of wine, 2 Cans of beer per week     Comment: weekend/social drinker    Drug use: Never    Sexual activity: Yes     Partners: Male     Birth control/protection: Partner-Vasectomy, None   Other Topics Concern    Are you pregnant or think you may be? No     Social Determinants of Health      Financial Resource Strain: Low Risk     Difficulty of Paying Living Expenses: Not hard at all   Food Insecurity: No Food Insecurity    Worried About Running Out of Food in the Last Year: Never true    Ran Out of Food in the Last Year: Never true   Transportation Needs: No Transportation Needs    Lack of Transportation (Medical): No    Lack of Transportation (Non-Medical): No   Physical Activity: Sufficiently Active    Days of Exercise per Week: 6 days    Minutes of Exercise per Session: 60 min   Stress: No Stress Concern Present    Feeling of Stress : Only a little   Social Connections: Unknown    Frequency of Communication with Friends and Family: More than three times a week    Frequency of Social Gatherings with Friends and Family: Once a week    Active Member of Clubs or Organizations: No    Attends Club or Organization Meetings: Never    Marital Status:    Housing Stability: Low Risk     Unable to Pay for Housing in the Last Year: No    Number of Places Lived in the Last Year: 1    Unstable Housing in the Last Year: No       MEDICATIONS:   Current Outpatient Medications:     aspirin 325 MG tablet, Take 1 tablet (325 mg total) by mouth once daily. for 21 days, Disp: 21 tablet, Rfl: 0    celecoxib (CELEBREX) 200 MG capsule, Take 1 capsule (200 mg total) by mouth once daily. With food for 60 doses, Disp: 30 capsule, Rfl: 1    efinaconazole (JUBLIA) 10 % Nisa, Apply to affected nail(s) qday, Disp: 4 mL, Rfl: 3    fluticasone propionate (FLONASE) 50 mcg/actuation nasal spray, SPRAY 2 SPRAYS BY EACH NOSTRIL ROUTE ONCE DAILY., Disp: 48 g, Rfl: 3    HYDROcodone-acetaminophen (NORCO) 7.5-325 mg per tablet, Take 1 tablet by mouth every 6 (six) hours as needed for Pain., Disp: 20 tablet, Rfl: 0    ketoconazole (NIZORAL) 2 % cream, AAA feet bid x 2-4 wks., Disp: 60 g, Rfl: 3    Lactobacillus rhamnosus GG (CULTURELLE) 10 billion cell capsule, Take 1 capsule by mouth once daily., Disp: , Rfl:     levothyroxine  (SYNTHROID) 50 MCG tablet, TAKE 1 TABLET BY MOUTH EVERY DAY IN THE MORNING, Disp: 90 tablet, Rfl: 3    multivitamin capsule, Take 1 capsule by mouth once daily., Disp: , Rfl:     omega-3 fatty acids/fish oil (FISH OIL-OMEGA-3 FATTY ACIDS) 300-1,000 mg capsule, Take by mouth once daily., Disp: , Rfl:     promethazine (PHENERGAN) 25 MG tablet, Take 1 tablet (25 mg total) by mouth every 6 (six) hours as needed for Nausea., Disp: 20 tablet, Rfl: 0    traMADoL (ULTRAM) 50 mg tablet, Take 1 tablet (50 mg total) by mouth every 6 (six) hours as needed for Pain., Disp: 20 tablet, Rfl: 0    Current Facility-Administered Medications:     acetaminophen tablet 650 mg, 650 mg, Oral, Once PRN, Silvestre Sylvester MD    albuterol inhaler 2 puff, 2 puff, Inhalation, Q20 Min PRN, Silvestre Sylvester MD    diphenhydrAMINE injection 25 mg, 25 mg, Intravenous, Once PRN, Silvestre Sylvester MD    EPINEPHrine (EPIPEN) 0.3 mg/0.3 mL pen injection 0.3 mg, 0.3 mg, Intramuscular, PRN, Silvestre Sylvester MD    methylPREDNISolone sodium succinate injection 40 mg, 40 mg, Intravenous, Once PRN, Silvestre Sylvester MD    ondansetron disintegrating tablet 4 mg, 4 mg, Oral, Once PRN, Silvestre Sylvester MD    sodium chloride 0.9% 500 mL flush bag, , Intravenous, PRN, Silvestre Sylvester MD    sodium chloride 0.9% flush 10 mL, 10 mL, Intravenous, PRN, Silvestre Sylvester MD  ALLERGIES:   Review of patient's allergies indicates:   Allergen Reactions    Anucort-hc [hydrocortisone acetate] Hives and Nausea And Vomiting    Demerol [meperidine] Nausea And Vomiting    Sulfa (sulfonamide antibiotics) Hives    Amoxicillin Nausea And Vomiting and Rash    Macrobid [nitrofurantoin monohyd/m-cryst] Nausea Only     dizziness       VITAL SIGNS: BP (!) 140/91   Pulse 72   Wt 74.6 kg (164 lb 5.7 oz)   BMI 30.06 kg/m²      Risks, indications and benefits of the surgical procedure were discussed with the patient. All questions with regard to surgery, rehab, expected  return to functional activities, activities of daily living and recreational endeavors were answered to her satisfaction.    It was explained to the patient that there may be an increase in surgical risks if the patient has certain co-morbidities such as but not limited to: Obesity, Cardiovascular issues (CHF, CAD, Arrhythmias), chronic pulmonary issues, previous or current neurovascular/neurological issues, previous strokes, diabetes mellitus, previous wound healing issues, previous wound or skin infections, PVD, clotting disorders, if the patient uses chronic steroids, if the patient takes or has immune compromising medications or diseases, or has previously or currently used tobacco products.     The patient verbalized that he/she does not have any additional clotting, bleeding, or blood disorders, other than what is list in her chart on today's review.     Then a brief history and physical exam were performed.    Review of Systems   Constitution: Negative. Negative for chills, fever and night sweats.   HENT: Negative for congestion and headaches.    Eyes: Negative for blurred vision, left vision loss and right vision loss.   Cardiovascular: Negative for chest pain and syncope.   Respiratory: Negative for cough and shortness of breath.    Endocrine: Negative for polydipsia, polyphagia and polyuria.   Hematologic/Lymphatic: Negative for bleeding problem. Does not bruise/bleed easily.   Skin: Negative for dry skin, itching and rash.   Musculoskeletal: Negative for falls and muscle weakness.   Gastrointestinal: Negative for abdominal pain and bowel incontinence.   Genitourinary: Negative for bladder incontinence and nocturia.   Neurological: Negative for disturbances in coordination, loss of balance and seizures.   Psychiatric/Behavioral: Negative for depression. The patient does not have insomnia.    Allergic/Immunologic: Negative for hives and persistent infections.     PHYSICAL EXAM:  GEN: A&Ox3, WD WN  NAD  HEENT: WNL  CHEST: CTAB, no W/R/R  HEART: RRR, no M/R/G  ABD: Soft, NT ND, BS x4 QUADS  MS; See Epic  NEURO: CN II-XII intact       The surgical consent was then reviewed with the patient, who agreed with all the contents of the consent form and it was signed. she was then given the [facility] surgery packet to bring with her to surgery for the anesthesia portion of her perioperative paperwork.   For all physicians except for Dr. Michelle, we will email and possibly fax the consent forms and booking sheets to [facility] pre-admit.    The patient was given the opportunity to ask questions about the surgical plan and consent form, and once no other questions were asked, I proceeded with the pre-op appointment.    PHYSICAL THERAPY:  She was also instructed regarding physical therapy and will begin on POD#1. She was given a copy of the original prescription to schedule. Another copy of this prescription was also faxed to [facility].    POST OP CARE:instructions were reviewed including care of the wound and dressing after surgery and when she can shower.     CRUTCHES OR WALKER: It was explained to the patient that if they are having a lower extremity surgery that they will require either a walker or crutches to ambulate safely with after surgery. It was explained that a cane or other assistive devices are not sufficient to safely ambulate with after surgery. I explained to the patient that I will place an order for them to receive either crutches or a walker after surgery to go home with. It was explained that if they have crutches or a walker at home already, that they are REQUIRED to bring them to the hospital on the day of surgery. It was explained that if they do not have them at the hospital on the day of surgery that they WILL be provided a new pair or crutches or a walker to go home with to ensure ambulation will be safe if the patient needs to stop somewhere on the way  home.      PAIN MANAGEMENT: Shawna Mayers was also given their pain management regimen, which includes the TENS unit given to her by [facility] along with the education required for its use.     PAIN MEDICATION:  Norco 7.5/325mg 1 po q 4-6 hours prn pain  Ultram 50 mg Take 1-2 p.o. q.6 hours p.r.n. breakthrough pain,   Phenergan 25 mg one p.o. q.6 hours p.r.n. nausea and vomiting.    Post op meds to be delivered bedside prior to discharge. Deliver to family if patient is in surgery at 5pm.    The patient was told that narcotic pain medications may make them drowsy and instructions were given to not sign legal documents, drive or operate heavy machinery, cars, or equipment while under the influence of narcotic medications. The patient was told and understands that narcotic pain medications should only be used as needed to control pain and that other options of pain control include TENs unit and ice packs/unit.     Patient was instructed to purchase and take Colace to counter possible GI side effects of taking opiates.     DVT prophylaxis was discussed with the patient today including risk factors for developing DVTs and history of DVTs. The patient was asked if any specific recommendations were given from the doctor/s that did pre-operative surgical clearance. The patient was then given an education sheet about DVTs and PE with warning signs and symptoms of both and steps to take if they suspect either of these.    Patient was asked if they were taking or using OCP pills or devices. If they answered yes, then they were instructed to stop using OCPs at this pre-operative appointment until 2 months post-op to help prevent DVT development. They understand that there are other forms of birth control that do not involve hormones. They expressed understanding that ignoring/not following this instruction could result in a DVT which could turn into a deadly pulmonary embolism.     This along with the Modified  Caprini risk assessment model for VTE in general surgical patients was used to determine the patient's DVT risk.     From: Desire MK, Emil DA, Daphne SM, et al. Prevention of VTE in nonorthopedic surgical patients: antithrombotic therapy and prevention of thrombosis, 9th ed: American College of Chest Physicians evidence-based clinical practical guidelines. Chest 2012; 141:e227S. Copyright © 2012. Reproduced with permission from the American College of Chest Physicians.    The below listed DVT prophylaxis regimen was discussed along with SCDs during surgery and bilateral SUSIE compression stockings to be used post-op. Length of treatment has been determined to be 10-42 days post-op by the above noted Caprini assessment model. Early ambulation post-op was also discussed and emphasized with the patient.     Patient was instructed to buy and take:  Aspirin 325mg QD x 3 weeks for DVT prophylaxis starting on the evening after surgery.  Patient will also use bilateral TEDs on lower extremities, SCDs during surgery, and early ambulation post-op. If the patient was previously taking 81mg baby aspirin, they were told to not take it will using the above stated aspirin and to restart the 81mg aspirin after completion of the aspirin dose.      Patient was also told to buy over the counter Prilosec medication and take it once daily for GI protection as long as they are taking NSAIDs or Aspirin.     Published data in Wyatt SEBASTIAN, et al. J Arthroplasty. Oct; 31(10):2237-40, 2016; showed that aspirin use as prophylaxis during revision total joint arthroplasty was more effective than warfarin in preventing symptomatic venous thromboembolic events and was associated with lower complications.  Patients in the study were also treated with intermittent pneumatic compression devices. Compression stockings would be our method of mechanical prophylaxis, which has been shown to be similar to pneumatic compression in the systematic review,  Noble BARAHONA, et al. Sada Surg. Feb; 239(2): 162-171, 2004.     Results showed a significantly higher incidence of symptomatic venous thromboembolic events among patients in the warfarin group vs. the aspirin group (1.75% vs. 0.56%). Researchers also noted a bleeding event rate of 1.5% among patients who received warfarin compared with a rate of 0.4% among patients who received aspirin.    Patient denies history of seizures.     I explained to following and the patient expressed understanding:  The patient is currently aware of the COVID19 pandemic and that proceeding with their surgical procedure could potentially increase exposure to coronavirus in the community. The patient understands that there is the possibility of delayed or cancelled appts or PT visits in the future. They understand that infection with the coronavirus could complicate their surgery recovery. They are aware of the current policies and procedures of [facility] and the government regarding the pandemic and they were given the option of delaying my surgery. The patient elects to proceed with surgery at this time.     The patient was instructed to practice strict social distancing, hand washing/hygiene, respiratory hygiene, and cough etiquette from now until 6 weeks following surgery to reduce the risk of pelon coronavirus.    As there were no other questions to be asked, she was given my business card along with Fidel Michelle MD business card if she has any questions or concerns prior to surgery or in the postop period.

## 2023-07-26 ENCOUNTER — HOSPITAL ENCOUNTER (OUTPATIENT)
Facility: HOSPITAL | Age: 53
Discharge: HOME | End: 2023-07-26
Attending: ORTHOPAEDIC SURGERY | Admitting: ORTHOPAEDIC SURGERY
Payer: COMMERCIAL

## 2023-07-26 ENCOUNTER — ANESTHESIA (OUTPATIENT)
Dept: SURGERY | Facility: HOSPITAL | Age: 53
End: 2023-07-26
Payer: COMMERCIAL

## 2023-07-26 VITALS
TEMPERATURE: 98 F | SYSTOLIC BLOOD PRESSURE: 128 MMHG | OXYGEN SATURATION: 96 % | DIASTOLIC BLOOD PRESSURE: 86 MMHG | HEART RATE: 64 BPM | BODY MASS INDEX: 30.18 KG/M2 | RESPIRATION RATE: 16 BRPM | HEIGHT: 62 IN | WEIGHT: 164 LBS

## 2023-07-26 DIAGNOSIS — M23.203 OLD TEAR OF MEDIAL MENISCUS OF RIGHT KNEE, UNSPECIFIED TEAR TYPE: Primary | ICD-10-CM

## 2023-07-26 LAB
B-HCG UR QL: NEGATIVE
CTP QC/QA: YES

## 2023-07-26 PROCEDURE — 63600175 PHARM REV CODE 636 W HCPCS: Performed by: SURGERY

## 2023-07-26 PROCEDURE — 63600175 PHARM REV CODE 636 W HCPCS: Performed by: ORTHOPAEDIC SURGERY

## 2023-07-26 PROCEDURE — 25000003 PHARM REV CODE 250: Performed by: STUDENT IN AN ORGANIZED HEALTH CARE EDUCATION/TRAINING PROGRAM

## 2023-07-26 PROCEDURE — 25000003 PHARM REV CODE 250: Performed by: NURSE ANESTHETIST, CERTIFIED REGISTERED

## 2023-07-26 PROCEDURE — 81025 URINE PREGNANCY TEST: CPT | Performed by: ORTHOPAEDIC SURGERY

## 2023-07-26 PROCEDURE — D9220A PRA ANESTHESIA: Mod: CRNA,,, | Performed by: NURSE ANESTHETIST, CERTIFIED REGISTERED

## 2023-07-26 PROCEDURE — 71000033 HC RECOVERY, INTIAL HOUR: Performed by: ORTHOPAEDIC SURGERY

## 2023-07-26 PROCEDURE — 25000003 PHARM REV CODE 250: Performed by: SURGERY

## 2023-07-26 PROCEDURE — 27201423 OPTIME MED/SURG SUP & DEVICES STERILE SUPPLY: Performed by: ORTHOPAEDIC SURGERY

## 2023-07-26 PROCEDURE — 36000711: Performed by: ORTHOPAEDIC SURGERY

## 2023-07-26 PROCEDURE — D9220A PRA ANESTHESIA: ICD-10-PCS | Mod: CRNA,,, | Performed by: NURSE ANESTHETIST, CERTIFIED REGISTERED

## 2023-07-26 PROCEDURE — D9220A PRA ANESTHESIA: ICD-10-PCS | Mod: ANES,,, | Performed by: SURGERY

## 2023-07-26 PROCEDURE — 37000008 HC ANESTHESIA 1ST 15 MINUTES: Performed by: ORTHOPAEDIC SURGERY

## 2023-07-26 PROCEDURE — 64447 NJX AA&/STRD FEMORAL NRV IMG: CPT | Performed by: SURGERY

## 2023-07-26 PROCEDURE — 63600175 PHARM REV CODE 636 W HCPCS: Performed by: NURSE ANESTHETIST, CERTIFIED REGISTERED

## 2023-07-26 PROCEDURE — 63600175 PHARM REV CODE 636 W HCPCS: Performed by: STUDENT IN AN ORGANIZED HEALTH CARE EDUCATION/TRAINING PROGRAM

## 2023-07-26 PROCEDURE — 94761 N-INVAS EAR/PLS OXIMETRY MLT: CPT

## 2023-07-26 PROCEDURE — 29880 PR KNEE SCOPE MED/LAT MENISCECTOMY: ICD-10-PCS | Mod: RT,,, | Performed by: ORTHOPAEDIC SURGERY

## 2023-07-26 PROCEDURE — 64447 ADDUCTOR CANAL SINGLE INJECTION: ICD-10-PCS | Mod: 59,RT,, | Performed by: SURGERY

## 2023-07-26 PROCEDURE — 99900035 HC TECH TIME PER 15 MIN (STAT)

## 2023-07-26 PROCEDURE — D9220A PRA ANESTHESIA: Mod: ANES,,, | Performed by: SURGERY

## 2023-07-26 PROCEDURE — 25000003 PHARM REV CODE 250: Performed by: PHYSICIAN ASSISTANT

## 2023-07-26 PROCEDURE — 71000015 HC POSTOP RECOV 1ST HR: Performed by: ORTHOPAEDIC SURGERY

## 2023-07-26 PROCEDURE — 29880 ARTHRS KNE SRG MNISECTMY M&L: CPT | Mod: RT,,, | Performed by: ORTHOPAEDIC SURGERY

## 2023-07-26 PROCEDURE — 37000009 HC ANESTHESIA EA ADD 15 MINS: Performed by: ORTHOPAEDIC SURGERY

## 2023-07-26 PROCEDURE — 36000710: Performed by: ORTHOPAEDIC SURGERY

## 2023-07-26 RX ORDER — SCOLOPAMINE TRANSDERMAL SYSTEM 1 MG/1
1 PATCH, EXTENDED RELEASE TRANSDERMAL
Status: DISCONTINUED | OUTPATIENT
Start: 2023-07-26 | End: 2023-07-26 | Stop reason: HOSPADM

## 2023-07-26 RX ORDER — EPINEPHRINE 1 MG/ML
INJECTION, SOLUTION, CONCENTRATE INTRAVENOUS
Status: DISCONTINUED | OUTPATIENT
Start: 2023-07-26 | End: 2023-07-26 | Stop reason: HOSPADM

## 2023-07-26 RX ORDER — BUPIVACAINE HYDROCHLORIDE 2.5 MG/ML
INJECTION, SOLUTION EPIDURAL; INFILTRATION; INTRACAUDAL
Status: COMPLETED | OUTPATIENT
Start: 2023-07-26 | End: 2023-07-26

## 2023-07-26 RX ORDER — ONDANSETRON 2 MG/ML
INJECTION INTRAMUSCULAR; INTRAVENOUS
Status: DISCONTINUED | OUTPATIENT
Start: 2023-07-26 | End: 2023-07-26

## 2023-07-26 RX ORDER — HALOPERIDOL 5 MG/ML
0.5 INJECTION INTRAMUSCULAR EVERY 10 MIN PRN
Status: DISCONTINUED | OUTPATIENT
Start: 2023-07-26 | End: 2023-07-26 | Stop reason: HOSPADM

## 2023-07-26 RX ORDER — FAMOTIDINE 10 MG/ML
INJECTION INTRAVENOUS
Status: DISCONTINUED | OUTPATIENT
Start: 2023-07-26 | End: 2023-07-26

## 2023-07-26 RX ORDER — SODIUM CHLORIDE 9 MG/ML
INJECTION, SOLUTION INTRAVENOUS CONTINUOUS
Status: DISCONTINUED | OUTPATIENT
Start: 2023-07-26 | End: 2023-07-26 | Stop reason: HOSPADM

## 2023-07-26 RX ORDER — CLINDAMYCIN PHOSPHATE 900 MG/50ML
900 INJECTION, SOLUTION INTRAVENOUS
Status: DISCONTINUED | OUTPATIENT
Start: 2023-07-26 | End: 2023-07-26 | Stop reason: HOSPADM

## 2023-07-26 RX ORDER — HYDROMORPHONE HYDROCHLORIDE 1 MG/ML
0.2 INJECTION, SOLUTION INTRAMUSCULAR; INTRAVENOUS; SUBCUTANEOUS EVERY 5 MIN PRN
Status: DISCONTINUED | OUTPATIENT
Start: 2023-07-26 | End: 2023-07-26 | Stop reason: HOSPADM

## 2023-07-26 RX ORDER — FENTANYL CITRATE 50 UG/ML
25-200 INJECTION, SOLUTION INTRAMUSCULAR; INTRAVENOUS
Status: DISCONTINUED | OUTPATIENT
Start: 2023-07-26 | End: 2023-07-26 | Stop reason: HOSPADM

## 2023-07-26 RX ORDER — METHOCARBAMOL 500 MG/1
1000 TABLET, FILM COATED ORAL ONCE
Status: COMPLETED | OUTPATIENT
Start: 2023-07-26 | End: 2023-07-26

## 2023-07-26 RX ORDER — CARBOXYMETHYLCELLULOSE SODIUM 10 MG/ML
GEL OPHTHALMIC
Status: DISCONTINUED | OUTPATIENT
Start: 2023-07-26 | End: 2023-07-26

## 2023-07-26 RX ORDER — OXYCODONE HYDROCHLORIDE 5 MG/1
5 TABLET ORAL
Status: DISCONTINUED | OUTPATIENT
Start: 2023-07-26 | End: 2023-07-26 | Stop reason: HOSPADM

## 2023-07-26 RX ORDER — SODIUM CHLORIDE 0.9 % (FLUSH) 0.9 %
10 SYRINGE (ML) INJECTION
Status: DISCONTINUED | OUTPATIENT
Start: 2023-07-26 | End: 2023-07-26 | Stop reason: HOSPADM

## 2023-07-26 RX ORDER — MIDAZOLAM HYDROCHLORIDE 1 MG/ML
.5-4 INJECTION INTRAMUSCULAR; INTRAVENOUS
Status: DISCONTINUED | OUTPATIENT
Start: 2023-07-26 | End: 2023-07-26 | Stop reason: HOSPADM

## 2023-07-26 RX ORDER — KETAMINE HCL IN 0.9 % NACL 50 MG/5 ML
SYRINGE (ML) INTRAVENOUS
Status: DISCONTINUED | OUTPATIENT
Start: 2023-07-26 | End: 2023-07-26

## 2023-07-26 RX ORDER — ACETAMINOPHEN 500 MG
1000 TABLET ORAL
Status: COMPLETED | OUTPATIENT
Start: 2023-07-26 | End: 2023-07-26

## 2023-07-26 RX ORDER — BUPIVACAINE HYDROCHLORIDE 5 MG/ML
INJECTION, SOLUTION EPIDURAL; INTRACAUDAL
Status: DISCONTINUED
Start: 2023-07-26 | End: 2023-07-26 | Stop reason: HOSPADM

## 2023-07-26 RX ORDER — PROPOFOL 10 MG/ML
VIAL (ML) INTRAVENOUS
Status: DISCONTINUED | OUTPATIENT
Start: 2023-07-26 | End: 2023-07-26

## 2023-07-26 RX ORDER — MIDAZOLAM HYDROCHLORIDE 1 MG/ML
INJECTION, SOLUTION INTRAMUSCULAR; INTRAVENOUS
Status: DISCONTINUED | OUTPATIENT
Start: 2023-07-26 | End: 2023-07-26

## 2023-07-26 RX ORDER — DEXAMETHASONE SODIUM PHOSPHATE 4 MG/ML
INJECTION, SOLUTION INTRA-ARTICULAR; INTRALESIONAL; INTRAMUSCULAR; INTRAVENOUS; SOFT TISSUE
Status: DISCONTINUED | OUTPATIENT
Start: 2023-07-26 | End: 2023-07-26

## 2023-07-26 RX ORDER — ROPIVACAINE HYDROCHLORIDE 5 MG/ML
INJECTION, SOLUTION EPIDURAL; INFILTRATION; PERINEURAL
Status: DISCONTINUED
Start: 2023-07-26 | End: 2023-07-26 | Stop reason: HOSPADM

## 2023-07-26 RX ORDER — FENTANYL CITRATE 50 UG/ML
INJECTION, SOLUTION INTRAMUSCULAR; INTRAVENOUS
Status: DISCONTINUED | OUTPATIENT
Start: 2023-07-26 | End: 2023-07-26

## 2023-07-26 RX ADMIN — GLYCOPYRROLATE 0.2 MG: 0.2 INJECTION, SOLUTION INTRAMUSCULAR; INTRAVENOUS at 12:07

## 2023-07-26 RX ADMIN — SODIUM CHLORIDE: 9 INJECTION, SOLUTION INTRAVENOUS at 09:07

## 2023-07-26 RX ADMIN — FENTANYL CITRATE 100 MCG: 50 INJECTION INTRAMUSCULAR; INTRAVENOUS at 09:07

## 2023-07-26 RX ADMIN — CARBOXYMETHYLCELLULOSE SODIUM 2 DROP: 10 GEL OPHTHALMIC at 11:07

## 2023-07-26 RX ADMIN — MIDAZOLAM HYDROCHLORIDE 2 MG: 1 INJECTION, SOLUTION INTRAMUSCULAR; INTRAVENOUS at 11:07

## 2023-07-26 RX ADMIN — MIDAZOLAM 2 MG: 1 INJECTION INTRAMUSCULAR; INTRAVENOUS at 09:07

## 2023-07-26 RX ADMIN — SCOPALAMINE 1 PATCH: 1 PATCH, EXTENDED RELEASE TRANSDERMAL at 09:07

## 2023-07-26 RX ADMIN — SODIUM CHLORIDE: 0.9 INJECTION, SOLUTION INTRAVENOUS at 11:07

## 2023-07-26 RX ADMIN — BUPIVACAINE HYDROCHLORIDE 20 ML: 2.5 INJECTION, SOLUTION EPIDURAL; INFILTRATION; INTRACAUDAL; PERINEURAL at 09:07

## 2023-07-26 RX ADMIN — Medication 30 MG: at 11:07

## 2023-07-26 RX ADMIN — METHOCARBAMOL 1000 MG: 500 TABLET ORAL at 01:07

## 2023-07-26 RX ADMIN — ONDANSETRON 4 MG: 2 INJECTION INTRAMUSCULAR; INTRAVENOUS at 11:07

## 2023-07-26 RX ADMIN — FENTANYL CITRATE 100 MCG: 50 INJECTION, SOLUTION INTRAMUSCULAR; INTRAVENOUS at 11:07

## 2023-07-26 RX ADMIN — GLYCOPYRROLATE 0.2 MG: 0.2 INJECTION, SOLUTION INTRAMUSCULAR; INTRAVENOUS at 11:07

## 2023-07-26 RX ADMIN — SODIUM CHLORIDE, SODIUM GLUCONATE, SODIUM ACETATE, POTASSIUM CHLORIDE, MAGNESIUM CHLORIDE, SODIUM PHOSPHATE, DIBASIC, AND POTASSIUM PHOSPHATE: .53; .5; .37; .037; .03; .012; .00082 INJECTION, SOLUTION INTRAVENOUS at 11:07

## 2023-07-26 RX ADMIN — DEXAMETHASONE SODIUM PHOSPHATE 8 MG: 4 INJECTION, SOLUTION INTRAMUSCULAR; INTRAVENOUS at 11:07

## 2023-07-26 RX ADMIN — OXYCODONE HYDROCHLORIDE 5 MG: 5 TABLET ORAL at 01:07

## 2023-07-26 RX ADMIN — ACETAMINOPHEN 1000 MG: 500 TABLET ORAL at 08:07

## 2023-07-26 RX ADMIN — PROPOFOL 200 MG: 10 INJECTION, EMULSION INTRAVENOUS at 11:07

## 2023-07-26 RX ADMIN — FAMOTIDINE 20 MG: 10 INJECTION, SOLUTION INTRAVENOUS at 11:07

## 2023-07-26 NOTE — PLAN OF CARE
VSS. Patient able to tolerate oral liquids. Patient reports tolerable pain level for discharge. Patient/family received home medication per bedside delivery. Dressing intact. Accu therm intact. Thigh TEDs intact. Patient instructed not to wear SUSIE hose without wearing closed-back shoes or  socks due to increased risk of falls, verbalized understanding. Crutches at bedside for home use. No distress noted. Patient states she is ready for discharge. Discharge instructions reviewed with patient and family, verbalized understanding. IV discontinued with catheter tip intact. Family at bedside to help patient dress. Patient voided before d/c. Patient wheeled to lobby via staff.

## 2023-07-26 NOTE — PATIENT INSTRUCTIONS
[address], [place]                                                                                          [phone]                   Postoperative Instructions for Knee Surgery                 Your Surgery Included:   Open               Arthroscopic   [] Ligament Repair       [] Diagnostic           [] ACL     [] PCL     [] MCL     [] PLLC      [] Synovectomy / Plica Removal [] Meniscal Cartilage Repair / Transplantation      [] Lysis of Adhesions / Manipulation [] Articular Cartilage Repair      [] Interval Release           [] Microfracture       [] OATS   [] ACI      [x] Meniscectomy           [] Osteochondral Allograft      [] Meniscal Cartilage Repair  [] Patellar Realignment       [x] Debridement / Chondroplasty         [] Lateral Release   [] Ligament Repair      [] Articular Cartilage Repair          [] Extensor Mechanism             []   Microfracture  []  OATS         []  Cartilage Biopsy [] Tendon Repair          [] Ligament Reconstruction          [] Patella                  [] Quadriceps             []   ACL    []   PCL  [] High Tibial Osteotomy       [] PRP Arthrocentesis  [] Joint Replacement         [] Amniox Arthrocentesis           [] Unicompartmental   [] Patellofemoral                    [] Total Knee                  Call our office ([phone]) immediately if you experience any of the following:       Excessive bleeding or pus like drainage at the incision site       Uncontrollable pain not relieved by pain medication       Excessive swelling or redness at the incision site       Fever above 101.5 degrees not controlled with Tylenol or Motrin       Shortness of Breath       Any foul odor or blistering from the surgery site    FOR EMERGENCIES: If any unusual problems or difficulties occur, call our office at [phone], or page the  at [phone] who will direct your call appropriately    1.   Pain Management: A cold therapy cuff, pain  medications, local injections, and in some cases, regional anesthesia injections are used to manage your post-operative pain. The decision to use each of these options is based on their risks and benefits.     Medications: You were given one or more of the following medication prescriptions before leaving the hospital. Have the prescriptions filled at a pharmacy on your way home and follow the instructions on the bottles. If you need a refill, please call your pharmacy.      Narcotic Medication (usually Norco/Hydrocodone APAP, Percocet/Oxycodone APAP, Roxicodone, or Tramadol): Begin taking the medication before your knee starts to hurt. Some patients do not like to take any medication, but if you wait until your pain is severe before taking, you will be very uncomfortable for several hours waiting for the narcotic to work. Always take with food.     Nausea / Vomiting: For this issue, we prescribe Phenergan, use this medication as directed.     Cold Therapy: You may have been sent home with a [facility] cold therapy unit and wrap for your knee. Fill with ice and water, or frozen water bottles with water, to the indicated fill line and use throughout the day for the first two days and then as needed to help relieve pain and control swelling. Ice the knee in 30 minute intervals, and do not place the wrap directly onto the skin.     Regional Anesthesia Injections (Blocks): You may have been given a regional nerve block either before or after surgery. This may make your entire leg numb for 24-36 hours.                            * Proceed with caution when bearing weight on your leg.     2.   Diet: Eat a bland diet for the first day after surgery. Progress your diet as tolerated. Constipation may occur with Narcotic usage, contact our office if you are experiencing constipation.    3.   Activity: Limit your activity during the first 48 hours, keep your leg elevated with pillows under your heel. After the first 48 hours  "at home, increase your activity level based on your symptoms.    4.   Dressing Change: Remove the soft dressing on the 3rd day. IF YOU HAVE A TAN AQUACEL BANDAGE ON YOUR KNEE, DO NOT REMOVE THIS. It is normal for some blood to be seen on the dressings. It is also normal for you to see apparent bruising on the skin around your knee when you remove the dressing. If present, leave the steri-strip tape across the incisions. If you are concerned by the drainage or the appearance of your knee, please call one of the numbers listed below.    5.   Showering: You may shower on the 3rd day after surgery with waterproof bandages over small incisions. If you have an incision with Prineo (clear mesh), it does not need to be covered. Do not submerge in any water until after your postoperative appointment in clinic.    6.   Your procedure did not require a post-operative brace.    7.   Your procedure did not require a Continuous Passive Motion (CPM) device.    8.   Weight Bearing: You may have been sent home with crutches. If instructed (see below), use these crutches at all times unless at complete rest.      [x] Full weight bearing            [x]  NOW        9.  Knee Exercises: Begin these exercises the first day after surgery in order to help you regain your motion and strength. You may do the following marked exercises:     [x] Quad Sets - Begin activating your quadriceps muscle by driving your          knee downward into full knee extension while seated on a table or bed   with a towel rolled and propped under your heel     [x] Straight Leg Raise (SLR) - While pelon your quadriceps muscle, lift     your fully extended leg to the level of your non-operative knee (as shown)     [x] Heel Slides - With the knee straight, slide your heel slowly toward your   buttocks, hold at the endpoint for 10-15 seconds, then slowly straighten     [x] Ankle pumps - With your knee straight, move your ankle in a "pumping"    fashion to " activate your calf and leg muscles      10.  Physical Therapy: Physical therapy is an essential component to your recovery from surgery. Your physical therapy will start in 1 days.    FIRST POSTOPERATIVE VISIT: As scheduled.

## 2023-07-26 NOTE — PROGRESS NOTES
Pre op completed. All concerns addressed. Patient belongings to be given to spouse. Bed in lowest position. Call light within reach. Family at beside.

## 2023-07-26 NOTE — TRANSFER OF CARE
"Anesthesia Transfer of Care Note    Patient: Shawna Mayers    Procedure(s) Performed: Procedure(s) (LRB):  ARTHROSCOPY, KNEE, WITH MENISCECTOMY (Right)  ARTHROSCOPY, KNEE, WITH CHONDROPLASTY (Right)    Patient location: PACU    Anesthesia Type: general    Transport from OR: Transported from OR on 6-10 L/min O2 by face mask with adequate spontaneous ventilation    Post pain: adequate analgesia    Post assessment: no apparent anesthetic complications    Post vital signs: stable    Level of consciousness: sedated    Nausea/Vomiting: no nausea/vomiting    Complications: none    Transfer of care protocol was followed      Last vitals:   Visit Vitals  /71 (BP Location: Left arm, Patient Position: Lying)   Pulse 78   Temp 36.6 °C (97.8 °F) (Temporal)   Resp 16   Ht 5' 2" (1.575 m)   Wt 74.4 kg (164 lb)   LMP 01/11/2023   SpO2 100%   Breastfeeding No   BMI 30.00 kg/m²     "

## 2023-07-26 NOTE — ANESTHESIA PROCEDURE NOTES
Adductor Canal Single Injection    Patient location during procedure: pre-op   Block not for primary anesthetic.  Reason for block: at surgeon's request and post-op pain management   Post-op Pain Location: Right Knee   Start time: 7/26/2023 9:23 AM  Timeout: 7/26/2023 9:23 AM   End time: 7/26/2023 9:26 AM    Staffing  Authorizing Provider: Louie Rojo MD  Performing Provider: Louie Rojo MD    Preanesthetic Checklist  Completed: patient identified, IV checked, site marked, risks and benefits discussed, surgical consent, monitors and equipment checked, pre-op evaluation and timeout performed  Peripheral Block  Patient position: supine  Prep: ChloraPrep  Patient monitoring: heart rate, cardiac monitor, continuous pulse ox, continuous capnometry and frequent blood pressure checks  Block type: adductor canal  Laterality: right  Injection technique: single shot  Needle  Needle type: Stimuplex   Needle gauge: 21 G  Needle length: 4 in  Needle localization: anatomical landmarks and ultrasound guidance   -ultrasound image captured on disc.  Assessment  Injection assessment: negative aspiration, negative parasthesia and local visualized surrounding nerve  Paresthesia pain: none  Heart rate change: no  Slow fractionated injection: yes    Medications:    Medications: bupivacaine (pf) (MARCAINE) injection 0.25% - Perineural   20 mL - 7/26/2023 9:26:00 AM    Additional Notes  A time out was conducted. Site christophe confirmed with team and patient. Allergies reviewed.   Vital signs stable throughout block. RN monitoring vitals throughout.   Needle advanced under continuous ultrasound guidance.  Local injected incrementally after confirming negative aspiration. No signs or symptoms of intravascular or intraneural injection noted.   No persistent paresthesias elicited or expressed. Patient tolerated procedure well.  20cc of 0.25% bupivacaine with epinephrine 1:300K, PF dexamethasone 1 mg, and clonidine 50 mcg used for  the block.

## 2023-07-26 NOTE — ANESTHESIA PREPROCEDURE EVALUATION
Pre-operative evaluation for Procedure(s) (LRB):  ARTHROSCOPY, KNEE, WITH MENISCECTOMY (Right)  ARTHROSCOPY, KNEE, WITH CHONDROPLASTY (Right)    Shawna Mayers is a 52 y.o. female     Patient Active Problem List   Diagnosis    Acquired hypothyroidism    Focal nodular hyperplasia of liver       Review of patient's allergies indicates:   Allergen Reactions    Adhesive Blisters    Anucort-hc [hydrocortisone acetate] Hives and Nausea And Vomiting    Demerol [meperidine] Nausea And Vomiting    Sulfa (sulfonamide antibiotics) Hives    Amoxicillin Nausea And Vomiting and Rash    Macrobid [nitrofurantoin monohyd/m-cryst] Nausea Only     dizziness       No current facility-administered medications on file prior to encounter.     Current Outpatient Medications on File Prior to Encounter   Medication Sig Dispense Refill    fluticasone propionate (FLONASE) 50 mcg/actuation nasal spray SPRAY 2 SPRAYS BY EACH NOSTRIL ROUTE ONCE DAILY. 48 g 3    Lactobacillus rhamnosus GG (CULTURELLE) 10 billion cell capsule Take 1 capsule by mouth once daily.      levothyroxine (SYNTHROID) 50 MCG tablet TAKE 1 TABLET BY MOUTH EVERY DAY IN THE MORNING 90 tablet 3    multivitamin capsule Take 1 capsule by mouth once daily.      omega-3 fatty acids/fish oil (FISH OIL-OMEGA-3 FATTY ACIDS) 300-1,000 mg capsule Take by mouth once daily.      celecoxib (CELEBREX) 200 MG capsule Take 1 capsule (200 mg total) by mouth once daily. With food for 60 doses 30 capsule 1    efinaconazole (JUBLIA) 10 % Nisa Apply to affected nail(s) qday 4 mL 3    ketoconazole (NIZORAL) 2 % cream AAA feet bid x 2-4 wks. 60 g 3       Past Surgical History:   Procedure Laterality Date    CHOLECYSTECTOMY      FOOT SURGERY      arc    WISDOM TOOTH EXTRACTION           CBC:  Lab Results   Component Value Date    WBC 6.80 07/10/2023    RBC 5.00 07/10/2023    HGB 15.1 07/10/2023    HCT 45.9 07/10/2023     07/10/2023    MCV 92 07/10/2023    MCH 30.2  "07/10/2023    MCHC 32.9 07/10/2023       CMP:   Lab Results   Component Value Date     07/10/2023    K 5.1 07/10/2023     07/10/2023    CO2 27 07/10/2023    BUN 13 07/10/2023    CREATININE 0.8 07/10/2023     07/10/2023    CALCIUM 10.2 07/10/2023    ALBUMIN 4.5 07/10/2023    PROT 7.8 07/10/2023    ALKPHOS 73 07/10/2023    ALT 29 07/10/2023    AST 32 07/10/2023    BILITOT 0.4 07/10/2023       INR:  No results found for: PT, INR, PROTIME, APTT      Diagnostic Studies:      EKG:   Results for orders placed or performed during the hospital encounter of 07/10/23   EKG 12-lead    Collection Time: 07/10/23 12:20 PM    Narrative    Test Reason : Z01.818,    Vent. Rate : 078 BPM     Atrial Rate : 078 BPM     P-R Int : 156 ms          QRS Dur : 106 ms      QT Int : 380 ms       P-R-T Axes : 067 071 062 degrees     QTc Int : 433 ms    Sinus rhythm with sinus arrhythmia with occasional Premature ventricular  complexes  Otherwise normal ECG    Confirmed by Claudine NUNEZ, Solis BARAJAS ([id number]) on 7/11/2023 9:56:21 PM    Referred By: CLARTIA ESCOBAR           Confirmed By:Solis Chow MD        2D Echo:  No results found for this or any previous visit.    Stress Test:   No results found for this or any previous visit.      Pre-op Vitals [07/26/23 0808]   BP Pulse Resp Temp SpO2   138/85 64 17 36.7 °C (98.1 °F) 95 %      Height Weight BMI (Calculated)     5' 2" 164 lb 30         Pre-op Vitals [07/26/23 0808]   BP Pulse Resp Temp SpO2   138/85 64 17 36.7 °C (98.1 °F) 95 %      Height Weight BMI (Calculated)     5' 2" 164 lb 30                                                                                                              Pre-op Assessment    I have reviewed the Patient Summary Reports.       I have reviewed the Medications.     Review of Systems  Anesthesia Hx:  No problems with previous Anesthesia   Denies Personal Hx of Anesthesia complications.   Social:  Non-Smoker, Social Alcohol Use  "   Hematology/Oncology:  Hematology Normal   Oncology Normal     EENT/Dental:   chronic allergic rhinitis Allergy, [place] Tooth Extraction   Cardiovascular:   Exercise tolerance: good Denies CAD.     Denies Angina. hyperlipidemia  Denies CHI. ECG has been reviewed.    Pulmonary:   Asthma asymptomatic Denies Shortness of breath.    Renal/:  Renal/ Normal  Denies Chronic Renal Disease.     Hepatic/GI:   GERD, well controlled Liver Disease, Focal nodular hyperplasia of liver, cholecystectomy   Musculoskeletal:   Old tear of medial meniscus of right knee,   Tibial plateau chondromalacia,  Chondromalacia of right knee,  H/O Foot surgery   Neurological:  Neurology Normal  Denies Headaches. Denies Seizures.    Endocrine:   Denies Diabetes. Hypothyroidism  Obesity / BMI > 30  Psych:   anxiety          Physical Exam  General: Well nourished, Cooperative, Alert and Oriented    Airway:  Mallampati: II   Mouth Opening: Normal  TM Distance: Normal  Tongue: Normal  Neck ROM: Normal ROM    Heart:  Rate: Normal  Rhythm: Regular Rhythm        Anesthesia Plan  Type of Anesthesia, risks & benefits discussed:    Anesthesia Type: Gen ETT, Gen Supraglottic Airway  Intra-op Monitoring Plan: Standard ASA Monitors  Post Op Pain Control Plan: multimodal analgesia, IV/PO Opioids PRN and peripheral nerve block  Induction:  IV  Informed Consent: Informed consent signed with the Patient and all parties understand the risks and agree with anesthesia plan.  All questions answered.   ASA Score: 2  Day of Surgery Review of History & Physical: H&P Update referred to the surgeon/provider.    Ready For Surgery From Anesthesia Perspective.     .

## 2023-07-26 NOTE — ANESTHESIA PROCEDURE NOTES
Intubation    Date/Time: 7/26/2023 11:25 AM  Performed by: Lisa Hernández CRNA  Authorized by: Louie Rojo MD     Intubation:     Induction:  Intravenous    Intubated:  Postinduction    Mask Ventilation:  Easy mask    Attempts:  1    Attempted By:  CRNA    Difficult Airway Encountered?: No      Complications:  None    Airway Device:  Supraglottic airway/LMA    Airway Device Size:  3.5    Style/Cuff Inflation:  Cuffed    Inflation Amount (mL):  0    Secured at:  The lips    Placement Verified By:  Capnometry    Complicating Factors:  None    Findings Post-Intubation:  Atraumatic/condition of teeth unchanged

## 2023-07-27 ENCOUNTER — PATIENT MESSAGE (OUTPATIENT)
Dept: SPORTS MEDICINE | Facility: CLINIC | Age: 53
End: 2023-07-27
Payer: COMMERCIAL

## 2023-07-27 NOTE — OP NOTE
[facility])  Surgery Department  Operative Note    SUMMARY     Date of Procedure: 7/26/2023     Procedure: Procedure(s) (LRB):  ARTHROSCOPY, KNEE, WITH MENISCECTOMY (Right)  ARTHROSCOPY, KNEE, WITH CHONDROPLASTY (Right)     Surgeon(s) and Role:     * Fidel Michelle MD - Primary    Assisting Surgeon:   Chato Treviño    Pre-Operative Diagnosis: Old tear of medial meniscus of right knee, unspecified tear type [M23.203]  Chondromalacia of right knee [M94.261]    Post-Operative Diagnosis: Post-Op Diagnosis Codes:     * Old tear of medial meniscus of right knee, unspecified tear type [M23.203]     * Chondromalacia of right knee [M94.261]    Anesthesia: General    Technical Procedures Used:     DATE OF PROCEDURE:  7/26/23      PREOPERATIVE DIAGNOSES:   1. Right knee chondromalacia  2. Right knee medial meniscus tear.   3. Right knee lateral meniscus tear     POSTOPERATIVE DIAGNOSES:   1. Right knee chondromalacia  2. Right knee medial meniscus tear.   3. Right knee lateral meniscus tear     PROCEDURES:   1. Right knee arthroscopic chondroplasty  2. Right knee arthroscopic partial medial and lateral meniscectomies     SURGEON: Fidel Michelle M.D.      ASSISTANT:   Chato Treviño     COMPLICATIONS: None.      POSITION: Supine with arthroscopic leg neal.      ANESTHESIA: General endotracheal plus local.      COMPLICATIONS: None.      TOURNIQUET TIME:  None     EBL:  Minimal     EXAMINATION UNDER ANESTHESIA:   Knee: full range of motion, no evidence of instability.      ARTHROSCOPIC FINDINGS:   1. Complex tear posterior horn / body medial meniscus.   2. Central tear, lateral meniscus  3. Chondromalacia, patella, trochlea, MFC grade 2/3 extensive     INDICATIONS: The patient is a 52 year-old female with a history of right knee pain. MRI confirms a tear in her medial meniscus.  After the risks and benefits of surgery were discussed with the patient, including bleeding, infection, scarring, persistent  pain, risk of blood clots (DVT), pulmonary embolism, compartment syndrome, damage to cartilage, damage to neurovascular structures, plus the risks of anesthesia, including, death, stroke, and heart attack, the patient wished to proceed with operative intervention.        DESCRIPTION OF PROCEDURE:      The patient and correct limb were identified and marked in the pre-op holding area.      The patient was brought in the room. After undergoing general endotracheal anesthesia, she was placed on a well-padded operating table. her right lower extremity was prepped and draped in usual sterile fashion. A nonsterile tourniquet was placed high up around the thigh. A well padded arthroscopic leg neal was used during the case. The contralateral leg was placed in a well padded Deuce stirrup with bony prominences all being well padded.       The standard inferolateral and inferomedial portals were created. Diagnostic arthroscopy performed.      CHONDROPLASTY: The patellofemoral joint was entered. There was significant chondromalacia on the trochlea and on the undersurface of the patella. Using a full radium shaver and biter, unstable cartilage flaps were trimmed down to a stable rim.     There was a mild synovitis noted within the anterior interval. An VAPR device was used to remove areas of irritating synovium from the overlying trochlea in the anterior interval to allow for visualization to minimize abrasion.     CHONDROPLASTY:  Attention turned to the medial compartment. Medial femoral condyle had grade 2/3 extensive changes along the mid flexion portion.  Using a full radium shaver and biter, unstable cartilage flaps were trimmed down to a stable rim.      There was a complex tear in the body and posterior horn of the medial meniscus, Using combination of straight and curved biters and arthroscopic yoni, the unstable portion of the medial meniscus was trimmed down to a stable rim. Approximately 25% of the body of the  medial meniscus was resected down to get this to a stable position.      Attention was turned to the intercondylar notch. The ACL and PCL were intact.      Attention was turned to the lateral compartment. The lateral meniscus had a tear along the central aspect.  Using a biter and shaver, the central 10% was trimmed out. The lateral femoral condyle and lateral tibial plateau were intact.       Portal sites were closed with 4-0 Monocryl, covered with Mastisol, Steri-Strips, Xeroform, 4 x 4 fluffs, ABD pads and thigh-high SUSIE hose.     The patient was extubated in the room, transferred to Recovery Room in stable condition accompanied by her physician.  There were no complications in the case.      I was present, scrubbed and did perform all critical portions of the case.        DANIEL LUCIO MD      Description of the Findings of the Procedure: above    Significant Surgical Tasks Conducted by the Assistant(s), if Applicable: none    Complications: No    Estimated Blood Loss (EBL): * No values recorded between 7/26/2023 11:38 AM and 7/26/2023 12:17 PM *           Implants: * No implants in log *    Specimens:   Specimen (24h ago, onward)      None                    Condition: Good    Disposition: PACU - hemodynamically stable.    Attestation: I was present and scrubbed for the entire procedure.    Discharge Note    SUMMARY     Admit Date: 7/26/2023    Discharge Date and Time: 7/26/2023  1:56 PM    Hospital Course (synopsis of major diagnoses, care, treatment, and services provided during the course of the hospital stay): outpatient surgery     Final Diagnosis: Post-Op Diagnosis Codes:     * Old tear of medial meniscus of right knee, unspecified tear type [M23.203]     * Chondromalacia of right knee [M94.261]    Disposition: Home or Self Care    Follow Up/Patient Instructions:     Medications:  Reconciled Home Medications:      Medication List        CONTINUE taking these medications      aspirin 325 MG  tablet  Take 1 tablet (325 mg total) by mouth once daily. for 21 days     celecoxib 200 MG capsule  Commonly known as: CeleBREX  Take 1 capsule (200 mg total) by mouth once daily. With food for 60 doses     fish oil-omega-3 fatty acids 300-1,000 mg capsule  Take by mouth once daily.     fluticasone propionate 50 mcg/actuation nasal spray  Commonly known as: FLONASE  SPRAY 2 SPRAYS BY EACH NOSTRIL ROUTE ONCE DAILY.     HYDROcodone-acetaminophen 7.5-325 mg per tablet  Commonly known as: NORCO  Take 1 tablet by mouth every 6 (six) hours as needed for Pain.     JUBLIA 10 % Deepali  Generic drug: efinaconazole  Apply to affected nail(s) qday     ketoconazole 2 % cream  Commonly known as: NIZORAL  AAA feet bid x 2-4 wks.     Lactobacillus rhamnosus GG 10 billion cell capsule  Commonly known as: CULTURELLE  Take 1 capsule by mouth once daily.     levothyroxine 50 MCG tablet  Commonly known as: SYNTHROID  TAKE 1 TABLET BY MOUTH EVERY DAY IN THE MORNING     multivitamin capsule  Take 1 capsule by mouth once daily.     promethazine 25 MG tablet  Commonly known as: PHENERGAN  Take 1 tablet (25 mg total) by mouth every 6 (six) hours as needed for Nausea.     traMADoL 50 mg tablet  Commonly known as: ULTRAM  Take 1 tablet (50 mg total) by mouth every 6 (six) hours as needed for Pain.            Discharge Procedure Orders   Diet Adult Regular     Keep surgical extremity elevated     Ice to affected area     Notify your health care provider if you experience any of the following:  temperature >100.4     Notify your health care provider if you experience any of the following:  persistent nausea and vomiting or diarrhea     Notify your health care provider if you experience any of the following:  severe uncontrolled pain     Notify your health care provider if you experience any of the following:  redness, tenderness, or signs of infection (pain, swelling, redness, odor or green/yellow discharge around incision site)     Notify your  health care provider if you experience any of the following:  difficulty breathing or increased cough     Notify your health care provider if you experience any of the following:  severe persistent headache     Notify your health care provider if you experience any of the following:  worsening rash      Remove dressing in 72 hours     Shower on day dressing removed (No bath)     Weight bearing restrictions (specify):   Order Comments: Weight bearing as tolerated

## 2023-08-09 ENCOUNTER — PATIENT MESSAGE (OUTPATIENT)
Dept: SPORTS MEDICINE | Facility: CLINIC | Age: 53
End: 2023-08-09

## 2023-08-09 ENCOUNTER — OFFICE VISIT (OUTPATIENT)
Dept: SPORTS MEDICINE | Facility: CLINIC | Age: 53
End: 2023-08-09
Payer: COMMERCIAL

## 2023-08-09 VITALS
HEIGHT: 62 IN | SYSTOLIC BLOOD PRESSURE: 136 MMHG | DIASTOLIC BLOOD PRESSURE: 86 MMHG | HEART RATE: 78 BPM | BODY MASS INDEX: 29.7 KG/M2 | WEIGHT: 161.38 LBS

## 2023-08-09 DIAGNOSIS — Z98.890 S/P ARTHROSCOPIC SURGERY OF RIGHT KNEE: ICD-10-CM

## 2023-08-09 DIAGNOSIS — Z09 SURGERY FOLLOW-UP EXAMINATION: Primary | ICD-10-CM

## 2023-08-09 PROCEDURE — 1159F MED LIST DOCD IN RCRD: CPT | Mod: CPTII,S$GLB,, | Performed by: PHYSICIAN ASSISTANT

## 2023-08-09 PROCEDURE — 3075F PR MOST RECENT SYSTOLIC BLOOD PRESS GE 130-139MM HG: ICD-10-PCS | Mod: CPTII,S$GLB,, | Performed by: PHYSICIAN ASSISTANT

## 2023-08-09 PROCEDURE — 3075F SYST BP GE 130 - 139MM HG: CPT | Mod: CPTII,S$GLB,, | Performed by: PHYSICIAN ASSISTANT

## 2023-08-09 PROCEDURE — 99999 PR PBB SHADOW E&M-EST. PATIENT-LVL IV: CPT | Mod: PBBFAC,,, | Performed by: PHYSICIAN ASSISTANT

## 2023-08-09 PROCEDURE — 99999 PR PBB SHADOW E&M-EST. PATIENT-LVL IV: ICD-10-PCS | Mod: PBBFAC,,, | Performed by: PHYSICIAN ASSISTANT

## 2023-08-09 PROCEDURE — 3079F PR MOST RECENT DIASTOLIC BLOOD PRESSURE 80-89 MM HG: ICD-10-PCS | Mod: CPTII,S$GLB,, | Performed by: PHYSICIAN ASSISTANT

## 2023-08-09 PROCEDURE — 3008F PR BODY MASS INDEX (BMI) DOCUMENTED: ICD-10-PCS | Mod: CPTII,S$GLB,, | Performed by: PHYSICIAN ASSISTANT

## 2023-08-09 PROCEDURE — 99024 POSTOP FOLLOW-UP VISIT: CPT | Mod: S$GLB,,, | Performed by: PHYSICIAN ASSISTANT

## 2023-08-09 PROCEDURE — 3008F BODY MASS INDEX DOCD: CPT | Mod: CPTII,S$GLB,, | Performed by: PHYSICIAN ASSISTANT

## 2023-08-09 PROCEDURE — 3079F DIAST BP 80-89 MM HG: CPT | Mod: CPTII,S$GLB,, | Performed by: PHYSICIAN ASSISTANT

## 2023-08-09 PROCEDURE — 1159F PR MEDICATION LIST DOCUMENTED IN MEDICAL RECORD: ICD-10-PCS | Mod: CPTII,S$GLB,, | Performed by: PHYSICIAN ASSISTANT

## 2023-08-09 PROCEDURE — 99024 PR POST-OP FOLLOW-UP VISIT: ICD-10-PCS | Mod: S$GLB,,, | Performed by: PHYSICIAN ASSISTANT

## 2023-08-09 NOTE — PROGRESS NOTES
"CC: right knee scope post op 2 weeks    Patient is here for her 2 week post op appointment s/p below and is doing well. Patient is doing PT at [facility] and is progressing as expected. Patient is no longer taking pain medication. she denies any chest pain, SOB, fevers, chills, nausea, vomiting, or drainage from incision sites.     DATE OF PROCEDURE:  7/26/23      PREOPERATIVE DIAGNOSES:   1. Right knee chondromalacia  2. Right knee medial meniscus tear.   3. Right knee lateral meniscus tear     POSTOPERATIVE DIAGNOSES:   1. Right knee chondromalacia  2. Right knee medial meniscus tear.   3. Right knee lateral meniscus tear     PROCEDURES:   1. Right knee arthroscopic chondroplasty  2. Right knee arthroscopic partial medial and lateral meniscectomies     SURGEON: Fidel Michelle M.D.     PE:    /86   Pulse 78   Ht 5' 2" (1.575 m)   Wt 73.2 kg (161 lb 6 oz)   LMP 01/11/2023   BMI 29.52 kg/m²      right knee:    Incisions clean/dry/intact  No sign of infection  Mild swelling  Compartments soft  Neurovascular status intact in extremity    AROM 0 to 130 degrees  Decreased quad strength  Minimal to no effusion    Assessment:  2 weeks s/p right knee arthroscopic chondroplasty and partial medial and lateral meniscectomies    Plan:  1.  Removed steri strips.  Wounds healing well    2.  Arthroscopic pictures reviewed and rehab plans discussed.    3.  Physical therapy for quad, ROM, modalities.  HEP for ROM, knee, VMO and hip abductor strengthening.     4.  Pain level acceptable for first post op visit.  Wean off pain medicine by next visit if still taking    5. Return to clinic in 4 weeks at 6 weeks post-op.      All questions were answered. Instructed patient to call with questions or concerns in the interim.       Medical Dictation software was used during the dictation of portions or the entirety of this medical record.  Phonetic or grammatic errors may exist due to the use of this software. " For clarification, refer to the author of the document.

## 2023-08-15 ENCOUNTER — OFFICE VISIT (OUTPATIENT)
Dept: DERMATOLOGY | Facility: CLINIC | Age: 53
End: 2023-08-15
Payer: COMMERCIAL

## 2023-08-15 DIAGNOSIS — L81.4 LENTIGINES: ICD-10-CM

## 2023-08-15 DIAGNOSIS — Z12.83 SCREENING EXAM FOR SKIN CANCER: ICD-10-CM

## 2023-08-15 DIAGNOSIS — D22.9 MULTIPLE BENIGN NEVI: ICD-10-CM

## 2023-08-15 DIAGNOSIS — D48.5 NEOPLASM OF UNCERTAIN BEHAVIOR OF SKIN: Primary | ICD-10-CM

## 2023-08-15 PROCEDURE — 99213 OFFICE O/P EST LOW 20 MIN: CPT | Mod: 25,S$GLB,, | Performed by: DERMATOLOGY

## 2023-08-15 PROCEDURE — 88305 TISSUE EXAM BY PATHOLOGIST: ICD-10-PCS | Mod: 26,,, | Performed by: PATHOLOGY

## 2023-08-15 PROCEDURE — 1160F PR REVIEW ALL MEDS BY PRESCRIBER/CLIN PHARMACIST DOCUMENTED: ICD-10-PCS | Mod: CPTII,S$GLB,, | Performed by: DERMATOLOGY

## 2023-08-15 PROCEDURE — 1159F PR MEDICATION LIST DOCUMENTED IN MEDICAL RECORD: ICD-10-PCS | Mod: CPTII,S$GLB,, | Performed by: DERMATOLOGY

## 2023-08-15 PROCEDURE — 11102 PR TANGENTIAL BIOPSY, SKIN, SINGLE LESION: ICD-10-PCS | Mod: S$GLB,,, | Performed by: DERMATOLOGY

## 2023-08-15 PROCEDURE — 1160F RVW MEDS BY RX/DR IN RCRD: CPT | Mod: CPTII,S$GLB,, | Performed by: DERMATOLOGY

## 2023-08-15 PROCEDURE — 88305 TISSUE EXAM BY PATHOLOGIST: CPT | Performed by: PATHOLOGY

## 2023-08-15 PROCEDURE — 1159F MED LIST DOCD IN RCRD: CPT | Mod: CPTII,S$GLB,, | Performed by: DERMATOLOGY

## 2023-08-15 PROCEDURE — 88305 TISSUE EXAM BY PATHOLOGIST: CPT | Mod: 26,,, | Performed by: PATHOLOGY

## 2023-08-15 PROCEDURE — 99213 PR OFFICE/OUTPT VISIT, EST, LEVL III, 20-29 MIN: ICD-10-PCS | Mod: 25,S$GLB,, | Performed by: DERMATOLOGY

## 2023-08-15 PROCEDURE — 11102 TANGNTL BX SKIN SINGLE LES: CPT | Mod: S$GLB,,, | Performed by: DERMATOLOGY

## 2023-08-15 NOTE — PATIENT INSTRUCTIONS
Biopsy Wound Care Instructions    Leave the bandage on for 24 hours without getting it wet.   Clean the area once a day with a gentle soap and water, then pat dry and apply Vaseline and a bandaid.  The site should be kept moist with Vaseline at all times to improve healing. Reapply a thick coating as needed. Do not let the site air out or form a scab, as this will delay healing and worsen scarring.  If any bleeding or oozing occurs once you return home, apply firm pressure to the area for 30 minutes straight without peeking. If bleeding continues, call the office immediately.  Please message us via [facility], call us at [phone], or return to the office at any sign of increasing redness, swelling, tenderness, pain, heat, yellow drainage/discharge, or continued bleeding.      Receiving Your Pathology Results    Your pathology results will be released to you on [facility] at the same time that Dr. Adair receives them.   Dr. Adair will then message you with her interpretation of the results and/or with the plan going forward.  If you do not use [facility] or if your pathology results require more of an explanation, you will receive your results via a phone call.  If 2 weeks go by and you have not received your results, please message us via [facility] or call us at [phone] to inform us.

## 2023-08-15 NOTE — PROGRESS NOTES
"  Patient Information  Name: Shawna Mayers  : 1970  MRN: [id number]     Referring Physician:  No ref. provider found   Primary Care Physician:  Baldev Braun MD   Date of Visit: 08/15/2023      Subjective:     History of Present lllness:    Shawna Mayers is a 52 y.o. female who presents with a chief complaint of moles and red spot on L neck.  Patient is here today for a "mole" check.     Today, patient complains of lesion(s):  Location: L neck  Duration: 2 months  Symptoms: red spot that is not going away, no pain or bleeding to area   Relieving factors/Previous treatments: none    Patient was last seen: 3/7/2022.  Prior notes by myself reviewed.   Clinical documentation obtained by nursing staff reviewed.    Review of Systems    Objective:   Physical Exam   Constitutional: She appears well-developed and well-nourished. No distress.   Neurological: She is alert and oriented to person, place, and time. She is not disoriented.   Psychiatric: She has a normal mood and affect.   Skin:   Areas Examined (abnormalities noted in diagram):   Scalp / Hair Palpated and Inspected  Head / Face Inspection Performed  Neck Inspection Performed  Chest / Axilla Inspection Performed  Abdomen Inspection Performed  Genitals / Buttocks / Groin Inspection Performed  Back Inspection Performed  RUE Inspected  LUE Inspection Performed  RLE Inspected  LLE Inspection Performed  Nails and Digits Inspection Performed                 Diagram Legend     Erythematous scaling macule/papule c/w actinic keratosis       Vascular papule c/w angioma      Pigmented verrucoid papule/plaque c/w seborrheic keratosis      Yellow umbilicated papule c/w sebaceous hyperplasia      Irregularly shaped tan macule c/w lentigo     1-2 mm smooth white papules consistent with Milia      Movable subcutaneous cyst with punctum c/w epidermal inclusion cyst      Subcutaneous movable cyst c/w pilar cyst      Firm pink to brown papule c/w " dermatofibroma      Pedunculated fleshy papule(s) c/w skin tag(s)      Evenly pigmented macule c/w junctional nevus     Mildly variegated pigmented, slightly irregular-bordered macule c/w mildly atypical nevus      Flesh colored to evenly pigmented papule c/w intradermal nevus       Pink pearly papule/plaque c/w basal cell carcinoma      Erythematous hyperkeratotic cursted plaque c/w SCC      Surgical scar with no sign of skin cancer recurrence      Open and closed comedones      Inflammatory papules and pustules      Verrucoid papule consistent consistent with wart     Erythematous eczematous patches and plaques     Dystrophic onycholytic nail with subungual debris c/w onychomycosis     Umbilicated papule    Erythematous-base heme-crusted tan verrucoid plaque consistent with inflamed seborrheic keratosis     Erythematous Silvery Scaling Plaque c/w Psoriasis     See annotation          [] Data reviewed  [] Prior external notes reviewed  [] Independent review of test  [] Management discussed with another provider  [] Independent historian    Assessment / Plan:      Pathology Orders:       Normal Orders This Visit    Specimen to Pathology, Dermatology     Questions:    Procedure Type: Dermatology and skin neoplasms    Number of Specimens: 1    ------------------------: -------------------------    Spec 1 Procedure: Biopsy    Spec 1 Clinical Impression: r/o BCC vs ISK    Spec 1 Source: left lateral neck    Release to patient:           Neoplasm of uncertain behavior of skin  -     Specimen to Pathology, Dermatology    Shave biopsy procedure note:  Risk, benefits, and alternatives of biopsy are discussed with the patient, including risk of infection, scar, recurrence, and need for additional treatment of site. The patient agrees to the procedure by verbal consent. The area is marked and prepped with alcohol.  Approximately 1 mL of lidocaine 1% with epinephrine is used for local anesthesia. A sharp blade is used to  remove a portion of the lesion. The specimen is sent for pathology. Hemostasis is obtained with aluminum chloride and/or monopolar hyfrecation if needed. The area is then dressed and bandaged. The patient tolerated the procedure well without adverse event. Written instructions on wound care were given and were reviewed with the patient, who is to call for any signs of bleeding or infection. The patient will be notified of the pathology results.    Multiple benign nevi  Multiple benign-appearing nevi present on exam today. Reassurance provided. Counseled patient to periodically examine moles and return to clinic if any changes in size, shape, or color are noted or if it becomes symptomatic (bleeding, itching, pain, etc).  Recommend using a broad-spectrum, water-resistant sunscreen with SPF of 30 or higher--reapply every 2 hours. Seek shade, wear sun-protective clothing, and perform regular skin self-exams.    Lentigines  These are benign sun spots which should be monitored for changes. Daily sun protection will reduce the number of new lesions.   Recommend using a broad-spectrum, water-resistant sunscreen with SPF of 30 or higher--reapply every 2 hours. Seek shade, wear sun-protective clothing, and perform regular skin self-exams.    Screening exam for skin cancer  Total body skin examination performed today as noted in physical exam. Suspicious lesion(s) were noted and/or biopsied as above.  Recommend using a broad-spectrum, water-resistant sunscreen with SPF of 30 or higher--reapply every 2 hours. Seek shade, wear sun-protective clothing, and perform regular skin self-exams.      Follow up in about 1 year (around 8/15/2024) for TBSE, or sooner dependent on pathology results.      Salma Adair MD, FAAD  [facility]

## 2023-08-19 LAB
FINAL PATHOLOGIC DIAGNOSIS: NORMAL
GROSS: NORMAL
Lab: NORMAL
MICROSCOPIC EXAM: NORMAL

## 2023-08-21 ENCOUNTER — PATIENT MESSAGE (OUTPATIENT)
Dept: DERMATOLOGY | Facility: CLINIC | Age: 53
End: 2023-08-21
Payer: COMMERCIAL

## 2023-08-21 ENCOUNTER — TELEPHONE (OUTPATIENT)
Dept: DERMATOLOGY | Facility: CLINIC | Age: 53
End: 2023-08-21
Payer: COMMERCIAL

## 2023-08-21 NOTE — TELEPHONE ENCOUNTER
----- Message from Salma Adair MD sent at 8/21/2023  8:00 AM CDT -----  Please let patient know that the path results showed a skin cancer which requires a surgical excision for definitive treatment.   Please schedule procedure and review pre- and post-op instructions with her.  KK    Final Pathologic Diagnosis   Skin, left lateral neck, shave biopsy:   -BASAL CELL CARCINOMA, SUPERFICIAL TYPE, EXTENDING TO A PERIPHERAL BIOPSY EDGE

## 2023-08-21 NOTE — PROGRESS NOTES
Please let patient know that the path results showed a skin cancer which requires a surgical excision for definitive treatment.   Please schedule procedure and review pre- and post-op instructions with her.  KK    Final Pathologic Diagnosis   Skin, left lateral neck, shave biopsy:   -BASAL CELL CARCINOMA, SUPERFICIAL TYPE, EXTENDING TO A PERIPHERAL BIOPSY EDGE

## 2023-08-26 ENCOUNTER — LAB VISIT (OUTPATIENT)
Dept: LAB | Facility: HOSPITAL | Age: 53
End: 2023-08-26
Attending: INTERNAL MEDICINE
Payer: COMMERCIAL

## 2023-08-26 DIAGNOSIS — Z00.00 WELLNESS EXAMINATION: ICD-10-CM

## 2023-08-26 DIAGNOSIS — E03.9 ACQUIRED HYPOTHYROIDISM: ICD-10-CM

## 2023-08-26 LAB
ALBUMIN SERPL BCP-MCNC: 4.1 G/DL (ref 3.5–5.2)
ALP SERPL-CCNC: 67 U/L (ref 55–135)
ALT SERPL W/O P-5'-P-CCNC: 22 U/L (ref 10–44)
ANION GAP SERPL CALC-SCNC: 8 MMOL/L (ref 8–16)
AST SERPL-CCNC: 32 U/L (ref 10–40)
BASOPHILS # BLD AUTO: 0.05 K/UL (ref 0–0.2)
BASOPHILS NFR BLD: 1 % (ref 0–1.9)
BILIRUB SERPL-MCNC: 0.6 MG/DL (ref 0.1–1)
BUN SERPL-MCNC: 10 MG/DL (ref 6–20)
CALCIUM SERPL-MCNC: 9.8 MG/DL (ref 8.7–10.5)
CHLORIDE SERPL-SCNC: 103 MMOL/L (ref 95–110)
CHOLEST SERPL-MCNC: 230 MG/DL (ref 120–199)
CHOLEST/HDLC SERPL: 3.5 {RATIO} (ref 2–5)
CO2 SERPL-SCNC: 28 MMOL/L (ref 23–29)
CREAT SERPL-MCNC: 0.7 MG/DL (ref 0.5–1.4)
DIFFERENTIAL METHOD: ABNORMAL
EOSINOPHIL # BLD AUTO: 0.2 K/UL (ref 0–0.5)
EOSINOPHIL NFR BLD: 3.5 % (ref 0–8)
ERYTHROCYTE [DISTWIDTH] IN BLOOD BY AUTOMATED COUNT: 12.1 % (ref 11.5–14.5)
EST. GFR  (NO RACE VARIABLE): >60 ML/MIN/1.73 M^2
GLUCOSE SERPL-MCNC: 90 MG/DL (ref 70–110)
HCT VFR BLD AUTO: 44.2 % (ref 37–48.5)
HDLC SERPL-MCNC: 65 MG/DL (ref 40–75)
HDLC SERPL: 28.3 % (ref 20–50)
HGB BLD-MCNC: 14.8 G/DL (ref 12–16)
IMM GRANULOCYTES # BLD AUTO: 0 K/UL (ref 0–0.04)
IMM GRANULOCYTES NFR BLD AUTO: 0 % (ref 0–0.5)
LDLC SERPL CALC-MCNC: 144.4 MG/DL (ref 63–159)
LYMPHOCYTES # BLD AUTO: 2.4 K/UL (ref 1–4.8)
LYMPHOCYTES NFR BLD: 49.8 % (ref 18–48)
MCH RBC QN AUTO: 30.5 PG (ref 27–31)
MCHC RBC AUTO-ENTMCNC: 33.5 G/DL (ref 32–36)
MCV RBC AUTO: 91 FL (ref 82–98)
MONOCYTES # BLD AUTO: 0.4 K/UL (ref 0.3–1)
MONOCYTES NFR BLD: 8.1 % (ref 4–15)
NEUTROPHILS # BLD AUTO: 1.8 K/UL (ref 1.8–7.7)
NEUTROPHILS NFR BLD: 37.6 % (ref 38–73)
NONHDLC SERPL-MCNC: 165 MG/DL
NRBC BLD-RTO: 0 /100 WBC
PLATELET # BLD AUTO: 319 K/UL (ref 150–450)
PMV BLD AUTO: 10 FL (ref 9.2–12.9)
POTASSIUM SERPL-SCNC: 4.7 MMOL/L (ref 3.5–5.1)
PROT SERPL-MCNC: 7.2 G/DL (ref 6–8.4)
RBC # BLD AUTO: 4.85 M/UL (ref 4–5.4)
SODIUM SERPL-SCNC: 139 MMOL/L (ref 136–145)
TRIGL SERPL-MCNC: 103 MG/DL (ref 30–150)
TSH SERPL DL<=0.005 MIU/L-ACNC: 1.91 UIU/ML (ref 0.4–4)
WBC # BLD AUTO: 4.84 K/UL (ref 3.9–12.7)

## 2023-08-26 PROCEDURE — 36415 COLL VENOUS BLD VENIPUNCTURE: CPT | Performed by: INTERNAL MEDICINE

## 2023-08-26 PROCEDURE — 80061 LIPID PANEL: CPT | Performed by: INTERNAL MEDICINE

## 2023-08-26 PROCEDURE — 80053 COMPREHEN METABOLIC PANEL: CPT | Performed by: INTERNAL MEDICINE

## 2023-08-26 PROCEDURE — 84443 ASSAY THYROID STIM HORMONE: CPT | Performed by: INTERNAL MEDICINE

## 2023-08-26 PROCEDURE — 85025 COMPLETE CBC W/AUTO DIFF WBC: CPT | Performed by: INTERNAL MEDICINE

## 2023-08-30 ENCOUNTER — OFFICE VISIT (OUTPATIENT)
Dept: PRIMARY CARE CLINIC | Facility: CLINIC | Age: 53
End: 2023-08-30
Payer: COMMERCIAL

## 2023-08-30 ENCOUNTER — PATIENT MESSAGE (OUTPATIENT)
Dept: PRIMARY CARE CLINIC | Facility: CLINIC | Age: 53
End: 2023-08-30

## 2023-08-30 VITALS
SYSTOLIC BLOOD PRESSURE: 150 MMHG | TEMPERATURE: 99 F | OXYGEN SATURATION: 99 % | DIASTOLIC BLOOD PRESSURE: 90 MMHG | BODY MASS INDEX: 30.1 KG/M2 | WEIGHT: 163.56 LBS | HEART RATE: 88 BPM | HEIGHT: 62 IN

## 2023-08-30 DIAGNOSIS — Z98.890 STATUS POST RIGHT KNEE SURGERY: ICD-10-CM

## 2023-08-30 DIAGNOSIS — C44.41 BASAL CELL CARCINOMA (BCC) OF LEFT SIDE OF NECK: ICD-10-CM

## 2023-08-30 DIAGNOSIS — Z82.49 FAMILY HISTORY OF CARDIOVASCULAR DISEASE: ICD-10-CM

## 2023-08-30 DIAGNOSIS — E03.9 ACQUIRED HYPOTHYROIDISM: Chronic | ICD-10-CM

## 2023-08-30 DIAGNOSIS — Z12.31 OTHER SCREENING MAMMOGRAM: ICD-10-CM

## 2023-08-30 DIAGNOSIS — Z00.00 ROUTINE GENERAL MEDICAL EXAMINATION AT A HEALTH CARE FACILITY: Primary | ICD-10-CM

## 2023-08-30 PROCEDURE — 99999 PR PBB SHADOW E&M-EST. PATIENT-LVL IV: CPT | Mod: PBBFAC,,, | Performed by: FAMILY MEDICINE

## 2023-08-30 PROCEDURE — 3077F PR MOST RECENT SYSTOLIC BLOOD PRESSURE >= 140 MM HG: ICD-10-PCS | Mod: CPTII,S$GLB,, | Performed by: FAMILY MEDICINE

## 2023-08-30 PROCEDURE — 3080F PR MOST RECENT DIASTOLIC BLOOD PRESSURE >= 90 MM HG: ICD-10-PCS | Mod: CPTII,S$GLB,, | Performed by: FAMILY MEDICINE

## 2023-08-30 PROCEDURE — 99396 PR PREVENTIVE VISIT,EST,40-64: ICD-10-PCS | Mod: S$GLB,,, | Performed by: FAMILY MEDICINE

## 2023-08-30 PROCEDURE — 3077F SYST BP >= 140 MM HG: CPT | Mod: CPTII,S$GLB,, | Performed by: FAMILY MEDICINE

## 2023-08-30 PROCEDURE — 99999 PR PBB SHADOW E&M-EST. PATIENT-LVL IV: ICD-10-PCS | Mod: PBBFAC,,, | Performed by: FAMILY MEDICINE

## 2023-08-30 PROCEDURE — 1159F PR MEDICATION LIST DOCUMENTED IN MEDICAL RECORD: ICD-10-PCS | Mod: CPTII,S$GLB,, | Performed by: FAMILY MEDICINE

## 2023-08-30 PROCEDURE — 3008F PR BODY MASS INDEX (BMI) DOCUMENTED: ICD-10-PCS | Mod: CPTII,S$GLB,, | Performed by: FAMILY MEDICINE

## 2023-08-30 PROCEDURE — 3008F BODY MASS INDEX DOCD: CPT | Mod: CPTII,S$GLB,, | Performed by: FAMILY MEDICINE

## 2023-08-30 PROCEDURE — 1159F MED LIST DOCD IN RCRD: CPT | Mod: CPTII,S$GLB,, | Performed by: FAMILY MEDICINE

## 2023-08-30 PROCEDURE — 3080F DIAST BP >= 90 MM HG: CPT | Mod: CPTII,S$GLB,, | Performed by: FAMILY MEDICINE

## 2023-08-30 PROCEDURE — 99396 PREV VISIT EST AGE 40-64: CPT | Mod: S$GLB,,, | Performed by: FAMILY MEDICINE

## 2023-08-30 RX ORDER — LEVOTHYROXINE SODIUM 50 UG/1
50 TABLET ORAL DAILY
Qty: 90 TABLET | Refills: 3 | Status: SHIPPED | OUTPATIENT
Start: 2023-08-30

## 2023-08-30 NOTE — PROGRESS NOTES
"      Assessment:     1. Routine general medical examination at a health care facility    2. Acquired hypothyroidism    3. Basal cell carcinoma (BCC) of left side of neck    4. Family history of cardiovascular disease    5. Status post right knee surgery    6. Other screening mammogram      Plan:     Basal cell carcinoma (BCC) of left side of neck  Resection appt soon w Dr Adair    Family history of cardiovascular disease  Dad passed 41 yo  Refer Cardiologist  She will return to exercise after knee surgery heals    Status post right knee surgery  Stable, Continue PT    Routine general medical examination at a health care facility  Follow with GYN for female health & cancer prevention  Move more, low fat, high fiber foods  Eat more food grown from the earth (picked from trees or out of the ground)  Colon cancer screening at 46 yo  Follow up yearly with LABS ONE WEEK PRIOR so we can discuss at your visit      Other screening mammogram  Mammo after 10/18    Acquired hypothyroidism  TSH normal, refilled Levothyroxine 50      CHIEF COMPLAINT: General exam    HPI: Shawna Mayers is a 52 y.o. female with is here today for general exam.     Follows w Dr Caceres & KARTHIK Wynne after repair R knee chondroplasty & meniscectomies 7/26/2023    Denies chest pain, shortness of breath    Current Outpatient Medications   Medication Instructions    fluticasone propionate (FLONASE) 50 mcg/actuation nasal spray SPRAY 2 SPRAYS BY EACH NOSTRIL ROUTE ONCE DAILY.    Lactobacillus rhamnosus GG (CULTURELLE) 10 billion cell capsule 1 capsule, Oral, Daily    levothyroxine (SYNTHROID) 50 mcg, Oral, Daily       Lab Results   Component Value Date    HGBA1C 5.3 04/25/2015     No results found for: "MICALBCREAT"  Lab Results   Component Value Date    LDLCALC 144.4 08/26/2023    LDLCALC 122.4 08/20/2022    CHOL 230 (H) 08/26/2023    HDL 65 08/26/2023    TRIG 103 08/26/2023       Lab Results   Component Value Date     08/26/2023    K " "4.7 08/26/2023     08/26/2023    CO2 28 08/26/2023    GLU 90 08/26/2023    BUN 10 08/26/2023    CREATININE 0.7 08/26/2023    CALCIUM 9.8 08/26/2023    PROT 7.2 08/26/2023    ALBUMIN 4.1 08/26/2023    BILITOT 0.6 08/26/2023    ALKPHOS 67 08/26/2023    AST 32 08/26/2023    ALT 22 08/26/2023    ANIONGAP 8 08/26/2023    ESTGFRAFRICA >60.0 08/14/2021    EGFRNONAA >60.0 08/14/2021    WBC 4.84 08/26/2023    HGB 14.8 08/26/2023    HGB 15.1 07/10/2023    HCT 44.2 08/26/2023    MCV 91 08/26/2023     08/26/2023    TSH 1.911 08/26/2023    HEPCAB Negative 08/14/2021       Lab Results   Component Value Date    FSH 3.40 11/21/2013    TOTALTESTOST 32 11/21/2013    ESTRADIOL 330 11/21/2013         Past Medical History:   Diagnosis Date    Allergy     Anxiety     Asthma     Focal nodular hyperplasia of liver     GERD (gastroesophageal reflux disease)     Hyperlipidemia     Thyroid disease     hypothyroidism     Past Surgical History:   Procedure Laterality Date    ARTHROSCOPIC CHONDROPLASTY OF KNEE JOINT Right 7/26/2023    Procedure: ARTHROSCOPY, KNEE, WITH CHONDROPLASTY;  Surgeon: Fidel Michelle MD;  Location: [facility] OR;  Service: Orthopedics;  Laterality: Right;    CHOLECYSTECTOMY      FOOT SURGERY      arc    KNEE ARTHROSCOPY W/ MENISCECTOMY Right 7/26/2023    Procedure: ARTHROSCOPY, KNEE, WITH MENISCECTOMY;  Surgeon: Fidel Michelle MD;  Location: [facility] OR;  Service: Orthopedics;  Laterality: Right;  partial and medial     WISDOM TOOTH EXTRACTION       Vitals:    08/30/23 1046   BP: (!) 150/90   Pulse: 88   Temp: 98.6 °F (37 °C)   TempSrc: Oral   SpO2: 99%   Weight: 74.2 kg (163 lb 9.3 oz)   Height: 5' 2" (1.575 m)   PainSc: 0-No pain     Objective:   Physical Exam  Constitutional:       Appearance: She is well-developed.   Eyes:      Pupils: Pupils are equal, round, and reactive to light.   Cardiovascular:      Rate and Rhythm: Normal rate and regular rhythm.      Heart sounds: Normal heart sounds. No murmur " heard.  Pulmonary:      Effort: Pulmonary effort is normal.      Breath sounds: Normal breath sounds. No wheezing.   Abdominal:      General: Bowel sounds are normal. There is no distension.      Palpations: Abdomen is soft. There is no mass.      Tenderness: There is no abdominal tenderness. There is no guarding or rebound.   Musculoskeletal:      Cervical back: Neck supple.   Skin:     General: Skin is warm and dry.   Neurological:      Mental Status: She is alert.   Psychiatric:         Behavior: Behavior normal.

## 2023-08-30 NOTE — ASSESSMENT & PLAN NOTE
Follow with GYN for female health & cancer prevention  Move more, low fat, high fiber foods  Eat more food grown from the earth (picked from trees or out of the ground)  Colon cancer screening at 44 yo  Follow up yearly with LABS ONE WEEK PRIOR so we can discuss at your visit

## 2023-08-31 ENCOUNTER — PROCEDURE VISIT (OUTPATIENT)
Dept: DERMATOLOGY | Facility: CLINIC | Age: 53
End: 2023-08-31
Payer: COMMERCIAL

## 2023-08-31 VITALS — DIASTOLIC BLOOD PRESSURE: 74 MMHG | SYSTOLIC BLOOD PRESSURE: 110 MMHG

## 2023-08-31 DIAGNOSIS — C44.41 BASAL CELL CARCINOMA (BCC) OF LEFT SIDE OF NECK: Primary | ICD-10-CM

## 2023-08-31 PROCEDURE — 99499 NO LOS: ICD-10-PCS | Mod: S$GLB,,, | Performed by: DERMATOLOGY

## 2023-08-31 PROCEDURE — 88305 TISSUE EXAM BY PATHOLOGIST: CPT | Performed by: PATHOLOGY

## 2023-08-31 PROCEDURE — 88305 TISSUE EXAM BY PATHOLOGIST: ICD-10-PCS | Mod: 26,,, | Performed by: PATHOLOGY

## 2023-08-31 PROCEDURE — 13132 CMPLX RPR F/C/C/M/N/AX/G/H/F: CPT | Mod: 51,S$GLB,, | Performed by: DERMATOLOGY

## 2023-08-31 PROCEDURE — 13132 PR RECMPL WND HEAD,FAC,HAND 2.6-7.5 CM: ICD-10-PCS | Mod: 51,S$GLB,, | Performed by: DERMATOLOGY

## 2023-08-31 PROCEDURE — 99499 UNLISTED E&M SERVICE: CPT | Mod: S$GLB,,, | Performed by: DERMATOLOGY

## 2023-08-31 PROCEDURE — 11622 PR EXC SKIN MALIG 1.1-2 CM REMAINDR BODY: ICD-10-PCS | Mod: S$GLB,,, | Performed by: DERMATOLOGY

## 2023-08-31 PROCEDURE — 11622 EXC S/N/H/F/G MAL+MRG 1.1-2: CPT | Mod: S$GLB,,, | Performed by: DERMATOLOGY

## 2023-08-31 PROCEDURE — 88305 TISSUE EXAM BY PATHOLOGIST: CPT | Mod: 26,,, | Performed by: PATHOLOGY

## 2023-08-31 NOTE — PROGRESS NOTES
PROCEDURE NOTE: EXCISION WITH COMPLEX REPAIR    DIAGNOSIS: Basal cell carcinoma    LOCATION: left lateral neck    ASSISTANT: Serina Byrne LPN    ANESTHESIA:  1% Lidocaine with Epinephrine and Sodium bicarbonate, 9 mL    PREPARATION: The diagnosis, procedure, alternatives, benefits and risks, including but not limited to: infection, bleeding/bruising, scar or cosmetic defect, local sensation disturbances, wound dehiscence (separation of wound edges after sutures removed) and/or recurrence of present condition were explained to the patient. The patient elected to proceed with the procedure by signing an informed consent.  Patient's identity was verified, and the site was verified.    PROCEDURE: Lesional tissue was carefully marked with at least 4 mm margins of clinically normal skin in all directions. The area to be anesthetized was cleaned with Hibiclens solution, injected with local anesthesia, and again prepped with Hibiclens and draped in sterile fashion to produce a suitable field for surgery. A fusiform excision was performed with a #15 blade carried down completely through the dermis into the subcutaneous tissue, and dissection was carried out in that plane. The specimen of tissue was removed to be sent for histologic confirmation. Bleeding points were stopped with monopolar hyfrecation or tied with absorbable suture as needed throughout the procedure to maintain hemostasis.     CLOSURE: The wound was extensively undermined to a distance greater than the maximum width of the defect (>1.5 cm) along at least one edge of the defect to mobilize tissue and to reduce tension on the sutured wound edges.  4-0 PDS II absorbable suture was used in a layered fashion closure with buried vertical mattress sutures through dermis and subcutaneous tissue to close the wound and to joão the cutaneous wound edge. This allows for preservation of structure and function of surrounding anatomy. The epidermis and dermis were  sutured with 5-0 Prolene in a running fashion.     SIZE OF DEFECT: 2.0 x 1.5 cm    LENGTH OF REPAIR: 4.0 cm     The area was then cleaned, and a sterile pressure dressing applied. The procedure was tolerated well without adverse event and negligible blood loss. The patient was discharged in stable condition.     Post op instructions: the patient was verbally (and in writing) educated on care of the wound and told to call or return for bleeding, tenderness, redness, etc. Patient should use acetaminophen 1000 mg every 8 hours for pain relief if needed.     Suture removal in 10 day(s).

## 2023-08-31 NOTE — PATIENT INSTRUCTIONS
After incision is healed:    Recommend OTC silicone gel sheets, such as ScarAway, or OTC silicone tape, such as CicaTape or Mepitac. Sheets/tape can be cut to size and should be worn for 12-24 hours per day.  Minimum treatment time is 2-3 months. Larger and older scars may take longer, therefore continued use is recommended if improvement is still seen after the initial 3 months.   Scar massage (apply firm pressure and rock finger side-to-side) for 5 minutes 5 times per day can also help.

## 2023-09-05 ENCOUNTER — OFFICE VISIT (OUTPATIENT)
Dept: CARDIOLOGY | Facility: CLINIC | Age: 53
End: 2023-09-05
Payer: COMMERCIAL

## 2023-09-05 VITALS
WEIGHT: 164.69 LBS | HEART RATE: 80 BPM | SYSTOLIC BLOOD PRESSURE: 135 MMHG | BODY MASS INDEX: 30.31 KG/M2 | DIASTOLIC BLOOD PRESSURE: 83 MMHG | HEIGHT: 62 IN

## 2023-09-05 DIAGNOSIS — R03.0 ELEVATED BLOOD PRESSURE READING: ICD-10-CM

## 2023-09-05 DIAGNOSIS — I49.3 PVC (PREMATURE VENTRICULAR CONTRACTION): ICD-10-CM

## 2023-09-05 DIAGNOSIS — Z82.49 FAMILY HISTORY OF CARDIOVASCULAR DISEASE: ICD-10-CM

## 2023-09-05 LAB
FINAL PATHOLOGIC DIAGNOSIS: NORMAL
GROSS: NORMAL
Lab: NORMAL
MICROSCOPIC EXAM: NORMAL

## 2023-09-05 PROCEDURE — 99999 PR PBB SHADOW E&M-EST. PATIENT-LVL III: CPT | Mod: PBBFAC,,, | Performed by: INTERNAL MEDICINE

## 2023-09-05 PROCEDURE — 3075F PR MOST RECENT SYSTOLIC BLOOD PRESS GE 130-139MM HG: ICD-10-PCS | Mod: CPTII,S$GLB,, | Performed by: INTERNAL MEDICINE

## 2023-09-05 PROCEDURE — 1160F PR REVIEW ALL MEDS BY PRESCRIBER/CLIN PHARMACIST DOCUMENTED: ICD-10-PCS | Mod: CPTII,S$GLB,, | Performed by: INTERNAL MEDICINE

## 2023-09-05 PROCEDURE — 99204 PR OFFICE/OUTPT VISIT, NEW, LEVL IV, 45-59 MIN: ICD-10-PCS | Mod: S$GLB,,, | Performed by: INTERNAL MEDICINE

## 2023-09-05 PROCEDURE — 99999 PR PBB SHADOW E&M-EST. PATIENT-LVL III: ICD-10-PCS | Mod: PBBFAC,,, | Performed by: INTERNAL MEDICINE

## 2023-09-05 PROCEDURE — 3079F PR MOST RECENT DIASTOLIC BLOOD PRESSURE 80-89 MM HG: ICD-10-PCS | Mod: CPTII,S$GLB,, | Performed by: INTERNAL MEDICINE

## 2023-09-05 PROCEDURE — 3008F BODY MASS INDEX DOCD: CPT | Mod: CPTII,S$GLB,, | Performed by: INTERNAL MEDICINE

## 2023-09-05 PROCEDURE — 1159F MED LIST DOCD IN RCRD: CPT | Mod: CPTII,S$GLB,, | Performed by: INTERNAL MEDICINE

## 2023-09-05 PROCEDURE — 1159F PR MEDICATION LIST DOCUMENTED IN MEDICAL RECORD: ICD-10-PCS | Mod: CPTII,S$GLB,, | Performed by: INTERNAL MEDICINE

## 2023-09-05 PROCEDURE — 3079F DIAST BP 80-89 MM HG: CPT | Mod: CPTII,S$GLB,, | Performed by: INTERNAL MEDICINE

## 2023-09-05 PROCEDURE — 99204 OFFICE O/P NEW MOD 45 MIN: CPT | Mod: S$GLB,,, | Performed by: INTERNAL MEDICINE

## 2023-09-05 PROCEDURE — 3075F SYST BP GE 130 - 139MM HG: CPT | Mod: CPTII,S$GLB,, | Performed by: INTERNAL MEDICINE

## 2023-09-05 PROCEDURE — 1160F RVW MEDS BY RX/DR IN RCRD: CPT | Mod: CPTII,S$GLB,, | Performed by: INTERNAL MEDICINE

## 2023-09-05 PROCEDURE — 3008F PR BODY MASS INDEX (BMI) DOCUMENTED: ICD-10-PCS | Mod: CPTII,S$GLB,, | Performed by: INTERNAL MEDICINE

## 2023-09-05 NOTE — PROGRESS NOTES
HISTORY:    52-year-old female with history of hypothyroidism presenting for initial evaluation by me.      The patient comes in for general cardiovascular risk assessment.      The patient denies any symptoms of chest pain, shortness of breath, or dyspnea on exertion. Patient does have on and off muscular cramps on her left side that has been present for years. Worse with certain positions. Lasts minutes and non-exertional. Occurs every few weeks.     Activity levels are very good. Exercises daily. Teaches spin and body pump. Took a couple of months off for a meniscus tear s/p successful scope. Finishing PT and feeling ready for increased activity.    The patient denies any previous history of myocardial infarction, coronary artery disease, peripheral arterial disease, stroke, congestive heart failure, or cardiomyopathy.  Patient is nonsmoker.  Patient does have family history of cardiac disease. With a father who had an MI at 42 w .    PHYSICAL EXAM:    Vitals:    09/05/23 1013   BP: 135/83   Pulse: 80       NAD, A+Ox3.  No jvd, no bruit.  RRR nml s1,s2. No murmurs.  CTA B no wheezes or crackles.  No edema.    LABS/STUDIES (imaging reviewed during clinic visit):    August 2023 CBC and CMP normal.   and HDL 65.  Triglycerides 103.  TSH normal.  EKG July 2023 sinus rhythm with no Q-waves or ST changes.  PVC noted.         ASSESSMENT & PLAN:    1. Family history of cardiovascular disease    2. Elevated blood pressure reading              No CV symptoms. 1.6% 10 year ASCVD risk. Pt reports controlled Bps at home and believes she has white coat hypertension. Has recently started a home log and will follow-up w her PCP. . Low 10 year risk. Can consider statin therapy if risk increases in future.      Exercise great. Discussed diet modifications with weight loss in mind.     Incidental PVC noted on ecg prior to knee arthroscopy. Can check a TTE. If normal LVEF, expectant management.    Vera Reyes,  MD

## 2023-09-05 NOTE — PROGRESS NOTES
Final Pathologic Diagnosis 1. Skin, left lateral neck, excision:   - HEALING BIOPSY SITE, NO RESIDUAL BASAL CELL CARCINOMA IS PRESENT.

## 2023-09-06 ENCOUNTER — HOSPITAL ENCOUNTER (OUTPATIENT)
Dept: CARDIOLOGY | Facility: HOSPITAL | Age: 53
Discharge: HOME | End: 2023-09-06
Attending: INTERNAL MEDICINE
Payer: COMMERCIAL

## 2023-09-06 VITALS
SYSTOLIC BLOOD PRESSURE: 128 MMHG | HEART RATE: 50 BPM | WEIGHT: 164 LBS | HEIGHT: 62 IN | DIASTOLIC BLOOD PRESSURE: 70 MMHG | BODY MASS INDEX: 30.18 KG/M2

## 2023-09-06 DIAGNOSIS — I49.3 PVC (PREMATURE VENTRICULAR CONTRACTION): ICD-10-CM

## 2023-09-06 LAB
ASCENDING AORTA: 2.94 CM
AV INDEX (PROSTH): 0.8
AV MEAN GRADIENT: 6 MMHG
AV PEAK GRADIENT: 11 MMHG
AV VALVE AREA BY VELOCITY RATIO: 2.62 CM²
AV VALVE AREA: 2.72 CM²
AV VELOCITY RATIO: 0.77
BSA FOR ECHO PROCEDURE: 1.8 M2
CV ECHO LV RWT: 0.29 CM
DOP CALC AO PEAK VEL: 1.67 M/S
DOP CALC AO VTI: 41.05 CM
DOP CALC LVOT AREA: 3.4 CM2
DOP CALC LVOT DIAMETER: 2.08 CM
DOP CALC LVOT PEAK VEL: 1.29 M/S
DOP CALC LVOT STROKE VOLUME: 111.7 CM3
DOP CALC MV VTI: 24.25 CM
DOP CALCLVOT PEAK VEL VTI: 32.89 CM
E WAVE DECELERATION TIME: 247.97 MSEC
E/A RATIO: 1.35
E/E' RATIO: 5.52 M/S
ECHO LV POSTERIOR WALL: 0.73 CM (ref 0.6–1.1)
FRACTIONAL SHORTENING: 32 % (ref 28–44)
INTERVENTRICULAR SEPTUM: 0.61 CM (ref 0.6–1.1)
LA MAJOR: 4.56 CM
LA MINOR: 4.22 CM
LA WIDTH: 3.01 CM
LEFT ATRIUM SIZE: 3.38 CM
LEFT ATRIUM VOLUME INDEX MOD: 17.6 ML/M2
LEFT ATRIUM VOLUME INDEX: 21.5 ML/M2
LEFT ATRIUM VOLUME MOD: 30.9 CM3
LEFT ATRIUM VOLUME: 37.91 CM3
LEFT INTERNAL DIMENSION IN SYSTOLE: 3.44 CM (ref 2.1–4)
LEFT VENTRICLE DIASTOLIC VOLUME INDEX: 68.89 ML/M2
LEFT VENTRICLE DIASTOLIC VOLUME: 121.25 ML
LEFT VENTRICLE MASS INDEX: 63 G/M2
LEFT VENTRICLE SYSTOLIC VOLUME INDEX: 27.8 ML/M2
LEFT VENTRICLE SYSTOLIC VOLUME: 48.89 ML
LEFT VENTRICULAR INTERNAL DIMENSION IN DIASTOLE: 5.05 CM (ref 3.5–6)
LEFT VENTRICULAR MASS: 110.53 G
LV LATERAL E/E' RATIO: 4.83 M/S
LV SEPTAL E/E' RATIO: 6.44 M/S
MV A" WAVE DURATION": 12.18 MSEC
MV MEAN GRADIENT: 1 MMHG
MV PEAK A VEL: 0.43 M/S
MV PEAK E VEL: 0.58 M/S
MV PEAK GRADIENT: 2 MMHG
MV STENOSIS PRESSURE HALF TIME: 71.91 MS
MV VALVE AREA BY CONTINUITY EQUATION: 4.61 CM2
MV VALVE AREA P 1/2 METHOD: 3.06 CM2
PISA TR MAX VEL: 2.42 M/S
PULM VEIN S/D RATIO: 1.78
PV PEAK D VEL: 0.27 M/S
PV PEAK S VEL: 0.48 M/S
RA MAJOR: 4.19 CM
RA PRESSURE ESTIMATED: 3 MMHG
RA WIDTH: 2.84 CM
RIGHT VENTRICULAR END-DIASTOLIC DIMENSION: 2.26 CM
RV TB RVSP: 5 MMHG
SINUS: 2.6 CM
STJ: 2.31 CM
TDI LATERAL: 0.12 M/S
TDI SEPTAL: 0.09 M/S
TDI: 0.11 M/S
TR MAX PG: 23 MMHG
TRICUSPID ANNULAR PLANE SYSTOLIC EXCURSION: 2.14 CM
TV REST PULMONARY ARTERY PRESSURE: 26 MMHG
Z-SCORE OF LEFT VENTRICULAR DIMENSION IN END DIASTOLE: 0.37
Z-SCORE OF LEFT VENTRICULAR DIMENSION IN END SYSTOLE: 1.05

## 2023-09-06 PROCEDURE — 93306 TTE W/DOPPLER COMPLETE: CPT

## 2023-09-06 PROCEDURE — 93306 TTE W/DOPPLER COMPLETE: CPT | Mod: 26,,, | Performed by: INTERNAL MEDICINE

## 2023-09-06 PROCEDURE — 93306 ECHO (CUPID ONLY): ICD-10-PCS | Mod: 26,,, | Performed by: INTERNAL MEDICINE

## 2023-09-07 ENCOUNTER — OFFICE VISIT (OUTPATIENT)
Dept: SPORTS MEDICINE | Facility: CLINIC | Age: 53
End: 2023-09-07
Payer: COMMERCIAL

## 2023-09-07 VITALS
BODY MASS INDEX: 30.02 KG/M2 | SYSTOLIC BLOOD PRESSURE: 129 MMHG | HEART RATE: 63 BPM | WEIGHT: 163.13 LBS | HEIGHT: 62 IN | DIASTOLIC BLOOD PRESSURE: 82 MMHG

## 2023-09-07 DIAGNOSIS — Z98.890 S/P ARTHROSCOPIC SURGERY OF RIGHT KNEE: Primary | ICD-10-CM

## 2023-09-07 PROCEDURE — 3079F PR MOST RECENT DIASTOLIC BLOOD PRESSURE 80-89 MM HG: ICD-10-PCS | Mod: CPTII,S$GLB,, | Performed by: ORTHOPAEDIC SURGERY

## 2023-09-07 PROCEDURE — 3074F SYST BP LT 130 MM HG: CPT | Mod: CPTII,S$GLB,, | Performed by: ORTHOPAEDIC SURGERY

## 2023-09-07 PROCEDURE — 3074F PR MOST RECENT SYSTOLIC BLOOD PRESSURE < 130 MM HG: ICD-10-PCS | Mod: CPTII,S$GLB,, | Performed by: ORTHOPAEDIC SURGERY

## 2023-09-07 PROCEDURE — 3008F PR BODY MASS INDEX (BMI) DOCUMENTED: ICD-10-PCS | Mod: CPTII,S$GLB,, | Performed by: ORTHOPAEDIC SURGERY

## 2023-09-07 PROCEDURE — 99999 PR PBB SHADOW E&M-EST. PATIENT-LVL III: CPT | Mod: PBBFAC,,, | Performed by: ORTHOPAEDIC SURGERY

## 2023-09-07 PROCEDURE — 3079F DIAST BP 80-89 MM HG: CPT | Mod: CPTII,S$GLB,, | Performed by: ORTHOPAEDIC SURGERY

## 2023-09-07 PROCEDURE — 1159F MED LIST DOCD IN RCRD: CPT | Mod: CPTII,S$GLB,, | Performed by: ORTHOPAEDIC SURGERY

## 2023-09-07 PROCEDURE — 3008F BODY MASS INDEX DOCD: CPT | Mod: CPTII,S$GLB,, | Performed by: ORTHOPAEDIC SURGERY

## 2023-09-07 PROCEDURE — 1159F PR MEDICATION LIST DOCUMENTED IN MEDICAL RECORD: ICD-10-PCS | Mod: CPTII,S$GLB,, | Performed by: ORTHOPAEDIC SURGERY

## 2023-09-07 PROCEDURE — 99999 PR PBB SHADOW E&M-EST. PATIENT-LVL III: ICD-10-PCS | Mod: PBBFAC,,, | Performed by: ORTHOPAEDIC SURGERY

## 2023-09-07 PROCEDURE — 99024 PR POST-OP FOLLOW-UP VISIT: ICD-10-PCS | Mod: S$GLB,,, | Performed by: ORTHOPAEDIC SURGERY

## 2023-09-07 PROCEDURE — 99024 POSTOP FOLLOW-UP VISIT: CPT | Mod: S$GLB,,, | Performed by: ORTHOPAEDIC SURGERY

## 2023-09-07 NOTE — PROGRESS NOTES
"CC: right knee scope post op 6 weeks    Patient is here for her 6 week post op appointment s/p below and is doing well. Patient is doing PT at [facility] and is progressing as expected.      PREOPERATIVE DIAGNOSES:   1. Right knee chondromalacia  2. Right knee medial meniscus tear.   3. Right knee lateral meniscus tear     POSTOPERATIVE DIAGNOSES:   1. Right knee chondromalacia  2. Right knee medial meniscus tear.   3. Right knee lateral meniscus tear     PROCEDURES:   1. Right knee arthroscopic chondroplasty  2. Right knee arthroscopic partial medial and lateral meniscectomies     SURGEON: Fidel Michelle M.D.     PE:    /82   Pulse 63   Ht 5' 2" (1.575 m)   Wt 74 kg (163 lb 2.3 oz)   BMI 29.84 kg/m²      right knee:    Incisions clean/dry/intact  No sign of infection  Mild swelling  Compartments soft  Neurovascular status intact in extremity    AROM 0 to 130 degrees  Decreased quad strength  Minimal to no effusion    Assessment:  6 weeks s/p right knee arthroscopic chondroplasty and partial medial and lateral meniscectomies    Plan:  1.  Continue PT    2. F/u in 6 weeks.       All questions were answered. Instructed patient to call with questions or concerns in the interim.       "

## 2023-09-12 ENCOUNTER — CLINICAL SUPPORT (OUTPATIENT)
Dept: DERMATOLOGY | Facility: CLINIC | Age: 53
End: 2023-09-12
Payer: COMMERCIAL

## 2023-09-12 DIAGNOSIS — Z48.02 VISIT FOR SUTURE REMOVAL: Primary | ICD-10-CM

## 2023-09-12 PROCEDURE — 99024 PR POST-OP FOLLOW-UP VISIT: ICD-10-PCS | Mod: S$GLB,,, | Performed by: DERMATOLOGY

## 2023-09-12 PROCEDURE — 99024 POSTOP FOLLOW-UP VISIT: CPT | Mod: S$GLB,,, | Performed by: DERMATOLOGY

## 2023-09-12 NOTE — PROGRESS NOTES
Patient Information  Name: Shawna Mayers  : 1970  MRN: [id number]     Suture Removal note:  CC: 53 y.o. female patient is here for suture removal.     HPI: Patient is s/p excision of BCC from the left lateral neck on 23.  Patient reports no problems.    WOUND PE:  Sutures intact.  Wound healing well.  Good approximation of skin edges.  No signs or symptoms of infection.    IMPRESSION:  Sections show changes consistent with previous procedure in the absence of residual basal cell carcinoma.     PLAN:  Sutures removed today.  Continue wound care.

## 2023-10-19 ENCOUNTER — OFFICE VISIT (OUTPATIENT)
Dept: SPORTS MEDICINE | Facility: CLINIC | Age: 53
End: 2023-10-19
Payer: COMMERCIAL

## 2023-10-19 VITALS
HEART RATE: 66 BPM | DIASTOLIC BLOOD PRESSURE: 80 MMHG | WEIGHT: 166 LBS | TEMPERATURE: 98 F | SYSTOLIC BLOOD PRESSURE: 128 MMHG | RESPIRATION RATE: 18 BRPM | HEIGHT: 62 IN | BODY MASS INDEX: 30.55 KG/M2

## 2023-10-19 DIAGNOSIS — Z98.890 S/P ARTHROSCOPIC SURGERY OF RIGHT KNEE: Primary | ICD-10-CM

## 2023-10-19 PROCEDURE — 3074F PR MOST RECENT SYSTOLIC BLOOD PRESSURE < 130 MM HG: ICD-10-PCS | Mod: CPTII,S$GLB,, | Performed by: ORTHOPAEDIC SURGERY

## 2023-10-19 PROCEDURE — 99024 POSTOP FOLLOW-UP VISIT: CPT | Mod: S$GLB,,, | Performed by: ORTHOPAEDIC SURGERY

## 2023-10-19 PROCEDURE — 99024 PR POST-OP FOLLOW-UP VISIT: ICD-10-PCS | Mod: S$GLB,,, | Performed by: ORTHOPAEDIC SURGERY

## 2023-10-19 PROCEDURE — 3079F PR MOST RECENT DIASTOLIC BLOOD PRESSURE 80-89 MM HG: ICD-10-PCS | Mod: CPTII,S$GLB,, | Performed by: ORTHOPAEDIC SURGERY

## 2023-10-19 PROCEDURE — 3074F SYST BP LT 130 MM HG: CPT | Mod: CPTII,S$GLB,, | Performed by: ORTHOPAEDIC SURGERY

## 2023-10-19 PROCEDURE — 3079F DIAST BP 80-89 MM HG: CPT | Mod: CPTII,S$GLB,, | Performed by: ORTHOPAEDIC SURGERY

## 2023-10-19 PROCEDURE — 99999 PR PBB SHADOW E&M-EST. PATIENT-LVL III: ICD-10-PCS | Mod: PBBFAC,,, | Performed by: ORTHOPAEDIC SURGERY

## 2023-10-19 PROCEDURE — 99999 PR PBB SHADOW E&M-EST. PATIENT-LVL III: CPT | Mod: PBBFAC,,, | Performed by: ORTHOPAEDIC SURGERY

## 2023-10-19 NOTE — PROGRESS NOTES
"CC: right knee scope post op 12 weeks    Patient is here for her 12 week post op appointment s/p below and is doing well. Patient is doing PT at [facility] and is progressing as expected.      PREOPERATIVE DIAGNOSES:   1. Right knee chondromalacia  2. Right knee medial meniscus tear.   3. Right knee lateral meniscus tear     POSTOPERATIVE DIAGNOSES:   1. Right knee chondromalacia  2. Right knee medial meniscus tear.   3. Right knee lateral meniscus tear     PROCEDURES:   1. Right knee arthroscopic chondroplasty  2. Right knee arthroscopic partial medial and lateral meniscectomies     SURGEON: Fidel Michelle M.D.     PE:    /80   Pulse 66   Temp 98.2 °F (36.8 °C) (Oral)   Resp 18   Ht 5' 2" (1.575 m)   Wt 75.3 kg (166 lb 0.1 oz)   BMI 30.36 kg/m²      right knee:    Incisions clean/dry/intact  No sign of infection  Mild swelling  Compartments soft  Neurovascular status intact in extremity    AROM 0 to 130 degrees  Decreased quad strength  Minimal to no effusion    Assessment:  12 weeks s/p right knee arthroscopic chondroplasty and partial medial and lateral meniscectomies    Plan:  1.  Continue PT transition to HEP    2. F/u PRN       All questions were answered. Instructed patient to call with questions or concerns in the interim.       "

## 2023-11-06 ENCOUNTER — HOSPITAL ENCOUNTER (OUTPATIENT)
Dept: RADIOLOGY | Facility: HOSPITAL | Age: 53
Discharge: HOME | End: 2023-11-06
Attending: FAMILY MEDICINE
Payer: COMMERCIAL

## 2023-11-06 DIAGNOSIS — Z12.31 OTHER SCREENING MAMMOGRAM: ICD-10-CM

## 2023-11-06 PROCEDURE — 77067 SCR MAMMO BI INCL CAD: CPT | Mod: 26,,, | Performed by: RADIOLOGY

## 2023-11-06 PROCEDURE — 77067 SCR MAMMO BI INCL CAD: CPT | Mod: TC

## 2023-11-06 PROCEDURE — 77063 MAMMO DIGITAL SCREENING BILAT WITH TOMO: ICD-10-PCS | Mod: 26,,, | Performed by: RADIOLOGY

## 2023-11-06 PROCEDURE — 77063 BREAST TOMOSYNTHESIS BI: CPT | Mod: 26,,, | Performed by: RADIOLOGY

## 2023-11-06 PROCEDURE — 77067 MAMMO DIGITAL SCREENING BILAT WITH TOMO: ICD-10-PCS | Mod: 26,,, | Performed by: RADIOLOGY

## 2023-11-13 ENCOUNTER — TELEPHONE (OUTPATIENT)
Dept: PRIMARY CARE CLINIC | Facility: CLINIC | Age: 53
End: 2023-11-13
Payer: COMMERCIAL

## 2023-11-13 ENCOUNTER — TELEPHONE (OUTPATIENT)
Dept: RADIOLOGY | Facility: HOSPITAL | Age: 53
End: 2023-11-13
Payer: COMMERCIAL

## 2023-11-13 DIAGNOSIS — R92.8 ABNORMAL FINDING ON BREAST IMAGING: Primary | ICD-10-CM

## 2023-11-13 NOTE — TELEPHONE ENCOUNTER
----- Message from Opal Kevin sent at 11/13/2023  8:05 AM CST -----  Regarding: pt adivce  Contact: [phone]  Pt needing ordering  placed in the system  to have mammogram scheduled , for additional findings. Pls call

## 2023-11-13 NOTE — TELEPHONE ENCOUNTER
Spoke with patient and explained mammogram findings.Patient expressed understanding of results. Patient scheduled abnormal mammogram follow up appointment at [facility] on 11/20/2023.

## 2023-11-14 DIAGNOSIS — R92.8 ABNORMAL MAMMOGRAM OF RIGHT BREAST: Primary | ICD-10-CM

## 2023-11-20 ENCOUNTER — TELEPHONE (OUTPATIENT)
Dept: INTERNAL MEDICINE | Facility: CLINIC | Age: 53
End: 2023-11-20
Payer: COMMERCIAL

## 2023-11-20 ENCOUNTER — HOSPITAL ENCOUNTER (OUTPATIENT)
Dept: RADIOLOGY | Facility: HOSPITAL | Age: 53
Discharge: HOME | End: 2023-11-20
Attending: FAMILY MEDICINE
Payer: COMMERCIAL

## 2023-11-20 DIAGNOSIS — R92.8 ABNORMAL FINDING ON BREAST IMAGING: ICD-10-CM

## 2023-11-20 PROCEDURE — 77065 DX MAMMO INCL CAD UNI: CPT | Mod: 26,RT,, | Performed by: RADIOLOGY

## 2023-11-20 PROCEDURE — 77061 MAMMO DIGITAL DIAGNOSTIC RIGHT WITH TOMO: ICD-10-PCS | Mod: 26,RT,, | Performed by: RADIOLOGY

## 2023-11-20 PROCEDURE — 77065 DX MAMMO INCL CAD UNI: CPT | Mod: TC,RT

## 2023-11-20 PROCEDURE — 77061 BREAST TOMOSYNTHESIS UNI: CPT | Mod: 26,RT,, | Performed by: RADIOLOGY

## 2023-11-20 PROCEDURE — 77065 MAMMO DIGITAL DIAGNOSTIC RIGHT WITH TOMO: ICD-10-PCS | Mod: 26,RT,, | Performed by: RADIOLOGY

## 2023-11-21 ENCOUNTER — TELEPHONE (OUTPATIENT)
Dept: INTERNAL MEDICINE | Facility: CLINIC | Age: 53
End: 2023-11-21
Payer: COMMERCIAL

## 2023-11-21 NOTE — TELEPHONE ENCOUNTER
"Per previous note:     "Dear Shawna Mayers,     As of  August 2, 2023, Dr. Braun is no longer practicing as a primary care provider in Internal Medicine at [facility]. Unfortunately, Dr. Braun was the only provider who can make any changes to your lab coding. Since he is no longer working at [facility], he can not make any changes to the medical diagnosis codes for the appointment that was on 8/26/23. We will be unable to add any diagnosis codes to your appointment that was on 8/26/23.     Sincerely,  Adelaide"  "

## 2023-11-21 NOTE — TELEPHONE ENCOUNTER
----- Message from Gabe Irizarry sent at 11/14/2023  2:15 PM CST -----  Contact: Breonna  Type:  Patient Call          Who Called: [facility]-  Breonna         Does the patient know what this is regarding?: Requesting a call back about having extra medical diagnosis codes for the appt that was on 8/26/23 ; please advise             Best Call Back Number:[phone]            Additional Information:

## 2023-11-30 ENCOUNTER — OFFICE VISIT (OUTPATIENT)
Dept: OBSTETRICS AND GYNECOLOGY | Facility: CLINIC | Age: 53
End: 2023-11-30
Payer: COMMERCIAL

## 2023-11-30 ENCOUNTER — LAB VISIT (OUTPATIENT)
Dept: LAB | Facility: HOSPITAL | Age: 53
End: 2023-11-30
Attending: OBSTETRICS & GYNECOLOGY
Payer: COMMERCIAL

## 2023-11-30 VITALS
BODY MASS INDEX: 30.08 KG/M2 | DIASTOLIC BLOOD PRESSURE: 82 MMHG | SYSTOLIC BLOOD PRESSURE: 124 MMHG | WEIGHT: 164.44 LBS

## 2023-11-30 DIAGNOSIS — Z00.00 ROUTINE GENERAL MEDICAL EXAMINATION AT A HEALTH CARE FACILITY: ICD-10-CM

## 2023-11-30 DIAGNOSIS — R35.0 URINARY FREQUENCY: ICD-10-CM

## 2023-11-30 DIAGNOSIS — Z79.890 HORMONE REPLACEMENT THERAPY (HRT): ICD-10-CM

## 2023-11-30 DIAGNOSIS — N95.1 PERIMENOPAUSE: ICD-10-CM

## 2023-11-30 DIAGNOSIS — Z01.419 ENCOUNTER FOR WELL WOMAN EXAM WITH ROUTINE GYNECOLOGICAL EXAM: Primary | ICD-10-CM

## 2023-11-30 LAB
ALBUMIN SERPL BCP-MCNC: 4.4 G/DL (ref 3.5–5.2)
ALP SERPL-CCNC: 81 U/L (ref 55–135)
ALT SERPL W/O P-5'-P-CCNC: 28 U/L (ref 10–44)
ANION GAP SERPL CALC-SCNC: 11 MMOL/L (ref 8–16)
AST SERPL-CCNC: 33 U/L (ref 10–40)
BILIRUB SERPL-MCNC: 0.4 MG/DL (ref 0.1–1)
BUN SERPL-MCNC: 19 MG/DL (ref 6–20)
CALCIUM SERPL-MCNC: 10.2 MG/DL (ref 8.7–10.5)
CHLORIDE SERPL-SCNC: 102 MMOL/L (ref 95–110)
CO2 SERPL-SCNC: 26 MMOL/L (ref 23–29)
CREAT SERPL-MCNC: 0.7 MG/DL (ref 0.5–1.4)
EST. GFR  (NO RACE VARIABLE): >60 ML/MIN/1.73 M^2
ESTRADIOL SERPL-MCNC: <10 PG/ML
FSH SERPL-ACNC: 77.65 MIU/ML
GLUCOSE SERPL-MCNC: 100 MG/DL (ref 70–110)
POTASSIUM SERPL-SCNC: 4.5 MMOL/L (ref 3.5–5.1)
PROT SERPL-MCNC: 7.9 G/DL (ref 6–8.4)
SODIUM SERPL-SCNC: 139 MMOL/L (ref 136–145)

## 2023-11-30 PROCEDURE — 99396 PREV VISIT EST AGE 40-64: CPT | Mod: S$GLB,,, | Performed by: OBSTETRICS & GYNECOLOGY

## 2023-11-30 PROCEDURE — 1159F PR MEDICATION LIST DOCUMENTED IN MEDICAL RECORD: ICD-10-PCS | Mod: CPTII,S$GLB,, | Performed by: OBSTETRICS & GYNECOLOGY

## 2023-11-30 PROCEDURE — 99396 PR PREVENTIVE VISIT,EST,40-64: ICD-10-PCS | Mod: S$GLB,,, | Performed by: OBSTETRICS & GYNECOLOGY

## 2023-11-30 PROCEDURE — 3074F SYST BP LT 130 MM HG: CPT | Mod: CPTII,S$GLB,, | Performed by: OBSTETRICS & GYNECOLOGY

## 2023-11-30 PROCEDURE — 1159F MED LIST DOCD IN RCRD: CPT | Mod: CPTII,S$GLB,, | Performed by: OBSTETRICS & GYNECOLOGY

## 2023-11-30 PROCEDURE — 87086 URINE CULTURE/COLONY COUNT: CPT | Performed by: OBSTETRICS & GYNECOLOGY

## 2023-11-30 PROCEDURE — 3008F BODY MASS INDEX DOCD: CPT | Mod: CPTII,S$GLB,, | Performed by: OBSTETRICS & GYNECOLOGY

## 2023-11-30 PROCEDURE — 84402 ASSAY OF FREE TESTOSTERONE: CPT | Performed by: OBSTETRICS & GYNECOLOGY

## 2023-11-30 PROCEDURE — 80053 COMPREHEN METABOLIC PANEL: CPT | Performed by: OBSTETRICS & GYNECOLOGY

## 2023-11-30 PROCEDURE — 1160F RVW MEDS BY RX/DR IN RCRD: CPT | Mod: CPTII,S$GLB,, | Performed by: OBSTETRICS & GYNECOLOGY

## 2023-11-30 PROCEDURE — 3074F PR MOST RECENT SYSTOLIC BLOOD PRESSURE < 130 MM HG: ICD-10-PCS | Mod: CPTII,S$GLB,, | Performed by: OBSTETRICS & GYNECOLOGY

## 2023-11-30 PROCEDURE — 3079F PR MOST RECENT DIASTOLIC BLOOD PRESSURE 80-89 MM HG: ICD-10-PCS | Mod: CPTII,S$GLB,, | Performed by: OBSTETRICS & GYNECOLOGY

## 2023-11-30 PROCEDURE — 3008F PR BODY MASS INDEX (BMI) DOCUMENTED: ICD-10-PCS | Mod: CPTII,S$GLB,, | Performed by: OBSTETRICS & GYNECOLOGY

## 2023-11-30 PROCEDURE — 83001 ASSAY OF GONADOTROPIN (FSH): CPT | Performed by: OBSTETRICS & GYNECOLOGY

## 2023-11-30 PROCEDURE — 99999 PR PBB SHADOW E&M-EST. PATIENT-LVL III: ICD-10-PCS | Mod: PBBFAC,,, | Performed by: OBSTETRICS & GYNECOLOGY

## 2023-11-30 PROCEDURE — 1160F PR REVIEW ALL MEDS BY PRESCRIBER/CLIN PHARMACIST DOCUMENTED: ICD-10-PCS | Mod: CPTII,S$GLB,, | Performed by: OBSTETRICS & GYNECOLOGY

## 2023-11-30 PROCEDURE — 99999 PR PBB SHADOW E&M-EST. PATIENT-LVL III: CPT | Mod: PBBFAC,,, | Performed by: OBSTETRICS & GYNECOLOGY

## 2023-11-30 PROCEDURE — 82670 ASSAY OF TOTAL ESTRADIOL: CPT | Performed by: OBSTETRICS & GYNECOLOGY

## 2023-11-30 PROCEDURE — 3079F DIAST BP 80-89 MM HG: CPT | Mod: CPTII,S$GLB,, | Performed by: OBSTETRICS & GYNECOLOGY

## 2023-12-02 PROBLEM — N95.1 PERIMENOPAUSE: Status: ACTIVE | Noted: 2023-12-02

## 2023-12-02 PROBLEM — R35.0 URINARY FREQUENCY: Status: ACTIVE | Noted: 2023-12-02

## 2023-12-02 PROBLEM — Z01.419 ENCOUNTER FOR WELL WOMAN EXAM WITH ROUTINE GYNECOLOGICAL EXAM: Status: ACTIVE | Noted: 2023-12-02

## 2023-12-02 LAB — BACTERIA UR CULT: NORMAL

## 2023-12-03 NOTE — ASSESSMENT & PLAN NOTE
Normal breast and pelvic exams except as noted. Pap/cotesting up to date. Continue breast self awareness. MMG and FOBT up to date. Recommend 30 minutes of exercise 5 times weekly. Counseled patient on promoting a healthy lifestyle including regular weightbearing physical activity (30 minutes on most days of the week), adequate nutrition (protein, calcium, and vitamin D), continued smoking cessation, no or moderate alcohol intake, and fall prevention. RTC 1 year for annual exam, sooner PRN. Follow up with PCP as scheduled for routine health maintenance.     Increased urinary frequency - culture obtained. Will treat if indicated.

## 2023-12-03 NOTE — ASSESSMENT & PLAN NOTE
Will obtain baseline hormone levels and liver function tests, patient reports considerable hot flashes and sleep issues affecting quality of life.

## 2023-12-04 PROBLEM — Z00.00 ROUTINE GENERAL MEDICAL EXAMINATION AT A HEALTH CARE FACILITY: Status: RESOLVED | Noted: 2023-08-30 | Resolved: 2023-12-04

## 2023-12-04 LAB — TESTOST FREE SERPL-MCNC: 0.6 PG/ML

## 2023-12-14 ENCOUNTER — OFFICE VISIT (OUTPATIENT)
Dept: OBSTETRICS AND GYNECOLOGY | Facility: CLINIC | Age: 53
End: 2023-12-14
Payer: COMMERCIAL

## 2023-12-14 DIAGNOSIS — Z79.890 HORMONE REPLACEMENT THERAPY (HRT): Primary | ICD-10-CM

## 2023-12-14 PROCEDURE — 1159F MED LIST DOCD IN RCRD: CPT | Mod: CPTII,95,, | Performed by: OBSTETRICS & GYNECOLOGY

## 2023-12-14 PROCEDURE — 1160F PR REVIEW ALL MEDS BY PRESCRIBER/CLIN PHARMACIST DOCUMENTED: ICD-10-PCS | Mod: CPTII,95,, | Performed by: OBSTETRICS & GYNECOLOGY

## 2023-12-14 PROCEDURE — 99214 PR OFFICE/OUTPT VISIT, EST, LEVL IV, 30-39 MIN: ICD-10-PCS | Mod: 95,,, | Performed by: OBSTETRICS & GYNECOLOGY

## 2023-12-14 PROCEDURE — 1159F PR MEDICATION LIST DOCUMENTED IN MEDICAL RECORD: ICD-10-PCS | Mod: CPTII,95,, | Performed by: OBSTETRICS & GYNECOLOGY

## 2023-12-14 PROCEDURE — 99214 OFFICE O/P EST MOD 30 MIN: CPT | Mod: 95,,, | Performed by: OBSTETRICS & GYNECOLOGY

## 2023-12-14 PROCEDURE — 1160F RVW MEDS BY RX/DR IN RCRD: CPT | Mod: CPTII,95,, | Performed by: OBSTETRICS & GYNECOLOGY

## 2023-12-14 RX ORDER — ESTRADIOL 1 MG/1
1 TABLET ORAL DAILY
Qty: 30 TABLET | Refills: 11 | Status: SHIPPED | OUTPATIENT
Start: 2023-12-14 | End: 2024-02-07

## 2023-12-14 RX ORDER — PROGESTERONE 200 MG/1
200 CAPSULE ORAL NIGHTLY
Qty: 30 CAPSULE | Refills: 11 | Status: SHIPPED | OUTPATIENT
Start: 2023-12-14 | End: 2024-02-07

## 2023-12-14 NOTE — PROGRESS NOTES
The patient location is: [facility]  The chief complaint leading to consultation is: HRT  Visit type: audiovisual  Total time spent with patient: 10 minutes    Each patient to whom he or she provides medical services by telemedicine is:  (1) informed of the relationship between the physician and patient and the respective role of any other health care provider with respect to management of the patient; and (2) notified that he or she may decline to receive medical services by telemedicine and may withdraw from such care at any time.    Chief Complaint: Discuss HRT     HPI:      Shawna Mayers is a 53 y.o.  who presents via virtual visit to discuss HRT and hormone results. At her annual visit she noted decreased energy levels, low libido, hot flashes, and sleep issues. Had some spotting in September but has not had any bleeding or cramping since then. Was told at one time she should not take hormones due to possible focal nodular hyperplasia of the liver, however imaging from  reviewed and area was <2 cm in size and questionable etiology. Recent LFTs normal.     ROS:     GENERAL: Denies fevers or chills. Feeling well overall.   ABDOMEN: Denies abdominal pain, constipation, diarrhea, nausea, vomiting, change in appetite.   URINARY: Denies frequency, dysuria, hematuria.  GYNECOLOGIC: See HPI.  NEUROLOGIC: Denies syncope or weakness.     Physical Exam:      PHYSICAL EXAM:  LMP 2023 (Approximate)   There is no height or weight on file to calculate BMI.     APPEARANCE: Well nourished, well developed, in no acute distress.  Exam deferred due to televisit    Results:      Pap (2019): NILM/HPV neg  MMG (2023): BIRADS-1  FOBT (2023): Negative  FSH: 77.65  E2: <10  Testosterone: 0.6  AST/ALT: 33/28    Assessment/Plan:     Problem List Items Addressed This Visit          Endocrine    Hormone replacement therapy (HRT) - Primary    Current Assessment & Plan     Patient counseled on risks,  benefits, and alternatives to HRT. She denies the following contraindications to HRT:  Vaginal bleeding, history of VTE/PE, thrombophilia, breast cancer, or active liver disease.  Discussed risks of treatment including MI, stroke, and DVT. Patient verbalized understanding and wishes to proceed with treatment. Will initiate estradiol 1 mg and prometrium 200 mg qHS for endometrial protection. Contact the office with no improvement in symptoms or if she wishes to try topical testosterone therapy for decreased libido.    A full discussion of the benefit-risk ratio of hormonal replacement therapy was carried out. Improvement in vasomotor and other climacteric symptoms is discussed, including possible improvements in sleep and mood. We discussed the study data showing increased risk of thrombo-embolic events such as myocardial infarction, stroke and also possibly breast cancer with estrogen replacement, and how this might affect her. We also discussed ACOG's recommendation to use hormone replacement therapy for the relief of hot flashes alone and to be on the lowest dose possible for the shortest amount of time.  Alternative such as herbal and soy-based products were reviewed. All of her questions about this therapy were answered.           Relevant Medications    estradioL (ESTRACE) 1 MG tablet    progesterone (PROMETRIUM) 200 MG capsule       Counseling:       Use of the [employer] Patient Portal discussed and encouraged during today's visit.       Yvrose Thompson MD  Department of Obstetrics & Gynecology  [facility]

## 2023-12-14 NOTE — ASSESSMENT & PLAN NOTE
Patient counseled on risks, benefits, and alternatives to HRT. She denies the following contraindications to HRT:  Vaginal bleeding, history of VTE/PE, thrombophilia, breast cancer, or active liver disease.  Discussed risks of treatment including MI, stroke, and DVT. Patient verbalized understanding and wishes to proceed with treatment. Will initiate estradiol 1 mg and prometrium 200 mg qHS for endometrial protection. Contact the office with no improvement in symptoms or if she wishes to try topical testosterone therapy for decreased libido.    A full discussion of the benefit-risk ratio of hormonal replacement therapy was carried out. Improvement in vasomotor and other climacteric symptoms is discussed, including possible improvements in sleep and mood. We discussed the study data showing increased risk of thrombo-embolic events such as myocardial infarction, stroke and also possibly breast cancer with estrogen replacement, and how this might affect her. We also discussed ACOG's recommendation to use hormone replacement therapy for the relief of hot flashes alone and to be on the lowest dose possible for the shortest amount of time.  Alternative such as herbal and soy-based products were reviewed. All of her questions about this therapy were answered.

## 2024-01-08 ENCOUNTER — PATIENT MESSAGE (OUTPATIENT)
Dept: OBSTETRICS AND GYNECOLOGY | Facility: CLINIC | Age: 54
End: 2024-01-08
Payer: COMMERCIAL

## 2024-02-01 ENCOUNTER — PATIENT MESSAGE (OUTPATIENT)
Dept: OBSTETRICS AND GYNECOLOGY | Facility: CLINIC | Age: 54
End: 2024-02-01
Payer: COMMERCIAL

## 2024-02-01 DIAGNOSIS — Z79.890 HORMONE REPLACEMENT THERAPY (HRT): Primary | ICD-10-CM

## 2024-02-07 RX ORDER — ESTRADIOL 2 MG/1
2 TABLET ORAL DAILY
Qty: 30 TABLET | Refills: 11 | Status: SHIPPED | OUTPATIENT
Start: 2024-02-07 | End: 2025-02-06

## 2024-02-07 RX ORDER — PROGESTERONE 100 MG/1
300 CAPSULE ORAL DAILY
Qty: 90 CAPSULE | Refills: 11 | Status: SHIPPED | OUTPATIENT
Start: 2024-02-07 | End: 2025-02-06

## 2024-02-08 DIAGNOSIS — Z71.9 HEALTH EDUCATION/COUNSELING: Primary | ICD-10-CM

## 2024-02-08 NOTE — TELEPHONE ENCOUNTER
Increased HRT dosages as patient still experiencing vasomotor symptoms on current dosages (Prometrium 200 mg and Estrace 1 mg).

## 2024-03-20 ENCOUNTER — OFFICE VISIT (OUTPATIENT)
Dept: PRIMARY CARE CLINIC | Facility: CLINIC | Age: 54
End: 2024-03-20
Payer: COMMERCIAL

## 2024-03-20 ENCOUNTER — PATIENT MESSAGE (OUTPATIENT)
Dept: OPTOMETRY | Facility: CLINIC | Age: 54
End: 2024-03-20
Payer: COMMERCIAL

## 2024-03-20 ENCOUNTER — LAB VISIT (OUTPATIENT)
Dept: LAB | Facility: HOSPITAL | Age: 54
End: 2024-03-20
Attending: FAMILY MEDICINE
Payer: COMMERCIAL

## 2024-03-20 VITALS
DIASTOLIC BLOOD PRESSURE: 80 MMHG | SYSTOLIC BLOOD PRESSURE: 136 MMHG | HEART RATE: 88 BPM | WEIGHT: 164.44 LBS | HEIGHT: 62 IN | OXYGEN SATURATION: 99 % | BODY MASS INDEX: 30.26 KG/M2 | TEMPERATURE: 98 F

## 2024-03-20 DIAGNOSIS — L60.8 CHANGE IN NAIL APPEARANCE: ICD-10-CM

## 2024-03-20 DIAGNOSIS — R42 LIGHTHEADEDNESS: ICD-10-CM

## 2024-03-20 DIAGNOSIS — H43.399 VITREOUS FLOATERS, UNSPECIFIED LATERALITY: ICD-10-CM

## 2024-03-20 DIAGNOSIS — E03.9 ACQUIRED HYPOTHYROIDISM: ICD-10-CM

## 2024-03-20 DIAGNOSIS — H81.10 BENIGN PAROXYSMAL POSITIONAL VERTIGO, UNSPECIFIED LATERALITY: Primary | ICD-10-CM

## 2024-03-20 LAB
ALBUMIN SERPL BCP-MCNC: 4.3 G/DL (ref 3.5–5.2)
ALP SERPL-CCNC: 84 U/L (ref 55–135)
ALT SERPL W/O P-5'-P-CCNC: 27 U/L (ref 10–44)
ANION GAP SERPL CALC-SCNC: 6 MMOL/L (ref 8–16)
AST SERPL-CCNC: 26 U/L (ref 10–40)
BASOPHILS # BLD AUTO: 0.05 K/UL (ref 0–0.2)
BASOPHILS NFR BLD: 0.8 % (ref 0–1.9)
BILIRUB SERPL-MCNC: 0.2 MG/DL (ref 0.1–1)
BUN SERPL-MCNC: 14 MG/DL (ref 6–20)
CALCIUM SERPL-MCNC: 9.8 MG/DL (ref 8.7–10.5)
CHLORIDE SERPL-SCNC: 106 MMOL/L (ref 95–110)
CO2 SERPL-SCNC: 27 MMOL/L (ref 23–29)
CREAT SERPL-MCNC: 0.8 MG/DL (ref 0.5–1.4)
DIFFERENTIAL METHOD BLD: NORMAL
EOSINOPHIL # BLD AUTO: 0.2 K/UL (ref 0–0.5)
EOSINOPHIL NFR BLD: 2.6 % (ref 0–8)
ERYTHROCYTE [DISTWIDTH] IN BLOOD BY AUTOMATED COUNT: 11.9 % (ref 11.5–14.5)
EST. GFR  (NO RACE VARIABLE): >60 ML/MIN/1.73 M^2
ESTIMATED AVG GLUCOSE: 103 MG/DL (ref 68–131)
GLUCOSE SERPL-MCNC: 131 MG/DL (ref 70–110)
HBA1C MFR BLD: 5.2 % (ref 4–5.6)
HCT VFR BLD AUTO: 46.5 % (ref 37–48.5)
HGB BLD-MCNC: 15.6 G/DL (ref 12–16)
IMM GRANULOCYTES # BLD AUTO: 0.01 K/UL (ref 0–0.04)
IMM GRANULOCYTES NFR BLD AUTO: 0.2 % (ref 0–0.5)
LYMPHOCYTES # BLD AUTO: 2.3 K/UL (ref 1–4.8)
LYMPHOCYTES NFR BLD: 36 % (ref 18–48)
MCH RBC QN AUTO: 30.5 PG (ref 27–31)
MCHC RBC AUTO-ENTMCNC: 33.5 G/DL (ref 32–36)
MCV RBC AUTO: 91 FL (ref 82–98)
MONOCYTES # BLD AUTO: 0.3 K/UL (ref 0.3–1)
MONOCYTES NFR BLD: 5.4 % (ref 4–15)
NEUTROPHILS # BLD AUTO: 3.5 K/UL (ref 1.8–7.7)
NEUTROPHILS NFR BLD: 55 % (ref 38–73)
NRBC BLD-RTO: 0 /100 WBC
PLATELET # BLD AUTO: 358 K/UL (ref 150–450)
PMV BLD AUTO: 10.3 FL (ref 9.2–12.9)
POTASSIUM SERPL-SCNC: 4.7 MMOL/L (ref 3.5–5.1)
PROT SERPL-MCNC: 7.2 G/DL (ref 6–8.4)
RBC # BLD AUTO: 5.11 M/UL (ref 4–5.4)
SODIUM SERPL-SCNC: 139 MMOL/L (ref 136–145)
T4 FREE SERPL-MCNC: 1.03 NG/DL (ref 0.71–1.51)
TSH SERPL DL<=0.005 MIU/L-ACNC: 1.46 UIU/ML (ref 0.4–4)
WBC # BLD AUTO: 6.27 K/UL (ref 3.9–12.7)

## 2024-03-20 PROCEDURE — 99999 PR PBB SHADOW E&M-EST. PATIENT-LVL V: CPT | Mod: PBBFAC,,, | Performed by: FAMILY MEDICINE

## 2024-03-20 PROCEDURE — 84439 ASSAY OF FREE THYROXINE: CPT | Performed by: FAMILY MEDICINE

## 2024-03-20 PROCEDURE — 36415 COLL VENOUS BLD VENIPUNCTURE: CPT | Mod: PN | Performed by: FAMILY MEDICINE

## 2024-03-20 PROCEDURE — 80053 COMPREHEN METABOLIC PANEL: CPT | Performed by: FAMILY MEDICINE

## 2024-03-20 PROCEDURE — 3008F BODY MASS INDEX DOCD: CPT | Mod: CPTII,S$GLB,, | Performed by: FAMILY MEDICINE

## 2024-03-20 PROCEDURE — 85025 COMPLETE CBC W/AUTO DIFF WBC: CPT | Performed by: FAMILY MEDICINE

## 2024-03-20 PROCEDURE — 1159F MED LIST DOCD IN RCRD: CPT | Mod: CPTII,S$GLB,, | Performed by: FAMILY MEDICINE

## 2024-03-20 PROCEDURE — 99214 OFFICE O/P EST MOD 30 MIN: CPT | Mod: S$GLB,,, | Performed by: FAMILY MEDICINE

## 2024-03-20 PROCEDURE — 1160F RVW MEDS BY RX/DR IN RCRD: CPT | Mod: CPTII,S$GLB,, | Performed by: FAMILY MEDICINE

## 2024-03-20 PROCEDURE — 3079F DIAST BP 80-89 MM HG: CPT | Mod: CPTII,S$GLB,, | Performed by: FAMILY MEDICINE

## 2024-03-20 PROCEDURE — 3044F HG A1C LEVEL LT 7.0%: CPT | Mod: CPTII,S$GLB,, | Performed by: FAMILY MEDICINE

## 2024-03-20 PROCEDURE — 3075F SYST BP GE 130 - 139MM HG: CPT | Mod: CPTII,S$GLB,, | Performed by: FAMILY MEDICINE

## 2024-03-20 PROCEDURE — 84443 ASSAY THYROID STIM HORMONE: CPT | Performed by: FAMILY MEDICINE

## 2024-03-20 PROCEDURE — 83036 HEMOGLOBIN GLYCOSYLATED A1C: CPT | Performed by: FAMILY MEDICINE

## 2024-03-20 NOTE — PROGRESS NOTES
Chief Complaint  Chief Complaint   Patient presents with    Dizziness       HPI  Shawna Mayers is a 53 y.o. female with multiple medical diagnoses as listed in the medical history and problem list that presents for dizziness.    She has experienced dizziness and lightheadedness since starting a plant-based diet and cutting out alcohol for the [facility] for 40 Challenge. She has also been experiencing fatigue, disorientation, and a constant dull headache. She has had some blurry vision and black floaters. No peripheral neuropathy. This has worsened over the past week, and she has been unable to drive. She has been eating whole grains and beans with a small amount of tofu and salmon, and has recently started adding chicken and peanut butter to her diet which has helped improve her symptoms a small amount.    She is also complaining of nail brittleness and lack of new nail growth. She has experienced cold intolerance, which is unusual for her as she typically feels warm.      PAST MEDICAL HISTORY:  Past Medical History:   Diagnosis Date    Allergy     Anxiety     Asthma     Focal nodular hyperplasia of liver     GERD (gastroesophageal reflux disease)     Hyperlipidemia     Thyroid disease     hypothyroidism       PAST SURGICAL HISTORY:  Past Surgical History:   Procedure Laterality Date    ARTHROSCOPIC CHONDROPLASTY OF KNEE JOINT Right 7/26/2023    Procedure: ARTHROSCOPY, KNEE, WITH CHONDROPLASTY;  Surgeon: Fidel Michelle MD;  Location: [facility] OR;  Service: Orthopedics;  Laterality: Right;    CHOLECYSTECTOMY      FOOT SURGERY      arc    KNEE ARTHROSCOPY W/ MENISCECTOMY Right 7/26/2023    Procedure: ARTHROSCOPY, KNEE, WITH MENISCECTOMY;  Surgeon: Fidel Michelle MD;  Location: [facility] OR;  Service: Orthopedics;  Laterality: Right;  partial and medial     WISDOM TOOTH EXTRACTION         SOCIAL HISTORY:  Social History     Socioeconomic History    Marital status:    Occupational History      Employer: [employer]    Tobacco Use    Smoking status: Never    Smokeless tobacco: Never   Substance and Sexual Activity    Alcohol use: Yes     Alcohol/week: 4.0 standard drinks of alcohol     Types: 2 Glasses of wine, 2 Cans of beer per week     Comment: weekend/social drinker    Drug use: Not Currently     Types: Other-see comments     Comment: edibles a few months ago    Sexual activity: Yes     Partners: Male     Birth control/protection: Partner-Vasectomy, None   Other Topics Concern    Are you pregnant or think you may be? No     Social Determinants of Health     Financial Resource Strain: Low Risk  (2/14/2024)    Overall Financial Resource Strain (CARDIA)     Difficulty of Paying Living Expenses: Not hard at all   Food Insecurity: No Food Insecurity (2/14/2024)    Hunger Vital Sign     Worried About Running Out of Food in the Last Year: Never true     Ran Out of Food in the Last Year: Never true   Transportation Needs: No Transportation Needs (2/14/2024)    PRAPARE - Transportation     Lack of Transportation (Medical): No     Lack of Transportation (Non-Medical): No   Physical Activity: Sufficiently Active (2/14/2024)    Exercise Vital Sign     Days of Exercise per Week: 4 days     Minutes of Exercise per Session: 60 min   Stress: No Stress Concern Present (2/14/2024)    [employer] [employer] - Occupational Stress Questionnaire     Feeling of Stress : Only a little   Social Connections: Unknown (2/14/2024)    Social Connection and Isolation Panel [NHANES]     Frequency of Communication with Friends and Family: More than three times a week     Frequency of Social Gatherings with Friends and Family: Once a week     Active Member of Clubs or Organizations: No     Attends Club or Organization Meetings: Patient declined     Marital Status:    Housing Stability: Low Risk  (2/14/2024)    Housing Stability Vital Sign     Unable to Pay for Housing in the Last Year: No      Number of Places Lived in the Last Year: 1     Unstable Housing in the Last Year: No       FAMILY HISTORY:  Family History   Problem Relation Age of Onset    Arthritis Mother     Asthma Mother     Diabetes Mother     Hearing loss Mother     Hyperlipidemia Mother     Vision loss Mother     Early death Father         42yrs old    Heart disease Father          of MI at 41yo    Hyperlipidemia Father     Kidney disease Father     Alcohol abuse Brother     Diabetes Maternal Grandmother     Breast cancer Neg Hx     Colon cancer Neg Hx     Ovarian cancer Neg Hx     Melanoma Neg Hx        ALLERGIES AND MEDICATIONS: updated and reviewed.  Review of patient's allergies indicates:   Allergen Reactions    Adhesive Blisters    Anucort-hc [hydrocortisone acetate] Hives and Nausea And Vomiting    Demerol [meperidine] Nausea And Vomiting    Sulfa (sulfonamide antibiotics) Hives    Amoxicillin Nausea And Vomiting and Rash    Macrobid [nitrofurantoin monohyd/m-cryst] Nausea Only     dizziness     Current Outpatient Medications   Medication Sig Dispense Refill    estradioL (ESTRACE) 2 MG tablet Take 1 tablet (2 mg total) by mouth once daily. 30 tablet 11    fluticasone propionate (FLONASE) 50 mcg/actuation nasal spray SPRAY 2 SPRAYS BY EACH NOSTRIL ROUTE ONCE DAILY. 48 g 3    Lactobacillus rhamnosus GG (CULTURELLE) 10 billion cell capsule Take 1 capsule by mouth once daily.      levothyroxine (SYNTHROID) 50 MCG tablet Take 1 tablet (50 mcg total) by mouth once daily. 90 tablet 3    progesterone (PROMETRIUM) 100 MG capsule Take 3 capsules (300 mg total) by mouth once daily. 90 capsule 11     No current facility-administered medications for this visit.         ROS  Review of Systems   Constitutional:  Positive for activity change. Negative for appetite change, chills and fever.   HENT:  Negative for rhinorrhea, sinus pain and sore throat.    Eyes:  Positive for visual disturbance (blurry vision, black floaters). Negative for  "photophobia.   Respiratory:  Negative for cough, shortness of breath and wheezing.    Cardiovascular:  Negative for chest pain and leg swelling.   Gastrointestinal:  Negative for abdominal pain, anal bleeding, constipation, diarrhea, nausea and vomiting.   Endocrine: Positive for cold intolerance. Negative for heat intolerance.   Genitourinary:  Negative for frequency, hematuria and urgency.   Musculoskeletal:  Positive for arthralgias (knee pain). Negative for myalgias.   Neurological:  Positive for light-headedness and headaches (Frontal headache, tylenol does not help). Negative for tremors, weakness and numbness.   Psychiatric/Behavioral:  The patient is nervous/anxious (irritable, defeated).            Physical Exam  Vitals:    03/20/24 1305 03/20/24 1334 03/20/24 1336   BP: 130/78 138/80 136/80   BP Location: Right arm Right arm Right arm   Patient Position: Sitting Sitting Standing   BP Method:  Medium (Manual) Medium (Manual)   Pulse: 88 88 88   Temp: 98.1 °F (36.7 °C)     TempSrc: Oral     SpO2: 99%     Weight: 74.6 kg (164 lb 7.4 oz)     Height: 5' 2" (1.575 m)      Body mass index is 30.08 kg/m².  Weight: 74.6 kg (164 lb 7.4 oz)   Height: 5' 2" (157.5 cm)   Physical Exam  Constitutional:       General: She is not in acute distress.     Appearance: She is well-developed. She is not diaphoretic.   HENT:      Head: Normocephalic and atraumatic.      Right Ear: Tympanic membrane, ear canal and external ear normal.      Left Ear: Tympanic membrane, ear canal and external ear normal.      Nose: Nose normal.      Mouth/Throat:      Mouth: Mucous membranes are moist.      Pharynx: Oropharynx is clear.   Eyes:      Extraocular Movements:      Right eye: Nystagmus present.      Left eye: Nystagmus present.      Pupils: Pupils are equal, round, and reactive to light.      Comments: Nystagmus elicited with lateral gaze L>R. No nystagmus with Jeramie-Hallpike.   Cardiovascular:      Rate and Rhythm: Normal rate and " regular rhythm.      Heart sounds: Normal heart sounds. No murmur heard.     No friction rub. No gallop.   Pulmonary:      Effort: Pulmonary effort is normal. No respiratory distress.      Breath sounds: Normal breath sounds. No stridor. No wheezing or rales.   Abdominal:      General: Bowel sounds are normal. There is no distension.      Palpations: Abdomen is soft. There is no mass.      Tenderness: There is no abdominal tenderness. There is no guarding.   Musculoskeletal:         General: No swelling, tenderness or deformity.   Skin:     General: Skin is warm.      Findings: Erythema (Chest erythema, attributes to anxiety) present.   Neurological:      General: No focal deficit present.      Mental Status: She is alert and oriented to person, place, and time.   Psychiatric:         Mood and Affect: Mood normal.         Behavior: Behavior normal.           Health Maintenance         Date Due Completion Date    Pneumococcal Vaccines (Age 0-64) (1 of 2 - PCV) Never done ---    Hemoglobin A1c (Diabetic Prevention Screening) 04/25/2018 4/25/2015    COVID-19 Vaccine (6 - 2023-24 season) 09/01/2023 9/17/2022    Colorectal Cancer Screening 06/05/2024 6/5/2023    Cervical Cancer Screening 06/13/2024 6/13/2019    Mammogram 11/20/2024 11/20/2023    TETANUS VACCINE 05/16/2027 5/16/2017    Lipid Panel 02/15/2029 2/15/2024              Assessment and Plan:  Acquired hypothyroidism  -     TSH; Future; Expected date: 04/20/2024  -     T4, Free; Future; Expected date: 04/20/2024  -     CBC Auto Differential; Future; Expected date: 04/20/2024  -     Comprehensive Metabolic Panel; Future; Expected date: 03/20/2024    Vitreous floaters, unspecified laterality  -     Ambulatory referral/consult to Optometry; Future; Expected date: 03/27/2024    Lightheadedness  -     CBC Auto Differential; Future; Expected date: 04/20/2024  -     Comprehensive Metabolic Panel; Future; Expected date: 03/20/2024  -     Hemoglobin A1C; Future  -      Stop plant-based diet, ensure sufficient protein and nutrient intake    Change in nail appearance  -     Ambulatory referral/consult to Dermatology; Future; Expected date: 03/27/2024      Silvestre Gambino, MS4       I independently examined and interviewed the patient after thorough review of chart and discussion with medical student.  I agree with the above with the following additions or modifications.   Benign positional paroxysmal vertigo.  Was have some nystagmus although her symptoms are not like her past complaints.  Epley maneuvers.  She has meclizine at home.      Follow-up with her PCP in 2 weeks if not improving with symptoms    Louie Ko MD  Staff Physician

## 2024-03-25 PROBLEM — H81.10 BENIGN PAROXYSMAL POSITIONAL VERTIGO: Status: ACTIVE | Noted: 2024-03-25

## 2024-04-04 ENCOUNTER — PATIENT MESSAGE (OUTPATIENT)
Dept: SPORTS MEDICINE | Facility: CLINIC | Age: 54
End: 2024-04-04
Payer: COMMERCIAL

## 2024-04-04 ENCOUNTER — TELEPHONE (OUTPATIENT)
Dept: SPORTS MEDICINE | Facility: CLINIC | Age: 54
End: 2024-04-04
Payer: COMMERCIAL

## 2024-04-04 NOTE — TELEPHONE ENCOUNTER
----- Message from Kaia Tidwell sent at 4/4/2024 11:50 AM CDT -----  Who Called: Pt    What is the request in detail: Requesting call back to discuss recent missed call from Renetta. Please advise    Can the clinic reply by [facility]? no    Best Call Back Number: [phone]      Additional Information:

## 2024-04-11 ENCOUNTER — HOSPITAL ENCOUNTER (OUTPATIENT)
Dept: RADIOLOGY | Facility: HOSPITAL | Age: 54
Discharge: HOME | End: 2024-04-11
Attending: ORTHOPAEDIC SURGERY
Payer: COMMERCIAL

## 2024-04-11 ENCOUNTER — OFFICE VISIT (OUTPATIENT)
Dept: SPORTS MEDICINE | Facility: CLINIC | Age: 54
End: 2024-04-11
Payer: COMMERCIAL

## 2024-04-11 VITALS
SYSTOLIC BLOOD PRESSURE: 139 MMHG | WEIGHT: 166.69 LBS | HEART RATE: 78 BPM | BODY MASS INDEX: 30.67 KG/M2 | DIASTOLIC BLOOD PRESSURE: 92 MMHG | HEIGHT: 62 IN

## 2024-04-11 DIAGNOSIS — G89.29 CHRONIC PAIN OF RIGHT KNEE: ICD-10-CM

## 2024-04-11 DIAGNOSIS — M25.561 RIGHT KNEE PAIN, UNSPECIFIED CHRONICITY: ICD-10-CM

## 2024-04-11 DIAGNOSIS — Z98.890 S/P ARTHROSCOPIC SURGERY OF RIGHT KNEE: ICD-10-CM

## 2024-04-11 DIAGNOSIS — M25.561 CHRONIC PAIN OF RIGHT KNEE: ICD-10-CM

## 2024-04-11 DIAGNOSIS — M17.11 PRIMARY OSTEOARTHRITIS OF RIGHT KNEE: Primary | ICD-10-CM

## 2024-04-11 PROCEDURE — 20610 DRAIN/INJ JOINT/BURSA W/O US: CPT | Mod: RT,S$GLB,, | Performed by: ORTHOPAEDIC SURGERY

## 2024-04-11 PROCEDURE — 3044F HG A1C LEVEL LT 7.0%: CPT | Mod: CPTII,S$GLB,, | Performed by: ORTHOPAEDIC SURGERY

## 2024-04-11 PROCEDURE — 3080F DIAST BP >= 90 MM HG: CPT | Mod: CPTII,S$GLB,, | Performed by: ORTHOPAEDIC SURGERY

## 2024-04-11 PROCEDURE — 3008F BODY MASS INDEX DOCD: CPT | Mod: CPTII,S$GLB,, | Performed by: ORTHOPAEDIC SURGERY

## 2024-04-11 PROCEDURE — 1159F MED LIST DOCD IN RCRD: CPT | Mod: CPTII,S$GLB,, | Performed by: ORTHOPAEDIC SURGERY

## 2024-04-11 PROCEDURE — 73564 X-RAY EXAM KNEE 4 OR MORE: CPT | Mod: TC,50

## 2024-04-11 PROCEDURE — 3075F SYST BP GE 130 - 139MM HG: CPT | Mod: CPTII,S$GLB,, | Performed by: ORTHOPAEDIC SURGERY

## 2024-04-11 PROCEDURE — 99999 PR PBB SHADOW E&M-EST. PATIENT-LVL III: CPT | Mod: PBBFAC,,, | Performed by: ORTHOPAEDIC SURGERY

## 2024-04-11 PROCEDURE — 73564 X-RAY EXAM KNEE 4 OR MORE: CPT | Mod: 26,,, | Performed by: RADIOLOGY

## 2024-04-11 PROCEDURE — 99214 OFFICE O/P EST MOD 30 MIN: CPT | Mod: 25,S$GLB,, | Performed by: ORTHOPAEDIC SURGERY

## 2024-04-11 RX ORDER — TRIAMCINOLONE ACETONIDE 40 MG/ML
40 INJECTION, SUSPENSION INTRA-ARTICULAR; INTRAMUSCULAR
Status: DISCONTINUED | OUTPATIENT
Start: 2024-04-11 | End: 2024-04-11 | Stop reason: HOSPADM

## 2024-04-11 RX ADMIN — TRIAMCINOLONE ACETONIDE 40 MG: 40 INJECTION, SUSPENSION INTRA-ARTICULAR; INTRAMUSCULAR at 03:04

## 2024-04-11 NOTE — PROGRESS NOTES
CC: Right knee pain    53 y.o. Female with a history of right knee atraumatic pain and surgery be me (see below). Patient reports increased pain and swelling with no injury. Pain is noted along the medial patella. She is working out regularly but reports pain with working out and has to stop. She is going on a hiking trip in May and is interested in acute relief.     She reports that the pain and weakness. It also bothers her at night.    - mechanical symptoms, - instability      DATE OF PROCEDURE:  7/26/23      PREOPERATIVE DIAGNOSES:   1. Right knee chondromalacia  2. Right knee medial meniscus tear.   3. Right knee lateral meniscus tear     POSTOPERATIVE DIAGNOSES:   1. Right knee chondromalacia  2. Right knee medial meniscus tear.   3. Right knee lateral meniscus tear     PROCEDURES:   1. Right knee arthroscopic chondroplasty  2. Right knee arthroscopic partial medial and lateral meniscectomies     SURGEON: Fidel Michelle M.D.     REVIEW OF SYSTEMS:   Constitution: Negative. Negative for chills, fever and night sweats.   HENT: Negative for congestion and headaches.    Eyes: Negative for blurred vision, left vision loss and right vision loss.   Cardiovascular: Negative for chest pain and syncope.   Respiratory: Negative for cough and shortness of breath.    Endocrine: Negative for polydipsia, polyphagia and polyuria.   Hematologic/Lymphatic: Negative for bleeding problem. Does not bruise/bleed easily.   Skin: Negative for dry skin, itching and rash.   Musculoskeletal: Negative for falls. Positive for right knee pain and  muscle weakness.   Gastrointestinal: Negative for abdominal pain and bowel incontinence.   Genitourinary: Negative for bladder incontinence and nocturia.   Neurological: Negative for disturbances in coordination, loss of balance and seizures.   Psychiatric/Behavioral: Negative for depression. The patient does not have insomnia.    Allergic/Immunologic: Negative for hives and persistent  infections.     PAST MEDICAL HISTORY:   Past Medical History:   Diagnosis Date    Allergy     Anxiety     Asthma     Focal nodular hyperplasia of liver     GERD (gastroesophageal reflux disease)     Hyperlipidemia     Thyroid disease     hypothyroidism       PAST SURGICAL HISTORY:   Past Surgical History:   Procedure Laterality Date    ARTHROSCOPIC CHONDROPLASTY OF KNEE JOINT Right 2023    Procedure: ARTHROSCOPY, KNEE, WITH CHONDROPLASTY;  Surgeon: Fidel Michelle MD;  Location: [facility] OR;  Service: Orthopedics;  Laterality: Right;    CHOLECYSTECTOMY      FOOT SURGERY      arc    KNEE ARTHROSCOPY W/ MENISCECTOMY Right 2023    Procedure: ARTHROSCOPY, KNEE, WITH MENISCECTOMY;  Surgeon: Fidel Michelle MD;  Location: [facility] OR;  Service: Orthopedics;  Laterality: Right;  partial and medial     WISDOM TOOTH EXTRACTION         FAMILY HISTORY:   Family History   Problem Relation Age of Onset    Arthritis Mother     Asthma Mother     Diabetes Mother     Hearing loss Mother     Hyperlipidemia Mother     Vision loss Mother     Early death Father         42yrs old    Heart disease Father          of MI at 43yo    Hyperlipidemia Father     Kidney disease Father     Alcohol abuse Brother     Diabetes Maternal Grandmother     Breast cancer Neg Hx     Colon cancer Neg Hx     Ovarian cancer Neg Hx     Melanoma Neg Hx        SOCIAL HISTORY:   Social History     Socioeconomic History    Marital status:    Occupational History     Employer: [employer]    Tobacco Use    Smoking status: Never    Smokeless tobacco: Never   Substance and Sexual Activity    Alcohol use: Yes     Alcohol/week: 4.0 standard drinks of alcohol     Types: 2 Glasses of wine, 2 Cans of beer per week     Comment: weekend/social drinker    Drug use: Not Currently     Types: Other-see comments     Comment: edibles a few months ago    Sexual activity: Yes     Partners: Male     Birth control/protection: Partner-Vasectomy,  None   Other Topics Concern    Are you pregnant or think you may be? No     Social Determinants of Health     Financial Resource Strain: Low Risk  (2/14/2024)    Overall Financial Resource Strain (CARDIA)     Difficulty of Paying Living Expenses: Not hard at all   Food Insecurity: No Food Insecurity (2/14/2024)    Hunger Vital Sign     Worried About Running Out of Food in the Last Year: Never true     Ran Out of Food in the Last Year: Never true   Transportation Needs: No Transportation Needs (2/14/2024)    PRAPARE - Transportation     Lack of Transportation (Medical): No     Lack of Transportation (Non-Medical): No   Physical Activity: Sufficiently Active (2/14/2024)    Exercise Vital Sign     Days of Exercise per Week: 4 days     Minutes of Exercise per Session: 60 min   Stress: No Stress Concern Present (2/14/2024)    [employer] - Occupational Stress Questionnaire     Feeling of Stress : Only a little   Social Connections: Unknown (2/14/2024)    Social Connection and Isolation Panel [NHANES]     Frequency of Communication with Friends and Family: More than three times a week     Frequency of Social Gatherings with Friends and Family: Once a week     Active Member of Clubs or Organizations: No     Attends Club or Organization Meetings: Patient declined     Marital Status:    Housing Stability: Low Risk  (2/14/2024)    Housing Stability Vital Sign     Unable to Pay for Housing in the Last Year: No     Number of Places Lived in the Last Year: 1     Unstable Housing in the Last Year: No       MEDICATIONS:     Current Outpatient Medications:     estradioL (ESTRACE) 2 MG tablet, Take 1 tablet (2 mg total) by mouth once daily., Disp: 30 tablet, Rfl: 11    fluticasone propionate (FLONASE) 50 mcg/actuation nasal spray, SPRAY 2 SPRAYS BY EACH NOSTRIL ROUTE ONCE DAILY., Disp: 48 g, Rfl: 3    Lactobacillus rhamnosus GG (CULTURELLE) 10 billion cell capsule, Take 1 capsule by mouth once  "daily., Disp: , Rfl:     levothyroxine (SYNTHROID) 50 MCG tablet, Take 1 tablet (50 mcg total) by mouth once daily., Disp: 90 tablet, Rfl: 3    progesterone (PROMETRIUM) 100 MG capsule, Take 3 capsules (300 mg total) by mouth once daily., Disp: 90 capsule, Rfl: 11    ALLERGIES:   Review of patient's allergies indicates:   Allergen Reactions    Adhesive Blisters    Anucort-hc [hydrocortisone acetate] Hives and Nausea And Vomiting    Demerol [meperidine] Nausea And Vomiting    Sulfa (sulfonamide antibiotics) Hives    Amoxicillin Nausea And Vomiting and Rash    Macrobid [nitrofurantoin monohyd/m-cryst] Nausea Only     dizziness       VITAL SIGNS:   BP (!) 139/92   Pulse 78   Ht 5' 2" (1.575 m)   Wt 75.6 kg (166 lb 10.7 oz)   BMI 30.48 kg/m²      PHYSICAL EXAMINATION:  General:  The patient is alert and oriented x 3.  Mood is pleasant.  Observation of ears, eyes and nose reveal no gross abnormalities.  No labored breathing observed.    RIGHT KNEE EXAMINATION     OBSERVATION / INSPECTION   Gait:   Nonantalgic   Alignment:  Neutral   Scars:   None   Muscle atrophy: Mild  Effusion:  Mild  Warmth:  None   Discoloration:   none     TENDERNESS / CREPITUS (T / C):          T / C      T / C   Patella   - / -   Lateral joint line   - / -   Peripatellar medial  +  Medial joint line    + / -   Peripatellar lateral -  Medial plica   - / -   Patellar tendon -   Popliteal fossa   - / -   Quad tendon   -   Gastrocnemius   -   Prepatellar Bursa - / -   Quadricep   -   Tibial tubercle  -  Thigh/hamstring  -   Pes anserine/HS -  Fibula    -   ITB   - / -  Tibia     -   Tib/fib joint  - / -  LCL    -     MFC   - / -   MCL: Proximal  -    LFC   - / -    Distal   -          ROM: (* = pain)  PASSIVE   ACTIVE    Left :   5 / 0 / 145   5 / 0 / 145     Right :    5 / 0 / 145   5 / 0 / 145    Patellofemoral examination:  See above noted areas of tenderness.   Patella position    Subluxation / dislocation: Centered           Sup. / " Inf;   Normal   Crepitus (PF):    Absent   Patellar Mobility:       Medial-lateral:   Normal    Superior-inferior:  Normal    Inferior tilt   Normal    Patellar tendon:  Normal   Lateral tilt:    Normal   J-sign:     None   Patellofemoral grind:   No pain       MENISCAL SIGNS:     Pain on terminal extension:  -  Pain on terminal flexion:  -  Rommels maneuver:  -   Squat     -     LIGAMENT EXAMINATION:  ACL / Lachman:  normal (-1 to 2mm)    PCL-Post.  drawer: normal 0 to 2mm  MCL- Valgus:  normal 0 to 2mm  LCL- Varus:  normal 0 to 2mm  Pivot shift: normal (Equal)   Dial Test: difference c/w other side   At 30° flexion: normal (< 5°)    At 90° flexion: normal (< 5°)   Reverse Pivot Shift:   normal (Equal)     STRENGTH: (* = with pain) PAINFUL SIDE   Quadricep   5/5   Hamstrin/5    EXTREMITY NEURO-VASCULAR EXAMINATION:   Sensation:  Grossly intact to light touch all dermatomal regions.   Motor Function:  Fully intact motor function at hip, knee, foot and ankle    DTRs;  quadriceps and  achilles 2+.  No clonus and downgoing Babinski.    Vascular status:  DP and PT pulses 2+, brisk capillary refill, symmetric.     OTHER FINDINGS:    IMAGING:    X-rays including standing, weight bearing AP and flexion bilateral knees, lateral and merchant views ordered and images reviewed by me show:    No fracture, dislocation or other pathology   Medial compartment: no degenerative changes   Lateral compartment: no degenerative changes   Patellofemoral compartment:  minimal DJD   Kellgren Mark grade 2      ASSESSMENT:    Right Knee Pain,   1. Primary osteoarthritis of right knee    2. Chronic pain of right knee    3. S/P arthroscopic surgery of right knee        PLAN:   1. CSI     2. 3D knee sleeve     3. 1 - 2 sessions PT here.     4. Euflexxa     5. F/u for injections.       All questions were answered, pt will contact us for questions or concerns in the interim.

## 2024-04-11 NOTE — PROCEDURES
Large Joint Aspiration/Injection: R knee    Date/Time: 4/11/2024 3:30 PM    Performed by: Fidel Michelle MD  Authorized by: Fidel Michelle MD    Consent Done?:  Yes (Verbal)  Indications:  Pain  Site marked: the procedure site was marked    Timeout: prior to procedure the correct patient, procedure, and site was verified    Prep: patient was prepped and draped in usual sterile fashion      Details:  Needle Size:  22 G  Ultrasonic Guidance for needle placement?: No    Approach:  Superior  Location:  Knee  Site:  R knee  Medications:  40 mg triamcinolone acetonide 40 mg/mL  Patient tolerance:  Patient tolerated the procedure well with no immediate complications

## 2024-04-15 ENCOUNTER — TELEPHONE (OUTPATIENT)
Dept: SPORTS MEDICINE | Facility: CLINIC | Age: 54
End: 2024-04-15
Payer: COMMERCIAL

## 2024-04-15 DIAGNOSIS — G89.29 CHRONIC PAIN OF RIGHT KNEE: ICD-10-CM

## 2024-04-15 DIAGNOSIS — M25.561 CHRONIC PAIN OF RIGHT KNEE: ICD-10-CM

## 2024-04-15 DIAGNOSIS — M17.11 PRIMARY OSTEOARTHRITIS OF RIGHT KNEE: Primary | ICD-10-CM

## 2024-04-16 ENCOUNTER — PATIENT MESSAGE (OUTPATIENT)
Dept: OBSTETRICS AND GYNECOLOGY | Facility: CLINIC | Age: 54
End: 2024-04-16
Payer: COMMERCIAL

## 2024-04-16 DIAGNOSIS — R68.82 DECREASED LIBIDO: ICD-10-CM

## 2024-04-16 DIAGNOSIS — R35.0 URINARY FREQUENCY: ICD-10-CM

## 2024-04-16 DIAGNOSIS — Z79.890 HORMONE REPLACEMENT THERAPY (HRT): Primary | ICD-10-CM

## 2024-04-16 NOTE — TELEPHONE ENCOUNTER
Rx for topical testosterone sent to [facility] with instructions for use and follow up labs/virtual visit in 6-8 weeks. Referral to Urogyn sent as well for urinary issues.

## 2024-04-23 NOTE — PROGRESS NOTES
Patient is here for follow up of right knee arthritis. Pt is requesting Orthovisc injection #1.  PMFH reviewed per encounter record. Has failed other conservative modalities including NSAIDS, activity modification, weight loss.       PHYSICAL EXAMINATION:      General: The patient is alert and oriented x 3. Mood is pleasant.   Observation of ears, eyes and nose reveals no gross abnormalities. No   labored breathing observed.      No signs of infection or adverse reaction to the right knee.    X-rays show osteoarthritis of the right knee with a Kellgren Mark grade of 2    Orthovisc Injection Procedure #1 - right knee    A time out was performed, including verification of patient ID, procedure, site and side, availability of information and equipment, review of safety issues, and agreement with consent, the procedure site was marked.    The patient was prepped aseptically with povidone-iodine swabsticks. A diagnostic and therapeutic injection of 2cc Orthovisc was given under sterile technique using a 22g x 1.5 needle from the superolateral aspect of the right knee Joint in the supine position.   Shawna Mayers had no adverse reactions to the medication. Pain decreased. female was instructed to apply ice to the joint for 20 minutes and avoid strenuous activities for 24-36 hours following the injection. female was warned of possible blood pressure changes during that time. Following that time, female can resume activities as prior to the injection.    female was reminded to call the clinic immediately for any adverse side effects as explained in clinic today.    Exp:  2/28/2026  Lot:  [id number]

## 2024-04-25 ENCOUNTER — OFFICE VISIT (OUTPATIENT)
Dept: SPORTS MEDICINE | Facility: CLINIC | Age: 54
End: 2024-04-25
Payer: COMMERCIAL

## 2024-04-25 VITALS
BODY MASS INDEX: 28.24 KG/M2 | WEIGHT: 165.38 LBS | HEART RATE: 77 BPM | HEIGHT: 64 IN | DIASTOLIC BLOOD PRESSURE: 80 MMHG | SYSTOLIC BLOOD PRESSURE: 126 MMHG

## 2024-04-25 DIAGNOSIS — M17.11 PRIMARY OSTEOARTHRITIS OF RIGHT KNEE: Primary | ICD-10-CM

## 2024-04-25 PROCEDURE — 99499 UNLISTED E&M SERVICE: CPT | Mod: 25,S$GLB,, | Performed by: PHYSICIAN ASSISTANT

## 2024-04-25 PROCEDURE — 20610 DRAIN/INJ JOINT/BURSA W/O US: CPT | Mod: RT,S$GLB,, | Performed by: PHYSICIAN ASSISTANT

## 2024-04-25 PROCEDURE — 99999 PR PBB SHADOW E&M-EST. PATIENT-LVL III: CPT | Mod: PBBFAC,,, | Performed by: PHYSICIAN ASSISTANT

## 2024-04-25 NOTE — PROCEDURES
Large Joint Aspiration/Injection: R knee    Date/Time: 4/25/2024 10:15 AM    Performed by: Lauro James PA-C  Authorized by: Lauro James PA-C    Consent Done?:  Yes (Verbal)  Indications:  Pain and arthritis  Site marked: the procedure site was marked    Timeout: prior to procedure the correct patient, procedure, and site was verified    Prep: patient was prepped and draped in usual sterile fashion    Local anesthesia used?: No      Details:  Needle Size:  22 G  Ultrasonic Guidance for needle placement?: No    Approach:  Superior  Location:  Knee  Site:  R knee  Medications:  15 mg sodium hyaluronate (orthovisc) 30 mg/2 mL  Patient tolerance:  Patient tolerated the procedure well with no immediate complications

## 2024-04-26 ENCOUNTER — CLINICAL SUPPORT (OUTPATIENT)
Dept: REHABILITATION | Facility: HOSPITAL | Age: 54
End: 2024-04-26
Attending: ORTHOPAEDIC SURGERY
Payer: COMMERCIAL

## 2024-04-26 DIAGNOSIS — M17.11 PRIMARY OSTEOARTHRITIS OF RIGHT KNEE: ICD-10-CM

## 2024-04-26 DIAGNOSIS — M25.561 CHRONIC PAIN OF RIGHT KNEE: ICD-10-CM

## 2024-04-26 DIAGNOSIS — G89.29 CHRONIC PAIN OF RIGHT KNEE: ICD-10-CM

## 2024-04-26 DIAGNOSIS — M25.661 DECREASED RANGE OF MOTION (ROM) OF RIGHT KNEE: Primary | ICD-10-CM

## 2024-04-26 PROCEDURE — 97161 PT EVAL LOW COMPLEX 20 MIN: CPT | Mod: PO

## 2024-04-26 PROCEDURE — 97110 THERAPEUTIC EXERCISES: CPT | Mod: PO

## 2024-04-26 NOTE — PLAN OF CARE
[facility]   Physical Therapy Initial Evaluation      Name: Shawna Woodsno [facility] Number: [id number]    Therapy Diagnosis:   Encounter Diagnoses   Name Primary?    Chronic pain of right knee     Primary osteoarthritis of right knee     Decreased range of motion (ROM) of right knee Yes        Physician: Fidel Michelle MD    Physician Orders: PT Eval and Treat   Medical Diagnosis from Referral: Chronic pain of right knee [M25.561, G89.29], Primary osteoarthritis of right knee [M17.11]   Evaluation Date: 4/26/2024  Authorization Period Expiration: 12/31/24  Plan of Care Expiration: 7/26/24  Progress Note Due: 5/26/24  Date of Surgery: N/A  Visit # / Visits authorized: 1/ 1   FOTO: 1/ 3    Precautions: Standard     Time In: 8:00  Time Out: 8:42  Total Billable Time: 42 minutes    Subjective     Date of onset: July 2024    History of current condition - Shawna reports: pain along the medial side of the right knee. She got a steroid injection two weeks ago which felt great until it wore off. She got her first gel injection yesterday. She has a history of menisectomy in July of 2023. She gets a quick shooting pain with stairs and heavy lifting mostly. She does have stiffness in the morning, but not as bad a sit was previously. She also notes she has lost strength in the leg. She did have tens before at end of her PT sessions.     Falls: no    Imaging: : FINDINGS:  The skeletal structures at the knees are intact.  Alignment shows minimal lateral subluxation of both patellae.  Joint spaces at the left knee are satisfactory without significant cartilage loss, erosion, or joint effusion.  The right knee does show some narrowing of the medial tibiofemoral joint space consistent with degenerative joint disease.  Other right knee joint spaces are better preserved.  Small joint effusion may be present on the right    Prior Therapy: yes  Social History:  lives with their family; 7 steps to  enter which caused pain prior to injection   Occupation: retired; enjoys regular exercise and gardening   Prior Level of Function: independent  Current Level of Function: limited in her hobbies (exercises, gardening). She is not able to weight lift, squat, or even do yoga.     Pain:  Current 3/10, worst 7/10, best 0/10   Location: right knee  Description: sharp with stairs  Aggravating Factors: stairs, lifting weights  Easing Factors: ibuprofen, injection    Patients goals: reduce the knee pain, return to her desired activities.      Medical History:   Past Medical History:   Diagnosis Date    Allergy     Anxiety     Asthma     Focal nodular hyperplasia of liver     GERD (gastroesophageal reflux disease)     Hyperlipidemia     Thyroid disease     hypothyroidism       Surgical History:   Shawna Mayers  has a past surgical history that includes Cholecystectomy; Foot surgery; [place] tooth extraction; Knee arthroscopy w/ meniscectomy (Right, 7/26/2023); and Arthroscopic chondroplasty of knee joint (Right, 7/26/2023).    Medications:   Shawna has a current medication list which includes the following prescription(s): estradiol, fluticasone propionate, lactobacillus rhamnosus gg, levothyroxine, and progesterone.    Allergies:   Review of patient's allergies indicates:   Allergen Reactions    Adhesive Blisters    Anucort-hc [hydrocortisone acetate] Hives and Nausea And Vomiting    Demerol [meperidine] Nausea And Vomiting    Sulfa (sulfonamide antibiotics) Hives    Amoxicillin Nausea And Vomiting and Rash    Macrobid [nitrofurantoin monohyd/m-cryst] Nausea Only     dizziness        Objective        Functional tests:   Quad set: fair on right, good on left      Range of Motion (Active):   Knee Right  Left    Flexion 127 129   Extension 0 4 hyper       Lower Extremity Strength  Right LE  Left LE    Quadriceps: 5/5 Quadriceps: 5/5   Hamstrings: 5/5 Hamstrings: 5/5   Hip flexion (seated): 5/5 Hip flexion (seated): 5/5  "  Hip extension:  4/5 Hip extension: 4/5   PGM: 4/5 PGM:  4+/5   Hip ER:  4-/5 with pain  Hip ER:  5/5   Hip IR: 4-/5 Hip IR: 5/5       Joint Mobility: patellar mobility is limited on right, normal on left     Palpation: no tenderness     Sensation: NT    Flexibility:    Hamstrings: R = min limitation ; L = min limitation     Edema: not present currently but pt reports swelling at superior medial patella by end of day    Intake Outcome Measure for FOTO knee Survey    Therapist reviewed FOTO scores for Shawna Mayers on 4/26/2024.   FOTO report - see Media section or FOTO account episode details.    Intake Score: 48%         Treatment     Total Treatment time (time-based codes) separate from Evaluation: 10 minutes     Shawna received the treatments listed below:      therapeutic exercises to develop strength, endurance, ROM, flexibility, posture, and core stabilization for 10 minutes including:  Adductor stretch 2x30"  Sidelying hip adduction 2x10 B  Sidelying clamshells 2x10 B  Sidelying reverse clamshells 2x10 B    manual therapy techniques: Joint mobilizations, Manual traction, and Soft tissue Mobilization were applied for 0 minutes, including:      neuromuscular re-education activities to improve: Balance, Coordination, Kinesthetic, Sense, Proprioception, and Posture for 0 minutes. The following activities were included:      therapeutic activities to improve functional performance for 0  minutes, including:        Patient Education and Home Exercises     Education provided:   - education on condition  -home exercise program    Written Home Exercises Provided: yes. Exercises were reviewed and Shawna was able to demonstrate them prior to the end of the session.  Shawna demonstrated good  understanding of the education provided. See EMR under Patient Instructions for exercises provided during therapy sessions.    Assessment     Shawna is a 53 y.o. female referred to outpatient Physical Therapy with a medical " diagnosis of Chronic pain of right knee [M25.561, G89.29], Primary osteoarthritis of right knee [M17.11] . Patient presents with chronic history of right knee pain with temporary relief following menisectomy, then by cortisone injection, followed by gel injection. She presents today with mild hip weakness likely partially limited by knee pain, limited knee extension range of motion, and limited joint mobility. She will benefit from skilled outpatient PT to address these deficits and restore max function.     Patient prognosis is Good.   Patient will benefit from skilled outpatient Physical Therapy to address the deficits stated above and in the chart below, provide patient /family education, and to maximize patientt's level of independence.     Plan of care discussed with patient: Yes  Patient's spiritual, cultural and educational needs considered and patient is agreeable to the plan of care and goals as stated below:     Anticipated Barriers for therapy: none    Medical Necessity is demonstrated by the following  History  Co-morbidities and personal factors that may impact the plan of care [x] LOW: no personal factors / co-morbidities  [] MODERATE: 1-2 personal factors / co-morbidities  [] HIGH: 3+ personal factors / co-morbidities    Moderate / High Support Documentation:   Co-morbidities affecting plan of care:     Personal Factors:   no deficits     Examination  Body Structures and Functions, activity limitations and participation restrictions that may impact the plan of care [x] LOW: addressing 1-2 elements  [] MODERATE: 3+ elements  [] HIGH: 4+ elements (please support below)    Moderate / High Support Documentation:      Clinical Presentation [x] LOW: stable  [] MODERATE: Evolving  [] HIGH: Unstable     Decision Making/ Complexity Score: low       GOALS: (not met, progressing unless otherwise specified)   Short Term Goals:  4 weeks  1.Report decreased right knee pain  < / =  4/10 at worst to increase  tolerance for gardening  2. Increase knee ROM to 4 degrees hyperextension in order to be able to perform ADLs without difficulty.  3. Increase strength by 1/3 MMT grade in bilateral lower extremity to increase tolerance for ADL and work activities.  4. Pt to tolerate HEP to improve ROM and independence with ADL's    Long Term Goals: 8 weeks  1.Report decreased right knee pain < / = 2/10  to increase tolerance for daily exercise   2.Patient goal: Pt to tolerate 5+ minutes of incline walking on treadmill to prepare for hiking trip in May  3.Increase strength to >/= 4+/5 in bilateral lower extremity to increase tolerance for ADL and work activities.  4. Pt will report at CJ level (20-40% impaired) on FOTO knee to demonstrate increase in LE function with every day tasks.    Plan     Plan of care Certification: 4/26/2024 to 7/26/24.    Outpatient Physical Therapy 2 times weekly for 10 weeks to include the following interventions: Aquatic Therapy, Cervical/Lumbar Traction, Electrical Stimulation  , Gait Training, Manual Therapy, Moist Heat/ Ice, Neuromuscular Re-ed, Patient Education, Therapeutic Activities, Therapeutic Exercise, and Ultrasound.     Ines Barnett, PT        Physician's Signature: _________________________________________ Date: ________________

## 2024-05-02 ENCOUNTER — OFFICE VISIT (OUTPATIENT)
Dept: SPORTS MEDICINE | Facility: CLINIC | Age: 54
End: 2024-05-02
Payer: COMMERCIAL

## 2024-05-02 VITALS
DIASTOLIC BLOOD PRESSURE: 78 MMHG | WEIGHT: 165.38 LBS | SYSTOLIC BLOOD PRESSURE: 123 MMHG | BODY MASS INDEX: 28.24 KG/M2 | HEART RATE: 63 BPM | HEIGHT: 64 IN

## 2024-05-02 DIAGNOSIS — M17.11 PRIMARY OSTEOARTHRITIS OF RIGHT KNEE: Primary | ICD-10-CM

## 2024-05-02 PROCEDURE — 20610 DRAIN/INJ JOINT/BURSA W/O US: CPT | Mod: RT,S$GLB,, | Performed by: PHYSICIAN ASSISTANT

## 2024-05-02 PROCEDURE — 99499 UNLISTED E&M SERVICE: CPT | Mod: 25,S$GLB,, | Performed by: PHYSICIAN ASSISTANT

## 2024-05-02 PROCEDURE — 99999 PR PBB SHADOW E&M-EST. PATIENT-LVL III: CPT | Mod: PBBFAC,,, | Performed by: PHYSICIAN ASSISTANT

## 2024-05-02 NOTE — PROGRESS NOTES
Patient is here for follow up of right knee arthritis. Pt is requesting Orthovisc injection #2.  PMFH reviewed per encounter record. Has failed other conservative modalities including NSAIDS, activity modification, weight loss.       PHYSICAL EXAMINATION:      General: The patient is alert and oriented x 3. Mood is pleasant.   Observation of ears, eyes and nose reveals no gross abnormalities. No   labored breathing observed.      No signs of infection or adverse reaction to the right knee.    X-rays show osteoarthritis of the right knee with a Kellgren Mark grade of 2    Orthovisc Injection Procedure #2 - right knee    A time out was performed, including verification of patient ID, procedure, site and side, availability of information and equipment, review of safety issues, and agreement with consent, the procedure site was marked.    The patient was prepped aseptically with povidone-iodine swabsticks. A diagnostic and therapeutic injection of 2cc Orthovisc was given under sterile technique using a 22g x 1.5 needle from the superolateral aspect of the right knee Joint in the supine position.   Shawna Mayers had no adverse reactions to the medication. Pain decreased. female was instructed to apply ice to the joint for 20 minutes and avoid strenuous activities for 24-36 hours following the injection. female was warned of possible blood pressure changes during that time. Following that time, female can resume activities as prior to the injection.    female was reminded to call the clinic immediately for any adverse side effects as explained in clinic today.    Exp:  3/2/2026  Lot:  [id number]

## 2024-05-02 NOTE — PROCEDURES
Large Joint Aspiration/Injection: R knee    Date/Time: 5/2/2024 10:00 AM    Performed by: Lauro James PA-C  Authorized by: Lauro James PA-C    Consent Done?:  Yes (Verbal)  Indications:  Pain and arthritis  Site marked: the procedure site was marked    Timeout: prior to procedure the correct patient, procedure, and site was verified    Prep: patient was prepped and draped in usual sterile fashion    Local anesthesia used?: No      Details:  Needle Size:  22 G  Ultrasonic Guidance for needle placement?: No    Approach:  Superior  Location:  Knee  Site:  R knee  Medications:  30 mg sodium hyaluronate (orthovisc) 30 mg/2 mL  Patient tolerance:  Patient tolerated the procedure well with no immediate complications

## 2024-05-03 NOTE — PROGRESS NOTES
"[facility]   Physical Therapy Treatment Note      Name: Shawna [facility]son [facility]  Clinic Number: [id number]    Therapy Diagnosis:   Encounter Diagnoses   Name Primary?    Chronic pain of right knee Yes    Primary osteoarthritis of right knee     Decreased range of motion (ROM) of right knee      Physician: Fidel Michelle MD    Visit Date: 5/6/2024    Physician Orders: PT Eval and Treat   Medical Diagnosis from Referral: Chronic pain of right knee [M25.561, G89.29], Primary osteoarthritis of right knee [M17.11]   Evaluation Date: 4/26/2024  Authorization Period Expiration: 12/31/24  Plan of Care Expiration: 7/26/24  Progress Note Due: 5/26/24  Date of Surgery: N/A  Visit # / Visits authorized: 1/ 20  FOTO: 1/ 3     Precautions: Standard      Time In: 7:02 am  Time Out: 7:56 am  Total Billable Time: 54 minutes    PTA Visit #: 1/5       Subjective     Patient reports: she had the second shot last week and will be getting the third one Thursday and she thinks they are helping  She was compliant with home exercise program.  Response to previous treatment: tolerated well  Functional change: ongoing    Pain: 0/10  Location: right knee      Objective      Objective Measures updated at progress report unless specified.     Treatment     Shawna received the treatments listed below:      therapeutic exercises to develop strength, endurance, ROM, flexibility, posture, and core stabilization for 44 minutes including:  Upright bike 5'  Adductor stretch 2x30"  Sidelying hip adduction 2x10 B  Side lying hip abduction 2x10 B  Sidelying clamshells with green band 2x10 B  Sidelying reverse clamshells 2x10 B  Bridges 2x10     manual therapy techniques: Joint mobilizations, Manual traction, and Soft tissue Mobilization were applied for 0 minutes, including:        neuromuscular re-education activities to improve: Balance, Coordination, Kinesthetic, Sense, Proprioception, and Posture for 10 minutes. The " following activities were included:  SLR 2x10  LAQ with 3# 2x10     therapeutic activities to improve functional performance for 0  minutes, including:    Patient Education and Home Exercises       Education provided:   - HEP    Written Home Exercises Provided: Patient instructed to cont prior HEP. Exercises were reviewed and Shawna was able to demonstrate them prior to the end of the session.  Shawna demonstrated good  understanding of the education provided. See Electronic Medical Record under Patient Instructions for exercises provided during therapy sessions    Assessment     Shawna presents to treatment with no knee pain. She reports she will be getting her last gel injection this week and has gotten some relief after the second one. She was able to tolerate exercises as noted with no increase in pain. Emphasis on hip/glute strengthening. Discussed using a resistance band when performing squats to reduce discomfort. Will attempt next session.     Shawna Is progressing well towards her goals.   Patient prognosis is Good.     Patient will continue to benefit from skilled outpatient physical therapy to address the deficits listed in the problem list box on initial evaluation, provide pt/family education and to maximize pt's level of independence in the home and community environment.     Patient's spiritual, cultural and educational needs considered and pt agreeable to plan of care and goals.     Anticipated barriers to physical therapy: none    GOALS: (not met, progressing unless otherwise specified)   Short Term Goals:  4 weeks  1.Report decreased right knee pain  < / =  4/10 at worst to increase tolerance for gardening  2. Increase knee ROM to 4 degrees hyperextension in order to be able to perform ADLs without difficulty.  3. Increase strength by 1/3 MMT grade in bilateral lower extremity to increase tolerance for ADL and work activities.  4. Pt to tolerate HEP to improve ROM and independence with ADL's      Long Term Goals: 8 weeks  1.Report decreased right knee pain < / = 2/10  to increase tolerance for daily exercise   2.Patient goal: Pt to tolerate 5+ minutes of incline walking on treadmill to prepare for hiking trip in May  3.Increase strength to >/= 4+/5 in bilateral lower extremity to increase tolerance for ADL and work activities.  4. Pt will report at CJ level (20-40% impaired) on FOTO knee to demonstrate increase in LE function with every day tasks.    Plan     Continue to progress as tolerated.     Jaylin Franco, PTA

## 2024-05-06 ENCOUNTER — CLINICAL SUPPORT (OUTPATIENT)
Dept: REHABILITATION | Facility: HOSPITAL | Age: 54
End: 2024-05-06
Payer: COMMERCIAL

## 2024-05-06 ENCOUNTER — PATIENT MESSAGE (OUTPATIENT)
Dept: OBSTETRICS AND GYNECOLOGY | Facility: CLINIC | Age: 54
End: 2024-05-06
Payer: COMMERCIAL

## 2024-05-06 DIAGNOSIS — Z79.890 HORMONE REPLACEMENT THERAPY (HRT): ICD-10-CM

## 2024-05-06 DIAGNOSIS — N93.9 VAGINAL BLEEDING: Primary | ICD-10-CM

## 2024-05-06 DIAGNOSIS — M25.561 CHRONIC PAIN OF RIGHT KNEE: Primary | ICD-10-CM

## 2024-05-06 DIAGNOSIS — M25.661 DECREASED RANGE OF MOTION (ROM) OF RIGHT KNEE: ICD-10-CM

## 2024-05-06 DIAGNOSIS — G89.29 CHRONIC PAIN OF RIGHT KNEE: Primary | ICD-10-CM

## 2024-05-06 DIAGNOSIS — M17.11 PRIMARY OSTEOARTHRITIS OF RIGHT KNEE: ICD-10-CM

## 2024-05-06 PROCEDURE — 97112 NEUROMUSCULAR REEDUCATION: CPT | Mod: PO,CQ

## 2024-05-06 PROCEDURE — 97110 THERAPEUTIC EXERCISES: CPT | Mod: PO,CQ

## 2024-05-07 NOTE — PROGRESS NOTES
"[facility]   Physical Therapy Treatment Note      Name: Shawna Woodson [facility]  Clinic Number: [id number]    Therapy Diagnosis:   Encounter Diagnoses   Name Primary?    Chronic pain of right knee Yes    Primary osteoarthritis of right knee     Decreased range of motion (ROM) of right knee        Physician: Fidel Michelle MD    Visit Date: 5/8/2024    Physician Orders: PT Eval and Treat   Medical Diagnosis from Referral: Chronic pain of right knee [M25.561, G89.29], Primary osteoarthritis of right knee [M17.11]   Evaluation Date: 4/26/2024  Authorization Period Expiration: 12/31/24  Plan of Care Expiration: 7/26/24  Progress Note Due: 5/26/24  Date of Surgery: N/A  Visit # / Visits authorized: 2/ 20  FOTO: 1/ 3     Precautions: Standard      Time In: 7:05 am  Time Out: 7:54 am  Total Billable Time: 49 minutes    PTA Visit #: 2/5       Subjective     Patient reports: she felt good after last session, had a little pain in her knee, but mostly just glute soreness  She was compliant with home exercise program.  Response to previous treatment: tolerated well  Functional change: ongoing    Pain: 0/10  Location: right knee      Objective      Objective Measures updated at progress report unless specified.     Treatment     Shawna received the treatments listed below:      therapeutic exercises to develop strength, endurance, ROM, flexibility, posture, and core stabilization for 29 minutes including:  Upright bike 5'  Adductor stretch 2x30"  Sidelying hip adduction 2x10 B  Side lying hip abduction 2x10 B  Sidelying clamshells with green band 2x10 B  Sidelying reverse clamshells 2x10 B  Bridges 2x10     manual therapy techniques: Joint mobilizations, Manual traction, and Soft tissue Mobilization were applied for 0 minutes, including:        neuromuscular re-education activities to improve: Balance, Coordination, Kinesthetic, Sense, Proprioception, and Posture for 10 minutes. The following " activities were included:  SLR 2x10  LAQ with 3# 2x10  Lateral walking with green band around lower leg 2 laps     therapeutic activities to improve functional performance for 10 minutes, including:  TRX squats with green band around knees 2x10  Reviewed lifting techniques for dead lifts and squatting at home       Patient Education and Home Exercises       Education provided:   - HEP    Written Home Exercises Provided: Patient instructed to cont prior HEP. Exercises were reviewed and Shawna was able to demonstrate them prior to the end of the session.  Shawna demonstrated good  understanding of the education provided. See Electronic Medical Record under Patient Instructions for exercises provided during therapy sessions    Assessment     Shawna reports glute soreness following last session and minimal knee soreness. Good tolerance to exercises with no increase in pain. Added TRX squats with green band around knees with emphasis on technique. Also educated her on dead lifting technique and squats for home workouts. Will continue to progress with functional strengthening as tolerated.     Shawna Is progressing well towards her goals.   Patient prognosis is Good.     Patient will continue to benefit from skilled outpatient physical therapy to address the deficits listed in the problem list box on initial evaluation, provide pt/family education and to maximize pt's level of independence in the home and community environment.     Patient's spiritual, cultural and educational needs considered and pt agreeable to plan of care and goals.     Anticipated barriers to physical therapy: none    GOALS: (not met, progressing unless otherwise specified)   Short Term Goals:  4 weeks  1.Report decreased right knee pain  < / =  4/10 at worst to increase tolerance for gardening  2. Increase knee ROM to 4 degrees hyperextension in order to be able to perform ADLs without difficulty.  3. Increase strength by 1/3 MMT grade in bilateral  lower extremity to increase tolerance for ADL and work activities.  4. Pt to tolerate HEP to improve ROM and independence with ADL's     Long Term Goals: 8 weeks  1.Report decreased right knee pain < / = 2/10  to increase tolerance for daily exercise   2.Patient goal: Pt to tolerate 5+ minutes of incline walking on treadmill to prepare for hiking trip in May  3.Increase strength to >/= 4+/5 in bilateral lower extremity to increase tolerance for ADL and work activities.  4. Pt will report at CJ level (20-40% impaired) on FOTO knee to demonstrate increase in LE function with every day tasks.    Plan     Continue to progress as tolerated.     Jaylin Franco, PTA

## 2024-05-08 ENCOUNTER — CLINICAL SUPPORT (OUTPATIENT)
Dept: REHABILITATION | Facility: HOSPITAL | Age: 54
End: 2024-05-08
Payer: COMMERCIAL

## 2024-05-08 DIAGNOSIS — M25.561 CHRONIC PAIN OF RIGHT KNEE: Primary | ICD-10-CM

## 2024-05-08 DIAGNOSIS — M25.661 DECREASED RANGE OF MOTION (ROM) OF RIGHT KNEE: ICD-10-CM

## 2024-05-08 DIAGNOSIS — G89.29 CHRONIC PAIN OF RIGHT KNEE: Primary | ICD-10-CM

## 2024-05-08 DIAGNOSIS — M17.11 PRIMARY OSTEOARTHRITIS OF RIGHT KNEE: ICD-10-CM

## 2024-05-08 PROCEDURE — 97110 THERAPEUTIC EXERCISES: CPT | Mod: PO,CQ

## 2024-05-08 PROCEDURE — 97530 THERAPEUTIC ACTIVITIES: CPT | Mod: PO,CQ

## 2024-05-08 PROCEDURE — 97112 NEUROMUSCULAR REEDUCATION: CPT | Mod: PO,CQ

## 2024-05-09 ENCOUNTER — OFFICE VISIT (OUTPATIENT)
Dept: SPORTS MEDICINE | Facility: CLINIC | Age: 54
End: 2024-05-09
Payer: COMMERCIAL

## 2024-05-09 VITALS
DIASTOLIC BLOOD PRESSURE: 85 MMHG | BODY MASS INDEX: 27.83 KG/M2 | HEIGHT: 64 IN | HEART RATE: 72 BPM | WEIGHT: 163 LBS | SYSTOLIC BLOOD PRESSURE: 122 MMHG

## 2024-05-09 DIAGNOSIS — M17.11 PRIMARY OSTEOARTHRITIS OF RIGHT KNEE: Primary | ICD-10-CM

## 2024-05-09 PROCEDURE — 99999 PR PBB SHADOW E&M-EST. PATIENT-LVL III: CPT | Mod: PBBFAC,,, | Performed by: PHYSICIAN ASSISTANT

## 2024-05-09 PROCEDURE — 20610 DRAIN/INJ JOINT/BURSA W/O US: CPT | Mod: RT,S$GLB,, | Performed by: PHYSICIAN ASSISTANT

## 2024-05-09 PROCEDURE — 99499 UNLISTED E&M SERVICE: CPT | Mod: 25,S$GLB,, | Performed by: PHYSICIAN ASSISTANT

## 2024-05-09 NOTE — PROGRESS NOTES
Patient is here for follow up of right knee arthritis. Pt is requesting Orthovisc injection #3.  PMFH reviewed per encounter record. Has failed other conservative modalities including NSAIDS, activity modification, weight loss.       PHYSICAL EXAMINATION:      General: The patient is alert and oriented x 3. Mood is pleasant.   Observation of ears, eyes and nose reveals no gross abnormalities. No   labored breathing observed.      No signs of infection or adverse reaction to the right knee.    X-rays show osteoarthritis of the right knee with a Kellgren Mark grade of 2    Orthovisc Injection Procedure #3 - right knee    A time out was performed, including verification of patient ID, procedure, site and side, availability of information and equipment, review of safety issues, and agreement with consent, the procedure site was marked.    The patient was prepped aseptically with povidone-iodine swabsticks. A diagnostic and therapeutic injection of 2cc Orthovisc was given under sterile technique using a 22g x 1.5 needle from the superolateral aspect of the right knee Joint in the supine position.   Shawna Mayers had no adverse reactions to the medication. Pain decreased. female was instructed to apply ice to the joint for 20 minutes and avoid strenuous activities for 24-36 hours following the injection. female was warned of possible blood pressure changes during that time. Following that time, female can resume activities as prior to the injection.    female was reminded to call the clinic immediately for any adverse side effects as explained in clinic today.    Exp:  3/2/2026  Lot:  [id number]      Follow-up virtually in 3 months.

## 2024-05-09 NOTE — PROCEDURES
Large Joint Aspiration/Injection: R knee    Date/Time: 5/9/2024 10:00 AM    Performed by: Lauro James PA-C  Authorized by: Lauro James PA-C    Consent Done?:  Yes (Verbal)  Indications:  Pain and arthritis  Site marked: the procedure site was marked    Timeout: prior to procedure the correct patient, procedure, and site was verified    Prep: patient was prepped and draped in usual sterile fashion    Local anesthesia used?: No      Details:  Needle Size:  22 G  Ultrasonic Guidance for needle placement?: No    Approach:  Superior  Location:  Knee  Site:  R knee  Medications:  30 mg sodium hyaluronate (orthovisc) 30 mg/2 mL  Patient tolerance:  Patient tolerated the procedure well with no immediate complications     self-care

## 2024-05-13 NOTE — PROGRESS NOTES
"[facility]   Physical Therapy Treatment Note      Name: Shawna Woodson [facility]  Clinic Number: [id number]    Therapy Diagnosis:   Encounter Diagnoses   Name Primary?    Chronic pain of right knee Yes    Primary osteoarthritis of right knee     Decreased range of motion (ROM) of right knee          Physician: Fidel Michelle MD    Visit Date: 5/14/2024    Physician Orders: PT Eval and Treat   Medical Diagnosis from Referral: Chronic pain of right knee [M25.561, G89.29], Primary osteoarthritis of right knee [M17.11]   Evaluation Date: 4/26/2024  Authorization Period Expiration: 12/31/24  Plan of Care Expiration: 7/26/24  Progress Note Due: 5/26/24  Date of Surgery: N/A  Visit # / Visits authorized: 3/ 20  FOTO: 1/ 3     Precautions: Standard      Time In: 7:00 am  Time Out: 7:53 am  Total Billable Time: 53 minutes    PTA Visit #: 3/5       Subjective     Patient reports: She had some soreness from the 3rd shot but is feeling better now  She was compliant with home exercise program.  Response to previous treatment: tolerated well  Functional change: ongoing    Pain: 0/10  Location: right knee      Objective      Objective Measures updated at progress report unless specified.     Treatment     Shawna received the treatments listed below:      Bold activities performed this session    therapeutic exercises to develop strength, endurance, ROM, flexibility, posture, and core stabilization for 23 minutes including:  Upright bike 5'  Adductor stretch 2x30"  Sidelying hip adduction 2x10 B +2#  Side lying hip abduction 2x10 B +2#  Sidelying clamshells with green band 2x10 B   Sidelying reverse clamshells 2x10 B +2#  Bridges 2x10     manual therapy techniques: Joint mobilizations, Manual traction, and Soft tissue Mobilization were applied for 0 minutes, including:        neuromuscular re-education activities to improve: Balance, Coordination, Kinesthetic, Sense, Proprioception, and Posture for 10 " minutes. The following activities were included:  SLR 2x10 B +2#  LAQ with +4# 2x10  Lateral walking with green band around lower leg 2 laps     therapeutic activities to improve functional performance for 20 minutes, including:  TRX squats with green band around knees 2x10  +TRX lunges 2x10  +Dead lifts to 12 in box with 15# KB 2x10  +step ups on 4 in step 2x10 B  +heel taps from 4 in step 2x10 B  Reviewed lifting techniques for dead lifts and squatting at home       Patient Education and Home Exercises       Education provided:   - HEP    Written Home Exercises Provided: Patient instructed to cont prior HEP. Exercises were reviewed and Shawna was able to demonstrate them prior to the end of the session.  Shawna demonstrated good  understanding of the education provided. See Electronic Medical Record under Patient Instructions for exercises provided during therapy sessions    Assessment     Shawna reported some soreness following her third shot, but two days later she was feeling better. She was able to tolerate increased resistance on most exercises today as well as addition of functional strength training activities. She completed all without an increase in pain. Cueing and instruction on technique for all new exercises. Continue to progress as tolerated.     Shawna Is progressing well towards her goals.   Patient prognosis is Good.     Patient will continue to benefit from skilled outpatient physical therapy to address the deficits listed in the problem list box on initial evaluation, provide pt/family education and to maximize pt's level of independence in the home and community environment.     Patient's spiritual, cultural and educational needs considered and pt agreeable to plan of care and goals.     Anticipated barriers to physical therapy: none    GOALS: (not met, progressing unless otherwise specified)   Short Term Goals:  4 weeks  1.Report decreased right knee pain  < / =  4/10 at worst to increase  tolerance for gardening  2. Increase knee ROM to 4 degrees hyperextension in order to be able to perform ADLs without difficulty.  3. Increase strength by 1/3 MMT grade in bilateral lower extremity to increase tolerance for ADL and work activities.  4. Pt to tolerate HEP to improve ROM and independence with ADL's     Long Term Goals: 8 weeks  1.Report decreased right knee pain < / = 2/10  to increase tolerance for daily exercise   2.Patient goal: Pt to tolerate 5+ minutes of incline walking on treadmill to prepare for hiking trip in May  3.Increase strength to >/= 4+/5 in bilateral lower extremity to increase tolerance for ADL and work activities.  4. Pt will report at CJ level (20-40% impaired) on FOTO knee to demonstrate increase in LE function with every day tasks.    Plan     Continue to progress as tolerated.     Jaylin Franco, PTA

## 2024-05-14 ENCOUNTER — CLINICAL SUPPORT (OUTPATIENT)
Dept: REHABILITATION | Facility: HOSPITAL | Age: 54
End: 2024-05-14
Payer: COMMERCIAL

## 2024-05-14 DIAGNOSIS — G89.29 CHRONIC PAIN OF RIGHT KNEE: Primary | ICD-10-CM

## 2024-05-14 DIAGNOSIS — M25.661 DECREASED RANGE OF MOTION (ROM) OF RIGHT KNEE: ICD-10-CM

## 2024-05-14 DIAGNOSIS — M17.11 PRIMARY OSTEOARTHRITIS OF RIGHT KNEE: ICD-10-CM

## 2024-05-14 DIAGNOSIS — M25.561 CHRONIC PAIN OF RIGHT KNEE: Primary | ICD-10-CM

## 2024-05-14 PROCEDURE — 97110 THERAPEUTIC EXERCISES: CPT | Mod: PO,CQ

## 2024-05-14 PROCEDURE — 97112 NEUROMUSCULAR REEDUCATION: CPT | Mod: PO,CQ

## 2024-05-14 PROCEDURE — 97530 THERAPEUTIC ACTIVITIES: CPT | Mod: PO,CQ

## 2024-05-15 NOTE — PROGRESS NOTES
"[facility]   Physical Therapy Treatment Note      Name: Shawna Woodson [facility]  Clinic Number: [id number]    Therapy Diagnosis:   Encounter Diagnoses   Name Primary?    Chronic pain of right knee Yes    Primary osteoarthritis of right knee     Decreased range of motion (ROM) of right knee            Physician: Fidel Michelle MD    Visit Date: 5/16/2024    Physician Orders: PT Eval and Treat   Medical Diagnosis from Referral: Chronic pain of right knee [M25.561, G89.29], Primary osteoarthritis of right knee [M17.11]   Evaluation Date: 4/26/2024  Authorization Period Expiration: 12/31/24  Plan of Care Expiration: 7/26/24  Progress Note Due: 5/26/24  Date of Surgery: N/A  Visit # / Visits authorized: 4/ 20  FOTO: 1/ 3     Precautions: Standard      Time In: 7:00  Time Out: 8:00  Total Billable Time: 60 minutes    PTA Visit #: 0/5       Subjective     Patient reports: She had some soreness from the 3rd shot but is feeling better now  She was compliant with home exercise program.  Response to previous treatment: tolerated well  Functional change: ongoing    Pain: 0/10  Location: right knee      Objective      Objective Measures updated at progress report unless specified.     Treatment     Shawna received the treatments listed below:      Bold activities performed this session    therapeutic exercises to develop strength, endurance, ROM, flexibility, posture, and core stabilization for 25 minutes including:  Upright bike 5'  Adductor stretch 2x30"  Sidelying hip adduction 2x10 B 2#  Side lying hip abduction 2x10 B 2#  Sidelying clamshells with green band 2x10 B   Sidelying reverse clamshells 2x10 B 2#  Bridges with green band 2x10     manual therapy techniques: Joint mobilizations, Manual traction, and Soft tissue Mobilization were applied for 0 minutes, including:        neuromuscular re-education activities to improve: Balance, Coordination, Kinesthetic, Sense, Proprioception, and " "Posture for 15 minutes. The following activities were included:  SLR 2x10 B 2#  LAQ on precor 5# L, 2.5# R 3x8 B   Lateral walking with green band around lower leg 2 laps     therapeutic activities to improve functional performance for 20 minutes, including:  TRX squats with green band around knees 2x10  TRX lunges 2x10  Dead lifts to 12 in box with 15# KB 2x10  step ups on 4 in step 2x10 B  heel taps from 4 in step 2x10 B  12" glute focused step up at [employer] x12 B  Reviewed lifting techniques for dead lifts and squatting at home       Patient Education and Home Exercises       Education provided:   - HEP    Written Home Exercises Provided: Patient instructed to cont prior HEP. Exercises were reviewed and Shawna was able to demonstrate them prior to the end of the session.  Shawna demonstrated good  understanding of the education provided. See Electronic Medical Record under Patient Instructions for exercises provided during therapy sessions    Assessment     Shawna is able to perform exercise with min verbal cues today. Added glute focused step up with fatigue but no pain noted. Also progressed to LAQ at precor knee extension and discussed how weight at gym may differ from in clinic secondary to pulley systems. Will continue to progress within tolerance with focus on quad and glute strength.     Shawna Is progressing well towards her goals.   Patient prognosis is Good.     Patient will continue to benefit from skilled outpatient physical therapy to address the deficits listed in the problem list box on initial evaluation, provide pt/family education and to maximize pt's level of independence in the home and community environment.     Patient's spiritual, cultural and educational needs considered and pt agreeable to plan of care and goals.     Anticipated barriers to physical therapy: none    GOALS: (not met, progressing unless otherwise specified)   Short Term Goals:  4 weeks  1.Report decreased right knee " pain  < / =  4/10 at worst to increase tolerance for gardening  2. Increase knee ROM to 4 degrees hyperextension in order to be able to perform ADLs without difficulty.  3. Increase strength by 1/3 MMT grade in bilateral lower extremity to increase tolerance for ADL and work activities.  4. Pt to tolerate HEP to improve ROM and independence with ADL's     Long Term Goals: 8 weeks  1.Report decreased right knee pain < / = 2/10  to increase tolerance for daily exercise   2.Patient goal: Pt to tolerate 5+ minutes of incline walking on treadmill to prepare for hiking trip in May  3.Increase strength to >/= 4+/5 in bilateral lower extremity to increase tolerance for ADL and work activities.  4. Pt will report at CJ level (20-40% impaired) on FOTO knee to demonstrate increase in LE function with every day tasks.    Plan     Continue to progress as tolerated.     Ines Barnett, PT

## 2024-05-16 ENCOUNTER — CLINICAL SUPPORT (OUTPATIENT)
Dept: REHABILITATION | Facility: HOSPITAL | Age: 54
End: 2024-05-16
Payer: COMMERCIAL

## 2024-05-16 DIAGNOSIS — M25.661 DECREASED RANGE OF MOTION (ROM) OF RIGHT KNEE: ICD-10-CM

## 2024-05-16 DIAGNOSIS — G89.29 CHRONIC PAIN OF RIGHT KNEE: Primary | ICD-10-CM

## 2024-05-16 DIAGNOSIS — M25.561 CHRONIC PAIN OF RIGHT KNEE: Primary | ICD-10-CM

## 2024-05-16 DIAGNOSIS — M17.11 PRIMARY OSTEOARTHRITIS OF RIGHT KNEE: ICD-10-CM

## 2024-05-16 PROCEDURE — 97112 NEUROMUSCULAR REEDUCATION: CPT | Mod: PO

## 2024-05-16 PROCEDURE — 97110 THERAPEUTIC EXERCISES: CPT | Mod: PO

## 2024-05-16 PROCEDURE — 97530 THERAPEUTIC ACTIVITIES: CPT | Mod: PO

## 2024-05-27 NOTE — PROGRESS NOTES
[facility]   Physical Therapy Treatment Note / Reassessment     Name: Shawna Reedvirgie  Clinic Number: [id number]    Therapy Diagnosis:   Encounter Diagnoses   Name Primary?    Chronic pain of right knee Yes    Primary osteoarthritis of right knee     Decreased range of motion (ROM) of right knee              Physician: Fidel Michelle MD    Visit Date: 5/28/2024    Physician Orders: PT Eval and Treat   Medical Diagnosis from Referral: Chronic pain of right knee [M25.561, G89.29], Primary osteoarthritis of right knee [M17.11]   Evaluation Date: 4/26/2024  Authorization Period Expiration: 12/31/24  Plan of Care Expiration: 7/26/24  Progress Note Due: 6/28/24  Date of Surgery: N/A  Visit # / Visits authorized: 5/ 20  FOTO: 1/ 3     Precautions: Standard      Time In: 7:00  Time Out: 7:57  Total Billable Time: 57 minutes    PTA Visit #: 0/5       Subjective     Patient reports: still bothersome following prolonged sitting. She doesn't have many complaints and has been feeling really good.   She was compliant with home exercise program.  Response to previous treatment: tolerated well  Functional change: ongoing    Pain: 0/10  Location: right knee      Objective      Functional tests:   Quad set: fair on right, good on left        Range of Motion (Active):   Knee Right  Left    Flexion 127 129   Extension 0 4 hyper         Lower Extremity Strength  Right LE   Left LE     Quadriceps: 5/5 Quadriceps: 5/5   Hamstrings: 5/5 Hamstrings: 5/5   Hip flexion (seated): 5/5 Hip flexion (seated): 5/5   Hip extension:  4+/5 Hip extension: 4+/5   PGM: 4+/5 PGM:  5/5   Hip ER:  4+/5 with medial knee pain  Hip ER:  5/5   Hip IR: 4+/5 Hip IR: 5/5         Joint Mobility: patellar mobility is normal bilaterally       Palpation: no tenderness      Sensation: NT     Flexibility:               Hamstrings: R = no limitation ; L = no limitation      Edema: not present currently but pt reports mild swelling  "at superior medial patella by end of day     Intake Outcome Measure for FOTO knee Survey     Therapist reviewed FOTO scores for Shawna Mayers on 5/28/2024.   FOTO report - see Media section or FOTO account episode details.     Intake Score: 66%           Treatment     Shawna received the treatments listed below:      Bold activities performed this session    therapeutic exercises to develop strength, endurance, ROM, flexibility, posture, and core stabilization for 29 minutes including:  Upright bike 5'  Adductor stretch 2x30"  Sidelying hip adduction 2x10 B 3#  Side lying hip abduction 2x10 B 3#  Sidelying clamshells with green band 2x10 B   Sidelying reverse clamshells 2x10 B 3#  Bridges with green band 2x10     manual therapy techniques: Joint mobilizations, Manual traction, and Soft tissue Mobilization were applied for 0 minutes, including:        neuromuscular re-education activities to improve: Balance, Coordination, Kinesthetic, Sense, Proprioception, and Posture for 5 minutes. The following activities were included:  SLR 2x10 B 3#  LAQ on precor 5# L, 2.5# R 3x8 B   Lateral walking with green band around lower leg 2 laps     therapeutic activities to improve functional performance for 23 minutes, including:  TRX squats with green band around knees 2x10  TRX lunges 2x10 (Left with wide stance using smith machine for balance)  Dead lifts to 12 in box with 15# KB 2x10  step ups on 4 in step 2x10 B  heel taps from 4 in step 2x10 B  12" glute focused step up at smith machine x12 B  Reviewed lifting techniques for dead lifts and squatting at home       Patient Education and Home Exercises       Education provided:   - HEP    Written Home Exercises Provided: Patient instructed to cont prior HEP. Exercises were reviewed and Shawna was able to demonstrate them prior to the end of the session.  Shawna demonstrated good  understanding of the education provided. See Electronic Medical Record under Patient " Instructions for exercises provided during therapy sessions    Assessment     Shawna tolerated progressions well today including 3# weights and swap to reverse lunge with wide base vs TRX lunge to address L knee pain. Reassessment reveals significant strength improvements. Continue to progress.     Shawna Is progressing well towards her goals.   Patient prognosis is Good.     Patient will continue to benefit from skilled outpatient physical therapy to address the deficits listed in the problem list box on initial evaluation, provide pt/family education and to maximize pt's level of independence in the home and community environment.     Patient's spiritual, cultural and educational needs considered and pt agreeable to plan of care and goals.     Anticipated barriers to physical therapy: none    GOALS: (not met, progressing unless otherwise specified)   Short Term Goals:  4 weeks   1.Report decreased right knee pain  < / =  4/10 at worst to increase tolerance for gardening MET  2. Increase knee ROM to 4 degrees hyperextension in order to be able to perform ADLs without difficulty.  3. Increase strength by 1/3 MMT grade in bilateral lower extremity to increase tolerance for ADL and work activities. MET  4. Pt to tolerate HEP to improve ROM and independence with ADL's MET     Long Term Goals: 8 weeks  1.Report decreased right knee pain < / = 2/10  to increase tolerance for daily exercise   2.Patient goal: Pt to tolerate 5+ minutes of incline walking on treadmill to prepare for hiking trip in May MET  3.Increase strength to >/= 4+/5 in bilateral lower extremity to increase tolerance for ADL and work activities.  4. Pt will report at CJ level (20-40% impaired) on FOTO knee to demonstrate increase in LE function with every day tasks.    Plan     Continue to progress as tolerated.     Ines Barnett, PT

## 2024-05-28 ENCOUNTER — CLINICAL SUPPORT (OUTPATIENT)
Dept: REHABILITATION | Facility: HOSPITAL | Age: 54
End: 2024-05-28
Payer: COMMERCIAL

## 2024-05-28 ENCOUNTER — OFFICE VISIT (OUTPATIENT)
Dept: DERMATOLOGY | Facility: CLINIC | Age: 54
End: 2024-05-28
Payer: COMMERCIAL

## 2024-05-28 DIAGNOSIS — M17.11 PRIMARY OSTEOARTHRITIS OF RIGHT KNEE: ICD-10-CM

## 2024-05-28 DIAGNOSIS — D22.39 FIBROUS PAPULE OF NOSE: ICD-10-CM

## 2024-05-28 DIAGNOSIS — M25.561 CHRONIC PAIN OF RIGHT KNEE: Primary | ICD-10-CM

## 2024-05-28 DIAGNOSIS — M25.661 DECREASED RANGE OF MOTION (ROM) OF RIGHT KNEE: ICD-10-CM

## 2024-05-28 DIAGNOSIS — B35.1 ONYCHOMYCOSIS: ICD-10-CM

## 2024-05-28 DIAGNOSIS — D48.5 NEOPLASM OF UNCERTAIN BEHAVIOR OF SKIN: Primary | ICD-10-CM

## 2024-05-28 DIAGNOSIS — G89.29 CHRONIC PAIN OF RIGHT KNEE: Primary | ICD-10-CM

## 2024-05-28 PROCEDURE — 1159F MED LIST DOCD IN RCRD: CPT | Mod: CPTII,S$GLB,, | Performed by: DERMATOLOGY

## 2024-05-28 PROCEDURE — 99214 OFFICE O/P EST MOD 30 MIN: CPT | Mod: 25,S$GLB,, | Performed by: DERMATOLOGY

## 2024-05-28 PROCEDURE — 97110 THERAPEUTIC EXERCISES: CPT | Mod: PO

## 2024-05-28 PROCEDURE — 3044F HG A1C LEVEL LT 7.0%: CPT | Mod: CPTII,S$GLB,, | Performed by: DERMATOLOGY

## 2024-05-28 PROCEDURE — 97530 THERAPEUTIC ACTIVITIES: CPT | Mod: PO

## 2024-05-28 PROCEDURE — 88305 TISSUE EXAM BY PATHOLOGIST: CPT | Mod: 26,,, | Performed by: DERMATOLOGY

## 2024-05-28 PROCEDURE — 88305 TISSUE EXAM BY PATHOLOGIST: CPT | Performed by: DERMATOLOGY

## 2024-05-28 PROCEDURE — 11102 TANGNTL BX SKIN SINGLE LES: CPT | Mod: S$GLB,,, | Performed by: DERMATOLOGY

## 2024-05-28 PROCEDURE — 1160F RVW MEDS BY RX/DR IN RCRD: CPT | Mod: CPTII,S$GLB,, | Performed by: DERMATOLOGY

## 2024-05-28 RX ORDER — TERBINAFINE HYDROCHLORIDE 250 MG/1
250 TABLET ORAL DAILY
Qty: 42 TABLET | Refills: 0 | Status: SHIPPED | OUTPATIENT
Start: 2024-05-28 | End: 2024-06-27

## 2024-05-28 NOTE — PROGRESS NOTES
Patient Information  Name: Shawna Mayers  : 1970  MRN: [id number]     Referring Physician:  No ref. provider found   Primary Care Physician:  Latanya Osborn MD   Date of Visit: 2024      Subjective:     History of Present lllness:    Shawna Mayers is a 53 y.o. female who presents with a chief complaint of spot, nail issues, and lesion.    Spot  Location: left nostril  Duration: months  Signs/Symptoms: new spot  Exacerbating factors: none  Relieving factors/Prior treatments: none    Nail Issue  Location: both big toes  Duration: years  Signs/Symptoms: discoloration on toe nails, thicker  Exacerbating factors: none  Relieving factors/Prior treatments: none    Lesion  Location: left medial calf  Duration: 7 months  Signs/Symptoms: has gotten bigger  Exacerbating factors: none  Relieving factors/Prior treatments: none    Patient was last seen: 2023.  Prior notes by myself reviewed.   Clinical documentation obtained by nursing staff reviewed.    Review of Systems    Objective:   Physical Exam   Constitutional: She appears well-developed and well-nourished. No distress.   Neurological: She is alert and oriented to person, place, and time. She is not disoriented.   Psychiatric: She has a normal mood and affect.   Skin:   Areas Examined (abnormalities noted in diagram):   Head / Face Inspection Performed  LLE Inspection Performed  Nails and Digits Inspection Performed                     Diagram Legend     Erythematous scaling macule/papule c/w actinic keratosis       Vascular papule c/w angioma      Pigmented verrucoid papule/plaque c/w seborrheic keratosis      Yellow umbilicated papule c/w sebaceous hyperplasia      Irregularly shaped tan macule c/w lentigo     1-2 mm smooth white papules consistent with Milia      Movable subcutaneous cyst with punctum c/w epidermal inclusion cyst      Subcutaneous movable cyst c/w pilar cyst      Firm pink to brown papule c/w dermatofibroma       Pedunculated fleshy papule(s) c/w skin tag(s)      Evenly pigmented macule c/w junctional nevus     Mildly variegated pigmented, slightly irregular-bordered macule c/w mildly atypical nevus      Flesh colored to evenly pigmented papule c/w intradermal nevus       Pink pearly papule/plaque c/w basal cell carcinoma      Erythematous hyperkeratotic cursted plaque c/w SCC      Surgical scar with no sign of skin cancer recurrence      Open and closed comedones      Inflammatory papules and pustules      Verrucoid papule consistent consistent with wart     Erythematous eczematous patches and plaques     Dystrophic onycholytic nail with subungual debris c/w onychomycosis     Umbilicated papule    Erythematous-base heme-crusted tan verrucoid plaque consistent with inflamed seborrheic keratosis     Erythematous Silvery Scaling Plaque c/w Psoriasis     See annotation          [] Data reviewed  [] Prior external notes reviewed  [] Independent review of test  [] Management discussed with another provider  [] Independent historian    Assessment / Plan:      Pathology Orders:       Normal Orders This Visit    Specimen to Pathology, Dermatology     Comments:    Number of Specimens:->1  ------------------------->-------------------------  Spec 1 Procedure:->Biopsy  Spec 1 Clinical Impression:->r/o wart vs SCC  Spec 1 Source:->left medial calf    Questions:    Procedure Type: Dermatology and skin neoplasms    Number of Specimens: 1    ------------------------: -------------------------    Spec 1 Procedure: Biopsy    Spec 1 Clinical Impression: r/o wart vs SCC    Spec 1 Source: left medial calf    Release to patient:           Neoplasm of uncertain behavior of skin  -     Specimen to Pathology, Dermatology    Shave biopsy procedure note:  Risk, benefits, and alternatives of biopsy are discussed with the patient, including risk of infection, scar, recurrence, and need for additional treatment of site. The patient agrees to the  procedure by verbal consent. The area is marked and prepped with alcohol.  Approximately 1 mL of lidocaine 1% with epinephrine is used for local anesthesia. A sharp blade is used to remove the lesion. The specimen is sent for pathology. Hemostasis is obtained with aluminum chloride and/or monopolar hyfrecation if needed. The area is then dressed and bandaged. The patient tolerated the procedure well without adverse event. Written instructions on wound care were given and were reviewed with the patient, who is to call for any signs of bleeding or infection. The patient will be notified of the pathology results.    Fibrous papule of nose  Benign-appearing lesion on exam today. Counseled patient to return to clinic for biopsy if any changes in size, shape, or color are noted or if it becomes symptomatic (bleeding, itching, pain, etc).    Onychomycosis  - chronic problem, not at treatment goal  Discussed treatment options with the patient, including risks, benefits, and side effects of oral Lamisil vs. topical treatments.   Common side effects may include headache, skin rash, GI upset, and taste disturbances. Rare side effects may include severe skin rash or hepatitis.    Discussed with patient that condition may recur after clearance with the medication. Patient elected to proceed with oral Lamisil treatment.     If long term use warranted, will check labs after 6 weeks of use. Lamisil may interfere with certain medications including tricyclic antidepressants, cimetidine, rifampin. Minimize alcohol intake while on Lamisil, and stop statin medications if cholesterol is within normal limits.     -     terbinafine HCL (LAMISIL) 250 mg tablet; Take 1 tablet (250 mg total) by mouth once daily.  Dispense: 42 tablet; Refill: 0  -     Hepatic Function Panel; Future; Expected date: 07/09/2024      Follow up in 6 months (on 11/28/2024) for follow up, or sooner dependent on pathology results.      Salma Adair MD,  [facility]

## 2024-05-28 NOTE — PATIENT INSTRUCTIONS
Biopsy Wound Care Instructions    Leave the bandage on for 24 hours without getting it wet.   Clean the area once a day with a gentle soap and water, then pat dry and apply Vaseline and a bandaid.  The site should be kept moist with Vaseline at all times to improve healing. Reapply a thick coating as needed. Do not let the site air out or form a scab, as this will delay healing and worsen scarring.  If any bleeding or oozing occurs once you return home, apply firm pressure to the area for 30 minutes straight without peeking. If bleeding continues, call the office immediately.  Please message us via [facility], call us at [phone], or return to the office at any sign of increasing redness, swelling, tenderness, pain, heat, yellow drainage/discharge, or continued bleeding.      Receiving Your Pathology Results    Your pathology results will be released to you on [facility] at the same time that Dr. Adair receives them.   Dr. Adair will then message you with her interpretation of the results and/or with the plan going forward.  If you do not use [facility] or if your pathology results require more of an explanation, you will receive your results via a phone call.  If 2 weeks go by and you have not received your results, please message us via [facility] or call us at [phone] to inform us.           Hydrocolloid Bandage    These bandages can be found at your local pharmacy in first [employer] or [employer].  Apply the bandage to clean, dry skin. Press and hold top of bandage once it is applied to warm the bandage.  Please note: There area where bandage adheres to wound bed will become white and puffy; this is not infection and a normal part of the healing process.  Do not change bandage until edges roll up.  When bandage is ready to be changed, remove carefully and slowly.  Wash wound with gentle cleanser and fingertips.  Make sure area is completely dry before applying new bandage.  Make sure wound bed is  in the center of the bandage.  Repeat process until fresh pink skin covers wound bed or until bandage no longer become white and puffy.

## 2024-05-29 ENCOUNTER — HOSPITAL ENCOUNTER (OUTPATIENT)
Dept: RADIOLOGY | Facility: HOSPITAL | Age: 54
Discharge: HOME | End: 2024-05-29
Attending: OBSTETRICS & GYNECOLOGY
Payer: COMMERCIAL

## 2024-05-29 DIAGNOSIS — N93.9 VAGINAL BLEEDING: ICD-10-CM

## 2024-05-29 DIAGNOSIS — Z79.890 HORMONE REPLACEMENT THERAPY (HRT): ICD-10-CM

## 2024-05-29 PROCEDURE — 76856 US EXAM PELVIC COMPLETE: CPT | Mod: 26,,, | Performed by: RADIOLOGY

## 2024-05-29 PROCEDURE — 76830 TRANSVAGINAL US NON-OB: CPT | Mod: TC

## 2024-05-29 PROCEDURE — 76830 TRANSVAGINAL US NON-OB: CPT | Mod: 26,,, | Performed by: RADIOLOGY

## 2024-05-29 NOTE — PROGRESS NOTES
"[facility]   Physical Therapy Treatment Note      Name: Shawna Woodson [facility]  Clinic Number: [id number]    Therapy Diagnosis:   Encounter Diagnoses   Name Primary?    Chronic pain of right knee Yes    Primary osteoarthritis of right knee     Decreased range of motion (ROM) of right knee                Physician: Fidel Michelle MD    Visit Date: 5/30/2024    Physician Orders: PT Eval and Treat   Medical Diagnosis from Referral: Chronic pain of right knee [M25.561, G89.29], Primary osteoarthritis of right knee [M17.11]   Evaluation Date: 4/26/2024  Authorization Period Expiration: 12/31/24  Plan of Care Expiration: 7/26/24  Progress Note Due: 6/28/24  Date of Surgery: N/A  Visit # / Visits authorized: 6/ 20  FOTO: 1/ 3     Precautions: Standard      Time In: 7:00  Time Out: 7:50  Total Billable Time: 50 minutes    PTA Visit #: 0/5       Subjective     Patient reports: feels she is getting stronger   She was compliant with home exercise program.  Response to previous treatment: tolerated well  Functional change: ongoing    Pain: 0/10  Location: right knee      Objective      Objective measures taken at progress report unless specified otherwise.     Treatment     Shawna received the treatments listed below:      Bold activities performed this session    therapeutic exercises to develop strength, endurance, ROM, flexibility, posture, and core stabilization for 23 minutes including:  Upright bike 5'  Adductor stretch 2x30"  Sidelying hip adduction 2x10 B 3#  Side lying hip abduction 2x10 B 3#  Sidelying clamshells with green band 2x10 B   Sidelying reverse clamshells 2x10 B 3#  Bridges with marches 2x10     manual therapy techniques: Joint mobilizations, Manual traction, and Soft tissue Mobilization were applied for 0 minutes, including:        neuromuscular re-education activities to improve: Balance, Coordination, Kinesthetic, Sense, Proprioception, and Posture for 4 minutes. The " "following activities were included:  SLR 2x10 B 3#  LAQ on precor 5# L, 2.5# R 3x8 B   Lateral walking with green band around lower leg 2 laps     therapeutic activities to improve functional performance for 23 minutes, including:  TRX squats with green band around knees 2x10  Wide stance lunge at smith machine for balance x15  RDLs to 12 in box with 15# KB x15  Dead lifts with 10# dumbbells x15  step ups on 6 in step 2x10 B  heel taps from 6 in step 2x10 B  12" glute focused step up at [employer] x12 B  Reviewed lifting techniques for dead lifts and squatting at home       Patient Education and Home Exercises       Education provided:   - HEP    Written Home Exercises Provided: Patient instructed to cont prior HEP. Exercises were reviewed and Shawna was able to demonstrate them prior to the end of the session.  Shawna demonstrated good  understanding of the education provided. See Electronic Medical Record under Patient Instructions for exercises provided during therapy sessions    Assessment     Shawna reports no knee pain with glute focused step ups today and only mild discomfort with 6" step downs which were progressed from 4" box today. She tolerated all other progressions very well. Will continue to encourage quad and glute strength and provide options for home gym workouts.     Shawna Is progressing well towards her goals.   Patient prognosis is Good.     Patient will continue to benefit from skilled outpatient physical therapy to address the deficits listed in the problem list box on initial evaluation, provide pt/family education and to maximize pt's level of independence in the home and community environment.     Patient's spiritual, cultural and educational needs considered and pt agreeable to plan of care and goals.     Anticipated barriers to physical therapy: none    GOALS: (not met, progressing unless otherwise specified)   Short Term Goals:  4 weeks   1.Report decreased right knee pain  < / =  " 4/10 at worst to increase tolerance for gardening MET  2. Increase knee ROM to 4 degrees hyperextension in order to be able to perform ADLs without difficulty.  3. Increase strength by 1/3 MMT grade in bilateral lower extremity to increase tolerance for ADL and work activities. MET  4. Pt to tolerate HEP to improve ROM and independence with ADL's MET     Long Term Goals: 8 weeks  1.Report decreased right knee pain < / = 2/10  to increase tolerance for daily exercise   2.Patient goal: Pt to tolerate 5+ minutes of incline walking on treadmill to prepare for hiking trip in May MET  3.Increase strength to >/= 4+/5 in bilateral lower extremity to increase tolerance for ADL and work activities.  4. Pt will report at CJ level (20-40% impaired) on FOTO knee to demonstrate increase in LE function with every day tasks.    Plan     Continue to progress as tolerated.     Ines Barnett, PT

## 2024-05-30 ENCOUNTER — TELEPHONE (OUTPATIENT)
Dept: OBSTETRICS AND GYNECOLOGY | Facility: CLINIC | Age: 54
End: 2024-05-30
Payer: COMMERCIAL

## 2024-05-30 ENCOUNTER — PATIENT MESSAGE (OUTPATIENT)
Dept: OBSTETRICS AND GYNECOLOGY | Facility: CLINIC | Age: 54
End: 2024-05-30
Payer: COMMERCIAL

## 2024-05-30 ENCOUNTER — CLINICAL SUPPORT (OUTPATIENT)
Dept: REHABILITATION | Facility: HOSPITAL | Age: 54
End: 2024-05-30
Payer: COMMERCIAL

## 2024-05-30 DIAGNOSIS — M17.11 PRIMARY OSTEOARTHRITIS OF RIGHT KNEE: ICD-10-CM

## 2024-05-30 DIAGNOSIS — M25.561 CHRONIC PAIN OF RIGHT KNEE: Primary | ICD-10-CM

## 2024-05-30 DIAGNOSIS — G89.29 CHRONIC PAIN OF RIGHT KNEE: Primary | ICD-10-CM

## 2024-05-30 DIAGNOSIS — M25.661 DECREASED RANGE OF MOTION (ROM) OF RIGHT KNEE: ICD-10-CM

## 2024-05-30 PROCEDURE — 97110 THERAPEUTIC EXERCISES: CPT | Mod: PO

## 2024-05-30 PROCEDURE — 97530 THERAPEUTIC ACTIVITIES: CPT | Mod: PO

## 2024-05-30 NOTE — TELEPHONE ENCOUNTER
----- Message from Yvrose Thompson MD sent at 5/30/2024 12:35 PM CDT -----  Can you please call this patient to explain the below results message and get her scheduled for an endometrial biopsy? Okay to double book a virtual slot or take a 5 or 7 day change spot to get her in sooner. Thanks.

## 2024-05-30 NOTE — TELEPHONE ENCOUNTER
Called pt and stated that bc her endometrial lining was 5mm, Dr. Thompson would like to f/u w/ a biopsy.  Pt verbally expressed understanding and was satisfied with her embx appt on 6/6 @2:30pm [facility].

## 2024-05-31 LAB
FINAL PATHOLOGIC DIAGNOSIS: NORMAL
GROSS: NORMAL
Lab: NORMAL
MICROSCOPIC EXAM: NORMAL

## 2024-06-03 NOTE — PROGRESS NOTES
"[facility]   Physical Therapy Treatment Note      Name: Shawna Woodson [facility]  Clinic Number: [id number]    Therapy Diagnosis:   Encounter Diagnoses   Name Primary?    Chronic pain of right knee Yes    Primary osteoarthritis of right knee     Decreased range of motion (ROM) of right knee                  Physician: Fidel Michelle MD    Visit Date: 6/4/2024    Physician Orders: PT Eval and Treat   Medical Diagnosis from Referral: Chronic pain of right knee [M25.561, G89.29], Primary osteoarthritis of right knee [M17.11]   Evaluation Date: 4/26/2024  Authorization Period Expiration: 12/31/24  Plan of Care Expiration: 7/26/24  Progress Note Due: 6/28/24  Date of Surgery: N/A  Visit # / Visits authorized: 7/ 20  FOTO: 1/ 3     Precautions: Standard      Time In: 7:00  Time Out: 7:54  Total Billable Time: 54 minutes    PTA Visit #: 0/5       Subjective     Patient reports: feels she is getting stronger   She was compliant with home exercise program.  Response to previous treatment: tolerated well  Functional change: ongoing    Pain: 0/10  Location: right knee      Objective      Objective measures taken at progress report unless specified otherwise.     Treatment     Shawna received the treatments listed below:      Bold activities performed this session    therapeutic exercises to develop strength, endurance, ROM, flexibility, posture, and core stabilization for 25 minutes including:  Upright bike 5'  Adductor stretch 2x30"  Sidelying hip adduction 2x10 B 4#  Side lying hip abduction 2x10 B 4#  Sidelying clamshells with green band 2x10 B   Sidelying reverse clamshells 2x10 B 4#  Prone hip extension 4# 2x10  Bridges with marches 2x10     manual therapy techniques: Joint mobilizations, Manual traction, and Soft tissue Mobilization were applied for 0 minutes, including:        neuromuscular re-education activities to improve: Balance, Coordination, Kinesthetic, Sense, Proprioception, and " "Posture for 4 minutes. The following activities were included:  SLR 2x10 B 4#  LAQ on precor 5# L, 2.5# R 3x8 B   Lateral walking with green band around lower leg 2 laps     therapeutic activities to improve functional performance for 25 minutes, including:  TRX squats with green band around knees 2x10  Wide stance lunge at smith machine for balance x15  RDLs to 12 in box with 15# KB x15  Dead lifts with 10# dumbbells x15  step ups on 6 in step 2x10 B  heel taps from 6 in step 2x10 B  12" glute focused step up at [employer] x12 B  Reviewed lifting techniques for dead lifts and squatting at home       Patient Education and Home Exercises       Education provided:   - HEP    Written Home Exercises Provided: Patient instructed to cont prior HEP. Exercises were reviewed and Shawna was able to demonstrate them prior to the end of the session.  Shawna demonstrated good  understanding of the education provided. See Electronic Medical Record under Patient Instructions for exercises provided during therapy sessions    Assessment     Excellent tolerance to session today with no increases in pain. Will continue to progress strengthening within her tolerance.     Shawna Is progressing well towards her goals.   Patient prognosis is Good.     Patient will continue to benefit from skilled outpatient physical therapy to address the deficits listed in the problem list box on initial evaluation, provide pt/family education and to maximize pt's level of independence in the home and community environment.     Patient's spiritual, cultural and educational needs considered and pt agreeable to plan of care and goals.     Anticipated barriers to physical therapy: none    GOALS: (not met, progressing unless otherwise specified)   Short Term Goals:  4 weeks   1.Report decreased right knee pain  < / =  4/10 at worst to increase tolerance for gardening MET  2. Increase knee ROM to 4 degrees hyperextension in order to be able to perform ADLs " without difficulty.  3. Increase strength by 1/3 MMT grade in bilateral lower extremity to increase tolerance for ADL and work activities. MET  4. Pt to tolerate HEP to improve ROM and independence with ADL's MET     Long Term Goals: 8 weeks  1.Report decreased right knee pain < / = 2/10  to increase tolerance for daily exercise   2.Patient goal: Pt to tolerate 5+ minutes of incline walking on treadmill to prepare for hiking trip in May MET  3.Increase strength to >/= 4+/5 in bilateral lower extremity to increase tolerance for ADL and work activities.  4. Pt will report at CJ level (20-40% impaired) on FOTO knee to demonstrate increase in LE function with every day tasks.    Plan     Continue to progress as tolerated.     Ines Barnett, PT

## 2024-06-04 ENCOUNTER — CLINICAL SUPPORT (OUTPATIENT)
Dept: REHABILITATION | Facility: HOSPITAL | Age: 54
End: 2024-06-04
Payer: COMMERCIAL

## 2024-06-04 DIAGNOSIS — M25.661 DECREASED RANGE OF MOTION (ROM) OF RIGHT KNEE: ICD-10-CM

## 2024-06-04 DIAGNOSIS — G89.29 CHRONIC PAIN OF RIGHT KNEE: Primary | ICD-10-CM

## 2024-06-04 DIAGNOSIS — M17.11 PRIMARY OSTEOARTHRITIS OF RIGHT KNEE: ICD-10-CM

## 2024-06-04 DIAGNOSIS — M25.561 CHRONIC PAIN OF RIGHT KNEE: Primary | ICD-10-CM

## 2024-06-04 PROCEDURE — 97110 THERAPEUTIC EXERCISES: CPT | Mod: PO

## 2024-06-04 PROCEDURE — 97112 NEUROMUSCULAR REEDUCATION: CPT | Mod: PO

## 2024-06-05 NOTE — PROGRESS NOTES
"[facility]   Physical Therapy Treatment Note      Name: Shawna Woodson [facility]  Clinic Number: [id number]    Therapy Diagnosis:   Encounter Diagnoses   Name Primary?    Chronic pain of right knee Yes    Primary osteoarthritis of right knee     Decreased range of motion (ROM) of right knee                    Physician: Fidel Michelle MD    Visit Date: 6/6/2024    Physician Orders: PT Eval and Treat   Medical Diagnosis from Referral: Chronic pain of right knee [M25.561, G89.29], Primary osteoarthritis of right knee [M17.11]   Evaluation Date: 4/26/2024  Authorization Period Expiration: 12/31/24  Plan of Care Expiration: 7/26/24  Progress Note Due: 6/28/24  Date of Surgery: N/A  Visit # / Visits authorized: 8/ 20  FOTO: 1/ 3     Precautions: Standard      Time In: 7:00  Time Out: 8:57  Total Billable Time: 57 minutes    PTA Visit #: 0/5       Subjective     Patient reports: a little throbbing at the end of the day but overall feeling great  She was compliant with home exercise program.  Response to previous treatment: tolerated well  Functional change: ongoing    Pain: 0/10  Location: right knee      Objective      Objective measures taken at progress report unless specified otherwise.     Treatment     Shawna received the treatments listed below:      Bold activities performed this session    therapeutic exercises to develop strength, endurance, ROM, flexibility, posture, and core stabilization for 23 minutes including:  Upright bike 5'  Adductor stretch 2x30"  Sidelying hip adduction 2x10 B 4#  Side lying hip abduction 2x10 B 4#  Sidelying clamshells with green band 2x10 B   Sidelying reverse clamshells 2x10 B 4#  Prone hip extension 4# 2x10  Bridges with marches 2x10  Shuttle with 4 bands 2x10      manual therapy techniques: Joint mobilizations, Manual traction, and Soft tissue Mobilization were applied for 0 minutes, including:        neuromuscular re-education activities to improve: " "Balance, Coordination, Kinesthetic, Sense, Proprioception, and Posture for 11 minutes. The following activities were included:  SLR 2x10 B 4#  LAQ on precor 5# L, 2.5# R 3x8 B   Lateral walking with green band around lower leg 2 laps     therapeutic activities to improve functional performance for 23 minutes, including:  TRX squats with green band around knees 2x10  Wide stance lunge at smith machine for balance x15  RDLs to 12 in box with 20# KB 2x10  Dead lifts with 10# dumbbells 2x10  step ups on 6 in step 2x10 B  heel taps from 6 in step 2x10 B  12" glute focused step up at smith machine x12 B  Reviewed lifting techniques for dead lifts and squatting at home       Patient Education and Home Exercises       Education provided:   - HEP    Written Home Exercises Provided: Patient instructed to cont prior HEP. Exercises were reviewed and Shawna was able to demonstrate them prior to the end of the session.  Shawna demonstrated good  understanding of the education provided. See Electronic Medical Record under Patient Instructions for exercises provided during therapy sessions    Assessment     Shawna tolerated progressions in resistance very well today but does have some clicking in the right knee during wide stance lunge. Clicking is new today, but non painful. Will continue to progress and prepare for discharge at next visit.     Shawna Is progressing well towards her goals.   Patient prognosis is Good.     Patient will continue to benefit from skilled outpatient physical therapy to address the deficits listed in the problem list box on initial evaluation, provide pt/family education and to maximize pt's level of independence in the home and community environment.     Patient's spiritual, cultural and educational needs considered and pt agreeable to plan of care and goals.     Anticipated barriers to physical therapy: none    GOALS: (not met, progressing unless otherwise specified)   Short Term Goals:  4 weeks "   1.Report decreased right knee pain  < / =  4/10 at worst to increase tolerance for gardening MET  2. Increase knee ROM to 4 degrees hyperextension in order to be able to perform ADLs without difficulty.  3. Increase strength by 1/3 MMT grade in bilateral lower extremity to increase tolerance for ADL and work activities. MET  4. Pt to tolerate HEP to improve ROM and independence with ADL's MET     Long Term Goals: 8 weeks  1.Report decreased right knee pain < / = 2/10  to increase tolerance for daily exercise   2.Patient goal: Pt to tolerate 5+ minutes of incline walking on treadmill to prepare for hiking trip in May MET  3.Increase strength to >/= 4+/5 in bilateral lower extremity to increase tolerance for ADL and work activities.  4. Pt will report at CJ level (20-40% impaired) on FOTO knee to demonstrate increase in LE function with every day tasks.    Plan     Continue to progress as tolerated.     Ines Barnett, PT

## 2024-06-06 ENCOUNTER — PATIENT MESSAGE (OUTPATIENT)
Dept: DERMATOLOGY | Facility: CLINIC | Age: 54
End: 2024-06-06
Payer: COMMERCIAL

## 2024-06-06 ENCOUNTER — PROCEDURE VISIT (OUTPATIENT)
Dept: OBSTETRICS AND GYNECOLOGY | Facility: CLINIC | Age: 54
End: 2024-06-06
Payer: COMMERCIAL

## 2024-06-06 ENCOUNTER — CLINICAL SUPPORT (OUTPATIENT)
Dept: REHABILITATION | Facility: HOSPITAL | Age: 54
End: 2024-06-06
Payer: COMMERCIAL

## 2024-06-06 VITALS
WEIGHT: 166.69 LBS | SYSTOLIC BLOOD PRESSURE: 136 MMHG | DIASTOLIC BLOOD PRESSURE: 82 MMHG | BODY MASS INDEX: 28.46 KG/M2 | HEIGHT: 64 IN

## 2024-06-06 DIAGNOSIS — Z12.11 COLON CANCER SCREENING: ICD-10-CM

## 2024-06-06 DIAGNOSIS — N95.0 POSTMENOPAUSAL BLEEDING: Primary | ICD-10-CM

## 2024-06-06 DIAGNOSIS — N93.8 DUB (DYSFUNCTIONAL UTERINE BLEEDING): ICD-10-CM

## 2024-06-06 DIAGNOSIS — G89.29 CHRONIC PAIN OF RIGHT KNEE: Primary | ICD-10-CM

## 2024-06-06 DIAGNOSIS — M25.661 DECREASED RANGE OF MOTION (ROM) OF RIGHT KNEE: ICD-10-CM

## 2024-06-06 DIAGNOSIS — M25.561 CHRONIC PAIN OF RIGHT KNEE: Primary | ICD-10-CM

## 2024-06-06 DIAGNOSIS — M17.11 PRIMARY OSTEOARTHRITIS OF RIGHT KNEE: ICD-10-CM

## 2024-06-06 LAB
B-HCG UR QL: NEGATIVE
CTP QC/QA: YES

## 2024-06-06 PROCEDURE — 88305 TISSUE EXAM BY PATHOLOGIST: CPT | Performed by: PATHOLOGY

## 2024-06-06 PROCEDURE — 88305 TISSUE EXAM BY PATHOLOGIST: CPT | Mod: 26,,, | Performed by: PATHOLOGY

## 2024-06-06 PROCEDURE — 97530 THERAPEUTIC ACTIVITIES: CPT | Mod: PO

## 2024-06-06 PROCEDURE — 58100 BIOPSY OF UTERUS LINING: CPT | Mod: S$GLB,,, | Performed by: OBSTETRICS & GYNECOLOGY

## 2024-06-06 PROCEDURE — 97110 THERAPEUTIC EXERCISES: CPT | Mod: PO

## 2024-06-06 PROCEDURE — 81025 URINE PREGNANCY TEST: CPT | Mod: S$GLB,,, | Performed by: OBSTETRICS & GYNECOLOGY

## 2024-06-06 PROCEDURE — 97112 NEUROMUSCULAR REEDUCATION: CPT | Mod: PO

## 2024-06-06 NOTE — PROGRESS NOTES
"[facility]   Physical Therapy Treatment Note      Name: Shawna Woodson [facility]  Clinic Number: [id number]    Therapy Diagnosis:   Encounter Diagnoses   Name Primary?    Chronic pain of right knee Yes    Primary osteoarthritis of right knee     Decreased range of motion (ROM) of right knee                      Physician: Fidel Michelle MD    Visit Date: 6/10/2024    Physician Orders: PT Eval and Treat   Medical Diagnosis from Referral: Chronic pain of right knee [M25.561, G89.29], Primary osteoarthritis of right knee [M17.11]   Evaluation Date: 4/26/2024  Authorization Period Expiration: 12/31/24  Plan of Care Expiration: 7/26/24  Progress Note Due: 6/28/24  Date of Surgery: N/A  Visit # / Visits authorized: 9/ 20  FOTO: 1/ 3     Precautions: Standard      Time In: 7:00***  Time Out: 8:57***  Total Billable Time: 57 minutes***    PTA Visit #: 0/5       Subjective     Patient reports: a little throbbing at the end of the day but overall feeling great  She was compliant with home exercise program.  Response to previous treatment: tolerated well  Functional change: ongoing    Pain: 0/10  Location: right knee      Objective      Objective measures taken at progress report unless specified otherwise.     Treatment     Shawna received the treatments listed below:      Bold activities performed this session    therapeutic exercises to develop strength, endurance, ROM, flexibility, posture, and core stabilization for 23 minutes including:  Upright bike 5'  Adductor stretch 2x30"  Sidelying hip adduction 2x10 B 4#  Side lying hip abduction 2x10 B 4#  Sidelying clamshells with green band 2x10 B   Sidelying reverse clamshells 2x10 B 4#  Prone hip extension 4# 2x10  Bridges with marches 2x10  Shuttle with 4 bands 2x10      manual therapy techniques: Joint mobilizations, Manual traction, and Soft tissue Mobilization were applied for 0 minutes, including:        neuromuscular re-education activities " "to improve: Balance, Coordination, Kinesthetic, Sense, Proprioception, and Posture for 11 minutes. The following activities were included:  SLR 2x10 B 4#  LAQ on precor 5# L, 2.5# R 3x8 B   Lateral walking with green band around lower leg 2 laps     therapeutic activities to improve functional performance for 23 minutes, including:  TRX squats with green band around knees 2x10  Wide stance lunge at smith machine for balance x15  RDLs to 12 in box with 20# KB 2x10  Dead lifts with 10# dumbbells 2x10  step ups on 6 in step 2x10 B  heel taps from 6 in step 2x10 B  12" glute focused step up at [employer] x12 B  Reviewed lifting techniques for dead lifts and squatting at home       Patient Education and Home Exercises       Education provided:   - HEP    Written Home Exercises Provided: Patient instructed to cont prior HEP. Exercises were reviewed and Shawna was able to demonstrate them prior to the end of the session.  Shawna demonstrated good  understanding of the education provided. See Electronic Medical Record under Patient Instructions for exercises provided during therapy sessions    Assessment     Shawna tolerated progressions in resistance very well today but does have some clicking in the right knee during wide stance lunge. Clicking is new today, but non painful. Will continue to progress and prepare for discharge at next visit.     Shawna Is progressing well towards her goals.   Patient prognosis is Good.     Patient will continue to benefit from skilled outpatient physical therapy to address the deficits listed in the problem list box on initial evaluation, provide pt/family education and to maximize pt's level of independence in the home and community environment.     Patient's spiritual, cultural and educational needs considered and pt agreeable to plan of care and goals.     Anticipated barriers to physical therapy: none    GOALS: (not met, progressing unless otherwise specified)   Short Term Goals:  4 " weeks   1.Report decreased right knee pain  < / =  4/10 at worst to increase tolerance for gardening MET  2. Increase knee ROM to 4 degrees hyperextension in order to be able to perform ADLs without difficulty.  3. Increase strength by 1/3 MMT grade in bilateral lower extremity to increase tolerance for ADL and work activities. MET  4. Pt to tolerate HEP to improve ROM and independence with ADL's MET     Long Term Goals: 8 weeks  1.Report decreased right knee pain < / = 2/10  to increase tolerance for daily exercise   2.Patient goal: Pt to tolerate 5+ minutes of incline walking on treadmill to prepare for hiking trip in May MET  3.Increase strength to >/= 4+/5 in bilateral lower extremity to increase tolerance for ADL and work activities.  4. Pt will report at CJ level (20-40% impaired) on FOTO knee to demonstrate increase in LE function with every day tasks.    Plan     Discharge next visit.    Loulou Louie, PT

## 2024-06-10 ENCOUNTER — CLINICAL SUPPORT (OUTPATIENT)
Dept: REHABILITATION | Facility: HOSPITAL | Age: 54
End: 2024-06-10
Payer: COMMERCIAL

## 2024-06-10 DIAGNOSIS — M25.561 CHRONIC PAIN OF RIGHT KNEE: Primary | ICD-10-CM

## 2024-06-10 DIAGNOSIS — M25.661 DECREASED RANGE OF MOTION (ROM) OF RIGHT KNEE: ICD-10-CM

## 2024-06-10 DIAGNOSIS — M17.11 PRIMARY OSTEOARTHRITIS OF RIGHT KNEE: ICD-10-CM

## 2024-06-10 DIAGNOSIS — G89.29 CHRONIC PAIN OF RIGHT KNEE: Primary | ICD-10-CM

## 2024-06-10 PROCEDURE — 97530 THERAPEUTIC ACTIVITIES: CPT | Mod: PO

## 2024-06-10 PROCEDURE — 97112 NEUROMUSCULAR REEDUCATION: CPT | Mod: PO

## 2024-06-10 PROCEDURE — 97110 THERAPEUTIC EXERCISES: CPT | Mod: PO

## 2024-06-10 NOTE — PROGRESS NOTES
"[facility]   Physical Therapy Treatment Note      Name: Shawna Woodson [facility]  Clinic Number: [id number]    Therapy Diagnosis:   Encounter Diagnoses   Name Primary?    Chronic pain of right knee Yes    Primary osteoarthritis of right knee     Decreased range of motion (ROM) of right knee                      Physician: Fidel Michelle MD    Visit Date: 6/10/2024    Physician Orders: PT Eval and Treat   Medical Diagnosis from Referral: Chronic pain of right knee [M25.561, G89.29], Primary osteoarthritis of right knee [M17.11]   Evaluation Date: 4/26/2024  Authorization Period Expiration: 12/31/24  Plan of Care Expiration: 7/26/24  Progress Note Due: 6/28/24  Date of Surgery: N/A  Visit # / Visits authorized: 9/ 20  FOTO: 1/ 3     Precautions: Standard      Time In: 8:00  Time Out: 8:43  Total Billable Time: 43 minutes    PTA Visit #: 0/5       Subjective     Patient reports: a little stiffness after sitting with legs crossed but doing well otherwise   She was compliant with home exercise program.  Response to previous treatment: tolerated well  Functional change: ongoing    Pain: 0/10  Location: right knee      Objective      Objective measures taken at progress report unless specified otherwise.     Treatment     Shawna received the treatments listed below:      Bold activities performed this session    therapeutic exercises to develop strength, endurance, ROM, flexibility, posture, and core stabilization for 12 minutes including:  Upright bike 5'  Adductor stretch 2x30"  Sidelying hip adduction 2x10 B 4#  Side lying hip abduction 2x10 B 4#  Sidelying clamshells with green band 2x10 B   Sidelying reverse clamshells 2x10 B 4#  Prone hip extension 4# 2x10  Bridges with marches 2x10  Shuttle sidelying with 2 bands 2x10 B     manual therapy techniques: Joint mobilizations, Manual traction, and Soft tissue Mobilization were applied for 0 minutes, including:        neuromuscular " "re-education activities to improve: Balance, Coordination, Kinesthetic, Sense, Proprioception, and Posture for 8 minutes. The following activities were included:  SLR 2x10 B 4#  LAQ on precor 5# L, 2.5# R 3x8 B   Lateral walking with green band around lower leg 2 laps     therapeutic activities to improve functional performance for 23 minutes, including:  TRX squats with green band around knees 2x10  Wide stance lunge at smith machine for balance x15  RDLs to 12 in box with 20# KB x15 B  Dead lifts with 10# dumbbells 2x10  step ups on 6 in step x15 B  heel taps from 6 in step x15 B  +wall sit pulse with pilates ball 2x30"  12" glute focused step up at [employer] x12 B  Reviewed lifting techniques for dead lifts and squatting at home       Patient Education and Home Exercises       Education provided:   - HEP    Written Home Exercises Provided: Patient instructed to cont prior HEP. Exercises were reviewed and Shawna was able to demonstrate them prior to the end of the session.  Shawna demonstrated good  understanding of the education provided. See Electronic Medical Record under Patient Instructions for exercises provided during therapy sessions    Assessment     Shawna tolerated progressions well today with reported fatigue in the quads by end of session. Will review HEP at next visit and discharge.     Shawna Is progressing well towards her goals.   Patient prognosis is Good.     Patient will continue to benefit from skilled outpatient physical therapy to address the deficits listed in the problem list box on initial evaluation, provide pt/family education and to maximize pt's level of independence in the home and community environment.     Patient's spiritual, cultural and educational needs considered and pt agreeable to plan of care and goals.     Anticipated barriers to physical therapy: none    GOALS: (not met, progressing unless otherwise specified)   Short Term Goals:  4 weeks   1.Report decreased right " knee pain  < / =  4/10 at worst to increase tolerance for gardening MET  2. Increase knee ROM to 4 degrees hyperextension in order to be able to perform ADLs without difficulty.  3. Increase strength by 1/3 MMT grade in bilateral lower extremity to increase tolerance for ADL and work activities. MET  4. Pt to tolerate HEP to improve ROM and independence with ADL's MET     Long Term Goals: 8 weeks  1.Report decreased right knee pain < / = 2/10  to increase tolerance for daily exercise   2.Patient goal: Pt to tolerate 5+ minutes of incline walking on treadmill to prepare for hiking trip in May MET  3.Increase strength to >/= 4+/5 in bilateral lower extremity to increase tolerance for ADL and work activities.  4. Pt will report at CJ level (20-40% impaired) on FOTO knee to demonstrate increase in LE function with every day tasks.    Plan     Discharge next visit.    Ines Barnett, PT

## 2024-06-10 NOTE — PROGRESS NOTES
[facility]   Physical Therapy Treatment Note / Discharge Visit     Name: Shawna Reedvirgie  Clinic Number: [id number]    Therapy Diagnosis:   Encounter Diagnoses   Name Primary?    Chronic pain of right knee Yes    Primary osteoarthritis of right knee     Decreased range of motion (ROM) of right knee                        Physician: Fidel Michelle MD    Visit Date: 6/12/2024    Physician Orders: PT Eval and Treat   Medical Diagnosis from Referral: Chronic pain of right knee [M25.561, G89.29], Primary osteoarthritis of right knee [M17.11]   Evaluation Date: 4/26/2024  Authorization Period Expiration: 12/31/24  Plan of Care Expiration: 7/26/24  Progress Note Due: 6/28/24  Date of Surgery: N/A  Visit # / Visits authorized: 10/ 20  FOTO: 1/ 3     Precautions: Standard      Time In: 7:00  Time Out: 7:50  Total Billable Time: 50 minutes    PTA Visit #: 0/5       Subjective     Patient reports: no pain today  She was compliant with home exercise program.  Response to previous treatment: tolerated well  Functional change: ongoing    Pain: 0/10  Location: right knee      Objective      Functional tests:   Quad set: fair on right, good on left        Range of Motion (Active):   Knee Right  Left    Flexion 127 129   Extension 0 4 hyper         Lower Extremity Strength  Right LE   Left LE     Quadriceps: 5/5 Quadriceps: 5/5   Hamstrings: 5/5 Hamstrings: 5/5   Hip flexion (seated): 5/5 Hip flexion (seated): 5/5   Hip extension:  5/5 Hip extension: 5/5   PGM: 4+/5 PGM:  5/5   Hip ER:  5/5  Hip ER:  5/5   Hip IR: 4+/5 Hip IR: 5/5         Joint Mobility: patellar mobility is normal bilaterally       Palpation: no tenderness      Sensation: NT     Flexibility:               Hamstrings: R = no limitation ; L = no limitation      Edema: not present currently but pt reports mild swelling at superior medial patella by end of day     Intake Outcome Measure for FOTO knee Survey     Therapist reviewed  "FOTO scores for Shawna Mayers on 5/28/2024.   FOTO report - see Media section or FOTO account episode details.     Intake Score: 66%           Treatment     Shawna received the treatments listed below:      Bold activities performed this session    therapeutic exercises to develop strength, endurance, ROM, flexibility, posture, and core stabilization for 15 minutes including:  Upright bike 5'  Adductor stretch 2x30"  Sidelying hip adduction 2x10 B 4#  Side lying hip abduction 2x10 B 4#  Sidelying clamshells with green band 2x10 B   Sidelying reverse clamshells 2x10 B 4#  Prone hip extension 4# 2x10  Bridges with marches 2x10  Shuttle sidelying with 2 bands 2x10 B     manual therapy techniques: Joint mobilizations, Manual traction, and Soft tissue Mobilization were applied for 0 minutes, including:        neuromuscular re-education activities to improve: Balance, Coordination, Kinesthetic, Sense, Proprioception, and Posture for 10 minutes. The following activities were included:  SLR 2x10 B 4#  LAQ on precor 5# L, 2.5# R 3x8 B   Lateral walking with green band around lower leg 2 laps     therapeutic activities to improve functional performance for 25 minutes, including:  TRX squats with green band around knees 2x10  Wide stance lunge at smith machine for balance x15  RDLs to 12 in box with 20# KB x15 B  Dead lifts with 10# dumbbells 2x10  step ups on 6 in step x15 B  heel taps from 6 in step x15 B  wall sit pulse with pilates ball 2x30"  +knee repeaters 2x15 B  12" glute focused step up at [employer] x12 B  Reviewed lifting techniques for dead lifts and squatting at home       Patient Education and Home Exercises       Education provided:   - HEP    Written Home Exercises Provided: Patient instructed to cont prior HEP. Exercises were reviewed and Shawna was able to demonstrate them prior to the end of the session.  Shawna demonstrated good  understanding of the education provided. See Electronic Medical " Record under Patient Instructions for exercises provided during therapy sessions    Assessment     Shawna tolerated all exercises well today without increases in pain. She is able to complete full program without verbal cues. She is discharged from PT today.     Shawna Is progressing well towards her goals.   Patient prognosis is Good.     Patient will continue to benefit from skilled outpatient physical therapy to address the deficits listed in the problem list box on initial evaluation, provide pt/family education and to maximize pt's level of independence in the home and community environment.     Patient's spiritual, cultural and educational needs considered and pt agreeable to plan of care and goals.     Anticipated barriers to physical therapy: none    GOALS: (not met, progressing unless otherwise specified)   Short Term Goals:  4 weeks    1.Report decreased right knee pain  < / =  4/10 at worst to increase tolerance for gardening MET  2. Increase knee ROM to 4 degrees hyperextension in order to be able to perform ADLs without difficulty. Not assessed  3. Increase strength by 1/3 MMT grade in bilateral lower extremity to increase tolerance for ADL and work activities. MET  4. Pt to tolerate HEP to improve ROM and independence with ADL's MET     Long Term Goals: 8 weeks  1.Report decreased right knee pain < / = 2/10  to increase tolerance for daily exercise  MET  2.Patient goal: Pt to tolerate 5+ minutes of incline walking on treadmill to prepare for hiking trip in May MET  3.Increase strength to >/= 4+/5 in bilateral lower extremity to increase tolerance for ADL and work activities. MET  4. Pt will report at CJ level (20-40% impaired) on FOTO knee to demonstrate increase in LE function with every day tasks. MET    Plan     Patient is discharged from skilled PT today    Ines Barnett PT

## 2024-06-12 ENCOUNTER — CLINICAL SUPPORT (OUTPATIENT)
Dept: REHABILITATION | Facility: HOSPITAL | Age: 54
End: 2024-06-12
Payer: COMMERCIAL

## 2024-06-12 DIAGNOSIS — M17.11 PRIMARY OSTEOARTHRITIS OF RIGHT KNEE: ICD-10-CM

## 2024-06-12 DIAGNOSIS — G89.29 CHRONIC PAIN OF RIGHT KNEE: Primary | ICD-10-CM

## 2024-06-12 DIAGNOSIS — M25.661 DECREASED RANGE OF MOTION (ROM) OF RIGHT KNEE: ICD-10-CM

## 2024-06-12 DIAGNOSIS — M25.561 CHRONIC PAIN OF RIGHT KNEE: Primary | ICD-10-CM

## 2024-06-12 LAB
FINAL PATHOLOGIC DIAGNOSIS: NORMAL
GROSS: NORMAL
Lab: NORMAL

## 2024-06-12 PROCEDURE — 97530 THERAPEUTIC ACTIVITIES: CPT | Mod: PO

## 2024-06-12 PROCEDURE — 97110 THERAPEUTIC EXERCISES: CPT | Mod: PO

## 2024-06-12 PROCEDURE — 97112 NEUROMUSCULAR REEDUCATION: CPT | Mod: PO

## 2024-06-13 ENCOUNTER — OFFICE VISIT (OUTPATIENT)
Dept: UROGYNECOLOGY | Facility: CLINIC | Age: 54
End: 2024-06-13
Payer: COMMERCIAL

## 2024-06-13 VITALS
HEIGHT: 64 IN | HEART RATE: 92 BPM | DIASTOLIC BLOOD PRESSURE: 84 MMHG | SYSTOLIC BLOOD PRESSURE: 141 MMHG | BODY MASS INDEX: 28.95 KG/M2 | WEIGHT: 169.56 LBS

## 2024-06-13 DIAGNOSIS — R39.15 URINARY URGENCY: Primary | ICD-10-CM

## 2024-06-13 DIAGNOSIS — R35.0 URINARY FREQUENCY: ICD-10-CM

## 2024-06-13 DIAGNOSIS — N39.3 STRESS INCONTINENCE: ICD-10-CM

## 2024-06-13 DIAGNOSIS — N39.41 URGE INCONTINENCE OF URINE: ICD-10-CM

## 2024-06-13 DIAGNOSIS — N95.2 VAGINAL ATROPHY: ICD-10-CM

## 2024-06-13 LAB
BILIRUB SERPL-MCNC: NEGATIVE MG/DL
BLOOD URINE, POC: NEGATIVE
CLARITY, POC UA: CLEAR
COLOR, POC UA: YELLOW
GLUCOSE UR QL STRIP: NORMAL
KETONES UR QL STRIP: NEGATIVE
LEUKOCYTE ESTERASE URINE, POC: NEGATIVE
NITRITE, POC UA: NEGATIVE
PH, POC UA: 7
POC RESIDUAL URINE VOLUME: 2 ML (ref 0–100)
PROTEIN, POC: NEGATIVE
SPECIFIC GRAVITY, POC UA: 1.01
UROBILINOGEN, POC UA: NORMAL

## 2024-06-13 PROCEDURE — 1159F MED LIST DOCD IN RCRD: CPT | Mod: CPTII,S$GLB,, | Performed by: OBSTETRICS & GYNECOLOGY

## 2024-06-13 PROCEDURE — 99999 PR PBB SHADOW E&M-EST. PATIENT-LVL IV: CPT | Mod: PBBFAC,,, | Performed by: OBSTETRICS & GYNECOLOGY

## 2024-06-13 PROCEDURE — 3077F SYST BP >= 140 MM HG: CPT | Mod: CPTII,S$GLB,, | Performed by: OBSTETRICS & GYNECOLOGY

## 2024-06-13 PROCEDURE — 51798 US URINE CAPACITY MEASURE: CPT | Mod: S$GLB,,, | Performed by: OBSTETRICS & GYNECOLOGY

## 2024-06-13 PROCEDURE — 99459 PELVIC EXAMINATION: CPT | Mod: S$GLB,,, | Performed by: OBSTETRICS & GYNECOLOGY

## 2024-06-13 PROCEDURE — 1160F RVW MEDS BY RX/DR IN RCRD: CPT | Mod: CPTII,S$GLB,, | Performed by: OBSTETRICS & GYNECOLOGY

## 2024-06-13 PROCEDURE — 3044F HG A1C LEVEL LT 7.0%: CPT | Mod: CPTII,S$GLB,, | Performed by: OBSTETRICS & GYNECOLOGY

## 2024-06-13 PROCEDURE — 81002 URINALYSIS NONAUTO W/O SCOPE: CPT | Mod: S$GLB,,, | Performed by: OBSTETRICS & GYNECOLOGY

## 2024-06-13 PROCEDURE — 3008F BODY MASS INDEX DOCD: CPT | Mod: CPTII,S$GLB,, | Performed by: OBSTETRICS & GYNECOLOGY

## 2024-06-13 PROCEDURE — 3079F DIAST BP 80-89 MM HG: CPT | Mod: CPTII,S$GLB,, | Performed by: OBSTETRICS & GYNECOLOGY

## 2024-06-13 PROCEDURE — 99214 OFFICE O/P EST MOD 30 MIN: CPT | Mod: S$GLB,,, | Performed by: OBSTETRICS & GYNECOLOGY

## 2024-06-13 RX ORDER — ESTRADIOL 0.1 MG/G
CREAM VAGINAL
Qty: 42.5 G | Refills: 3 | Status: SHIPPED | OUTPATIENT
Start: 2024-06-13

## 2024-06-13 NOTE — PROGRESS NOTES
Chief Complaint   Patient presents with    Urinary Frequency        HPI: Patient is a 53 y.o. female  who presents today with c/o urinary frequency. Symptoms started last year. She thought she was getting frequent UTIs but her PCP would perform testing but they returned negative for infection. She reports that symptoms got worse last fall but then once she started on HRT in December started to notice an improvement. She is taking 2 mg estradiol and 300 mg progesterone by mouth. Is not using any vaginal estrogen cream.     She reports feeling like her bladder fills quickly. Currently voiding every 1-2 hours during the day. She wakes 3-4 times per night when she has some spotting. She states that she develops some hot flashes during this time as well. When symptoms are better she will wake 2 times per night. She occasionally has urinary urgency particularly if she has been sitting for a while and then stands up. She denies urgency urinary incontinence. She may occasionally with a cough or sneeze notice some loss of urine but not currently bothersome. She denies enuresis.   May feel pain for an hour or two if she wakes with a full bladder.     Drinks 80-96 ounces of water per day. Has two cups of coffee in the morning. Stops intake about 5-530 pm. States that if she doesn't drink a lot during the day she may get leg cramps and vertigo. Adds electrolytes to her water after an episode of dehydration which resulted in a visit to the ED.     She has not tried any treatment for symptoms.   She denies vaginal heaviness, pressure and bulge.     She denies constipation. Denies accidental bowel leakage.     Review of Systems   Constitutional:  Negative for activity change, appetite change, chills, fatigue, fever and unexpected weight change.   HENT:  Negative for nasal congestion, dental problem, hearing loss, mouth dryness and sore throat.    Eyes:  Negative for pain and eye dryness.   Respiratory:  Negative for cough,  shortness of breath and wheezing.    Cardiovascular:  Negative for chest pain, palpitations and leg swelling.   Gastrointestinal:  Negative for abdominal distention, abdominal pain, blood in stool, constipation and rectal pain.   Endocrine: Negative for cold intolerance and heat intolerance.   Genitourinary:  Positive for hot flashes. Negative for dyspareunia and vaginal dryness.        +decreased libido   Musculoskeletal:  Negative for arthralgias, back pain, gait problem, joint swelling, myalgias, neck pain and neck stiffness.   Integumentary:  Negative for rash.   Neurological:  Negative for dizziness, tremors, seizures, speech difficulty, weakness, light-headedness, numbness and headaches.   Psychiatric/Behavioral:  Negative for confusion, dysphoric mood and sleep disturbance. The patient is not nervous/anxious.           Past Medical History:   Diagnosis Date    Allergy     Anxiety     Asthma     resolved    Focal nodular hyperplasia of liver     GERD (gastroesophageal reflux disease)     Hyperlipidemia     Thyroid disease     hypothyroidism       Past Surgical History:   Procedure Laterality Date    ARTHROSCOPIC CHONDROPLASTY OF KNEE JOINT Right 7/26/2023    Procedure: ARTHROSCOPY, KNEE, WITH CHONDROPLASTY;  Surgeon: Fidel Michelle MD;  Location: [facility] OR;  Service: Orthopedics;  Laterality: Right;    CHOLECYSTECTOMY      FOOT SURGERY      arc    KNEE ARTHROSCOPY W/ MENISCECTOMY Right 7/26/2023    Procedure: ARTHROSCOPY, KNEE, WITH MENISCECTOMY;  Surgeon: Fidel Michelle MD;  Location: [facility] OR;  Service: Orthopedics;  Laterality: Right;  partial and medial     WISDOM TOOTH EXTRACTION           Current Outpatient Medications:     estradioL (ESTRACE) 0.01 % (0.1 mg/gram) vaginal cream, 0.5 grams with applicator or dime-sized amount with finger in vagina nightly x 2 weeks, then three times a week thereafter, Disp: 42.5 g, Rfl: 3    estradioL (ESTRACE) 2 MG tablet, Take 1 tablet (2 mg total) by mouth  once daily., Disp: 30 tablet, Rfl: 11    fluticasone propionate (FLONASE) 50 mcg/actuation nasal spray, SPRAY 2 SPRAYS BY EACH NOSTRIL ROUTE ONCE DAILY. (Patient not taking: Reported on 2024), Disp: 48 g, Rfl: 3    Lactobacillus rhamnosus GG (CULTURELLE) 10 billion cell capsule, Take 1 capsule by mouth once daily. (Patient not taking: Reported on 2024), Disp: , Rfl:     levothyroxine (SYNTHROID) 50 MCG tablet, Take 1 tablet (50 mcg total) by mouth once daily., Disp: 90 tablet, Rfl: 3    progesterone (PROMETRIUM) 100 MG capsule, Take 3 capsules (300 mg total) by mouth once daily., Disp: 90 capsule, Rfl: 11    terbinafine HCL (LAMISIL) 250 mg tablet, Take 1 tablet (250 mg total) by mouth once daily. (Patient not taking: Reported on 2024), Disp: 42 tablet, Rfl: 0    Review of patient's allergies indicates:   Allergen Reactions    Adhesive Blisters    Anucort-hc [hydrocortisone acetate] Hives and Nausea And Vomiting    Demerol [meperidine] Nausea And Vomiting    Sulfa (sulfonamide antibiotics) Hives    Amoxicillin Nausea And Vomiting and Rash    Macrobid [nitrofurantoin monohyd/m-cryst] Nausea Only     dizziness       Family History   Problem Relation Name Age of Onset    Arthritis Mother Mother     Asthma Mother Mother     Hearing loss Mother Mother     Hyperlipidemia Mother Mother     Vision loss Mother Mother     Early death Father Father         42yrs old    Heart disease Father Father          of MI at 41yo    Hyperlipidemia Father Father     Kidney disease Father Father     Alcohol abuse Brother Brother     Diabetes Maternal Grandmother Maternal grandmother     Breast cancer Neg Hx      Colon cancer Neg Hx      Ovarian cancer Neg Hx      Melanoma Neg Hx         Social History     Socioeconomic History    Marital status:    Occupational History     Employer: [employer]    Tobacco Use    Smoking status: Never    Smokeless tobacco: Never   Substance and Sexual Activity     Alcohol use: Yes     Alcohol/week: 4.0 standard drinks of alcohol     Types: 2 Glasses of wine, 2 Cans of beer per week     Comment: weekend/social drinker    Drug use: Not Currently     Types: Other-see comments     Comment: edibles a few months ago    Sexual activity: Yes     Partners: Male     Birth control/protection: Partner-Vasectomy, None   Other Topics Concern    Are you pregnant or think you may be? No   Social History Narrative    Lives with spouse. Feels safe in her home.      Social Determinants of Health     Financial Resource Strain: Low Risk  (2024)    Overall Financial Resource Strain (CARDIA)     Difficulty of Paying Living Expenses: Not hard at all   Food Insecurity: No Food Insecurity (2024)    Hunger Vital Sign     Worried About Running Out of Food in the Last Year: Never true     Ran Out of Food in the Last Year: Never true   Transportation Needs: No Transportation Needs (2024)    PRAPARE - Transportation     Lack of Transportation (Medical): No     Lack of Transportation (Non-Medical): No   Physical Activity: Sufficiently Active (2024)    Exercise Vital Sign     Days of Exercise per Week: 4 days     Minutes of Exercise per Session: 60 min   Stress: No Stress Concern Present (2024)    [place] [employer] - Occupational Stress Questionnaire     Feeling of Stress : Only a little   Housing Stability: Low Risk  (2024)    Housing Stability Vital Sign     Unable to Pay for Housing in the Last Year: No     Number of Places Lived in the Last Year: 1     Unstable Housing in the Last Year: No       OB History          0    Para        Term   0            AB        Living             SAB        IAB        Ectopic        Multiple        Live Births                     Gyn History    Mammogram: 23 BIRADS 1 negative  Pap smear: 29 normal pap and negative HPV  LMP: Patient's last menstrual period was 2023 (approximate).    Postmenopausal bleeding: perimenopausal      INITIAL PHYSICAL EXAMINATION    Vitals:    06/13/24 1350   BP: (!) 141/84   Pulse: 92      General: Healthy in appearance, Well nourished, Affect Normal, NAD.  Neck: Supple, No masses, Trachea midline, Thyroid normal size,  non-tender, no masses or nodules.  Nodes: No clavicular/cervical adenopathy.  Skin: Normal temperature, No atypical lesions or rash.  Heart: Normal sounds, no murmurs  Lungs: Normal respiratory effort.  Gastrointestinal: Non tender, Non distended, No masses, guarding or  rebound, No hepatosplenomegally, No hernia.  Ext: No clubbing, cyanosis, edema or varicosities.  DTR's: 2+ bilaterally  Strength 5/5 bilateral upper and lower extremities    Genitourinary- (Verbal consent obtained; Female chaperone present during the pelvic exam)    Vulva: normal without lesions, masses, atrophy or pain  Urethra: meatus central and normal in appearance, no prolapse/caruncle, no masses or discharge. Empty supine stress test was negative.  Bladder: non-tender, no masses  Vagina: No discharge or lesions, mild atrophy, no masses appreciated.  [See POP-Q]  Cervix: no lesions, no discharge  Uterus:  non-tender, anteverted, approximately 6 weeks size  Adnexa: no masses, non tender.  Rectal: deferred Normal tone and anorectal angle  Neuro: S2-4- pin prick and light touch intact, Anal wink present  Levator strength: 0/5  Levator tenderness: left 0/10 and right 0/10    POP-Q Exam- pelvic organ prolapse quantitative    Aa -3  Anterior Wall Ba -3  Anterior wall C -6  Cervix or cuff   Gh 3  Genital hiatus pb 3  perineal body tvl 9  Total vaginal length   Ap -3  Posterior wall Bp -3  Posterior wall D -9  Posterior fornix     with Uterus    POP-Q Stage:0      Office Visit on 06/13/2024   Component Date Value Ref Range Status    Glucose, UA 06/13/2024 normal   Final    Bilirubin, POC 06/13/2024 negative   Final    Ketones, UA 06/13/2024 negative   Final    Spec Grav UA 06/13/2024  1.010   Final    Blood, UA 06/13/2024 negative   Final    pH, UA 06/13/2024 7   Final    Protein, POC 06/13/2024 negative   Final    Urobilinogen, UA 06/13/2024 normal   Final    Nitrite, UA 06/13/2024 negative   Final    WBC, UA 06/13/2024 negative   Final    Color, UA 06/13/2024 Yellow   Final    Clarity, UA 06/13/2024 Clear   Final    POC Residual Urine Volume 06/13/2024 2  0 - 100 mL Final        ASSESSMENT & PLAN:    Urinary urgency    Urinary frequency  -     Ambulatory referral/consult to Urogynecology  -     POCT URINE DIPSTICK WITHOUT MICROSCOPE  -     POCT Bladder Scan  -     Ambulatory referral/consult to Physical/Occupational Therapy; Future; Expected date: 06/20/2024    Urge incontinence of urine  -     Ambulatory referral/consult to Physical/Occupational Therapy; Future; Expected date: 06/20/2024    Stress incontinence  -     Ambulatory referral/consult to Physical/Occupational Therapy; Future; Expected date: 06/20/2024    Vaginal atrophy  -     estradioL (ESTRACE) 0.01 % (0.1 mg/gram) vaginal cream; 0.5 grams with applicator or dime-sized amount with finger in vagina nightly x 2 weeks, then three times a week thereafter  Dispense: 42.5 g; Refill: 3       Exam findings and treatment options were discussed in detail with the patient. Her symptoms are consistent with mixed urinary incontinence. We spent time in discussion today of the differences between UUI and MILADY. We reviewed treatment options of each type of incontinence as well, discussing in detail that for UUI she can try conservative measures such as bladder retraining, PT or medications and for MILADY options such as PT, Revive, pessary use or surgery. Additional information was provided.   Patient did see a significant improvement in her symptoms with HRT and we discussed that vaginal estrogen may further improve her symptoms. Discussed genitourinary syndrome of menopause and that local estrogen tends to be more effective for urinary symptoms.  Risks and benefits were reviewed.   Also will start with conservative management including fluid titration, restriction of intake 2-3 hours before bedtime, and pelvic floor physical therapy.     She will be scheduled for a follow up appointment in about 3-4 months at which times symptoms will be re-evaluated and additional treatments considered if there is no significant improvement.       All questions were answered today. The patient was encouraged to contact the office as needed with any additional questions or concerns.     Total time spent on visit was 34 minutes.  This includes face to face time and non-face to face time preparing to see the patient (eg, review of tests), Obtaining and/or reviewing separately obtained history, Documenting clinical information in the electronic or other health record, Independently interpreting resultsand communicating results to the patient/family/caregiver, or Care coordination.    Vera Francois MD

## 2024-06-15 NOTE — PROCEDURES
Endometrial biopsy    Date/Time: 6/6/2024 2:30 PM    Performed by: Yvrose Thompson MD  Authorized by: Yvrose Thompson MD    Consent:     Consent given by:  Patient    Patient questions answered: yes      Patient agrees, verbalizes understanding, and wants to proceed: yes    Indication:     Indications: Other disorder of menstruation and other abnormal bleeding from female genital tract    Pre-procedure:     Pre-procedure timeout performed: yes    Procedure:     Procedure: endometrial biopsy with Pipelle      Cervix cleaned and prepped: yes      A paracervical block was performed: no      An intracervical block was performed: no      The cervix was dilated: no      Uterus sound depth (cm):  8    Specimen collected: specimen collected and sent to pathology      Patient tolerated procedure well with no complications: yes      Procedure:  Endometrial biopsy    Diagnosis: AUB/PMB    The risks, benefits, and alternatives were discussed prior to the procedure. Patient signed consents.    UPT: Negative    Procedure note:  The speculum was placed and the cervix prepped with hibiclens.  The anterior lip of the cervix was grasped with a single tooth tenaculum.  The endometrial pipelle was inserted to a depth of 8 cm and an adequate amount of tissue was obtained with one pass.  The instruments were removed. Silver nitrate and pressure was applied for hemostasis.  The specimen was sent to pathology for evaluation.  There were no complications.    Assessment and Plan:  Postmenopausal bleeding  -     Specimen to Pathology, Ob/Gyn    DUB (dysfunctional uterine bleeding)  -     POCT Urine Pregnancy    Colon cancer screening  -     Ambulatory referral/consult to Endo Procedure ; Future; Expected date: 06/07/2024    Other orders  -     Endometrial biopsy      Ibuprofen 600 mg every 6 hours PRN cramping.  Call the office for heavy vaginal bleeding, fever, nausea, vomiting, or severe lower abdominal pain.      Yvrose REYES   MD Jay  Department of Obstetrics & Gynecology  [facility]

## 2024-07-17 ENCOUNTER — PATIENT MESSAGE (OUTPATIENT)
Dept: UROGYNECOLOGY | Facility: CLINIC | Age: 54
End: 2024-07-17
Payer: COMMERCIAL

## 2024-07-22 ENCOUNTER — PATIENT MESSAGE (OUTPATIENT)
Dept: SPORTS MEDICINE | Facility: CLINIC | Age: 54
End: 2024-07-22
Payer: COMMERCIAL

## 2024-08-08 ENCOUNTER — OFFICE VISIT (OUTPATIENT)
Dept: SPORTS MEDICINE | Facility: CLINIC | Age: 54
End: 2024-08-08
Payer: COMMERCIAL

## 2024-08-08 DIAGNOSIS — G89.29 CHRONIC PAIN OF RIGHT KNEE: ICD-10-CM

## 2024-08-08 DIAGNOSIS — M17.11 PRIMARY OSTEOARTHRITIS OF RIGHT KNEE: Primary | ICD-10-CM

## 2024-08-08 DIAGNOSIS — M25.561 CHRONIC PAIN OF RIGHT KNEE: ICD-10-CM

## 2024-08-12 ENCOUNTER — CLINICAL SUPPORT (OUTPATIENT)
Dept: ENDOSCOPY | Facility: HOSPITAL | Age: 54
End: 2024-08-12
Attending: OBSTETRICS & GYNECOLOGY
Payer: COMMERCIAL

## 2024-08-12 ENCOUNTER — TELEPHONE (OUTPATIENT)
Dept: ENDOSCOPY | Facility: HOSPITAL | Age: 54
End: 2024-08-12

## 2024-08-12 VITALS — BODY MASS INDEX: 28.89 KG/M2 | WEIGHT: 157 LBS | HEIGHT: 62 IN

## 2024-08-12 DIAGNOSIS — Z12.11 COLON CANCER SCREENING: ICD-10-CM

## 2024-08-12 RX ORDER — SODIUM, POTASSIUM,MAG SULFATES 17.5-3.13G
1 SOLUTION, RECONSTITUTED, ORAL ORAL DAILY
Qty: 1 KIT | Refills: 0 | Status: SHIPPED | OUTPATIENT
Start: 2024-08-12 | End: 2024-08-14

## 2024-08-12 NOTE — TELEPHONE ENCOUNTER
Spoke to pt to schedule procedure(s) Colonoscopy       Physician to perform procedure(s) Dr. ALEJANDRO Ha  Date of Procedure (s) 10/17/24  Arrival Time 11:00 AM  Time of Procedure(s) 12:00 PM   Location of Procedure(s) [place] 4th Floor  Type of Rx Prep sent to patient: Suprep  Instructions provided to patient via [facility]    Patient was informed on the following information and verbalized understanding. Screening questionnaire reviewed with patient and complete. If procedure requires anesthesia, a responsible adult needs to be present to accompany the patient home, patient cannot drive after receiving anesthesia. Appointment details are tentative, especially check-in time. Patient will receive a prep-op call 7 days prior to confirm check-in time for procedure. If applicable the patient should contact their pharmacy to verify Rx for procedure prep is ready for pick-up. Patient was advised to call the scheduling department at [phone] if pharmacy states no Rx is available. Patient was advised to call the endoscopy scheduling department if any questions or concerns arise.      [facility] Scheduling Department

## 2024-08-13 ENCOUNTER — TELEPHONE (OUTPATIENT)
Dept: ENDOSCOPY | Facility: HOSPITAL | Age: 54
End: 2024-08-13
Payer: COMMERCIAL

## 2024-08-13 DIAGNOSIS — Z12.11 SPECIAL SCREENING FOR MALIGNANT NEOPLASMS, COLON: Primary | ICD-10-CM

## 2024-08-13 RX ORDER — POLYETHYLENE GLYCOL 3350, SODIUM SULFATE ANHYDROUS, SODIUM BICARBONATE, SODIUM CHLORIDE, POTASSIUM CHLORIDE 236; 22.74; 6.74; 5.86; 2.97 G/4L; G/4L; G/4L; G/4L; G/4L
4 POWDER, FOR SOLUTION ORAL ONCE
Qty: 4000 ML | Refills: 0 | Status: SHIPPED | OUTPATIENT
Start: 2024-08-13 | End: 2024-08-13

## 2024-08-15 ENCOUNTER — PATIENT MESSAGE (OUTPATIENT)
Dept: PRIMARY CARE CLINIC | Facility: CLINIC | Age: 54
End: 2024-08-15
Payer: COMMERCIAL

## 2024-08-15 NOTE — TELEPHONE ENCOUNTER
Pt had labs done a few months ago. She is scheduled for a annual visit on 9/17/24. Do you need any labs prior to the visit?

## 2024-08-21 ENCOUNTER — TELEPHONE (OUTPATIENT)
Dept: ENDOSCOPY | Facility: HOSPITAL | Age: 54
End: 2024-08-21
Payer: COMMERCIAL

## 2024-08-21 NOTE — TELEPHONE ENCOUNTER
Spoke to pt to reschedule procedure(s) Colonoscopy       Physician to perform procedure(s) Dr. JENN Hawkins  Date of Procedure (s) 11/6/24  Arrival Time 7:15 AM  Time of Procedure(s) 8:15 AM   Location of Procedure(s) [place] 4th Floor  Type of Rx Prep sent to patient: Suprep  Instructions provided to patient via [facility]    Patient was informed on the following information and verbalized understanding. Screening questionnaire reviewed with patient and complete. If procedure requires anesthesia, a responsible adult needs to be present to accompany the patient home, patient cannot drive after receiving anesthesia. Appointment details are tentative, especially check-in time. Patient will receive a prep-op call 7 days prior to confirm check-in time for procedure. If applicable the patient should contact their pharmacy to verify Rx for procedure prep is ready for pick-up. Patient was advised to call the scheduling department at [phone] if pharmacy states no Rx is available. Patient was advised to call the endoscopy scheduling department if any questions or concerns arise.      [facility] Scheduling Department

## 2024-09-10 ENCOUNTER — OFFICE VISIT (OUTPATIENT)
Dept: DERMATOLOGY | Facility: CLINIC | Age: 54
End: 2024-09-10
Payer: COMMERCIAL

## 2024-09-10 DIAGNOSIS — D22.9 MULTIPLE BENIGN NEVI: ICD-10-CM

## 2024-09-10 DIAGNOSIS — B07.9 VERRUCA VULGARIS: Primary | ICD-10-CM

## 2024-09-10 DIAGNOSIS — L81.4 LENTIGINES: ICD-10-CM

## 2024-09-10 DIAGNOSIS — Z12.83 SCREENING EXAM FOR SKIN CANCER: ICD-10-CM

## 2024-09-10 DIAGNOSIS — Z85.828 HISTORY OF NONMELANOMA SKIN CANCER: ICD-10-CM

## 2024-09-10 PROCEDURE — 1160F RVW MEDS BY RX/DR IN RCRD: CPT | Mod: CPTII,S$GLB,, | Performed by: DERMATOLOGY

## 2024-09-10 PROCEDURE — 1159F MED LIST DOCD IN RCRD: CPT | Mod: CPTII,S$GLB,, | Performed by: DERMATOLOGY

## 2024-09-10 PROCEDURE — 99213 OFFICE O/P EST LOW 20 MIN: CPT | Mod: 25,S$GLB,, | Performed by: DERMATOLOGY

## 2024-09-10 PROCEDURE — 17110 DESTRUCTION B9 LES UP TO 14: CPT | Mod: S$GLB,,, | Performed by: DERMATOLOGY

## 2024-09-10 PROCEDURE — 3044F HG A1C LEVEL LT 7.0%: CPT | Mod: CPTII,S$GLB,, | Performed by: DERMATOLOGY

## 2024-09-10 NOTE — PATIENT INSTRUCTIONS
CRYOSURGERY      Your doctor has used a method called cryosurgery to treat your skin condition. Cryosurgery refers to the use of very cold substances to treat a variety of skin conditions such as warts, precancerous skin lesions, molluscum contagiosum, sun spots, and several benign growths. The substance we use in cryosurgery is liquid nitrogen and is so cold (-195 degrees Celsius) that it burns when administered.     Following treatment in the office, the skin may immediately itch or burn and become red. You may find the area around the lesion is affected as well. It is sometimes necessary to treat not only the lesion, but also a small area of the surrounding normal skin to achieve a good response.     A blister, and even a blood-filled blister, may form after treatment.   This is a normal response. If the blister is painful, it is acceptable to sterilize a needle with rubbing alcohol and gently pop the blister. It is important that you gently wash the area with soap and warm water as the blister fluid may contain wart virus if a wart was treated. Do not remove the roof of the blister.     The area treated can take anywhere from 1-3 weeks to heal. Healing time depends on the kind of skin lesion treated, the location, and how aggressively the lesion was treated. It is recommended that the areas treated are covered with Vaseline and a bandaid to improve healing. If a bandaid is not practical, Vaseline applied several times per day will do. Keeping these areas moist will speed the healing time.    Treatment with liquid nitrogen can leave a scar. In dark skin, it may be a light or dark scar; in light skin it may be a white or pink scar. These will generally fade with time, but may never go away completely.     If you have any concerns after your treatment, please message us via [facility] or call us at [phone].

## 2024-09-10 NOTE — PROGRESS NOTES
"  Patient Information  Name: Shawna Mayers  : 1970  MRN: [id number]     Referring Physician:  No ref. provider found   Primary Care Physician:  Latanya Osborn MD   Date of Visit: 09/10/2024      Subjective:     History of Present lllness:    Shawna Mayers is a 54 y.o. female who presents with a chief complaint of moles, wart, and dry skin.  This is a high risk patient with a personal history of nonmelanoma skin cancer who is here today to check for the development of new lesions.  Patient is here today for a "mole" check.   Today, patient complains of:    Wart  Location: left dorsal ankle  Duration: months  Signs/Symptoms: 2 bumps, might have come back  Exacerbating factors: none  Relieving factors/Prior treatments: OTC sal acid    Dry Skin  Location: both feet  Duration: years, has gotten worse recently  Signs/Symptoms: dry skin  Exacerbating factors: none  Relieving factors/Prior treatments: OTC aveeno does not help    Patient was last seen: 2024.  Prior notes by myself reviewed.   Clinical documentation obtained by nursing staff reviewed.    Review of Systems    Objective:   Physical Exam   Constitutional: She appears well-developed and well-nourished. No distress.   Neurological: She is alert and oriented to person, place, and time. She is not disoriented.   Psychiatric: She has a normal mood and affect.   Skin:   Areas Examined (abnormalities noted in diagram):   Scalp / Hair Palpated and Inspected  Head / Face Inspection Performed  Neck Inspection Performed  Chest / Axilla Inspection Performed  Abdomen Inspection Performed  Genitals / Buttocks / Groin Inspection Performed  Back Inspection Performed  RUE Inspected  LUE Inspection Performed  RLE Inspected  LLE Inspection Performed  Nails and Digits Inspection Performed                 Diagram Legend     Erythematous scaling macule/papule c/w actinic keratosis       Vascular papule c/w angioma      Pigmented verrucoid papule/plaque c/w " seborrheic keratosis      Yellow umbilicated papule c/w sebaceous hyperplasia      Irregularly shaped tan macule c/w lentigo     1-2 mm smooth white papules consistent with Milia      Movable subcutaneous cyst with punctum c/w epidermal inclusion cyst      Subcutaneous movable cyst c/w pilar cyst      Firm pink to brown papule c/w dermatofibroma      Pedunculated fleshy papule(s) c/w skin tag(s)      Evenly pigmented macule c/w junctional nevus     Mildly variegated pigmented, slightly irregular-bordered macule c/w mildly atypical nevus      Flesh colored to evenly pigmented papule c/w intradermal nevus       Pink pearly papule/plaque c/w basal cell carcinoma      Erythematous hyperkeratotic cursted plaque c/w SCC      Surgical scar with no sign of skin cancer recurrence      Open and closed comedones      Inflammatory papules and pustules      Verrucoid papule consistent consistent with wart     Erythematous eczematous patches and plaques     Dystrophic onycholytic nail with subungual debris c/w onychomycosis     Umbilicated papule    Erythematous-base heme-crusted tan verrucoid plaque consistent with inflamed seborrheic keratosis     Erythematous Silvery Scaling Plaque c/w Psoriasis     See annotation    No images are attached to the encounter or orders placed in the encounter.      [] Data reviewed  [] Prior external notes reviewed  [] Independent review of test  [] Management discussed with another provider  [] Independent historian    Assessment / Plan:        Verruca vulgaris  Cryosurgery procedure note:  Risk, benefits, and alternatives of cryosurgery are discussed with the patient, including but not limited to the risks of hypopigmentation, hyperpigmentation, scar, infection, recurrence of lesion(s), development of new lesion(s), and need for additional treatment of the lesion(s). Verbal consent obtained from patient. Liquid nitrogen cryosurgery applied to 3 lesion(s) to produce a freeze injury. Counseled  patient that blisters may form, and instructed patient on wound care with gentle cleansing and use of Vaseline ointment to keep moist until healed. Handout was provided, and patient was instructed to return to clinic in 1-2 months if lesions do not completely resolve.    Multiple benign nevi  Multiple benign-appearing nevi present on exam today. Reassurance provided. Counseled patient to periodically examine moles and return to clinic if any changes in size, shape, or color are noted or if it becomes symptomatic (bleeding, itching, pain, etc).  Recommend using a broad-spectrum, water-resistant sunscreen with SPF of 30 or higher--reapply every 2 hours. Seek shade, wear sun-protective clothing, and perform regular skin self-exams.    Lentigines  These are benign sun spots which should be monitored for changes. Daily sun protection will reduce the number of new lesions.   Recommend using a broad-spectrum, water-resistant sunscreen with SPF of 30 or higher--reapply every 2 hours. Seek shade, wear sun-protective clothing, and perform regular skin self-exams.    History of nonmelanoma skin cancer   - stable and chronic  Area(s) of previous nonmelanoma skin cancer evaluated with no evidence of recurrence. Reassurance provided.  Recommend using a broad-spectrum, water-resistant sunscreen with SPF of 30 or higher--reapply every 2 hours. Seek shade, wear sun-protective clothing, and perform regular skin self-exams.    Screening exam for skin cancer  Total body skin examination performed today as noted in physical exam. No lesions suspicious for malignancy were seen.  Recommend using a broad-spectrum, water-resistant sunscreen with SPF of 30 or higher--reapply every 2 hours. Seek shade, wear sun-protective clothing, and perform regular skin self-exams.         Follow up in about 1 year (around 9/10/2025) for TBSE, or sooner if any new problems or changing lesions.      Salma Adair MD, FAAD  [facility]

## 2024-09-26 ENCOUNTER — CLINICAL SUPPORT (OUTPATIENT)
Dept: REHABILITATION | Facility: HOSPITAL | Age: 54
End: 2024-09-26
Attending: OBSTETRICS & GYNECOLOGY
Payer: COMMERCIAL

## 2024-09-26 DIAGNOSIS — N39.41 URGE INCONTINENCE OF URINE: ICD-10-CM

## 2024-09-26 DIAGNOSIS — N39.3 STRESS INCONTINENCE: ICD-10-CM

## 2024-09-26 DIAGNOSIS — M62.89 PELVIC FLOOR DYSFUNCTION: Primary | ICD-10-CM

## 2024-09-26 DIAGNOSIS — R35.0 URINARY FREQUENCY: ICD-10-CM

## 2024-09-26 PROCEDURE — 97112 NEUROMUSCULAR REEDUCATION: CPT | Mod: PO

## 2024-09-26 PROCEDURE — 97162 PT EVAL MOD COMPLEX 30 MIN: CPT | Mod: PO

## 2024-09-26 NOTE — PATIENT INSTRUCTIONS
"Home Exercise Program: 09/26/2024    360 Breathing Technique          Inhale long, slow and deep. You should feel as if your lower ribs are expanding in all directions like the way an umbrella opens. You should feel the belly, back and sides gently expand and you may notice a relaxation in the pelvic floor. Then exhale and let your belly "snap back" together like a rubber band.      Continue to breathe like this for 5 minutes. Repeat 1-2 times/day.      Home Exercise Program: 09/26/2024    YOGA POSES   to improve pelvic floor muscle DROP.  Maintain each position for 1 minute, 1-2 times per day.       Deep Squat    Happy Baby            Child's Pose  Do Child's pose in sitting as directed in clinic!   "

## 2024-09-26 NOTE — PROGRESS NOTES
[facility]  Pelvic Health Physical Therapy Initial Evaluation    Date: 9/26/2024   Name: Shawna Woodson [facility] Number: [id number]  Therapy Diagnosis:   Encounter Diagnosis   Name Primary?    Pelvic floor dysfunction Yes     Physician: Vera Francois MD    Physician Orders: PT Eval and Treat    Medical Diagnosis from Referral: Urinary frequency [R35.0], Urge incontinence of urine [N39.41], Stress incontinence [N39.3]   Evaluation Date: 9/26/2024  Authorization Period Expiration: 12/31/2024  Plan of Care Expiration: 12/26/2024  Visit # / Visits authorized: 1/ 1    Time In: 11:06  Time Out: 12:00  Total Appointment Time (timed & untimed codes): 54 minutes    Precautions: universal    Subjective     Date of onset: worse in the last year    History of current condition - Shawna reports: that her bladder fills up quickly.  She is starting to leak with cough and sneeze; she also has small scale urge leakage if too far from a bathroom.      OB/GYN History: G0; + HRT;   Sexually active? Yes  Pain with vaginal exams, intercourse or tampon use? No    Bladder/Bowel History: trouble emptying bladder completely and urinary incontinence  Frequency of urination:   Daytime: every 2 hours           Nighttime: 3-4  Difficulty initiating urine stream: No  Urine stream: strong  Complete emptying: Yes; but double voids  Bladder leakage: Yes  Frequency of incidents: once per week on average  Amount leaked (urine): few drops  Urinary Urgency: Yes, Able to delay the urge for at least 30 minute(s).  Frequency of bowel movements: once a day  Difficulty initiating BM: No  Quality/Shape of BM: [place] Stool Chart 3-4  Does Patient Feel Empty after BM? Yes  Fiber Supplements or Laxative Use? No  Colon leakage: No  Form of protection:  underwear changes  Number of pads required in 24 hours: 0    Pain:  Location:  bladder - happens once per week, upon waking  Current 0/10, worst 7/10, best 0/10   Description:  ""irritation"  Aggravating Factors/Activities that cause symptoms: Full bladder   Easing Factors:  voiding     Medical History: Shawna  has a past medical history of Allergy, Anxiety, Asthma, Focal nodular hyperplasia of liver, GERD (gastroesophageal reflux disease), Hyperlipidemia, and Thyroid disease.     Surgical History: Shawna Mayers  has a past surgical history that includes Cholecystectomy; Foot surgery; [place] tooth extraction; Knee arthroscopy w/ meniscectomy (Right, 7/26/2023); and Arthroscopic chondroplasty of knee joint (Right, 7/26/2023).    Medications: Shawna has a current medication list which includes the following prescription(s): estradiol, estradiol, fluticasone propionate, lactobacillus rhamnosus gg, levothyroxine, and progesterone.    Allergies:   Review of patient's allergies indicates:   Allergen Reactions    Adhesive Blisters    Anucort-hc [hydrocortisone acetate] Hives and Nausea And Vomiting    Demerol [meperidine] Nausea And Vomiting    Sulfa (sulfonamide antibiotics) Hives    Amoxicillin Nausea And Vomiting and Rash    Macrobid [nitrofurantoin monohyd/m-cryst] Nausea Only     dizziness        Prior Therapy/Previous treatment included: none  Social History:  lives with their spouse    Current exercise: 5 days per week- strength training; 6 days per week walking  Occupation: Pt is retired.  [place] and stays busy  Prior Level of Function: could control urine flow in ADL's.   Current Level of Function: leaks urine from time to time    Types of fluid intake: water and coffee; wine on the weekends; Erika once per day  Diet: TAD   Habitus: well developed, well nourished  Abuse/Neglect: No     Pts goals: to stop having to void so often at night and during the day; also to stop leaking urine.    OBJECTIVE     See EMR under MEDIA for written consent provided 9/26/2024  Chaperone: declined    ORTHO SCREEN  Posture in sitting: slouched   Posture in standing: WNL  Pelvic alignment: no sign " of deviations noted in supine     ABDOMINAL WALL ASSESSMENT  Abdominal strength: Rectus abdominus: 3/5     Transverse abdominus: weak but palpable contraction noted  Scarring: none  Diastasis: absent       BREATHING MECHANICS ASSESSMENT   Thorax Assessment During Quiet Respiration: WNL excursion of abdominal wall  Thorax Assessment During Deep Respiration: WNL excursion of abdominal wall and Decreased excursion of lateral ribs    VAGINAL PELVIC FLOOR EXAM    EXTERNAL ASSESSMENT  Introitus: WNL  Skin condition: slight redness noted  Scarring: none   Sensation: WNL   Pain: none    Voluntary contraction: visible lift  Voluntary relaxation: nil  Involuntary contraction: visible lift  Bearing down: nil  Perineal descent: absent      INTERNAL ASSESSMENT  Pain: none   Sensation: unable to localized pressure appropriately   Vaginal vault: WNL   Muscle Bulk: hypertonus   Muscle Power: 1/5 at best      Quality of contraction: little/no length change noted- best change noted in layer 2 on the L   Specificity: WNL   Coordination: tends to hold breath during PFM contration   Prolapse check: none    Limitation/Restriction for FOTO PFDI Urinary Survey    Therapist reviewed FOTO scores for Shawna Mayers on 9/26/2024.   FOTO documents entered into [employer] - see Media section.    Limitation Score: 42%       TREATMENT     Treatment Time In: 11:45  Treatment Time Out: 12:00  Total Treatment time (time-based codes) separate from Evaluation: 15 minutes    Neuromuscular Re-education to develop Coordination and Down training for 15 minutes including:   diaphragmatic breathing and yoga postures for drop    Patient Education provided:   general anatomy/physiology of urinary/ bowel  system, benefits of treatment, risks of treatment, and alternative methods of treatment were discussed with the pt. Additionally, anatomy/physiology of pelvic floor and posture/body mechanices were reviewed.     Home Exercises provided:  Written Home  Exercises provided: yes.  Exercises were reviewed and Shawna was able to demonstrate them prior to the end of the session.    Shawna demonstrated good  understanding of the education provided.     See EMR under Patient Instructions for exercises provided 9/26/2024.    Assessment     Shawna is a 54 y.o. female referred to outpatient Physical Therapy with a medical diagnosis of Urinary frequency [R35.0], Urge incontinence of urine [N39.41], Stress incontinence [N39.3] . Pt presents with poor knowledge of body mechanics and posture, poor trunk stability, decreased pelvic muscle strength, increased tension of the pelvic muscles, poor quality of pelvic muscle contraction, increased frequency of urination, increased nocturia, and poor coordination of pelvic floor muscles during ADL's leading to urinary or fecal leakage.       Pt prognosis is Good.   Pt will benefit from skilled outpatient Physical Therapy to address the deficits stated above and in the chart below, provide pt/family education, and to maximize pt's level of independence.     Plan of care discussed with patient: Yes  Pt's spiritual, cultural and educational needs considered and patient is agreeable to the plan of care and goals as stated below:     Anticipated Barriers for therapy: none    Medical Necessity is demonstrated by the following:    History  Co-morbidities and personal factors that may impact the plan of care Co-morbidities   anxiety, COPD/asthma, and OA knee    Personal Factors  no deficits     moderate   Examination  Body structures and functions, activity limitations and participation restrictions that may impact the plan of care Body Regions/Systems/Functions:     poor knowledge of body mechanics and posture, poor trunk stability, decreased pelvic muscle strength, increased tension of the pelvic muscles, poor quality of pelvic muscle contraction, increased frequency of urination, increased nocturia, and poor coordination of pelvic floor  muscles during ADL's leading to urinary or fecal leakage    Activity Limitations:  urgency , delaying urge to urinate, pain with full bladder affecting ADL participation and/or sleep, and incontinence with ADLs    Participation Restrictions:  all ADLs/iADLs uninterrupted by urinary incontinence/urgency/frequency and ADL participation affected by pain    Activity limitations:   Learning and applying knowledge  None    General Tasks and Commands  None    Communication  None    Mobility  None    Self care  None    Domestic Life  None    Interactions/Relationships  None    Life Areas  None    Community and Social Life  None       moderate   Clinical Presentation unstable clinical presentation with unpredictable characteristics high   Decision Making/ Complexity Score: moderate       Goals:  Short Term Goals: 2 weeks  Pt to report increased awareness of PFM lift/drop during exercises and functional activities.  Pt to be I with double voiding techniques.  Pt to be I with diaphragmatic breathing.      Long Term Goals: 12 weeks   Pt will report improved ability to perform ADLs (ie. dressing, bathing, functional transfers) with little (drops) to no urinary leakage 7/7 days per week.  Pt will report improved ability to perform iADLs (ie. driving, home chores, grocery shopping) with little (drops) to no urinary leakage 7/7 days per week.   Pt will report improved ability to cough/sneeze/laugh/yell with little (drops) to no urinary leakage 7/7 days per week.   Pt will report improved ability to manage bladder spasms appropriately 100% of the time for an improvement in urinary frequency/urgency.   Pt will report improved ability to sleep uninterrupted by excessive nocturia 5/7 days per week.   Pt will maintain a voiding interval of > or = 3 hours for improved activity tolerance (ie. prolonged driving, work meeting, watching a movie).   Pt will report < or = 2/10 pain with urination/defecation 80% of the time.   Pt/family will be  independent with HEP for continued self-management of symptoms.     Plan     Plan of care Certification: 9/26/2024 to 12/26/2024.    Outpatient Physical Therapy 1 times per 2 week(s)  for 12 weeks to include the following interventions: therapeutic exercises, therapeutic activity, neuromuscular re-education, manual therapy, modalities PRN, patient/family education, and self care/home management    Jade Anglin, PT, BCB-PMD

## 2024-09-30 ENCOUNTER — OFFICE VISIT (OUTPATIENT)
Dept: PRIMARY CARE CLINIC | Facility: CLINIC | Age: 54
End: 2024-09-30
Payer: COMMERCIAL

## 2024-09-30 VITALS
WEIGHT: 157.88 LBS | BODY MASS INDEX: 29.05 KG/M2 | OXYGEN SATURATION: 99 % | DIASTOLIC BLOOD PRESSURE: 86 MMHG | HEIGHT: 62 IN | HEART RATE: 106 BPM | SYSTOLIC BLOOD PRESSURE: 136 MMHG

## 2024-09-30 DIAGNOSIS — Z79.890 HORMONE REPLACEMENT THERAPY (HRT): ICD-10-CM

## 2024-09-30 DIAGNOSIS — K76.89 FOCAL NODULAR HYPERPLASIA OF LIVER: ICD-10-CM

## 2024-09-30 DIAGNOSIS — H81.10 BENIGN PAROXYSMAL POSITIONAL VERTIGO, UNSPECIFIED LATERALITY: ICD-10-CM

## 2024-09-30 DIAGNOSIS — N95.0 POSTMENOPAUSAL BLEEDING: ICD-10-CM

## 2024-09-30 DIAGNOSIS — E03.9 ACQUIRED HYPOTHYROIDISM: ICD-10-CM

## 2024-09-30 DIAGNOSIS — Z00.00 ANNUAL PHYSICAL EXAM: Primary | ICD-10-CM

## 2024-09-30 DIAGNOSIS — Z12.31 ENCOUNTER FOR SCREENING MAMMOGRAM FOR MALIGNANT NEOPLASM OF BREAST: ICD-10-CM

## 2024-09-30 PROCEDURE — 3044F HG A1C LEVEL LT 7.0%: CPT | Mod: CPTII,S$GLB,, | Performed by: STUDENT IN AN ORGANIZED HEALTH CARE EDUCATION/TRAINING PROGRAM

## 2024-09-30 PROCEDURE — 99999 PR PBB SHADOW E&M-EST. PATIENT-LVL IV: CPT | Mod: PBBFAC,,, | Performed by: STUDENT IN AN ORGANIZED HEALTH CARE EDUCATION/TRAINING PROGRAM

## 2024-09-30 PROCEDURE — 3079F DIAST BP 80-89 MM HG: CPT | Mod: CPTII,S$GLB,, | Performed by: STUDENT IN AN ORGANIZED HEALTH CARE EDUCATION/TRAINING PROGRAM

## 2024-09-30 PROCEDURE — 1159F MED LIST DOCD IN RCRD: CPT | Mod: CPTII,S$GLB,, | Performed by: STUDENT IN AN ORGANIZED HEALTH CARE EDUCATION/TRAINING PROGRAM

## 2024-09-30 PROCEDURE — 99396 PREV VISIT EST AGE 40-64: CPT | Mod: S$GLB,,, | Performed by: STUDENT IN AN ORGANIZED HEALTH CARE EDUCATION/TRAINING PROGRAM

## 2024-09-30 PROCEDURE — 3075F SYST BP GE 130 - 139MM HG: CPT | Mod: CPTII,S$GLB,, | Performed by: STUDENT IN AN ORGANIZED HEALTH CARE EDUCATION/TRAINING PROGRAM

## 2024-09-30 PROCEDURE — 3008F BODY MASS INDEX DOCD: CPT | Mod: CPTII,S$GLB,, | Performed by: STUDENT IN AN ORGANIZED HEALTH CARE EDUCATION/TRAINING PROGRAM

## 2024-09-30 PROCEDURE — 1160F RVW MEDS BY RX/DR IN RCRD: CPT | Mod: CPTII,S$GLB,, | Performed by: STUDENT IN AN ORGANIZED HEALTH CARE EDUCATION/TRAINING PROGRAM

## 2024-09-30 RX ORDER — LEVOTHYROXINE SODIUM 50 UG/1
50 TABLET ORAL DAILY
Qty: 90 TABLET | Refills: 3 | Status: SHIPPED | OUTPATIENT
Start: 2024-09-30

## 2024-09-30 NOTE — PROGRESS NOTES
Shawna Mayers  1970        Subjective     Chief Complaint: Annual    History of Present Illness:  Ms. Shawna Mayers is a 54 y.o. female who presents to clinic for est care and annual.     Hx of hypothyroidism- dx during fertility work-up years ago.  On Synthroid 50 mcg. Stable dose.    Lab Results   Component Value Date    TSH 1.457 03/20/2024        FNH of liver- dx years ago in her 30s. Was on OCPs at that time which was thought to have been related. Had GB removed for stones. On U/S looking at GB, was told liver lesions. Done at [facility]. Last MRCP 2012. Was seeing Hepatology here. Dr. Foley -Last saw in 2012/2013. 6m f/u recommended. Discussed at case conference per chart review. Last MRI caused anxiety. Was planning on doing open MRI but lost to f/u.    FINDINGS:  Visualized lung bases, heart and pericardial structures are unremarkable.    The liver is normal in contour and morphology.  No focal hepatic mass lesions are seen.  There is a wedge-shaped region of enhancement in the posterior right hepatic lobe segment 6 which measures 1.9 x 1.4 cm and becomes hypointense to liver parenchyma  on hepatocyte phase imaging. This is intermediate signal intensity on T2-weighted imaging.  These imaging findings are nonspecific but are not suggestive of focal nodular hyperplasia. Differential considerations include adenoma and or vascular  malformation causing transient hyperperfusion. The gallbladder is not well-visualized and may have been removed; the common bile duct is not dilated.  There is no intrahepatic bile duct dilatation.  Spleen is unremarkable. The portal and splenic veins  are patent.      Remaining visualized intra-abdominal structures are unremarkable.  Soft tissue and osseous structures are intact.  Impression   1.9 x 1.4 cm wedge-shaped region of early arterial enhancement in the posterior right hepatic lobe segment 6 with nonspecific imaging characteristics.  This could represent  an adenoma or vascular malformation leading to transient hyperperfusion.  Recommend 6-month imaging follow-up to evaluate for stability.    On HRT- estradiol and progesterone with Dr. Thompson. OBGYN.  Seeing Urogyn as well. Dr. Francois. Using vaginal estrogen cream as tolerated.     AUB- endometrial polyp, considering D&C in future pending f/u.  Due for pap smear. Last done 2019.  Final Pathologic Diagnosis ENDOMETRIUM, BIOPSY:  - Benign endometrial polyp.  - Polyp demonstrates areas of inactive appearing glands with stromal decidualization, suggestive of partial progesterone hormone effect.  - No hyperplasia, atypia, or malignancy.     Mammo due 11/2024.  Colon- due now, scheduled 11/2024.    HLD-not on meds, diet controlled.    Vertigo- hx of this, chronic. Sometimes flares when dehydrated.   Did alcohol-free for 40 and went vegan.   Improved when added meat back.    BP Readings from Last 5 Encounters:   09/30/24 136/86   06/13/24 (!) 141/84   06/06/24 136/82   05/09/24 122/85   05/02/24 123/78     Answers submitted by the patient for this visit:  Review of Systems Questionnaire (Submitted on 9/23/2024)  activity change: No  unexpected weight change: No  rhinorrhea: No  trouble swallowing: No  visual disturbance: No  chest tightness: No  polyuria: No  difficulty urinating: No  menstrual problem: No  joint swelling: No  arthralgias: No  confusion: No  dysphoric mood: No      Review of Systems   HENT:  Negative for hearing loss.    Eyes:  Negative for discharge.   Respiratory:  Negative for wheezing.    Cardiovascular:  Negative for chest pain and palpitations.   Gastrointestinal:  Negative for blood in stool, constipation, diarrhea and vomiting.   Genitourinary:  Negative for dysuria and hematuria.   Musculoskeletal:  Negative for neck pain.   Neurological:  Negative for weakness and headaches.   Endo/Heme/Allergies:  Negative for polydipsia.        PAST HISTORY:     Past Medical History:   Diagnosis Date    Allergy      Anxiety     Asthma     resolved    Focal nodular hyperplasia of liver     GERD (gastroesophageal reflux disease)     Hyperlipidemia     Thyroid disease     hypothyroidism       Past Surgical History:   Procedure Laterality Date    ARTHROSCOPIC CHONDROPLASTY OF KNEE JOINT Right 2023    Procedure: ARTHROSCOPY, KNEE, WITH CHONDROPLASTY;  Surgeon: Fidel Michelle MD;  Location: [facility] OR;  Service: Orthopedics;  Laterality: Right;    CHOLECYSTECTOMY      FOOT SURGERY      arc    KNEE ARTHROSCOPY W/ MENISCECTOMY Right 2023    Procedure: ARTHROSCOPY, KNEE, WITH MENISCECTOMY;  Surgeon: Fidel Michelle MD;  Location: [facility] OR;  Service: Orthopedics;  Laterality: Right;  partial and medial     WISDOM TOOTH EXTRACTION         Family History   Problem Relation Name Age of Onset    Arthritis Mother Mother     Asthma Mother Mother     Hearing loss Mother Mother     Hyperlipidemia Mother Mother     Vision loss Mother Mother     Early death Father Father         42yrs old    Heart disease Father Father          of MI at 41yo    Hyperlipidemia Father Father     Kidney disease Father Father     Alcohol abuse Brother Brother     Diabetes Maternal Grandmother Maternal grandmother     Breast cancer Neg Hx      Colon cancer Neg Hx      Ovarian cancer Neg Hx      Melanoma Neg Hx           MEDICATIONS & ALLERGIES:     Current Outpatient Medications on File Prior to Visit   Medication Sig    estradioL (ESTRACE) 0.01 % (0.1 mg/gram) vaginal cream 0.5 grams with applicator or dime-sized amount with finger in vagina nightly x 2 weeks, then three times a week thereafter    estradioL (ESTRACE) 2 MG tablet Take 1 tablet (2 mg total) by mouth once daily.    fluticasone propionate (FLONASE) 50 mcg/actuation nasal spray SPRAY 2 SPRAYS BY EACH NOSTRIL ROUTE ONCE DAILY.    Lactobacillus rhamnosus GG (CULTURELLE) 10 billion cell capsule Take 1 capsule by mouth once daily.    progesterone (PROMETRIUM) 100 MG capsule Take 3  "capsules (300 mg total) by mouth once daily.    [DISCONTINUED] levothyroxine (SYNTHROID) 50 MCG tablet Take 1 tablet (50 mcg total) by mouth once daily.     No current facility-administered medications on file prior to visit.       Review of patient's allergies indicates:   Allergen Reactions    Adhesive Blisters    Anucort-hc [hydrocortisone acetate] Hives and Nausea And Vomiting    Demerol [meperidine] Nausea And Vomiting    Sulfa (sulfonamide antibiotics) Hives    Amoxicillin Nausea And Vomiting and Rash    Macrobid [nitrofurantoin monohyd/m-cryst] Nausea Only     dizziness       OBJECTIVE:     Vital Signs:  Vitals:    09/30/24 1355   BP: 136/86   BP Location: Right arm   Patient Position: Sitting   Pulse: 106   SpO2: 99%   Weight: 71.6 kg (157 lb 13.6 oz)   Height: 5' 2" (1.575 m)       Body mass index is 28.87 kg/m².     Physical Exam:  Physical Exam  Vitals and nursing note reviewed.   Constitutional:       General: She is not in acute distress.     Appearance: Normal appearance. She is not ill-appearing, toxic-appearing or diaphoretic.   HENT:      Head: Normocephalic and atraumatic.      Right Ear: Tympanic membrane, ear canal and external ear normal.      Left Ear: Tympanic membrane, ear canal and external ear normal.      Mouth/Throat:      Mouth: Mucous membranes are moist.      Pharynx: No oropharyngeal exudate or posterior oropharyngeal erythema.   Eyes:      General: No scleral icterus.        Right eye: No discharge.         Left eye: No discharge.      Conjunctiva/sclera: Conjunctivae normal.   Cardiovascular:      Rate and Rhythm: Normal rate and regular rhythm.      Pulses: Normal pulses.      Heart sounds: Normal heart sounds. No murmur heard.  Pulmonary:      Effort: Pulmonary effort is normal. No respiratory distress.      Breath sounds: Normal breath sounds. No wheezing.   Abdominal:      Tenderness: There is no right CVA tenderness or left CVA tenderness.   Musculoskeletal:         General: " "Normal range of motion.      Cervical back: Normal range of motion and neck supple. No rigidity or tenderness.      Right lower leg: No edema.      Left lower leg: No edema.   Lymphadenopathy:      Cervical: No cervical adenopathy.   Skin:     General: Skin is warm and dry.   Neurological:      Mental Status: She is alert and oriented to person, place, and time. Mental status is at baseline.      Gait: Gait normal.   Psychiatric:         Mood and Affect: Mood normal.         Behavior: Behavior normal.            Laboratory  Lab Results   Component Value Date    GLU 81 03/28/2024     03/28/2024    K 5.0 03/28/2024     03/28/2024    CO2 27 03/28/2024    BUN 14 03/28/2024    CREATININE 0.7 03/28/2024    CALCIUM 10.5 03/28/2024     Lab Results   Component Value Date    HGBA1C 5.2 03/20/2024     No results for input(s): "POCTGLUCOSE" in the last 72 hours.        ASSESSMENT & PLAN:   Ms. Shawna Mayers is a 54 y.o. female who was seen today in clinic for est care and annual.     1. Annual physical exam  -     COMPREHENSIVE METABOLIC PANEL; Future; Expected date: 09/30/2024  -     CBC W/ AUTO DIFFERENTIAL; Future; Expected date: 09/30/2024  -     TSH; Future; Expected date: 09/30/2024  -     HEMOGLOBIN A1C; Future; Expected date: 09/30/2024  -     LIPID PANEL; Future; Expected date: 09/30/2024    2. Focal nodular hyperplasia of liver  -May need f/u, Significant anxiety with MRI. Will refer back to Hepatology to see if further imaging needed.  -     Ambulatory referral/consult to Hepatology; Future; Expected date: 10/07/2024  -     COMPREHENSIVE METABOLIC PANEL; Future; Expected date: 09/30/2024  -     CBC W/ AUTO DIFFERENTIAL; Future; Expected date: 09/30/2024  -     TSH; Future; Expected date: 09/30/2024  -     HEMOGLOBIN A1C; Future; Expected date: 09/30/2024  -     LIPID PANEL; Future; Expected date: 09/30/2024    3. Acquired hypothyroidism  -     Vitamin D; Future; Expected date: 09/30/2024  -     " Magnesium; Future; Expected date: 09/30/2024  -     COMPREHENSIVE METABOLIC PANEL; Future; Expected date: 09/30/2024  -     CBC W/ AUTO DIFFERENTIAL; Future; Expected date: 09/30/2024  -     TSH; Future; Expected date: 09/30/2024  -     HEMOGLOBIN A1C; Future; Expected date: 09/30/2024  -     LIPID PANEL; Future; Expected date: 09/30/2024  -     levothyroxine (SYNTHROID) 50 MCG tablet; Take 1 tablet (50 mcg total) by mouth once daily.  Dispense: 90 tablet; Refill: 3    4. Benign paroxysmal positional vertigo, unspecified laterality  -     VITAMIN B12; Future; Expected date: 09/30/2024  -     IRON AND TIBC; Future; Expected date: 09/30/2024  -     Ferritin; Future  -     Vitamin D; Future; Expected date: 09/30/2024  -     Magnesium; Future; Expected date: 09/30/2024  -     COMPREHENSIVE METABOLIC PANEL; Future; Expected date: 09/30/2024  -     CBC W/ AUTO DIFFERENTIAL; Future; Expected date: 09/30/2024  -     TSH; Future; Expected date: 09/30/2024  -     HEMOGLOBIN A1C; Future; Expected date: 09/30/2024  -     LIPID PANEL; Future; Expected date: 09/30/2024    5. Hormone replacement therapy (HRT)  -     COMPREHENSIVE METABOLIC PANEL; Future; Expected date: 09/30/2024  -     CBC W/ AUTO DIFFERENTIAL; Future; Expected date: 09/30/2024  -     TSH; Future; Expected date: 09/30/2024  -     HEMOGLOBIN A1C; Future; Expected date: 09/30/2024  -     LIPID PANEL; Future; Expected date: 09/30/2024    6. Postmenopausal bleeding  -     IRON AND TIBC; Future; Expected date: 09/30/2024  -     Ferritin; Future  -     COMPREHENSIVE METABOLIC PANEL; Future; Expected date: 09/30/2024  -     CBC W/ AUTO DIFFERENTIAL; Future; Expected date: 09/30/2024  -     TSH; Future; Expected date: 09/30/2024  -     HEMOGLOBIN A1C; Future; Expected date: 09/30/2024  -     LIPID PANEL; Future; Expected date: 09/30/2024    7. Encounter for screening mammogram for malignant neoplasm of breast  -     Mammo Digital Screening Bilat w/ Joe; Future; Expected  date: 09/30/2024           Audrey Moulton MD

## 2024-10-01 ENCOUNTER — PATIENT MESSAGE (OUTPATIENT)
Dept: PODIATRY | Facility: CLINIC | Age: 54
End: 2024-10-01
Payer: COMMERCIAL

## 2024-10-02 ENCOUNTER — TELEPHONE (OUTPATIENT)
Dept: HEPATOLOGY | Facility: CLINIC | Age: 54
End: 2024-10-02
Payer: COMMERCIAL

## 2024-10-02 NOTE — TELEPHONE ENCOUNTER
Called patient to schedule Hepatology appointment, left voicemail to call back at [phone] or schedule the appointment via [facility] at her earliest convenience.

## 2024-10-04 ENCOUNTER — TELEPHONE (OUTPATIENT)
Dept: HEPATOLOGY | Facility: CLINIC | Age: 54
End: 2024-10-04
Payer: COMMERCIAL

## 2024-10-04 NOTE — TELEPHONE ENCOUNTER
Called patient to offer a Virtual appointment with Dr. Kaylin Gibbs on 10/8/24, left voicemail to call back at [phone] or was given the option to schedule via [facility].

## 2024-10-07 ENCOUNTER — OFFICE VISIT (OUTPATIENT)
Dept: PODIATRY | Facility: CLINIC | Age: 54
End: 2024-10-07
Payer: COMMERCIAL

## 2024-10-07 VITALS
HEART RATE: 71 BPM | DIASTOLIC BLOOD PRESSURE: 84 MMHG | SYSTOLIC BLOOD PRESSURE: 147 MMHG | WEIGHT: 157.88 LBS | BODY MASS INDEX: 28.87 KG/M2

## 2024-10-07 DIAGNOSIS — M77.42 METATARSALGIA OF BOTH FEET: ICD-10-CM

## 2024-10-07 DIAGNOSIS — L60.9 DISEASE OF NAIL: ICD-10-CM

## 2024-10-07 DIAGNOSIS — L85.3 XEROSIS OF SKIN: ICD-10-CM

## 2024-10-07 DIAGNOSIS — Z98.890 HISTORY OF FOOT SURGERY: Primary | ICD-10-CM

## 2024-10-07 DIAGNOSIS — M77.41 METATARSALGIA OF BOTH FEET: ICD-10-CM

## 2024-10-07 PROCEDURE — 1159F MED LIST DOCD IN RCRD: CPT | Mod: CPTII,S$GLB,, | Performed by: PODIATRIST

## 2024-10-07 PROCEDURE — 99204 OFFICE O/P NEW MOD 45 MIN: CPT | Mod: S$GLB,,, | Performed by: PODIATRIST

## 2024-10-07 PROCEDURE — 99999 PR PBB SHADOW E&M-EST. PATIENT-LVL III: CPT | Mod: PBBFAC,,, | Performed by: PODIATRIST

## 2024-10-07 PROCEDURE — 1160F RVW MEDS BY RX/DR IN RCRD: CPT | Mod: CPTII,S$GLB,, | Performed by: PODIATRIST

## 2024-10-07 PROCEDURE — 3044F HG A1C LEVEL LT 7.0%: CPT | Mod: CPTII,S$GLB,, | Performed by: PODIATRIST

## 2024-10-07 PROCEDURE — 3008F BODY MASS INDEX DOCD: CPT | Mod: CPTII,S$GLB,, | Performed by: PODIATRIST

## 2024-10-07 PROCEDURE — 3077F SYST BP >= 140 MM HG: CPT | Mod: CPTII,S$GLB,, | Performed by: PODIATRIST

## 2024-10-07 PROCEDURE — 3079F DIAST BP 80-89 MM HG: CPT | Mod: CPTII,S$GLB,, | Performed by: PODIATRIST

## 2024-10-07 NOTE — PROGRESS NOTES
Subjective:      Patient ID: Shawna Mayers is a 54 y.o. female.    Chief Complaint:   Foot Pain (Right 2nd,3rd and 4th digit toe pain short shooting pain, when pt. Crawl toes its very painful. Left foot 2nd,3rd, and 4th digit toe painful but not as bad as right foot.)    Shawna is a 54 y.o. female who presents to the podiatry clinic  with complaint of  bilateral foot pain with walking program.  Relates around the summer she increased her mileage  to about 6 miles.   She noticed bright and foot pain to the toes specifically 2 3 and 4 right more than left.  Usually resolves when she removes the shoes  Patient has tried switching shoes she uses Boss  She gets fitted at the running store  Patient relates her knee is doing much better  She is stretching    She is a history of left arch surgery as a child  Did wear inserts in her shoes as a child    Overall she has not had any arch pain.      She also briefly inquiring about toenail fungus (unable to tolerate the oral Lamisil ) nail changes and dry skin  She recently got Amlactin from Dermatology  She started soaking in Epsom salt and tea tree oil      Pt saw orthopedics 2022:  closed fracture proximal phalanx left fifth toe        Hx derm : onychomycosis  Past Medical History:   Diagnosis Date    Allergy     Anxiety     Asthma     resolved    Focal nodular hyperplasia of liver     GERD (gastroesophageal reflux disease)     Hyperlipidemia     Thyroid disease     hypothyroidism     Past Surgical History:   Procedure Laterality Date    ARTHROSCOPIC CHONDROPLASTY OF KNEE JOINT Right 7/26/2023    Procedure: ARTHROSCOPY, KNEE, WITH CHONDROPLASTY;  Surgeon: Fidel Michelle MD;  Location: [facility] OR;  Service: Orthopedics;  Laterality: Right;    CHOLECYSTECTOMY      FOOT SURGERY      arc    KNEE ARTHROSCOPY W/ MENISCECTOMY Right 7/26/2023    Procedure: ARTHROSCOPY, KNEE, WITH MENISCECTOMY;  Surgeon: Fidel Michelle MD;  Location: [facility] OR;  Service: Orthopedics;   Laterality: Right;  partial and medial     WISDOM TOOTH EXTRACTION       Current Outpatient Medications on File Prior to Visit   Medication Sig Dispense Refill    estradioL (ESTRACE) 0.01 % (0.1 mg/gram) vaginal cream 0.5 grams with applicator or dime-sized amount with finger in vagina nightly x 2 weeks, then three times a week thereafter 42.5 g 3    estradioL (ESTRACE) 2 MG tablet Take 1 tablet (2 mg total) by mouth once daily. 30 tablet 11    fluticasone propionate (FLONASE) 50 mcg/actuation nasal spray SPRAY 2 SPRAYS BY EACH NOSTRIL ROUTE ONCE DAILY. 48 g 3    Lactobacillus rhamnosus GG (CULTURELLE) 10 billion cell capsule Take 1 capsule by mouth once daily.      levothyroxine (SYNTHROID) 50 MCG tablet Take 1 tablet (50 mcg total) by mouth once daily. 90 tablet 3    progesterone (PROMETRIUM) 100 MG capsule Take 3 capsules (300 mg total) by mouth once daily. 90 capsule 11     No current facility-administered medications on file prior to visit.     Review of patient's allergies indicates:   Allergen Reactions    Adhesive Blisters    Anucort-hc [hydrocortisone acetate] Hives and Nausea And Vomiting    Demerol [meperidine] Nausea And Vomiting    Sulfa (sulfonamide antibiotics) Hives    Amoxicillin Nausea And Vomiting and Rash    Macrobid [nitrofurantoin monohyd/m-cryst] Nausea Only     dizziness       Review of Systems   Constitutional: Negative for chills, decreased appetite, fever, malaise/fatigue, night sweats, weight gain and weight loss.   Cardiovascular:  Negative for chest pain, claudication, dyspnea on exertion, leg swelling, palpitations and syncope.   Respiratory:  Negative for cough and shortness of breath.    Endocrine: Negative for cold intolerance and heat intolerance.   Hematologic/Lymphatic: Negative for bleeding problem. Does not bruise/bleed easily.   Skin:  Positive for color change, dry skin and nail changes. Negative for flushing, itching, poor wound healing, rash, skin cancer, suspicious lesions  and unusual hair distribution.   Musculoskeletal:  Negative for arthritis, back pain, falls, gout, joint pain, joint swelling, muscle cramps, muscle weakness, myalgias, neck pain and stiffness.        Foot pain   Gastrointestinal:  Negative for diarrhea, nausea and vomiting.   Neurological:  Positive for paresthesias. Negative for dizziness, focal weakness, light-headedness, numbness, tremors, vertigo and weakness.   Psychiatric/Behavioral:  Negative for altered mental status and depression. The patient does not have insomnia.    Allergic/Immunologic: Negative.            Objective:       Vitals:    10/07/24 1429   BP: (!) 147/84   Pulse: 71   Weight: 71.6 kg (157 lb 13.6 oz)   PainSc: 0-No pain   PainLoc: Foot   71.6 kg (157 lb 13.6 oz)     Physical Exam  Vitals reviewed.   Constitutional:       General: She is not in acute distress.     Appearance: She is well-developed. She is not ill-appearing, toxic-appearing or diaphoretic.      Comments: Proper supportive shoegear      Cardiovascular:      Pulses:           Dorsalis pedis pulses are 2+ on the right side and 2+ on the left side.        Posterior tibial pulses are 2+ on the right side and 2+ on the left side.   Musculoskeletal:         General: No deformity.      Right lower leg: No edema.      Left lower leg: No edema.      Right ankle: Normal.      Right Achilles Tendon: Normal.      Left ankle: Normal.      Left Achilles Tendon: Normal.      Right foot: Decreased range of motion. No deformity, tenderness or bony tenderness.      Left foot: Decreased range of motion. No deformity, tenderness or bony tenderness.      Comments: No pain on palpation to interspaces or metatarsophalangeal joints    No pain with range of motion    Prominent metatarsal heads    Gastroc equinus bilateral   Feet:      Right foot:      Protective Sensation: 10 sites tested.  10 sites sensed.      Skin integrity: Dry skin present. No ulcer, blister, skin breakdown, erythema, warmth or  callus.      Toenail Condition: Right toenails are abnormally thick.      Left foot:      Protective Sensation: 10 sites tested.  10 sites sensed.      Skin integrity: Dry skin present. No ulcer, blister, skin breakdown, erythema, warmth or callus.      Toenail Condition: Left toenails are abnormally thick.      Comments: [place] Spike intact to all digits    No signs of infection    Diffuse xerosis    Nail changes dark discolored  Right 2nd subungual hematoma dry  Skin:     General: Skin is warm and dry.      Capillary Refill: Capillary refill takes 2 to 3 seconds.      Coloration: Skin is not pale.      Findings: No erythema or rash.   Neurological:      Mental Status: She is alert and oriented to person, place, and time.      Sensory: No sensory deficit.      Gait: Gait is intact.   Psychiatric:         Attention and Perception: Attention normal.         Mood and Affect: Mood normal.         Speech: Speech normal.         Behavior: Behavior normal.         Thought Content: Thought content normal.         Cognition and Memory: Cognition normal.         Judgment: Judgment normal.               Assessment:       Encounter Diagnoses   Name Primary?    History of foot surgery Yes    Metatarsalgia of both feet     Disease of nail     Xerosis of skin          Plan:       Shawna was seen today for foot pain.    Diagnoses and all orders for this visit:    History of foot surgery    Metatarsalgia of both feet    Disease of nail    Xerosis of skin      I counseled the patient on her conditions, their implications and medical management.    Chart review    With regards to nails and skin agree with Dermatology for ammonium lactate    Recommend trying vinegar and water soaks    Consider nails sample in the future for fungus and trying a topical antifungal patient is unable to tolerate oral Lamisil.    Shoe gear pattern well worn Boss the lateral central metatarsal heads    Discussed with patient metatarsalgia also seems  consistent with neuromas  No suspicion for stress fracture at this point  Recommend trying inserts    Can consider different brand such as new balance however patient tried in the past    If Spenco inserts work recommend custom-molded orthotics..  Discussed briefly [employer] making them      Continue Stretching    The patient's foot pain is not resolved with Spenco inserts recommend x-ray patient can call with update of symptoms and I can order an x-ray if needed              Follow up if symptoms worsen or fail to improve.

## 2024-10-08 ENCOUNTER — OFFICE VISIT (OUTPATIENT)
Dept: HEPATOLOGY | Facility: CLINIC | Age: 54
End: 2024-10-08
Payer: COMMERCIAL

## 2024-10-08 DIAGNOSIS — R79.89 ABNORMAL LIVER FUNCTION TESTS: Primary | ICD-10-CM

## 2024-10-08 DIAGNOSIS — D13.4 HEPATIC ADENOMA: ICD-10-CM

## 2024-10-08 DIAGNOSIS — K76.89 FOCAL NODULAR HYPERPLASIA OF LIVER: ICD-10-CM

## 2024-10-08 DIAGNOSIS — R79.89 ABNORMAL LIVER FUNCTION TESTS: ICD-10-CM

## 2024-10-08 DIAGNOSIS — D13.4 HEPATIC ADENOMA: Primary | ICD-10-CM

## 2024-10-08 PROCEDURE — 3044F HG A1C LEVEL LT 7.0%: CPT | Mod: CPTII,95,, | Performed by: INTERNAL MEDICINE

## 2024-10-08 PROCEDURE — 99203 OFFICE O/P NEW LOW 30 MIN: CPT | Mod: 95,,, | Performed by: INTERNAL MEDICINE

## 2024-10-08 PROCEDURE — 1159F MED LIST DOCD IN RCRD: CPT | Mod: CPTII,95,, | Performed by: INTERNAL MEDICINE

## 2024-10-08 PROCEDURE — 1160F RVW MEDS BY RX/DR IN RCRD: CPT | Mod: CPTII,95,, | Performed by: INTERNAL MEDICINE

## 2024-10-08 NOTE — Clinical Note
1. IV sedation at [facility] for MRI w wo eovist and MRE 2. Labs and AFP this sat with other bloodwork ordered by her PCP Return 6-8 weeks

## 2024-10-08 NOTE — PROGRESS NOTES
The patient location is: home  The chief complaint leading to consultation is: f/u liver lesion    Visit type: audiovisual    Face to Face time with patient: 15 minutes  30 minutes of total time spent on the encounter, which includes face to face time and non-face to face time preparing to see the patient (eg, review of tests), Obtaining and/or reviewing separately obtained history, Documenting clinical information in the electronic or other health record, Independently interpreting results (not separately reported) and communicating results to the patient/family/caregiver, or Care coordination (not separately reported).         Each patient to whom he or she provides medical services by telemedicine is:  (1) informed of the relationship between the physician and patient and the respective role of any other health care provider with respect to management of the patient; and (2) notified that he or she may decline to receive medical services by telemedicine and may withdraw from such care at any time.    Notes: HEPATOLOGY CONSULTATION    Referring Physician: Audrey Moulton MD   Current Corresponding Physician: Audrey Moulton MD     Reason for Consultation: Consultation for evaluation of Abnormal Abdominal/Liver Imaging    History of Present Illness: Shawna Mayers is a 54 y.o. femalewho presents for evaluation of a hx of abnormal liver imaging    --note from [facility] 12/21/2012 (stopped BCP in 2010):  42-year-old woman who had an MRI in 2010, to followup the finding of a liver mass seen on ultrasound when she developed some gallbladder issues. She had a 7 cm right hepatic lobe with imaging characteristics consistent with focal nodular hyperplasia. She had a followup MRI later that year, which showed no interval change in the appearance or size. She is asymptomatic. She has been taken off birth control pills.   Discussed 1/7: single 1.8 cm lesion, T2 bright, suggestive of adenoma. Get prior studies for  comparison    --feeling well  --no abdo pain, N/V  BMI: 28  Alcohol: social    Labs 3/28/24: ALT 25, aST 30, ALKP 78, Tbil 0.4    Chief Complaint   Patient presents with    Abnormal Abdominal/Liver Imaging       Past Medical History:   Diagnosis Date    Allergy     Anxiety     Asthma     resolved    Focal nodular hyperplasia of liver     GERD (gastroesophageal reflux disease)     Hyperlipidemia     Thyroid disease     hypothyroidism     Outpatient Encounter Medications as of 10/8/2024   Medication Sig Dispense Refill    estradioL (ESTRACE) 2 MG tablet Take 1 tablet (2 mg total) by mouth once daily. 30 tablet 11    fluticasone propionate (FLONASE) 50 mcg/actuation nasal spray SPRAY 2 SPRAYS BY EACH NOSTRIL ROUTE ONCE DAILY. 48 g 3    Lactobacillus rhamnosus GG (CULTURELLE) 10 billion cell capsule Take 1 capsule by mouth once daily.      levothyroxine (SYNTHROID) 50 MCG tablet Take 1 tablet (50 mcg total) by mouth once daily. 90 tablet 3    progesterone (PROMETRIUM) 100 MG capsule Take 3 capsules (300 mg total) by mouth once daily. 90 capsule 11    estradioL (ESTRACE) 0.01 % (0.1 mg/gram) vaginal cream 0.5 grams with applicator or dime-sized amount with finger in vagina nightly x 2 weeks, then three times a week thereafter (Patient not taking: Reported on 10/8/2024) 42.5 g 3     No facility-administered encounter medications on file as of 10/8/2024.     Review of patient's allergies indicates:   Allergen Reactions    Adhesive Blisters    Anucort-hc [hydrocortisone acetate] Hives and Nausea And Vomiting    Demerol [meperidine] Nausea And Vomiting    Sulfa (sulfonamide antibiotics) Hives    Amoxicillin Nausea And Vomiting and Rash    Macrobid [nitrofurantoin monohyd/m-cryst] Nausea Only     dizziness     Family History   Problem Relation Name Age of Onset    Arthritis Mother Mother     Asthma Mother Mother     Hearing loss Mother Mother     Hyperlipidemia Mother Mother     Vision loss Mother Mother     Early death  Father Father         42yrs old    Heart disease Father Father          of MI at 43yo    Hyperlipidemia Father Father     Kidney disease Father Father     Alcohol abuse Brother Brother     Diabetes Maternal Grandmother Maternal grandmother     Breast cancer Neg Hx      Colon cancer Neg Hx      Ovarian cancer Neg Hx      Melanoma Neg Hx         Social History     Socioeconomic History    Marital status:    Occupational History     Employer: [employer]    Tobacco Use    Smoking status: Never    Smokeless tobacco: Never   Substance and Sexual Activity    Alcohol use: Yes     Alcohol/week: 4.0 standard drinks of alcohol     Types: 2 Glasses of wine, 2 Cans of beer per week     Comment: weekend/social drinker    Drug use: Not Currently     Types: Other-see comments     Comment: edibles a few months ago    Sexual activity: Yes     Partners: Male     Birth control/protection: Partner-Vasectomy, None   Other Topics Concern    Are you pregnant or think you may be? No   Social History Narrative    Lives with spouse. Feels safe in her home.      Social Drivers of Health     Financial Resource Strain: Low Risk  (2024)    Overall Financial Resource Strain (CARDIA)     Difficulty of Paying Living Expenses: Not hard at all   Food Insecurity: No Food Insecurity (2024)    Hunger Vital Sign     Worried About Running Out of Food in the Last Year: Never true     Ran Out of Food in the Last Year: Never true   Transportation Needs: No Transportation Needs (2024)    PRAPARE - Transportation     Lack of Transportation (Medical): No     Lack of Transportation (Non-Medical): No   Physical Activity: Sufficiently Active (2024)    Exercise Vital Sign     Days of Exercise per Week: 4 days     Minutes of Exercise per Session: 60 min   Stress: No Stress Concern Present (2024)    [employer] - Occupational Stress Questionnaire     Feeling of Stress : Only a little    Housing Stability: Low Risk  (2/14/2024)    Housing Stability Vital Sign     Unable to Pay for Housing in the Last Year: No     Number of Places Lived in the Last Year: 1     Unstable Housing in the Last Year: No     Review of Systems   Constitutional: Negative.    HENT: Negative.     Eyes: Negative.    Respiratory: Negative.     Cardiovascular: Negative.    Gastrointestinal: Negative.    Genitourinary: Negative.    Musculoskeletal: Negative.    Skin: Negative.    Neurological: Negative.    Psychiatric/Behavioral: Negative.       There were no vitals filed for this visit.    Physical Exam    Computed MELD 3.0 unavailable. One or more values for this score either were not found within the given timeframe or did not fit some other criterion.  Computed MELD-Na unavailable. One or more values for this score either were not found within the given timeframe or did not fit some other criterion.      Lab Results   Component Value Date    GLU 81 03/28/2024    BUN 14 03/28/2024    CREATININE 0.7 03/28/2024    CALCIUM 10.5 03/28/2024     03/28/2024    K 5.0 03/28/2024     03/28/2024    PROT 7.3 03/28/2024    CO2 27 03/28/2024    ANIONGAP 10 03/28/2024    WBC 8.11 03/28/2024    RBC 4.96 03/28/2024    HGB 15.2 03/28/2024    HCT 44.3 03/28/2024    MCV 89 03/28/2024    MCH 30.6 03/28/2024    MCHC 34.3 03/28/2024     Lab Results   Component Value Date    RDW 11.5 03/28/2024     03/28/2024    MPV 10.2 03/28/2024    GRAN 3.5 03/20/2024    GRAN 55.0 03/20/2024    LYMPH 2.3 03/20/2024    LYMPH 36.0 03/20/2024    MONO 0.3 03/20/2024    MONO 5.4 03/20/2024    EOSINOPHIL 2.6 03/20/2024    BASOPHIL 0.8 03/20/2024    EOS 0.2 03/20/2024    BASO 0.05 03/20/2024    CHOL 234 (H) 03/28/2024    TRIG 152 (H) 03/28/2024    HDL 55 03/28/2024    CHOLHDL 23.5 03/28/2024    TOTALCHOLEST 4.3 03/28/2024    ALBUMIN 4.4 03/28/2024    BILIDIR 0.1 05/04/2016    AST 30 03/28/2024    ALT 25 03/28/2024    ALKPHOS 78 03/28/2024        Assessment and Plan:  Shawna Mayers is a 54 y.o. female with a hx of abnormal liver imaging. I am recommending an MRI w wo eovist to further evaluate liver lesions. Eovist is helpful when trying to distinguish between adenomas and FNHs. Check AFP. I am also recommending an MRE to screen for fibrosis.    Return 8 weeks

## 2024-10-08 NOTE — PATIENT INSTRUCTIONS
IV sedation at [facility] for MRI w wo eovist and MRE  2. Labs and AFP this sat with other bloodwork ordered by her PCP  Return 6-8 weeks

## 2024-10-09 ENCOUNTER — TELEPHONE (OUTPATIENT)
Dept: HEPATOLOGY | Facility: CLINIC | Age: 54
End: 2024-10-09
Payer: COMMERCIAL

## 2024-10-09 DIAGNOSIS — K76.89 FOCAL NODULAR HYPERPLASIA OF LIVER: ICD-10-CM

## 2024-10-09 DIAGNOSIS — D13.4 HEPATIC ADENOMA: Primary | ICD-10-CM

## 2024-10-09 NOTE — TELEPHONE ENCOUNTER
1.Call placed to then patient at [phone]. No Answer.  Left VM message this is Maria Del Rosario calling from the Of[facility] of [facility] Gibbs[facility].  I was calling to arrange your MRI w/sedation at the [facility] [place], [address]. Please call us back to let us know what days you are available at [phone].    2.Labs and AFP linked to appt already scheduled for Sat 10/12/24.    3.Return to clinic in 6-8 weeks. Virtual Visit scheduled for 11/20/24 at 9:30 am.      Patient returned call while nurse was writing note.  Informed someone must accompany her to the MRI w/Sedation appt.   will accompany her.  Shawna gave me dates available for next week and then the next 2 weeks.    Will reach out to Anesthesia and get back to the Patient. Voiced understanding.

## 2024-10-09 NOTE — TELEPHONE ENCOUNTER
---- Message from Angelique Don ------  Good morning,  Per MRI we can only do 1 adult MRI a day. The first available date I have w/sedation is 11/14 1pm. I also have 11/15 1pm, 11/18 1pm, 11/19 1pm.      Call placed to the Patient [phone].  Patient accepted Friday 11/15/24 at 1 pm for the MRI w/Sedation.    Message forwarded back to [facility] w/Anesthesia with acceptance date.    Will place Order and contact Becca Hess.

## 2024-10-09 NOTE — TELEPHONE ENCOUNTER
----- Message from Kaylin Gibbs MD sent at 10/8/2024  2:18 PM CDT -----  1. IV sedation at [facility] for MRI w wo eovist and MRE  2. Labs and AFP this sat with other bloodwork ordered by her PCP  Return 6-8 weeks

## 2024-10-09 NOTE — TELEPHONE ENCOUNTER
Call placed to [facility] to speak with Angelique DONOVAN.   Informed Emily does not work with [facility] and Angelique is working from home.  Send her a message for date for the Anesthesia for MRI with Sedation.

## 2024-10-10 ENCOUNTER — CLINICAL SUPPORT (OUTPATIENT)
Dept: REHABILITATION | Facility: HOSPITAL | Age: 54
End: 2024-10-10
Payer: COMMERCIAL

## 2024-10-10 DIAGNOSIS — M62.89 PELVIC FLOOR DYSFUNCTION: Primary | ICD-10-CM

## 2024-10-10 PROCEDURE — 97112 NEUROMUSCULAR REEDUCATION: CPT | Mod: PO

## 2024-10-10 NOTE — PROGRESS NOTES
Pelvic Health Physical Therapy   Treatment Note     Name: Shawna Woodson [facility]  Clinic Number: [id number]    Therapy Diagnosis:   Encounter Diagnosis   Name Primary?    Pelvic floor dysfunction Yes     Physician: Vera Francois MD    Visit Date: 10/10/2024     Physician Orders: PT Eval and Treat    Medical Diagnosis from Referral: Urinary frequency [R35.0], Urge incontinence of urine [N39.41], Stress incontinence [N39.3]   Evaluation Date: 9/26/2024  Authorization Period Expiration: 12/31/2024  Plan of Care Expiration: 12/26/2024  Visit # / Visits authorized: 2/20  Cancelled Visits: 0  No Show Visits: 0    Time In: 9:50  Time Out: 10:30  Total Billable Time: 40 minutes    Precautions: Standard    Subjective     Pt reports: no new c/o.  Bladder is full.  She was compliant with home exercise program.  Response to previous treatment: no adverse effects  Functional change: none noted yet    Pain: none      Objective     Pre void 482cc; PVR 54cc    Shawna participated in neuromuscular re-education activities to develop Down training for 40 minutes including: Pelvic floor downtraining with assist of sEMG and pelvic floor downtraining with assist of RUSI     Her bladder was first visualized with transabdominal RUSI- she was noted to demonstrate poor lift and drop of the bladder base, despite verbal and tactile cues.  She was instructed in double voiding techniques, and sent to the restroom to void- She came back noting that she was successful getting more urine out, and her PVR was measured to be 54cc.    With SEMG, we worked in supine and supine hooklying with external lead wires.  She demonstrated normal baseline resting, low WR rise, and poor holding in 5 sec Kegels.  She then performed DB with Kegel (reverse kegel) with SEMG assist- she did a good job of slowing breathing and expanding her abdomen.      Home Exercises Provided and Patient Education Provided     Education provided:   - double voiding techniques  Discussed  progression of plan of care with patient; educated pt in activity modification; reviewed HEP with pt. Pt demonstrated and verbalized understanding of all instruction and was provided with a handout of HEP (see Patient Instructions).    Written Home Exercises Provided: yes.  Exercises were reviewed and Shawna was able to demonstrate them prior to the end of the session.  Shawna demonstrated good  understanding of the education provided.     See EMR under Patient Instructions for exercises provided 10/10/2024.    Assessment     Will look at internal cueing for lift and drop, along with manual therapy to improve use of muscle length.    Shawna Is progressing well towards her goals.   Pt prognosis is Good.     Pt will continue to benefit from skilled outpatient physical therapy to address the deficits listed in the problem list box on initial evaluation, provide pt/family education and to maximize pt's level of independence in the home and community environment.     Pt's spiritual, cultural and educational needs considered and pt agreeable to plan of care and goals.     Anticipated barriers to physical therapy: none    Goals: Short Term Goals: 2 weeks  Pt to report increased awareness of PFM lift/drop during exercises and functional activities.  Pt to be I with double voiding techniques.  Pt to be I with diaphragmatic breathing.       Long Term Goals: 12 weeks   Pt will report improved ability to perform ADLs (ie. dressing, bathing, functional transfers) with little (drops) to no urinary leakage 7/7 days per week.  Pt will report improved ability to perform iADLs (ie. driving, home chores, grocery shopping) with little (drops) to no urinary leakage 7/7 days per week.   Pt will report improved ability to cough/sneeze/laugh/yell with little (drops) to no urinary leakage 7/7 days per week.   Pt will report improved ability to manage bladder spasms appropriately 100% of the time for an improvement in urinary  frequency/urgency.   Pt will report improved ability to sleep uninterrupted by excessive nocturia 5/7 days per week.   Pt will maintain a voiding interval of > or = 3 hours for improved activity tolerance (ie. prolonged driving, work meeting, watching a movie).   Pt will report < or = 2/10 pain with urination/defecation 80% of the time.   Pt/family will be independent with HEP for continued self-management of symptoms.   ALL PROGRESSING    Plan     Plan of care Certification: 9/26/2024 to 12/26/2024.     Outpatient Physical Therapy 1 times per 2 week(s)  for 12 weeks to include the following interventions: therapeutic exercises, therapeutic activity, neuromuscular re-education, manual therapy, modalities PRN, patient/family education, and self care/home management    Jade Anglin, PT, BCB-PMD

## 2024-10-10 NOTE — PATIENT INSTRUCTIONS
10/10/2024    Double Voiding: more than one urine stream per sitting.    When your urine stream stops, perform the followin. Body Scan to be sure that your legs, buttocks, and belly are relaxed.    2. Diaphragmatic breathing (belly breathing) to re-drop the pelvic floor.  3. Bladder tapping (as shown in clinic)  4. Stand up and sit back down.      If no more urine comes in 2-3 minutes, you're done!

## 2024-10-12 ENCOUNTER — LAB VISIT (OUTPATIENT)
Dept: LAB | Facility: HOSPITAL | Age: 54
End: 2024-10-12
Attending: STUDENT IN AN ORGANIZED HEALTH CARE EDUCATION/TRAINING PROGRAM
Payer: COMMERCIAL

## 2024-10-12 DIAGNOSIS — Z79.890 HORMONE REPLACEMENT THERAPY (HRT): ICD-10-CM

## 2024-10-12 DIAGNOSIS — Z00.00 ANNUAL PHYSICAL EXAM: ICD-10-CM

## 2024-10-12 DIAGNOSIS — R79.89 ABNORMAL LIVER FUNCTION TESTS: ICD-10-CM

## 2024-10-12 DIAGNOSIS — D13.4 HEPATIC ADENOMA: ICD-10-CM

## 2024-10-12 DIAGNOSIS — N95.0 POSTMENOPAUSAL BLEEDING: ICD-10-CM

## 2024-10-12 DIAGNOSIS — H81.10 BENIGN PAROXYSMAL POSITIONAL VERTIGO, UNSPECIFIED LATERALITY: ICD-10-CM

## 2024-10-12 DIAGNOSIS — E03.9 ACQUIRED HYPOTHYROIDISM: ICD-10-CM

## 2024-10-12 DIAGNOSIS — K76.89 FOCAL NODULAR HYPERPLASIA OF LIVER: ICD-10-CM

## 2024-10-12 LAB
25(OH)D3+25(OH)D2 SERPL-MCNC: 45 NG/ML (ref 30–96)
AFP SERPL-MCNC: 7 NG/ML (ref 0–8.4)
ALBUMIN SERPL BCP-MCNC: 4.2 G/DL (ref 3.5–5.2)
ALBUMIN SERPL BCP-MCNC: 4.2 G/DL (ref 3.5–5.2)
ALP SERPL-CCNC: 77 U/L (ref 55–135)
ALP SERPL-CCNC: 77 U/L (ref 55–135)
ALT SERPL W/O P-5'-P-CCNC: 33 U/L (ref 10–44)
ALT SERPL W/O P-5'-P-CCNC: 33 U/L (ref 10–44)
ANION GAP SERPL CALC-SCNC: 12 MMOL/L (ref 8–16)
ANION GAP SERPL CALC-SCNC: 12 MMOL/L (ref 8–16)
AST SERPL-CCNC: 36 U/L (ref 10–40)
AST SERPL-CCNC: 36 U/L (ref 10–40)
BASOPHILS # BLD AUTO: 0.04 K/UL (ref 0–0.2)
BASOPHILS # BLD AUTO: 0.04 K/UL (ref 0–0.2)
BASOPHILS NFR BLD: 0.8 % (ref 0–1.9)
BASOPHILS NFR BLD: 0.8 % (ref 0–1.9)
BILIRUB DIRECT SERPL-MCNC: 0.2 MG/DL (ref 0.1–0.3)
BILIRUB SERPL-MCNC: 0.5 MG/DL (ref 0.1–1)
BILIRUB SERPL-MCNC: 0.5 MG/DL (ref 0.1–1)
BUN SERPL-MCNC: 10 MG/DL (ref 6–20)
BUN SERPL-MCNC: 10 MG/DL (ref 6–20)
CALCIUM SERPL-MCNC: 10 MG/DL (ref 8.7–10.5)
CALCIUM SERPL-MCNC: 10 MG/DL (ref 8.7–10.5)
CHLORIDE SERPL-SCNC: 101 MMOL/L (ref 95–110)
CHLORIDE SERPL-SCNC: 101 MMOL/L (ref 95–110)
CHOLEST SERPL-MCNC: 223 MG/DL (ref 120–199)
CHOLEST/HDLC SERPL: 3.8 {RATIO} (ref 2–5)
CO2 SERPL-SCNC: 25 MMOL/L (ref 23–29)
CO2 SERPL-SCNC: 25 MMOL/L (ref 23–29)
CREAT SERPL-MCNC: 0.8 MG/DL (ref 0.5–1.4)
CREAT SERPL-MCNC: 0.8 MG/DL (ref 0.5–1.4)
DIFFERENTIAL METHOD BLD: ABNORMAL
DIFFERENTIAL METHOD BLD: ABNORMAL
EOSINOPHIL # BLD AUTO: 0.2 K/UL (ref 0–0.5)
EOSINOPHIL # BLD AUTO: 0.2 K/UL (ref 0–0.5)
EOSINOPHIL NFR BLD: 4.7 % (ref 0–8)
EOSINOPHIL NFR BLD: 4.7 % (ref 0–8)
ERYTHROCYTE [DISTWIDTH] IN BLOOD BY AUTOMATED COUNT: 12 % (ref 11.5–14.5)
ERYTHROCYTE [DISTWIDTH] IN BLOOD BY AUTOMATED COUNT: 12 % (ref 11.5–14.5)
EST. GFR  (NO RACE VARIABLE): >60 ML/MIN/1.73 M^2
EST. GFR  (NO RACE VARIABLE): >60 ML/MIN/1.73 M^2
ESTIMATED AVG GLUCOSE: 97 MG/DL (ref 68–131)
FERRITIN SERPL-MCNC: 81 NG/ML (ref 20–300)
GLUCOSE SERPL-MCNC: 88 MG/DL (ref 70–110)
GLUCOSE SERPL-MCNC: 88 MG/DL (ref 70–110)
HAV IGG SER QL IA: NORMAL
HBA1C MFR BLD: 5 % (ref 4–5.6)
HBV CORE AB SERPL QL IA: NORMAL
HBV SURFACE AB SER-ACNC: 45.31 MIU/ML
HBV SURFACE AB SER-ACNC: REACTIVE M[IU]/ML
HBV SURFACE AG SERPL QL IA: NORMAL
HCT VFR BLD AUTO: 44.6 % (ref 37–48.5)
HCT VFR BLD AUTO: 44.6 % (ref 37–48.5)
HDLC SERPL-MCNC: 58 MG/DL (ref 40–75)
HDLC SERPL: 26 % (ref 20–50)
HGB BLD-MCNC: 15.3 G/DL (ref 12–16)
HGB BLD-MCNC: 15.3 G/DL (ref 12–16)
HIV 1+2 AB+HIV1 P24 AG SERPL QL IA: NORMAL
IGA SERPL-MCNC: 102 MG/DL (ref 40–350)
IGG SERPL-MCNC: 1093 MG/DL (ref 650–1600)
IMM GRANULOCYTES # BLD AUTO: 0.01 K/UL (ref 0–0.04)
IMM GRANULOCYTES # BLD AUTO: 0.01 K/UL (ref 0–0.04)
IMM GRANULOCYTES NFR BLD AUTO: 0.2 % (ref 0–0.5)
IMM GRANULOCYTES NFR BLD AUTO: 0.2 % (ref 0–0.5)
INR PPP: 0.9 (ref 0.8–1.2)
IRON SERPL-MCNC: 116 UG/DL (ref 30–160)
LDLC SERPL CALC-MCNC: 138.8 MG/DL (ref 63–159)
LYMPHOCYTES # BLD AUTO: 2.4 K/UL (ref 1–4.8)
LYMPHOCYTES # BLD AUTO: 2.4 K/UL (ref 1–4.8)
LYMPHOCYTES NFR BLD: 48.2 % (ref 18–48)
LYMPHOCYTES NFR BLD: 48.2 % (ref 18–48)
MAGNESIUM SERPL-MCNC: 2.1 MG/DL (ref 1.6–2.6)
MCH RBC QN AUTO: 31.2 PG (ref 27–31)
MCH RBC QN AUTO: 31.2 PG (ref 27–31)
MCHC RBC AUTO-ENTMCNC: 34.3 G/DL (ref 32–36)
MCHC RBC AUTO-ENTMCNC: 34.3 G/DL (ref 32–36)
MCV RBC AUTO: 91 FL (ref 82–98)
MCV RBC AUTO: 91 FL (ref 82–98)
MONOCYTES # BLD AUTO: 0.4 K/UL (ref 0.3–1)
MONOCYTES # BLD AUTO: 0.4 K/UL (ref 0.3–1)
MONOCYTES NFR BLD: 6.9 % (ref 4–15)
MONOCYTES NFR BLD: 6.9 % (ref 4–15)
NEUTROPHILS # BLD AUTO: 2 K/UL (ref 1.8–7.7)
NEUTROPHILS # BLD AUTO: 2 K/UL (ref 1.8–7.7)
NEUTROPHILS NFR BLD: 39.2 % (ref 38–73)
NEUTROPHILS NFR BLD: 39.2 % (ref 38–73)
NONHDLC SERPL-MCNC: 165 MG/DL
NRBC BLD-RTO: 0 /100 WBC
NRBC BLD-RTO: 0 /100 WBC
PLATELET # BLD AUTO: 343 K/UL (ref 150–450)
PLATELET # BLD AUTO: 343 K/UL (ref 150–450)
PMV BLD AUTO: 11.1 FL (ref 9.2–12.9)
PMV BLD AUTO: 11.1 FL (ref 9.2–12.9)
POTASSIUM SERPL-SCNC: 3.9 MMOL/L (ref 3.5–5.1)
POTASSIUM SERPL-SCNC: 3.9 MMOL/L (ref 3.5–5.1)
PROT SERPL-MCNC: 7.6 G/DL (ref 6–8.4)
PROT SERPL-MCNC: 7.6 G/DL (ref 6–8.4)
PROTHROMBIN TIME: 10.3 SEC (ref 9–12.5)
RBC # BLD AUTO: 4.91 M/UL (ref 4–5.4)
RBC # BLD AUTO: 4.91 M/UL (ref 4–5.4)
SATURATED IRON: 32 % (ref 20–50)
SODIUM SERPL-SCNC: 138 MMOL/L (ref 136–145)
SODIUM SERPL-SCNC: 138 MMOL/L (ref 136–145)
TOTAL IRON BINDING CAPACITY: 364 UG/DL (ref 250–450)
TRANSFERRIN SERPL-MCNC: 246 MG/DL (ref 200–375)
TRIGL SERPL-MCNC: 131 MG/DL (ref 30–150)
TSH SERPL DL<=0.005 MIU/L-ACNC: 1.63 UIU/ML (ref 0.4–4)
VIT B12 SERPL-MCNC: 694 PG/ML (ref 210–950)
WBC # BLD AUTO: 5.06 K/UL (ref 3.9–12.7)
WBC # BLD AUTO: 5.06 K/UL (ref 3.9–12.7)

## 2024-10-12 PROCEDURE — 86381 MITOCHONDRIAL ANTIBODY EACH: CPT | Performed by: INTERNAL MEDICINE

## 2024-10-12 PROCEDURE — 85025 COMPLETE CBC W/AUTO DIFF WBC: CPT | Performed by: STUDENT IN AN ORGANIZED HEALTH CARE EDUCATION/TRAINING PROGRAM

## 2024-10-12 PROCEDURE — 83540 ASSAY OF IRON: CPT | Performed by: STUDENT IN AN ORGANIZED HEALTH CARE EDUCATION/TRAINING PROGRAM

## 2024-10-12 PROCEDURE — 86706 HEP B SURFACE ANTIBODY: CPT | Performed by: INTERNAL MEDICINE

## 2024-10-12 PROCEDURE — 82728 ASSAY OF FERRITIN: CPT | Performed by: STUDENT IN AN ORGANIZED HEALTH CARE EDUCATION/TRAINING PROGRAM

## 2024-10-12 PROCEDURE — 80321 ALCOHOLS BIOMARKERS 1OR 2: CPT | Performed by: INTERNAL MEDICINE

## 2024-10-12 PROCEDURE — 82103 ALPHA-1-ANTITRYPSIN TOTAL: CPT | Performed by: INTERNAL MEDICINE

## 2024-10-12 PROCEDURE — 86364 TISS TRNSGLTMNASE EA IG CLAS: CPT | Performed by: INTERNAL MEDICINE

## 2024-10-12 PROCEDURE — 83036 HEMOGLOBIN GLYCOSYLATED A1C: CPT | Performed by: STUDENT IN AN ORGANIZED HEALTH CARE EDUCATION/TRAINING PROGRAM

## 2024-10-12 PROCEDURE — 86015 ACTIN ANTIBODY EACH: CPT | Performed by: INTERNAL MEDICINE

## 2024-10-12 PROCEDURE — 83735 ASSAY OF MAGNESIUM: CPT | Performed by: STUDENT IN AN ORGANIZED HEALTH CARE EDUCATION/TRAINING PROGRAM

## 2024-10-12 PROCEDURE — 82103 ALPHA-1-ANTITRYPSIN TOTAL: CPT | Mod: 91 | Performed by: INTERNAL MEDICINE

## 2024-10-12 PROCEDURE — 86038 ANTINUCLEAR ANTIBODIES: CPT | Performed by: INTERNAL MEDICINE

## 2024-10-12 PROCEDURE — 82248 BILIRUBIN DIRECT: CPT | Performed by: INTERNAL MEDICINE

## 2024-10-12 PROCEDURE — 82607 VITAMIN B-12: CPT | Performed by: STUDENT IN AN ORGANIZED HEALTH CARE EDUCATION/TRAINING PROGRAM

## 2024-10-12 PROCEDURE — 84443 ASSAY THYROID STIM HORMONE: CPT | Performed by: STUDENT IN AN ORGANIZED HEALTH CARE EDUCATION/TRAINING PROGRAM

## 2024-10-12 PROCEDURE — 86704 HEP B CORE ANTIBODY TOTAL: CPT | Performed by: INTERNAL MEDICINE

## 2024-10-12 PROCEDURE — 82105 ALPHA-FETOPROTEIN SERUM: CPT | Performed by: INTERNAL MEDICINE

## 2024-10-12 PROCEDURE — 87340 HEPATITIS B SURFACE AG IA: CPT | Performed by: INTERNAL MEDICINE

## 2024-10-12 PROCEDURE — 80053 COMPREHEN METABOLIC PANEL: CPT | Performed by: STUDENT IN AN ORGANIZED HEALTH CARE EDUCATION/TRAINING PROGRAM

## 2024-10-12 PROCEDURE — 82784 ASSAY IGA/IGD/IGG/IGM EACH: CPT | Performed by: INTERNAL MEDICINE

## 2024-10-12 PROCEDURE — 86790 VIRUS ANTIBODY NOS: CPT | Performed by: INTERNAL MEDICINE

## 2024-10-12 PROCEDURE — 80061 LIPID PANEL: CPT | Performed by: STUDENT IN AN ORGANIZED HEALTH CARE EDUCATION/TRAINING PROGRAM

## 2024-10-12 PROCEDURE — 85610 PROTHROMBIN TIME: CPT | Performed by: INTERNAL MEDICINE

## 2024-10-12 PROCEDURE — 82390 ASSAY OF CERULOPLASMIN: CPT | Performed by: INTERNAL MEDICINE

## 2024-10-12 PROCEDURE — 82784 ASSAY IGA/IGD/IGG/IGM EACH: CPT | Mod: 59 | Performed by: INTERNAL MEDICINE

## 2024-10-12 PROCEDURE — 87389 HIV-1 AG W/HIV-1&-2 AB AG IA: CPT | Performed by: INTERNAL MEDICINE

## 2024-10-12 PROCEDURE — 82306 VITAMIN D 25 HYDROXY: CPT | Performed by: STUDENT IN AN ORGANIZED HEALTH CARE EDUCATION/TRAINING PROGRAM

## 2024-10-14 LAB
A1AT SERPL-MCNC: 146 MG/DL (ref 100–190)
ANA SER QL IF: NORMAL
CERULOPLASMIN SERPL-MCNC: 43 MG/DL (ref 15–45)

## 2024-10-15 LAB
CLINICAL BIOCHEMIST REVIEW: NORMAL
MITOCHONDRIA AB TITR SER IF: NORMAL {TITER}
PLPETH BLD-MCNC: 11 NG/ML
POPETH BLD-MCNC: 18 NG/ML
SMOOTH MUSCLE AB TITR SER IF: NORMAL {TITER}

## 2024-10-17 LAB
A1AT PHENOTYP SERPL-IMP: NORMAL BANDS
A1AT SERPL NEPH-MCNC: 135 MG/DL (ref 100–190)
TTG IGA SER-ACNC: <0.2 U/ML

## 2024-10-24 ENCOUNTER — CLINICAL SUPPORT (OUTPATIENT)
Dept: REHABILITATION | Facility: HOSPITAL | Age: 54
End: 2024-10-24
Payer: COMMERCIAL

## 2024-10-24 DIAGNOSIS — M62.89 PELVIC FLOOR DYSFUNCTION: Primary | ICD-10-CM

## 2024-10-24 PROCEDURE — 97112 NEUROMUSCULAR REEDUCATION: CPT | Mod: PO

## 2024-10-24 NOTE — PATIENT INSTRUCTIONS
"    TRY DOING THE ABOVE WITH A TAMPON APPLICATOR:    Hold it in your hand (don't push the applicator plunger- just hold it in place).  Work on just belly breathing, noting the drop when you inhale.  Then work on your reverse Kegels (above).  Finally, work on "QUICK FLICKS"- short, 1 second contractions with about 5-10 seconds in between.  You can add a throat clear for pressure challenge.    "

## 2024-10-24 NOTE — PROGRESS NOTES
"Chief Complaint   Patient presents with    Follow-up        HPI: Patient is a 54 y.o. female  presents for a follow up visit for OAB symptoms and mixed incontinence. Patient was last seen 24 and referred to PFPT and started on vaginal estrogen. Has been working with Jade Anglin DPT.     Patient has had 3 visits. She feels that "progress is being made" and believes that she will see additional improvement. She notices that with some of the HEP she finds that she is finding less urinary frequency and urinary urgency. The leakage of urine is still present without any significant change. She rates overall improvement at about 15% at this time.   Stress urinary incontinence is not often.     She is voiding about every 2 hours. Waking 3-4 times per night.   She has decreased her liquids and drinking about 80 ounces of water per day. Tried to decrease to 64 ounces but felt that it was not adequate.     Reports trying the vaginal estrogen but noticed some bleeding although it did not last long. Reports that she has never stopped bleeding completely.     REVIEW OF SYSTEMS:  A full 14-point ROS was performed and was significant for those  mentioned in the HPI.    The following portions of the patient's history were reviewed and updated as appropriate: allergies, current medications, past medical history, past surgical history and problem list.    PHYSICAL EXAMINATION    Vitals:    10/25/24 1332   BP: (!) 146/83   Pulse: 82        General: Healthy in appearance, Well nourished, Affect Normal, NAD.    No visits with results within 1 Day(s) from this visit.   Latest known visit with results is:   Lab Visit on 10/12/2024   Component Date Value Ref Range Status    Vitamin B-12 10/12/2024 694  210 - 950 pg/mL Final    Iron 10/12/2024 116  30 - 160 ug/dL Final    Transferrin 10/12/2024 246  200 - 375 mg/dL Final    TIBC 10/12/2024 364  250 - 450 ug/dL Final    Saturated Iron 10/12/2024 32  20 - 50 % Final    Ferritin " 10/12/2024 81  20.0 - 300.0 ng/mL Final    Vit D, 25-Hydroxy 10/12/2024 45  30 - 96 ng/mL Final    Magnesium 10/12/2024 2.1  1.6 - 2.6 mg/dL Final    Sodium 10/12/2024 138  136 - 145 mmol/L Final    Potassium 10/12/2024 3.9  3.5 - 5.1 mmol/L Final    Chloride 10/12/2024 101  95 - 110 mmol/L Final    CO2 10/12/2024 25  23 - 29 mmol/L Final    Glucose 10/12/2024 88  70 - 110 mg/dL Final    BUN 10/12/2024 10  6 - 20 mg/dL Final    Creatinine 10/12/2024 0.8  0.5 - 1.4 mg/dL Final    Calcium 10/12/2024 10.0  8.7 - 10.5 mg/dL Final    Total Protein 10/12/2024 7.6  6.0 - 8.4 g/dL Final    Albumin 10/12/2024 4.2  3.5 - 5.2 g/dL Final    Total Bilirubin 10/12/2024 0.5  0.1 - 1.0 mg/dL Final    Alkaline Phosphatase 10/12/2024 77  55 - 135 U/L Final    AST 10/12/2024 36  10 - 40 U/L Final    ALT 10/12/2024 33  10 - 44 U/L Final    eGFR 10/12/2024 >60.0  >60 mL/min/1.73 m^2 Final    Anion Gap 10/12/2024 12  8 - 16 mmol/L Final    WBC 10/12/2024 5.06  3.90 - 12.70 K/uL Final    RBC 10/12/2024 4.91  4.00 - 5.40 M/uL Final    Hemoglobin 10/12/2024 15.3  12.0 - 16.0 g/dL Final    Hematocrit 10/12/2024 44.6  37.0 - 48.5 % Final    MCV 10/12/2024 91  82 - 98 fL Final    MCH 10/12/2024 31.2 (H)  27.0 - 31.0 pg Final    MCHC 10/12/2024 34.3  32.0 - 36.0 g/dL Final    RDW 10/12/2024 12.0  11.5 - 14.5 % Final    Platelets 10/12/2024 343  150 - 450 K/uL Final    MPV 10/12/2024 11.1  9.2 - 12.9 fL Final    Immature Granulocytes 10/12/2024 0.2  0.0 - 0.5 % Final    Gran # (ANC) 10/12/2024 2.0  1.8 - 7.7 K/uL Final    Immature Grans (Abs) 10/12/2024 0.01  0.00 - 0.04 K/uL Final    Lymph # 10/12/2024 2.4  1.0 - 4.8 K/uL Final    Mono # 10/12/2024 0.4  0.3 - 1.0 K/uL Final    Eos # 10/12/2024 0.2  0.0 - 0.5 K/uL Final    Baso # 10/12/2024 0.04  0.00 - 0.20 K/uL Final    nRBC 10/12/2024 0  0 /100 WBC Final    Gran % 10/12/2024 39.2  38.0 - 73.0 % Final    Lymph % 10/12/2024 48.2 (H)  18.0 - 48.0 % Final    Mono % 10/12/2024 6.9  4.0 - 15.0 %  Final    Eosinophil % 10/12/2024 4.7  0.0 - 8.0 % Final    Basophil % 10/12/2024 0.8  0.0 - 1.9 % Final    Differential Method 10/12/2024 Automated   Final    TSH 10/12/2024 1.632  0.400 - 4.000 uIU/mL Final    Hemoglobin A1C 10/12/2024 5.0  4.0 - 5.6 % Final    Estimated Avg Glucose 10/12/2024 97  68 - 131 mg/dL Final    Cholesterol 10/12/2024 223 (H)  120 - 199 mg/dL Final    Triglycerides 10/12/2024 131  30 - 150 mg/dL Final    HDL 10/12/2024 58  40 - 75 mg/dL Final    LDL Cholesterol 10/12/2024 138.8  63.0 - 159.0 mg/dL Final    HDL/Cholesterol Ratio 10/12/2024 26.0  20.0 - 50.0 % Final    Total Cholesterol/HDL Ratio 10/12/2024 3.8  2.0 - 5.0 Final    Non-HDL Cholesterol 10/12/2024 165  mg/dL Final    WBC 10/12/2024 5.06  3.90 - 12.70 K/uL Final    RBC 10/12/2024 4.91  4.00 - 5.40 M/uL Final    Hemoglobin 10/12/2024 15.3  12.0 - 16.0 g/dL Final    Hematocrit 10/12/2024 44.6  37.0 - 48.5 % Final    MCV 10/12/2024 91  82 - 98 fL Final    MCH 10/12/2024 31.2 (H)  27.0 - 31.0 pg Final    MCHC 10/12/2024 34.3  32.0 - 36.0 g/dL Final    RDW 10/12/2024 12.0  11.5 - 14.5 % Final    Platelets 10/12/2024 343  150 - 450 K/uL Final    MPV 10/12/2024 11.1  9.2 - 12.9 fL Final    Immature Granulocytes 10/12/2024 0.2  0.0 - 0.5 % Final    Gran # (ANC) 10/12/2024 2.0  1.8 - 7.7 K/uL Final    Immature Grans (Abs) 10/12/2024 0.01  0.00 - 0.04 K/uL Final    Lymph # 10/12/2024 2.4  1.0 - 4.8 K/uL Final    Mono # 10/12/2024 0.4  0.3 - 1.0 K/uL Final    Eos # 10/12/2024 0.2  0.0 - 0.5 K/uL Final    Baso # 10/12/2024 0.04  0.00 - 0.20 K/uL Final    nRBC 10/12/2024 0  0 /100 WBC Final    Gran % 10/12/2024 39.2  38.0 - 73.0 % Final    Lymph % 10/12/2024 48.2 (H)  18.0 - 48.0 % Final    Mono % 10/12/2024 6.9  4.0 - 15.0 % Final    Eosinophil % 10/12/2024 4.7  0.0 - 8.0 % Final    Basophil % 10/12/2024 0.8  0.0 - 1.9 % Final    Differential Method 10/12/2024 Automated   Final    Sodium 10/12/2024 138  136 - 145 mmol/L Final    Potassium  10/12/2024 3.9  3.5 - 5.1 mmol/L Final    Chloride 10/12/2024 101  95 - 110 mmol/L Final    CO2 10/12/2024 25  23 - 29 mmol/L Final    Glucose 10/12/2024 88  70 - 110 mg/dL Final    BUN 10/12/2024 10  6 - 20 mg/dL Final    Creatinine 10/12/2024 0.8  0.5 - 1.4 mg/dL Final    Calcium 10/12/2024 10.0  8.7 - 10.5 mg/dL Final    Total Protein 10/12/2024 7.6  6.0 - 8.4 g/dL Final    Albumin 10/12/2024 4.2  3.5 - 5.2 g/dL Final    Total Bilirubin 10/12/2024 0.5  0.1 - 1.0 mg/dL Final    Alkaline Phosphatase 10/12/2024 77  55 - 135 U/L Final    AST 10/12/2024 36  10 - 40 U/L Final    ALT 10/12/2024 33  10 - 44 U/L Final    eGFR 10/12/2024 >60.0  >60 mL/min/1.73 m^2 Final    Anion Gap 10/12/2024 12  8 - 16 mmol/L Final    Bilirubin, Direct 10/12/2024 0.2  0.1 - 0.3 mg/dL Final    Prothrombin Time 10/12/2024 10.3  9.0 - 12.5 sec Final    INR 10/12/2024 0.9  0.8 - 1.2 Final    PEth 16:0/18.1 (POPEth) 10/12/2024 18  Cutoff: 10 ng/mL Final    PEth 16:0/18.2 (PLPEth) 10/12/2024 11  Cutoff: 10 ng/mL Final    PETH INTERPRETATION 10/12/2024 Positive.   Final    IgG 10/12/2024 1093  650 - 1600 mg/dL Final    Smooth Muscle Ab 10/12/2024 Negative 1:40  Negative Final    LESA Screen 10/12/2024 Negative <1:80  Negative <1:80 Final    Anti-Mitochon Ab IFA 10/12/2024 Negative 1:40  Negative Final    A-1 Antitrypsin 10/12/2024 146  100 - 190 mg/dL Final    Alpha 1 Antitrypsin Phenotype 10/12/2024 MM  bands Final    Alpha-1 Anti-Trypsin 10/12/2024 135  100 - 190 mg/dL Final    Ceruloplasmin 10/12/2024 43.0  15.0 - 45.0 mg/dL Final    TTG IgA 10/12/2024 <0.2  <7.0 U/mL Final    IgA 10/12/2024 102  40 - 350 mg/dL Final    Iron 10/12/2024 116  30 - 160 ug/dL Final    Transferrin 10/12/2024 246  200 - 375 mg/dL Final    TIBC 10/12/2024 364  250 - 450 ug/dL Final    Saturated Iron 10/12/2024 32  20 - 50 % Final    Ferritin 10/12/2024 81  20.0 - 300.0 ng/mL Final    HIV 1/2 Ag/Ab 10/12/2024 Non-reactive  Non-reactive Final    Hepatitis A Antibody  IgG 10/12/2024 Non-reactive   Final    Hep B Core Total Ab 10/12/2024 Non-reactive  Non-reactive Final    Hep B S Ab 10/12/2024 45.31  mIU/mL Final    Hep B S Ab 10/12/2024 Reactive   Final    Hepatitis B Surface Ag 10/12/2024 Non-reactive  Non-reactive Final    Iron 10/12/2024 116  30 - 160 ug/dL Final    Transferrin 10/12/2024 246  200 - 375 mg/dL Final    TIBC 10/12/2024 364  250 - 450 ug/dL Final    Saturated Iron 10/12/2024 32  20 - 50 % Final    Ferritin 10/12/2024 81  20.0 - 300.0 ng/mL Final    AFP 10/12/2024 7.0  0.0 - 8.4 ng/mL Final        ASSESSMENT & PLAN:  Stress incontinence    Urge incontinence of urine    Perimenopause       53 yo with mixed incontinence and DUB presents today for a follow up visit. She has seen some improvement with PFPT and has additional visits scheduled. She will continue with PT at this time. Primarily bothered by the urgency and frequency. Discussed that we can try medication if the PT does not work for her. She will return in about 2 months for re-evaluation.     Patient underwent a pelvic ultrasound on 5/29/24. It showed Uterus: Measures 7.5 x 3.5 x 5.1 cm.  Endometrial echo complex measures 5 mm, thickened in the setting of postmenopausal bleeding.  Appearance is unremarkable. Right ovary: Not visualized. Left ovary: Not visualized.No free fluid.    EMB was performed on 6/6/24 and it showed a benign EM polyp. Polyp demonstrates areas of inactive appearing glands with stromal decidualization, suggestive of partial progesterone hormone effect. No hyperplasia, atypia, or malignancy.    Patient has an upcoming appointment in December with Dr. Thompson for discussion of symptoms and possible D&C.     All questions were answered today. The patient was encouraged to contact the office as needed with any additional questions or concerns.     Total time spent on visit was 20 minutes.  This includes face to face time and non-face to face time preparing to see the patient (eg, review of  tests), Obtaining and/or reviewing separately obtained history, Documenting clinical information in the electronic or other health record, Independently interpreting resultsand communicating results to the patient/family/caregiver, or Care coordination.    Vera Francois MD

## 2024-10-24 NOTE — PROGRESS NOTES
Pelvic Health Physical Therapy   Treatment Note     [facility]  [id number]    Therapy Diagnosis:   Encounter Diagnosis   Name Primary?    Pelvic floor dysfunction Yes     Physician: Vera Francois MD    Visit Date: 10/24/2024     Physician Orders: PT Eval and Treat    Medical Diagnosis from Referral: Urinary frequency [R35.0], Urge incontinence of urine [N39.41], Stress incontinence [N39.3]   Evaluation Date: 9/26/2024  Authorization Period Expiration: 12/31/2024  Plan of Care Expiration: 12/26/2024  Visit # / Visits authorized: 3/20  Cancelled Visits: 0  No Show Visits: 0    Time In: 1:00  Time Out: 1:40  Total Billable Time: 40 minutes    Precautions: Standard    Subjective     Pt reports: no new c/o.  Last UI was Monday when she dripped due to being far from a bathroom.    She was compliant with home exercise program.  Response to previous treatment: no adverse effects  Functional change: I with double voiding; improved bladder emptying.      Pain: none      Objective     Shawna participated in neuromuscular re-education activities to develop Down training for 40 minutes including: pelvic floor muscle downtraining in internal vaginal manual cues.  Internal work performed today with pt's verbal consent.  Her levator ani muscles lengthened fairly well with ischemic pressure, but most lengthening came from her diaphragmatic breath.  She was able to lift and drop with reverse Kegels very well.  Still could not localize pressure internally.  We then turned our attention to layers 1 and 2- she was able to produce a palpable quick flick with manual cueing.  We spoke about using a tampon applicator for training with DB, reverse Kegel, and quick flicks.  Handout was provided to add this to her home program.      Home Exercises Provided and Patient Education Provided     Education provided:   - double voiding techniques  Discussed progression of plan of care with patient; educated pt in activity  modification; reviewed HEP with pt. Pt demonstrated and verbalized understanding of all instruction and was provided with a handout of HEP (see Patient Instructions).    Written Home Exercises Provided: yes.  Exercises were reviewed and Shawna was able to demonstrate them prior to the end of the session.  Shawna demonstrated good  understanding of the education provided.     See EMR under Patient Instructions for exercises provided 10/24/2024.    Assessment     She lengthens very well with a diaphragmatic breath- may look at SEMG again next session.     Shawna Is progressing well towards her goals.   Pt prognosis is Good.     Pt will continue to benefit from skilled outpatient physical therapy to address the deficits listed in the problem list box on initial evaluation, provide pt/family education and to maximize pt's level of independence in the home and community environment.     Pt's spiritual, cultural and educational needs considered and pt agreeable to plan of care and goals.     Anticipated barriers to physical therapy: none    Goals: Short Term Goals: 2 weeks  Pt to report increased awareness of PFM lift/drop during exercises and functional activities.  Pt to be I with double voiding techniques.  Pt to be I with diaphragmatic breathing.       Long Term Goals: 12 weeks   Pt will report improved ability to perform ADLs (ie. dressing, bathing, functional transfers) with little (drops) to no urinary leakage 7/7 days per week.  Pt will report improved ability to perform iADLs (ie. driving, home chores, grocery shopping) with little (drops) to no urinary leakage 7/7 days per week.   Pt will report improved ability to cough/sneeze/laugh/yell with little (drops) to no urinary leakage 7/7 days per week.   Pt will report improved ability to manage bladder spasms appropriately 100% of the time for an improvement in urinary frequency/urgency.   Pt will report improved ability to sleep uninterrupted by excessive nocturia  5/7 days per week.   Pt will maintain a voiding interval of > or = 3 hours for improved activity tolerance (ie. prolonged driving, work meeting, watching a movie).   Pt will report < or = 2/10 pain with urination/defecation 80% of the time.   Pt/family will be independent with HEP for continued self-management of symptoms.   ALL PROGRESSING    Plan     Plan of care Certification: 9/26/2024 to 12/26/2024.     Outpatient Physical Therapy 1 times per 2 week(s)  for 12 weeks to include the following interventions: therapeutic exercises, therapeutic activity, neuromuscular re-education, manual therapy, modalities PRN, patient/family education, and self care/home management    Jade Anglin, PT, BCB-PMD

## 2024-10-25 ENCOUNTER — OFFICE VISIT (OUTPATIENT)
Dept: UROGYNECOLOGY | Facility: CLINIC | Age: 54
End: 2024-10-25
Payer: COMMERCIAL

## 2024-10-25 VITALS
WEIGHT: 152.56 LBS | BODY MASS INDEX: 28.07 KG/M2 | HEIGHT: 62 IN | DIASTOLIC BLOOD PRESSURE: 83 MMHG | HEART RATE: 82 BPM | SYSTOLIC BLOOD PRESSURE: 146 MMHG

## 2024-10-25 DIAGNOSIS — N95.1 PERIMENOPAUSE: ICD-10-CM

## 2024-10-25 DIAGNOSIS — N39.41 URGE INCONTINENCE OF URINE: ICD-10-CM

## 2024-10-25 DIAGNOSIS — N39.3 STRESS INCONTINENCE: ICD-10-CM

## 2024-10-25 DIAGNOSIS — R35.0 URINARY FREQUENCY: ICD-10-CM

## 2024-10-25 DIAGNOSIS — R39.15 URINARY URGENCY: Primary | ICD-10-CM

## 2024-10-25 PROCEDURE — 99999 PR PBB SHADOW E&M-EST. PATIENT-LVL III: CPT | Mod: PBBFAC,,, | Performed by: OBSTETRICS & GYNECOLOGY

## 2024-10-31 ENCOUNTER — TELEPHONE (OUTPATIENT)
Dept: ENDOSCOPY | Facility: HOSPITAL | Age: 54
End: 2024-10-31
Payer: COMMERCIAL

## 2024-11-01 ENCOUNTER — PATIENT MESSAGE (OUTPATIENT)
Dept: OBSTETRICS AND GYNECOLOGY | Facility: CLINIC | Age: 54
End: 2024-11-01
Payer: COMMERCIAL

## 2024-11-04 DIAGNOSIS — N95.0 POSTMENOPAUSAL BLEEDING: Primary | ICD-10-CM

## 2024-11-04 NOTE — PROGRESS NOTES
Continued PMB, will plan for HSC D&C 11/22. Case request placed today. Will do virtual visit for consents tomorrow and sign consents AM of surgery. Patient voiced understanding and is amenable to the plan.

## 2024-11-05 ENCOUNTER — ANESTHESIA EVENT (OUTPATIENT)
Dept: SURGERY | Facility: OTHER | Age: 54
End: 2024-11-05
Payer: COMMERCIAL

## 2024-11-05 ENCOUNTER — TELEPHONE (OUTPATIENT)
Dept: OBSTETRICS AND GYNECOLOGY | Facility: CLINIC | Age: 54
End: 2024-11-05
Payer: COMMERCIAL

## 2024-11-05 ENCOUNTER — OFFICE VISIT (OUTPATIENT)
Dept: OBSTETRICS AND GYNECOLOGY | Facility: CLINIC | Age: 54
End: 2024-11-05
Payer: COMMERCIAL

## 2024-11-05 DIAGNOSIS — N95.0 POSTMENOPAUSAL BLEEDING: Primary | ICD-10-CM

## 2024-11-05 PROCEDURE — 1159F MED LIST DOCD IN RCRD: CPT | Mod: CPTII,95,, | Performed by: OBSTETRICS & GYNECOLOGY

## 2024-11-05 PROCEDURE — 99214 OFFICE O/P EST MOD 30 MIN: CPT | Mod: 95,,, | Performed by: OBSTETRICS & GYNECOLOGY

## 2024-11-05 PROCEDURE — 3044F HG A1C LEVEL LT 7.0%: CPT | Mod: CPTII,95,, | Performed by: OBSTETRICS & GYNECOLOGY

## 2024-11-05 PROCEDURE — 1160F RVW MEDS BY RX/DR IN RCRD: CPT | Mod: CPTII,95,, | Performed by: OBSTETRICS & GYNECOLOGY

## 2024-11-05 RX ORDER — FAMOTIDINE 20 MG/1
20 TABLET, FILM COATED ORAL
OUTPATIENT
Start: 2024-11-05

## 2024-11-05 NOTE — PROGRESS NOTES
The patient location is: [facility]  The chief complaint leading to consultation is: Preop for HSC D&C  Visit type: audiovisual  Total time spent with patient: 30 minutes    Each patient to whom he or she provides medical services by telemedicine is:  (1) informed of the relationship between the physician and patient and the respective role of any other health care provider with respect to management of the patient; and (2) notified that he or she may decline to receive medical services by telemedicine and may withdraw from such care at any time.    [facility] - Ob Gyn  History & Physical  Gynecology    SUBJECTIVE:     Chief Complaint/Reason for Procedure: PMB    History of Present Illness:  Patient is a 54 y.o.  with persistent PMB. She had no bleeding between January and 2023, but in September starting having spotting. FSH from 2023 was 77, E2 <10. She was started on HRT for vasomotor symptoms and sleep disturbance, dosages were increased for improved symptom control but she began to have an increase in vaginal bleeding. EMBx done 2024 showed a benign endometrial polyp, but no hyperplasia, atypia, or malignancy. She has continued to have intermittent bleeding since the EMBx. PMH includes hypothyroidism on Synthroid.     Of note, she was diagnosed with possible focular nodular hyperplasia while on hormonal contraception, stopped in . MRI of liver in  showed 1.9 x 1.4 cm wedge-shaped region of early arterial enhancement in the posterior right hepatic lobe segment 6 with nonspecific imaging characteristics. This could represent an adenoma or vascular malformation leading to transient hyperperfusion. Liver function tests were normal and patient counseled on risks and benefits of HRT with this questionable diagnosis and elected to proceed. She has upcoming MRI and has been following with hepatology.     TVUS (2024): Uterus 7.5 x 3.5 x 5.1 cm, EMS 5 mm, BOV not visualized  Pap  (2019): NILM/HPV neg, needs  MMG (2023): BIRADS-1, scheduled  Colonoscopy: Scheduled on     Review of patient's allergies indicates:   Allergen Reactions    Adhesive Blisters    Anucort-hc [hydrocortisone acetate] Hives and Nausea And Vomiting    Demerol [meperidine] Nausea And Vomiting    Sulfa (sulfonamide antibiotics) Hives    Amoxicillin Nausea And Vomiting and Rash    Macrobid [nitrofurantoin monohyd/m-cryst] Nausea Only     dizziness       OB History    Para Term  AB Living   0   0         SAB IAB Ectopic Multiple Live Births                 Past Medical History:   Diagnosis Date    Allergy     Anxiety     Asthma     resolved    Focal nodular hyperplasia of liver     GERD (gastroesophageal reflux disease)     Hyperlipidemia     Thyroid disease     hypothyroidism     Past Surgical History:   Procedure Laterality Date    ARTHROSCOPIC CHONDROPLASTY OF KNEE JOINT Right 2023    Procedure: ARTHROSCOPY, KNEE, WITH CHONDROPLASTY;  Surgeon: Fidel Michelle MD;  Location: [facility] OR;  Service: Orthopedics;  Laterality: Right;    CHOLECYSTECTOMY      FOOT SURGERY      arc    KNEE ARTHROSCOPY W/ MENISCECTOMY Right 2023    Procedure: ARTHROSCOPY, KNEE, WITH MENISCECTOMY;  Surgeon: Fidel Michelle MD;  Location: [facility] OR;  Service: Orthopedics;  Laterality: Right;  partial and medial     WISDOM TOOTH EXTRACTION       Family History   Problem Relation Name Age of Onset    Arthritis Mother Mother     Asthma Mother Mother     Hearing loss Mother Mother     Hyperlipidemia Mother Mother     Vision loss Mother Mother     Early death Father Father         42yrs old    Heart disease Father Father          of MI at 41yo    Hyperlipidemia Father Father     Kidney disease Father Father     Alcohol abuse Brother Brother     Diabetes Maternal Grandmother Maternal grandmother     Breast cancer Neg Hx      Colon cancer Neg Hx      Ovarian cancer Neg Hx      Melanoma Neg Hx       Social History      Tobacco Use    Smoking status: Never    Smokeless tobacco: Never   Substance Use Topics    Alcohol use: Yes     Alcohol/week: 4.0 standard drinks of alcohol     Types: 2 Glasses of wine, 2 Cans of beer per week     Comment: weekend/social drinker    Drug use: Not Currently     Types: Other-see comments     Comment: edibles a few months ago     No outpatient medications have been marked as taking for the 11/5/24 encounter (Office Visit) with Yvrose Thompson MD.       Review of Systems:  Constitutional: no fever or chills  Respiratory: no cough or shortness of breath  Cardiovascular: no chest pain or palpitations  Gastrointestinal: no nausea or vomiting, tolerating diet  Genitourinary: no hematuria or dysuria  Musculoskeletal: no arthralgias or myalgias  Neurological: no seizures or tremors  Behavioral/Psych: no auditory or visual hallucinations     OBJECTIVE:     Vital Signs (Most Recent): Virtual visit       Physical Exam:  Deferred due to virtual visit    Laboratory:  Lab Results   Component Value Date    WBC 5.06 10/12/2024    WBC 5.06 10/12/2024    HGB 15.3 10/12/2024    HGB 15.3 10/12/2024    HCT 44.6 10/12/2024    HCT 44.6 10/12/2024    MCV 91 10/12/2024    MCV 91 10/12/2024     10/12/2024     10/12/2024     CMP  Sodium   Date Value Ref Range Status   10/12/2024 138 136 - 145 mmol/L Final   10/12/2024 138 136 - 145 mmol/L Final     Potassium   Date Value Ref Range Status   10/12/2024 3.9 3.5 - 5.1 mmol/L Final   10/12/2024 3.9 3.5 - 5.1 mmol/L Final     Chloride   Date Value Ref Range Status   10/12/2024 101 95 - 110 mmol/L Final   10/12/2024 101 95 - 110 mmol/L Final     CO2   Date Value Ref Range Status   10/12/2024 25 23 - 29 mmol/L Final   10/12/2024 25 23 - 29 mmol/L Final     Glucose   Date Value Ref Range Status   10/12/2024 88 70 - 110 mg/dL Final   10/12/2024 88 70 - 110 mg/dL Final     BUN   Date Value Ref Range Status   10/12/2024 10 6 - 20 mg/dL Final   10/12/2024 10 6 - 20 mg/dL  Final     Creatinine   Date Value Ref Range Status   10/12/2024 0.8 0.5 - 1.4 mg/dL Final   10/12/2024 0.8 0.5 - 1.4 mg/dL Final     Calcium   Date Value Ref Range Status   10/12/2024 10.0 8.7 - 10.5 mg/dL Final   10/12/2024 10.0 8.7 - 10.5 mg/dL Final     Total Protein   Date Value Ref Range Status   10/12/2024 7.6 6.0 - 8.4 g/dL Final   10/12/2024 7.6 6.0 - 8.4 g/dL Final     Albumin   Date Value Ref Range Status   10/12/2024 4.2 3.5 - 5.2 g/dL Final   10/12/2024 4.2 3.5 - 5.2 g/dL Final     Total Bilirubin   Date Value Ref Range Status   10/12/2024 0.5 0.1 - 1.0 mg/dL Final     Comment:     For infants and newborns, interpretation of results should be based  on gestational age, weight and in agreement with clinical  observations.    Premature Infant recommended reference ranges:  Up to 24 hours.............<8.0 mg/dL  Up to 48 hours............<12.0 mg/dL  3-5 days..................<15.0 mg/dL  6-29 days.................<15.0 mg/dL     10/12/2024 0.5 0.1 - 1.0 mg/dL Final     Comment:     For infants and newborns, interpretation of results should be based  on gestational age, weight and in agreement with clinical  observations.    Premature Infant recommended reference ranges:  Up to 24 hours.............<8.0 mg/dL  Up to 48 hours............<12.0 mg/dL  3-5 days..................<15.0 mg/dL  6-29 days.................<15.0 mg/dL       Alkaline Phosphatase   Date Value Ref Range Status   10/12/2024 77 55 - 135 U/L Final   10/12/2024 77 55 - 135 U/L Final     AST   Date Value Ref Range Status   10/12/2024 36 10 - 40 U/L Final   10/12/2024 36 10 - 40 U/L Final     ALT   Date Value Ref Range Status   10/12/2024 33 10 - 44 U/L Final   10/12/2024 33 10 - 44 U/L Final     Anion Gap   Date Value Ref Range Status   10/12/2024 12 8 - 16 mmol/L Final   10/12/2024 12 8 - 16 mmol/L Final     eGFR if    Date Value Ref Range Status   08/14/2021 >60.0 >60 mL/min/1.73 m^2 Final     eGFR if non African American    Date Value Ref Range Status   2021 >60.0 >60 mL/min/1.73 m^2 Final     Comment:     Calculation used to obtain the estimated glomerular filtration  rate (eGFR) is the CKD-EPI equation.          ASSESSMENT/PLAN:   54 y.o.  with postmenopausal bleeding.     Patient counseled on risks of surgery including but not limited to pain, bleeding, infection, damage to surrounding pelvic or abdominal structures such as bowel or bladder, damage to pelvic/abdominal nerves and vasculature, anesthesia complications, and death. Counseled on planned hysteroscopic nature of procedure including risks of uterine perforation, volume overload, electrolyte abnormalities, and creation of intrauterine synechiae. Patient consents to a blood transfusion if one becomes medically necessary. Consents to be signed AM of surgery, all questions answered to the patient's apparent satisfaction. To OR 24 for HSC D&C, and other necessary interventions.     UPT and T&S to be ordered at preadmit.    RTC 4 weeks for postop visit.       Yvrose Thompson MD  Department of Obstetrics & Gynecology  [facility]

## 2024-11-05 NOTE — TELEPHONE ENCOUNTER
"Called pre-admit and spoke with Rita. She messaged back:"Anesthesia said she is fine for a phone interview. She does not need an in person appt. A nurse will call her a few days prior to her surgery. "    Called pt and relayed the pre-admit information listed above. Pt had wwe scheduled for 12/17 with Dr. Thompson so she will keep that appointment as a combined 4w post-op/wwe. Pt verbalized understanding.   "

## 2024-11-06 ENCOUNTER — ANESTHESIA EVENT (OUTPATIENT)
Dept: ENDOSCOPY | Facility: HOSPITAL | Age: 54
End: 2024-11-06
Payer: COMMERCIAL

## 2024-11-06 ENCOUNTER — ANESTHESIA (OUTPATIENT)
Dept: ENDOSCOPY | Facility: HOSPITAL | Age: 54
End: 2024-11-06
Payer: COMMERCIAL

## 2024-11-06 ENCOUNTER — HOSPITAL ENCOUNTER (OUTPATIENT)
Facility: HOSPITAL | Age: 54
Discharge: HOME | End: 2024-11-06
Attending: COLON & RECTAL SURGERY | Admitting: COLON & RECTAL SURGERY
Payer: COMMERCIAL

## 2024-11-06 VITALS
DIASTOLIC BLOOD PRESSURE: 78 MMHG | OXYGEN SATURATION: 97 % | SYSTOLIC BLOOD PRESSURE: 133 MMHG | WEIGHT: 150 LBS | BODY MASS INDEX: 27.6 KG/M2 | HEIGHT: 62 IN | RESPIRATION RATE: 16 BRPM | TEMPERATURE: 98 F | HEART RATE: 50 BPM

## 2024-11-06 DIAGNOSIS — Z12.11 SCREENING FOR COLON CANCER: ICD-10-CM

## 2024-11-06 PROCEDURE — 27201012 HC FORCEPS, HOT/COLD, DISP: Performed by: COLON & RECTAL SURGERY

## 2024-11-06 PROCEDURE — 63600175 PHARM REV CODE 636 W HCPCS

## 2024-11-06 PROCEDURE — 25000003 PHARM REV CODE 250

## 2024-11-06 PROCEDURE — 88305 TISSUE EXAM BY PATHOLOGIST: CPT | Performed by: STUDENT IN AN ORGANIZED HEALTH CARE EDUCATION/TRAINING PROGRAM

## 2024-11-06 PROCEDURE — 45385 COLONOSCOPY W/LESION REMOVAL: CPT | Mod: 33 | Performed by: COLON & RECTAL SURGERY

## 2024-11-06 PROCEDURE — A4216 STERILE WATER/SALINE, 10 ML: HCPCS

## 2024-11-06 PROCEDURE — 45385 COLONOSCOPY W/LESION REMOVAL: CPT | Mod: 33,,, | Performed by: COLON & RECTAL SURGERY

## 2024-11-06 PROCEDURE — 37000009 HC ANESTHESIA EA ADD 15 MINS: Performed by: COLON & RECTAL SURGERY

## 2024-11-06 PROCEDURE — 45380 COLONOSCOPY AND BIOPSY: CPT | Mod: 33,59,, | Performed by: COLON & RECTAL SURGERY

## 2024-11-06 PROCEDURE — 88305 TISSUE EXAM BY PATHOLOGIST: CPT | Mod: 26,,, | Performed by: STUDENT IN AN ORGANIZED HEALTH CARE EDUCATION/TRAINING PROGRAM

## 2024-11-06 PROCEDURE — 45380 COLONOSCOPY AND BIOPSY: CPT | Mod: 33,59 | Performed by: COLON & RECTAL SURGERY

## 2024-11-06 PROCEDURE — 27201089 HC SNARE, DISP (ANY): Performed by: COLON & RECTAL SURGERY

## 2024-11-06 PROCEDURE — 37000008 HC ANESTHESIA 1ST 15 MINUTES: Performed by: COLON & RECTAL SURGERY

## 2024-11-06 RX ORDER — VITAMIN B COMPLEX
1 CAPSULE ORAL DAILY
COMMUNITY

## 2024-11-06 RX ORDER — MULTIVITAMIN
1 TABLET ORAL DAILY
COMMUNITY

## 2024-11-06 RX ORDER — PROPOFOL 10 MG/ML
VIAL (ML) INTRAVENOUS
Status: DISCONTINUED | OUTPATIENT
Start: 2024-11-06 | End: 2024-11-06

## 2024-11-06 RX ORDER — SODIUM CHLORIDE 0.9 % (FLUSH) 0.9 %
SYRINGE (ML) INJECTION
Status: DISCONTINUED | OUTPATIENT
Start: 2024-11-06 | End: 2024-11-06

## 2024-11-06 RX ORDER — LIDOCAINE HYDROCHLORIDE 20 MG/ML
INJECTION INTRAVENOUS
Status: DISCONTINUED | OUTPATIENT
Start: 2024-11-06 | End: 2024-11-06

## 2024-11-06 RX ORDER — AMOXICILLIN 500 MG
CAPSULE ORAL DAILY
COMMUNITY

## 2024-11-06 RX ORDER — SODIUM CHLORIDE 9 MG/ML
INJECTION, SOLUTION INTRAVENOUS CONTINUOUS
Status: DISCONTINUED | OUTPATIENT
Start: 2024-11-06 | End: 2024-11-06 | Stop reason: HOSPADM

## 2024-11-06 RX ADMIN — PROPOFOL 30 MG: 10 INJECTION, EMULSION INTRAVENOUS at 08:11

## 2024-11-06 RX ADMIN — PROPOFOL 70 MG: 10 INJECTION, EMULSION INTRAVENOUS at 08:11

## 2024-11-06 RX ADMIN — SODIUM CHLORIDE 30 ML: 9 INJECTION, SOLUTION INTRAMUSCULAR; INTRAVENOUS; SUBCUTANEOUS at 08:11

## 2024-11-06 RX ADMIN — LIDOCAINE HYDROCHLORIDE 50 MG: 20 INJECTION INTRAVENOUS at 08:11

## 2024-11-06 RX ADMIN — PROPOFOL 150 MCG/KG/MIN: 10 INJECTION, EMULSION INTRAVENOUS at 08:11

## 2024-11-06 NOTE — PROVATION PATIENT INSTRUCTIONS
Discharge Summary/Instructions after an Endoscopic Procedure  Patient Name: Shawna Mayers  Patient MRN: [id number]  Patient YOB: 1970 Wednesday, November 6, 2024  Edison Hawkins MD  Dear patient,  As a result of recent federal legislation (The Federal Cures Act), you may   receive lab or pathology results from your procedure in your [facility]   account before your physician is able to contact you. Your physician or   their representative will relay the results to you with their   recommendations at their soonest availability.  Thank you,  RESTRICTIONS:  During your procedure today, you received medications for sedation.  These   medications may affect your judgment, balance and coordination.  Therefore,   for 24 hours, you have the following restrictions:   - DO NOT drive a car, operate machinery, make legal/financial decisions,   sign important papers or drink alcohol.    ACTIVITY:  Today: no heavy lifting, straining or running due to procedural   sedation/anesthesia.  The following day: return to full activity including work.  DIET:  Eat and drink normally unless instructed otherwise.     TREATMENT FOR COMMON SIDE EFFECTS:  - Mild abdominal pain, nausea, belching, bloating or excessive gas:  rest,   eat lightly and use a heating pad.  - Sore Throat: treat with throat lozenges and/or gargle with warm salt   water.  - Because air was used during the procedure, expelling large amounts of air   from your rectum or belching is normal.  - If a bowel prep was taken, you may not have a bowel movement for 1-3 days.    This is normal.  SYMPTOMS TO WATCH FOR AND REPORT TO YOUR PHYSICIAN:  1. Abdominal pain or bloating, other than gas cramps.  2. Chest pain.  3. Back pain.  4. Signs of infection such as: chills or fever occurring within 24 hours   after the procedure.  5. Rectal bleeding, which would show as bright red, maroon, or black stools.   (A tablespoon of blood from the rectum is not serious, especially  if   hemorrhoids are present.)  6. Vomiting.  7. Weakness or dizziness.  GO DIRECTLY TO THE NEAREST EMERGENCY ROOM IF YOU HAVE ANY OF THE FOLLOWING:      Difficulty breathing              Chills and/or fever over 101 F   Persistent vomiting and/or vomiting blood   Severe abdominal pain   Severe chest pain   Black, tarry stools   Bleeding- more than one tablespoon   Any other symptom or condition that you feel may need urgent attention  Your doctor recommends these additional instructions:  If any biopsies were taken, your doctors clinic will contact you in 1 to 2   weeks with any results.  - Discharge patient to home.   - High fiber diet.   - Continue present medications.   - Await pathology results.   - Repeat colonoscopy in 3 years for surveillance.   - Return to primary care physician PRN.  For questions, problems or results please call your physician - Edison Hawkins MD at Work:  [phone].  [facility], EMERGENCY ROOM PHONE NUMBER: [phone]  IF A COMPLICATION OR EMERGENCY SITUATION ARISES AND YOU ARE UNABLE TO REACH   YOUR PHYSICIAN - GO DIRECTLY TO THE EMERGENCY ROOM.  MD Edison Ivy MD  11/6/2024 8:41:18 AM  This report has been verified and signed electronically.  Dear patient,  As a result of recent federal legislation (The Federal Cures Act), you may   receive lab or pathology results from your procedure in your [facility]   account before your physician is able to contact you. Your physician or   their representative will relay the results to you with their   recommendations at their soonest availability.  Thank you,  PROVATION

## 2024-11-06 NOTE — TRANSFER OF CARE
"Anesthesia Transfer of Care Note    Patient: Shawna Mayers    Procedure(s) Performed: Procedure(s) (LRB):  COLONOSCOPY (N/A)    Patient location: [facility]    Anesthesia Type: general    Transport from OR: Transported from OR on room air with adequate spontaneous ventilation    Post pain: adequate analgesia    Post assessment: no apparent anesthetic complications and tolerated procedure well    Post vital signs: stable    Level of consciousness: awake    Nausea/Vomiting: no nausea/vomiting    Complications: none    Transfer of care protocol was followed      Last vitals: Visit Vitals  BP (!) 154/81   Pulse 72   Temp 36.3 °C (97.4 °F) (Temporal)   Resp 16   Ht 5' 2" (1.575 m)   Wt 68 kg (150 lb)   LMP 09/12/2023 (Approximate)   SpO2 98%   Breastfeeding No   BMI 27.44 kg/m²     "

## 2024-11-06 NOTE — ANESTHESIA POSTPROCEDURE EVALUATION
Anesthesia Post Evaluation    Patient: Shawna Mayers    Procedure(s) Performed: Procedure(s) (LRB):  COLONOSCOPY (N/A)    Final Anesthesia Type: general      Patient location during evaluation: PACU  Patient participation: Yes- Able to Participate  Level of consciousness: awake and alert  Post-procedure vital signs: reviewed and stable  Pain management: adequate  Airway patency: patent    PONV status at discharge: No PONV  Anesthetic complications: no      Cardiovascular status: blood pressure returned to baseline  Respiratory status: unassisted  Hydration status: euvolemic  Follow-up not needed.              Vitals Value Taken Time   /78 11/06/24 0912   Temp 36.7 °C (98.1 °F) 11/06/24 0846   Pulse 50 11/06/24 0912   Resp 16 11/06/24 0912   SpO2 97 % 11/06/24 0912         Event Time   Out of Recovery 09:17:42         Pain/Ewa Score: Ewa Score: 10 (11/6/2024  8:46 AM)

## 2024-11-06 NOTE — ANESTHESIA PREPROCEDURE EVALUATION
11/06/2024  Shawna Mayers is a 54 y.o., female.    Past Medical History:   Diagnosis Date    Allergy     Anxiety     Asthma     resolved    Focal nodular hyperplasia of liver     GERD (gastroesophageal reflux disease)     Hyperlipidemia     Thyroid disease     hypothyroidism     Past Surgical History:   Procedure Laterality Date    ARTHROSCOPIC CHONDROPLASTY OF KNEE JOINT Right 7/26/2023    Procedure: ARTHROSCOPY, KNEE, WITH CHONDROPLASTY;  Surgeon: Fidel Michelle MD;  Location: [facility] OR;  Service: Orthopedics;  Laterality: Right;    CHOLECYSTECTOMY      FOOT SURGERY      arc    KNEE ARTHROSCOPY W/ MENISCECTOMY Right 7/26/2023    Procedure: ARTHROSCOPY, KNEE, WITH MENISCECTOMY;  Surgeon: Fidel Michelle MD;  Location: [facility] OR;  Service: Orthopedics;  Laterality: Right;  partial and medial     WISDOM TOOTH EXTRACTION           Pre-op Assessment    I have reviewed the Patient Summary Reports.    I have reviewed the NPO Status.   I have reviewed the Medications.     Review of Systems  Anesthesia Hx:  No problems with previous Anesthesia                Social:  Non-Smoker       Hematology/Oncology:  Hematology Normal   Oncology Normal                                   EENT/Dental:  EENT/Dental Normal           Cardiovascular:  Cardiovascular Normal Exercise tolerance: good                                             Pulmonary:  Pulmonary Normal                       Renal/:  Renal/ Normal                 Hepatic/GI:     GERD, well controlled Liver Disease,               Musculoskeletal:  Arthritis               Neurological:  Neurology Normal                                      Endocrine:   Hypothyroidism          Dermatological:  Skin Normal    Psych:  Psychiatric Normal                    Physical Exam  General: Well nourished, Cooperative, Alert and Oriented    Airway:  Mallampati: II    Mouth Opening: Normal  TM Distance: Normal  Tongue: Normal  Neck ROM: Normal ROM    Dental:  Intact        Anesthesia Plan  Type of Anesthesia, risks & benefits discussed:    Anesthesia Type: Gen Natural Airway  Intra-op Monitoring Plan: Standard ASA Monitors  Induction:  IV  Informed Consent: Informed consent signed with the Patient and all parties understand the risks and agree with anesthesia plan.  All questions answered. Patient consented to blood products? No  ASA Score: 2  Day of Surgery Review of History & Physical: H&P Update referred to the surgeon/provider.I have interviewed and examined the patient. I have reviewed the patient's H&P dated:     Ready For Surgery From Anesthesia Perspective.     .

## 2024-11-06 NOTE — PLAN OF CARE
POC discussed with pt. Understanding verbalized. Patient states that she viewed the colonoscopy video on the portal

## 2024-11-06 NOTE — BRIEF OP NOTE
[facility] 4th Floor  Brief Operative Note     SUMMARY     Surgery Date: 11/6/2024     Surgeons and Role:     * Edison Hawkins MD - Primary    Assisting Surgeon: None    Pre-op Diagnosis:  Colon cancer screening [Z12.11]    Post-op Diagnosis:  Post-Op Diagnosis Codes:     * Colon cancer screening [Z12.11]    Procedure(s) (LRB):  COLONOSCOPY (N/A)    Anesthesia: Choice    Description of the findings of the procedure: polyps removed    Estimated Blood Loss: * No values recorded between 11/6/2024  7:50 AM and 11/6/2024  8:41 AM *         Specimens:   Specimen (24h ago, onward)       Start     Ordered    11/06/24 0825  Specimen to Pathology, Surgery Gastrointestinal tract  Once        Comments: Pre-op Diagnosis: Colon cancer screening [Z12.11]Procedure(s):COLONOSCOPY Number of specimens: 3Name of specimens: Jar 1 Ascending colon polyps x 3-cold snare;Jar 2 Hepatic Flexure Polyp- cold forcep;Jar 3 Sigmoid colon polyp-cold forceps;     References:    Click here for ordering Quick Tip   Question Answer Comment   Procedure Type: Gastrointestinal tract    Specimen Class: Routine/Screening    Release to patient Immediate        11/06/24 0838                    Discharge Note    SUMMARY     Admit Date: 11/6/2024    Discharge Date and Time: 11/6/2024 8:41 AM    Hospital Course Patient was seen in the preoperative area by both myself and anesthesia. All consents were verified and all questions appropriately answered. All risks, benefits and alternatives explained to patient. Patient proceeded to endoscopy suite for colonoscopy and was discharged home postoperative once cleared by anesthesia.    Final Diagnosis: Post-Op Diagnosis Codes:     * Colon cancer screening [Z12.11]    Disposition: Home or Self Care    Follow Up/Patient Instructions: See Provation report    Medications:  Reconciled Home Medications:      Medication List        CONTINUE taking these medications      b complex vitamins capsule  Take 1  capsule by mouth once daily.     * estradioL 2 MG tablet  Commonly known as: ESTRACE  Take 1 tablet (2 mg total) by mouth once daily.     * estradioL 0.01 % (0.1 mg/gram) vaginal cream  Commonly known as: ESTRACE  0.5 grams with applicator or dime-sized amount with finger in vagina nightly x 2 weeks, then three times a week thereafter     fish oil-omega-3 fatty acids 300-1,000 mg capsule  Take by mouth once daily.     fluticasone propionate 50 mcg/actuation nasal spray  Commonly known as: FLONASE  SPRAY 2 SPRAYS BY EACH NOSTRIL ROUTE ONCE DAILY.     Lactobacillus rhamnosus GG 10 billion cell capsule  Commonly known as: CULTURELLE  Take 1 capsule by mouth once daily.     levothyroxine 50 MCG tablet  Commonly known as: SYNTHROID  Take 1 tablet (50 mcg total) by mouth once daily.     multivitamin per tablet  Commonly known as: THERAGRAN  Take 1 tablet by mouth once daily.     progesterone 100 MG capsule  Commonly known as: PROMETRIUM  Take 3 capsules (300 mg total) by mouth once daily.           * This list has 2 medication(s) that are the same as other medications prescribed for you. Read the directions carefully, and ask your doctor or other care provider to review them with you.                Discharge Procedure Orders   Diet general     Call MD for:  temperature >100.4     Call MD for:  persistent nausea and vomiting     Call MD for:  severe uncontrolled pain     Call MD for:  difficulty breathing, headache or visual disturbances     Call MD for:  redness, tenderness, or signs of infection (pain, swelling, redness, odor or green/yellow discharge around incision site)     Call MD for:  hives     Call MD for:  persistent dizziness or light-headedness     Call MD for:  extreme fatigue     Activity as tolerated      Follow-up Information       Audrey Moulton MD Follow up.    Specialty: Internal Medicine  Why: As needed  Contact information:  [address]  [facility] [id number] [phone]

## 2024-11-06 NOTE — H&P
[facility] 4th Floor  Colon and Rectal Surgery  History & Physical    Patient Name: Shawna Mayers  MRN: [id number]  Admission Date: 11/6/2024  Attending Physician: Edison Hawkins MD  Primary Care Provider: Audrey Moulton MD    Patient information was obtained from patient and medical records.    Subjective:     Chief Complaint/Reason for Admission: Here for Colonoscopy    History of Present Illness:  Patient is a 54 y.o. female presents for colonoscopy. Never had colonoscopy. No hematochezia, melena or change in bowel habits. No personal or fam hx of CRC or IBD, but +polyps in mother.    No current facility-administered medications on file prior to encounter.     Current Outpatient Medications on File Prior to Encounter   Medication Sig    b complex vitamins capsule Take 1 capsule by mouth once daily.    estradioL (ESTRACE) 2 MG tablet Take 1 tablet (2 mg total) by mouth once daily.    fluticasone propionate (FLONASE) 50 mcg/actuation nasal spray SPRAY 2 SPRAYS BY EACH NOSTRIL ROUTE ONCE DAILY.    Lactobacillus rhamnosus GG (CULTURELLE) 10 billion cell capsule Take 1 capsule by mouth once daily.    multivitamin (THERAGRAN) per tablet Take 1 tablet by mouth once daily.    omega-3 fatty acids/fish oil (FISH OIL-OMEGA-3 FATTY ACIDS) 300-1,000 mg capsule Take by mouth once daily.    progesterone (PROMETRIUM) 100 MG capsule Take 3 capsules (300 mg total) by mouth once daily.    estradioL (ESTRACE) 0.01 % (0.1 mg/gram) vaginal cream 0.5 grams with applicator or dime-sized amount with finger in vagina nightly x 2 weeks, then three times a week thereafter       Review of patient's allergies indicates:   Allergen Reactions    Adhesive Blisters    Anucort-hc [hydrocortisone acetate] Hives and Nausea And Vomiting    Demerol [meperidine] Nausea And Vomiting    Sulfa (sulfonamide antibiotics) Hives    Amoxicillin Nausea And Vomiting and Rash    Macrobid [nitrofurantoin monohyd/m-cryst] Nausea Only     dizziness        Past Medical History:   Diagnosis Date    Allergy     Anxiety     Asthma     resolved    Focal nodular hyperplasia of liver     GERD (gastroesophageal reflux disease)     Hyperlipidemia     Hypotension, iatrogenic     Thyroid disease     hypothyroidism     Past Surgical History:   Procedure Laterality Date    ARTHROSCOPIC CHONDROPLASTY OF KNEE JOINT Right 7/26/2023    Procedure: ARTHROSCOPY, KNEE, WITH CHONDROPLASTY;  Surgeon: Fidel Michelle MD;  Location: [facility] OR;  Service: Orthopedics;  Laterality: Right;    CHOLECYSTECTOMY      FOOT SURGERY      arc    KNEE ARTHROSCOPY W/ MENISCECTOMY Right 7/26/2023    Procedure: ARTHROSCOPY, KNEE, WITH MENISCECTOMY;  Surgeon: Fidel Michelle MD;  Location: [facility] OR;  Service: Orthopedics;  Laterality: Right;  partial and medial     WISDOM TOOTH EXTRACTION       Family History       Problem Relation (Age of Onset)    Alcohol abuse Brother    Arthritis Mother    Asthma Mother    Diabetes Maternal Grandmother    Early death Father    Hearing loss Mother    Heart disease Father    Hyperlipidemia Mother, Father    Kidney disease Father    Vision loss Mother          Tobacco Use    Smoking status: Never    Smokeless tobacco: Never   Substance and Sexual Activity    Alcohol use: Yes     Alcohol/week: 4.0 standard drinks of alcohol     Types: 2 Glasses of wine, 2 Cans of beer per week     Comment: weekend/social drinker    Drug use: Not Currently     Types: Other-see comments     Comment: edibles a few months ago    Sexual activity: Yes     Partners: Male     Birth control/protection: Partner-Vasectomy, None     Review of Systems   Constitutional:  Negative for activity change, appetite change, chills, fatigue, fever and unexpected weight change.   HENT:  Negative for congestion, ear pain, sore throat and trouble swallowing.    Eyes:  Negative for pain, redness and itching.   Respiratory:  Negative for cough, shortness of breath and wheezing.    Cardiovascular:   "Negative for chest pain, palpitations and leg swelling.   Gastrointestinal:  Negative for abdominal distention, abdominal pain, anal bleeding, blood in stool, constipation, diarrhea, nausea, rectal pain and vomiting.   Endocrine: Negative for cold intolerance, heat intolerance and polyuria.   Genitourinary:  Negative for dysuria, flank pain, frequency and hematuria.   Musculoskeletal:  Negative for gait problem, joint swelling and neck pain.   Skin:  Negative for color change, rash and wound.   Allergic/Immunologic: Negative for environmental allergies and immunocompromised state.   Neurological:  Negative for dizziness, speech difficulty, weakness and numbness.   Psychiatric/Behavioral:  Negative for agitation, confusion and hallucinations.      Objective:     BP (!) 154/81   Pulse 72   Temp 97.4 °F (36.3 °C) (Temporal)   Resp 16   Ht 5' 2" (1.575 m)   Wt 68 kg (150 lb)   LMP 09/12/2023 (Approximate)   SpO2 98%   Breastfeeding No   BMI 27.44 kg/m²     Physical Exam  Constitutional:       Appearance: She is well-developed.   HENT:      Head: Normocephalic and atraumatic.   Eyes:      Conjunctiva/sclera: Conjunctivae normal.   Neck:      Thyroid: No thyromegaly.   Cardiovascular:      Rate and Rhythm: Normal rate.   Pulmonary:      Effort: Pulmonary effort is normal. No respiratory distress.   Abdominal:      General: There is no distension.      Palpations: Abdomen is soft. There is no mass.      Tenderness: There is no abdominal tenderness.   Musculoskeletal:         General: No tenderness. Normal range of motion.      Cervical back: Normal range of motion.   Skin:     General: Skin is warm and dry.      Capillary Refill: Capillary refill takes less than 2 seconds.      Findings: No rash.   Neurological:      Mental Status: She is alert and oriented to person, place, and time.       Assessment/Plan:     Patient is a 54 y.o. female who presents for colonoscopy     - Ok to proceed to endoscopy suite for " colonoscopy  - Consent obtained. All risks, benefits and alternatives fully explained to patient, including but not limited to bleeding, infection, perforation, and missed polyps. All questions appropriately answered to patient's satisfaction. Consent signed and placed on chart.    There are no hospital problems to display for this patient.    VTE Risk Mitigation (From admission, onward)      None            Edison Hawkins MD  Colon and Rectal Surgery  [facility] 4th [facility]

## 2024-11-11 ENCOUNTER — CLINICAL SUPPORT (OUTPATIENT)
Dept: REHABILITATION | Facility: HOSPITAL | Age: 54
End: 2024-11-11
Payer: COMMERCIAL

## 2024-11-11 DIAGNOSIS — M62.89 PELVIC FLOOR DYSFUNCTION: Primary | ICD-10-CM

## 2024-11-11 LAB
FINAL PATHOLOGIC DIAGNOSIS: NORMAL
GROSS: NORMAL
Lab: NORMAL

## 2024-11-11 PROCEDURE — 97112 NEUROMUSCULAR REEDUCATION: CPT | Mod: PO

## 2024-11-11 NOTE — PROGRESS NOTES
Pelvic Health Physical Therapy   Treatment Note     [facility]  [facility] [id number]    Therapy Diagnosis:   Encounter Diagnosis   Name Primary?    Pelvic floor dysfunction Yes     Physician: Vera Francois MD    Visit Date: 11/11/2024     Physician Orders: PT Eval and Treat    Medical Diagnosis from Referral: Urinary frequency [R35.0], Urge incontinence of urine [N39.41], Stress incontinence [N39.3]   Evaluation Date: 9/26/2024  Authorization Period Expiration: 12/31/2024  Plan of Care Expiration: 12/26/2024  Visit # / Visits authorized: 4/20  Cancelled Visits: 0  No Show Visits: 0    Time In: 1:45  Time Out: 2:25  Total Billable Time: 40 minutes    Precautions: Standard    Subjective     Pt reports: hasn't leaked since last session.     She was compliant with home exercise program.  Response to previous treatment: no adverse effects  Functional change: I with double voiding; improved bladder emptying less urinary leakage.      Pain: none    Objective     Shawna participated in neuromuscular re-education activities to develop Down training for 40 minutes including: pelvic floor muscle downtraining with assist of SEMG.  We worked in supine and sitting with external lead wires.  She demonstrated slightly elevated baseline resting, low (but improved) WR rise, and fair holding in 10 sec Kegels.  Derecruitment was delayed at times.  In sitting, her WR rise and holding were poor.  We then returned to supine and she performed reverse Kegels with fair WR rise- she needed verbal cues to slow down her breathing to maximize times in each phase.  I advised her to continue her current program, and we would re-group after her hysteroscopy.      Pt's pelvic floor muscles were retested manually with pt's verbal consent.  With some verbal encouragement, she was able to produce a 2/5 pelvic floor muscle contraction on both sides on this date.        Home Exercises Provided and Patient Education Provided      Education provided:   - reverse Kegels  Discussed progression of plan of care with patient; educated pt in activity modification; reviewed HEP with pt. Pt demonstrated and verbalized understanding of all instruction and was provided with a handout of HEP (see Patient Instructions).    Written Home Exercises Provided: yes.  Exercises were reviewed and Shawna was able to demonstrate them prior to the end of the session.  Shawna demonstrated good  understanding of the education provided.     See EMR under Patient Instructions for exercises provided prior session.    Assessment     She lengthens very well with a diaphragmatic breath- may look at SEMG again next session.     Shawna Is progressing well towards her goals.   Pt prognosis is Good.     Pt will continue to benefit from skilled outpatient physical therapy to address the deficits listed in the problem list box on initial evaluation, provide pt/family education and to maximize pt's level of independence in the home and community environment.     Pt's spiritual, cultural and educational needs considered and pt agreeable to plan of care and goals.     Anticipated barriers to physical therapy: none    Goals: Short Term Goals: 2 weeks  Pt to report increased awareness of PFM lift/drop during exercises and functional activities.  Pt to be I with double voiding techniques.  Pt to be I with diaphragmatic breathing.       Long Term Goals: 12 weeks   Pt will report improved ability to perform ADLs (ie. dressing, bathing, functional transfers) with little (drops) to no urinary leakage 7/7 days per week.  Pt will report improved ability to perform iADLs (ie. driving, home chores, grocery shopping) with little (drops) to no urinary leakage 7/7 days per week.   Pt will report improved ability to cough/sneeze/laugh/yell with little (drops) to no urinary leakage 7/7 days per week.   Pt will report improved ability to manage bladder spasms appropriately 100% of the time for  an improvement in urinary frequency/urgency.   Pt will report improved ability to sleep uninterrupted by excessive nocturia 5/7 days per week.   Pt will maintain a voiding interval of > or = 3 hours for improved activity tolerance (ie. prolonged driving, work meeting, watching a movie).   Pt will report < or = 2/10 pain with urination/defecation 80% of the time.   Pt/family will be independent with HEP for continued self-management of symptoms.   ALL PROGRESSING    Plan     Plan of care Certification: 9/26/2024 to 12/26/2024.     Outpatient Physical Therapy 1 times per 2 week(s)  for 12 weeks to include the following interventions: therapeutic exercises, therapeutic activity, neuromuscular re-education, manual therapy, modalities PRN, patient/family education, and self care/home management    Jade Anglin, PT, BCB-PMD

## 2024-11-12 ENCOUNTER — PATIENT MESSAGE (OUTPATIENT)
Dept: PREADMISSION TESTING | Facility: OTHER | Age: 54
End: 2024-11-12
Payer: COMMERCIAL

## 2024-11-12 NOTE — PRE-PROCEDURE INSTRUCTIONS
Pre admit phone call completed.    Instructions given to patient about NPO status as follows:     The evening before surgery do not eat anything after 9 p.m. ( this includes hard candy, chewing gum and mints).  You may only have GATORADE, POWERADE AND WATER from 9 p.m. until you leave your home. DO NOT  DRINK ANY LIQUIDS ON THE WAY TO THE HOSPITAL.      Patient was also instructed on the below information:    Park in the Parking lot behind the hospital or in the [email or web address] [facility] across the street from the parking lot.  Parking is complimentary.  If you will be discharged the same day as your procedure, please arrange for a responsible adult to drive you home or  to accompany you if traveling by taxi.  YOU WILL NOT BE PERMITTED TO DRIVE OR TO LEAVE THE HOSPITAL ALONE AFTER SURGERY.  It is strongly recommended that you arrange for someone to remain with you for the first 24 hrs following your surgery.    Patient verbalized understanding of above instructions.

## 2024-11-13 NOTE — PRE-PROCEDURE INSTRUCTIONS
PreOp Instructions given:   - Verbal medication information (what to hold and what to take)   - NPO guidelines 5964-1086 CLEAR LIQUIDS ONLY  - Arrival place directions given; time to be given the day before procedure by the   Surgeon's Office 1200 MHSC/MAP  - Bathing with antibacterial soap   - Don't wear any jewelry or bring any valuables AM of surgery   - No makeup or moisturizer to face   - No perfume/cologne, powder, lotions or aftershave   Pt. verbalized understanding.   Pt denies any h/o Anesthesia/Sedation complications or side effects.

## 2024-11-15 ENCOUNTER — HOSPITAL ENCOUNTER (OUTPATIENT)
Facility: HOSPITAL | Age: 54
Discharge: HOME | End: 2024-11-15
Attending: ANESTHESIOLOGY | Admitting: ANESTHESIOLOGY
Payer: COMMERCIAL

## 2024-11-15 ENCOUNTER — ANESTHESIA (OUTPATIENT)
Dept: ENDOSCOPY | Facility: HOSPITAL | Age: 54
End: 2024-11-15
Payer: COMMERCIAL

## 2024-11-15 ENCOUNTER — ANESTHESIA EVENT (OUTPATIENT)
Dept: ENDOSCOPY | Facility: HOSPITAL | Age: 54
End: 2024-11-15
Payer: COMMERCIAL

## 2024-11-15 ENCOUNTER — HOSPITAL ENCOUNTER (OUTPATIENT)
Dept: RADIOLOGY | Facility: HOSPITAL | Age: 54
Discharge: HOME | End: 2024-11-15
Attending: INTERNAL MEDICINE
Payer: COMMERCIAL

## 2024-11-15 VITALS
TEMPERATURE: 98 F | SYSTOLIC BLOOD PRESSURE: 155 MMHG | RESPIRATION RATE: 16 BRPM | HEART RATE: 56 BPM | BODY MASS INDEX: 27.79 KG/M2 | WEIGHT: 151 LBS | HEIGHT: 62 IN | HEART RATE: 62 BPM | OXYGEN SATURATION: 95 % | DIASTOLIC BLOOD PRESSURE: 89 MMHG

## 2024-11-15 DIAGNOSIS — D13.4 HEPATIC ADENOMA: ICD-10-CM

## 2024-11-15 DIAGNOSIS — R79.89 ABNORMAL LIVER FUNCTION TESTS: ICD-10-CM

## 2024-11-15 DIAGNOSIS — R19.00 ABDOMINAL MASS: ICD-10-CM

## 2024-11-15 DIAGNOSIS — K76.89 FOCAL NODULAR HYPERPLASIA OF LIVER: ICD-10-CM

## 2024-11-15 PROCEDURE — 71000044 HC DOSC ROUTINE RECOVERY FIRST HOUR

## 2024-11-15 PROCEDURE — 76391 MR ELASTOGRAPHY: CPT | Mod: TC

## 2024-11-15 PROCEDURE — 74183 MRI ABD W/O CNTR FLWD CNTR: CPT | Mod: TC

## 2024-11-15 PROCEDURE — 76391 MR ELASTOGRAPHY: CPT | Mod: 26,,, | Performed by: INTERNAL MEDICINE

## 2024-11-15 PROCEDURE — 37000008 HC ANESTHESIA 1ST 15 MINUTES

## 2024-11-15 PROCEDURE — 63600175 PHARM REV CODE 636 W HCPCS: Performed by: NURSE ANESTHETIST, CERTIFIED REGISTERED

## 2024-11-15 PROCEDURE — 74183 MRI ABD W/O CNTR FLWD CNTR: CPT | Mod: 26,,, | Performed by: INTERNAL MEDICINE

## 2024-11-15 PROCEDURE — 25000003 PHARM REV CODE 250: Performed by: NURSE ANESTHETIST, CERTIFIED REGISTERED

## 2024-11-15 PROCEDURE — A9581 GADOXETATE DISODIUM INJ: HCPCS | Performed by: INTERNAL MEDICINE

## 2024-11-15 PROCEDURE — 25500020 PHARM REV CODE 255: Performed by: INTERNAL MEDICINE

## 2024-11-15 PROCEDURE — 37000009 HC ANESTHESIA EA ADD 15 MINS

## 2024-11-15 RX ORDER — MIDAZOLAM HYDROCHLORIDE 1 MG/ML
INJECTION INTRAMUSCULAR; INTRAVENOUS
Status: DISCONTINUED | OUTPATIENT
Start: 2024-11-15 | End: 2024-11-15

## 2024-11-15 RX ORDER — GLUCAGON 1 MG
1 KIT INJECTION
Status: DISCONTINUED | OUTPATIENT
Start: 2024-11-15 | End: 2024-11-15 | Stop reason: HOSPADM

## 2024-11-15 RX ORDER — DEXMEDETOMIDINE HYDROCHLORIDE 100 UG/ML
INJECTION, SOLUTION INTRAVENOUS
Status: DISCONTINUED | OUTPATIENT
Start: 2024-11-15 | End: 2024-11-15

## 2024-11-15 RX ORDER — LIDOCAINE HYDROCHLORIDE 10 MG/ML
1 INJECTION, SOLUTION EPIDURAL; INFILTRATION; INTRACAUDAL; PERINEURAL ONCE
Status: DISCONTINUED | OUTPATIENT
Start: 2024-11-15 | End: 2024-11-15 | Stop reason: HOSPADM

## 2024-11-15 RX ADMIN — MIDAZOLAM HYDROCHLORIDE 2 MG: 2 INJECTION, SOLUTION INTRAMUSCULAR; INTRAVENOUS at 03:11

## 2024-11-15 RX ADMIN — SODIUM CHLORIDE: 0.9 INJECTION, SOLUTION INTRAVENOUS at 03:11

## 2024-11-15 RX ADMIN — GADOXETATE DISODIUM 10 ML: 181.43 INJECTION, SOLUTION INTRAVENOUS at 04:11

## 2024-11-15 RX ADMIN — DEXMEDETOMIDINE 8 MCG: 100 INJECTION, SOLUTION, CONCENTRATE INTRAVENOUS at 03:11

## 2024-11-15 NOTE — TRANSFER OF CARE
"Anesthesia Transfer of Care Note    Patient: Shawna Mayers    Procedure(s) Performed: Procedure(s) (LRB):  MRI (Magnetic Resonance Imagine) (N/A)    Patient location: [facility]    Anesthesia Type: MAC    Transport from OR: Transported from OR on room air with adequate spontaneous ventilation    Post pain: adequate analgesia    Post assessment: no apparent anesthetic complications and tolerated procedure well    Post vital signs: stable    Level of consciousness: awake, alert and oriented    Nausea/Vomiting: no nausea/vomiting    Complications: none    Transfer of care protocol was followed      Last vitals: Visit Vitals  BP (!) 163/80   Pulse 68   Temp 36.6 °C (97.9 °F) (Temporal)   Resp 18   Ht 5' 2" (1.575 m)   Wt 68.5 kg (151 lb)   LMP 09/12/2023 (Approximate)   SpO2 98%   Breastfeeding No   BMI 27.62 kg/m²     "

## 2024-11-15 NOTE — PLAN OF CARE
Pt Aox4. VSS. No signs of distress. Patient and pt's  educated and given discharge instructions at bedside. All questions answered. IV removed. Tolerating PO liquids. Pt ready for discharge.

## 2024-11-15 NOTE — ANESTHESIA PREPROCEDURE EVALUATION
11/15/2024  Shawna Mayers is a 54 y.o., female.    Pre-operative evaluation for Procedure(s) (LRB):  MRI (Magnetic Resonance Imagine) (N/A)    Shawna Mayers is a 54 y.o. female with non-painful  intraabdominal mass. No major comorbidity otherwise. No anesthetic complications    LDA:     Prev airway:     Drips:     Patient Active Problem List   Diagnosis    Acquired hypothyroidism    Focal nodular hyperplasia of liver    Old tear of medial meniscus of right knee    Basal cell carcinoma (BCC) of left side of neck    Family history of cardiovascular disease    Status post right knee surgery    Other screening mammogram    Elevated blood pressure reading    PVC (premature ventricular contraction)    Encounter for well woman exam with routine gynecological exam    Urinary frequency    Perimenopause    Hormone replacement therapy (HRT)    Benign paroxysmal positional vertigo    Chronic pain of right knee    Primary osteoarthritis of right knee    Decreased range of motion (ROM) of right knee    Postmenopausal bleeding    Pelvic floor dysfunction       Review of patient's allergies indicates:   Allergen Reactions    Adhesive Blisters    Anucort-hc [hydrocortisone acetate] Hives and Nausea And Vomiting    Demerol [meperidine] Nausea And Vomiting    Sulfa (sulfonamide antibiotics) Hives    Amoxicillin Nausea And Vomiting and Rash    Macrobid [nitrofurantoin monohyd/m-cryst] Nausea Only     dizziness        No current facility-administered medications on file prior to encounter.     Current Outpatient Medications on File Prior to Encounter   Medication Sig Dispense Refill    b complex vitamins capsule Take 1 capsule by mouth once daily.      estradioL (ESTRACE) 2 MG tablet Take 1 tablet (2 mg total) by mouth once daily. (Patient taking differently: Take 2 mg by mouth nightly.) 30 tablet 11     fluticasone propionate (FLONASE) 50 mcg/actuation nasal spray SPRAY 2 SPRAYS BY EACH NOSTRIL ROUTE ONCE DAILY. 48 g 3    Lactobacillus rhamnosus GG (CULTURELLE) 10 billion cell capsule Take 1 capsule by mouth once daily.      levothyroxine (SYNTHROID) 50 MCG tablet Take 1 tablet (50 mcg total) by mouth once daily. 90 tablet 3    multivitamin (THERAGRAN) per tablet Take 1 tablet by mouth once daily.      omega-3 fatty acids/fish oil (FISH OIL-OMEGA-3 FATTY ACIDS) 300-1,000 mg capsule Take by mouth once daily.      progesterone (PROMETRIUM) 100 MG capsule Take 3 capsules (300 mg total) by mouth once daily. 90 capsule 11    estradioL (ESTRACE) 0.01 % (0.1 mg/gram) vaginal cream 0.5 grams with applicator or dime-sized amount with finger in vagina nightly x 2 weeks, then three times a week thereafter 42.5 g 3       Past Surgical History:   Procedure Laterality Date    ARTHROSCOPIC CHONDROPLASTY OF KNEE JOINT Right 7/26/2023    Procedure: ARTHROSCOPY, KNEE, WITH CHONDROPLASTY;  Surgeon: Fidel Michelle MD;  Location: [facility];  Service: Orthopedics;  Laterality: Right;    CHOLECYSTECTOMY      COLONOSCOPY N/A 11/6/2024    Procedure: COLONOSCOPY;  Surgeon: Edison Hawkins MD;  Location: [address]);  Service: Endoscopy;  Laterality: N/A;  referral Yvrose Thompson. Suprep instr. to portal.EC  8/21 r/s suprep instructions to pt portal.cf  10/31/24- precall complete - ERW    FOOT SURGERY      arc    KNEE ARTHROSCOPY W/ MENISCECTOMY Right 7/26/2023    Procedure: ARTHROSCOPY, KNEE, WITH MENISCECTOMY;  Surgeon: Fidel Michelle MD;  Location: [facility] OR;  Service: Orthopedics;  Laterality: Right;  partial and medial     WISDOM TOOTH EXTRACTION         Social History     Socioeconomic History    Marital status:    Occupational History     Employer: [employer]    Tobacco Use    Smoking status: Never    Smokeless tobacco: Never   Substance and Sexual Activity    Alcohol use: Yes     Alcohol/week:  "4.0 standard drinks of alcohol     Types: 2 Glasses of wine, 2 Cans of beer per week     Comment: weekend/social drinker    Drug use: Not Currently     Types: Other-see comments     Comment: edibles a few months ago    Sexual activity: Yes     Partners: Male     Birth control/protection: Partner-Vasectomy, None   Other Topics Concern    Are you pregnant or think you may be? No   Social History Narrative    Lives with spouse. Feels safe in her home.      Social Drivers of Health     Financial Resource Strain: Low Risk  (2/14/2024)    Overall Financial Resource Strain (CARDIA)     Difficulty of Paying Living Expenses: Not hard at all   Food Insecurity: No Food Insecurity (2/14/2024)    Hunger Vital Sign     Worried About Running Out of Food in the Last Year: Never true     Ran Out of Food in the Last Year: Never true   Transportation Needs: No Transportation Needs (2/14/2024)    PRAPARE - Transportation     Lack of Transportation (Medical): No     Lack of Transportation (Non-Medical): No   Physical Activity: Sufficiently Active (2/14/2024)    Exercise Vital Sign     Days of Exercise per Week: 4 days     Minutes of Exercise per Session: 60 min   Stress: No Stress Concern Present (2/14/2024)    [employer] [place] [employer] - Occupational Stress Questionnaire     Feeling of Stress : Only a little   Housing Stability: Low Risk  (2/14/2024)    Housing Stability Vital Sign     Unable to Pay for Housing in the Last Year: No     Number of Places Lived in the Last Year: 1     Unstable Housing in the Last Year: No         Vital Signs Range (Last 24H):  Temp:  [37.3 °C (99.1 °F)]   Pulse:  [78-86]   Resp:  [18]   BP: (137)/(101)   SpO2:  [98 %]       CBC: No results for input(s): "WBC", "RBC", "HGB", "HCT", "PLT", "MCV", "MCH", "MCHC" in the last 72 hours.    CMP: No results for input(s): "NA", "K", "CL", "CO2", "BUN", "CREATININE", "GLU", "MG", "PHOS", "CALCIUM", "ALBUMIN", "PROT", "ALKPHOS", "ALT", "AST", "BILITOT" " "in the last 72 hours.    INR  No results for input(s): "PT", "INR", "PROTIME", "APTT" in the last 72 hours.        Diagnostic Studies:      EKD Echo:          Pre-op Assessment    I have reviewed the Patient Summary Reports.     I have reviewed the Nursing Notes.    I have reviewed the Medications.     Review of Systems  Anesthesia Hx:  No problems with previous Anesthesia             Denies Family Hx of Anesthesia complications.    Denies Personal Hx of Anesthesia complications.                    Social:  Non-Smoker       Hematology/Oncology:  Hematology Normal   Oncology Normal                                   EENT/Dental:  EENT/Dental Normal           Cardiovascular:  Cardiovascular Normal                                              Renal/:  Renal/ Normal                 OB/GYN/PEDS:           Legal Guardian is Mother , birth was Full Term     Denies Developmental Delay Denies Anomilies    Dermatological:  Skin Normal    Psych:  Psychiatric Normal                    Physical Exam  General: Well nourished    Airway:  Mallampati: II   Mouth Opening: Normal  Tongue: Normal  Neck ROM: Normal ROM    Dental:  Intact    Chest/Lungs:  Clear to auscultation        Anesthesia Plan  Type of Anesthesia, risks & benefits discussed:    Anesthesia Type: Gen ETT  Intra-op Monitoring Plan: Standard ASA Monitors  Post Op Pain Control Plan: multimodal analgesia  Induction:  IV  Airway Plan: Direct  Informed Consent: Informed consent signed with the Patient and all parties understand the risks and agree with anesthesia plan.  All questions answered.   ASA Score: 3    Ready For Surgery From Anesthesia Perspective.     .      "

## 2024-11-19 ENCOUNTER — PATIENT MESSAGE (OUTPATIENT)
Dept: SPORTS MEDICINE | Facility: CLINIC | Age: 54
End: 2024-11-19
Payer: COMMERCIAL

## 2024-11-19 DIAGNOSIS — M17.11 PRIMARY OSTEOARTHRITIS OF RIGHT KNEE: Primary | ICD-10-CM

## 2024-11-19 NOTE — TELEPHONE ENCOUNTER
Medical Necessity for viscosupplementation use: After thorough evaluation of the patient, I have determined that viscosupplementation treatment is medically necessary. The patient has painful degenerative joint disease (DJD) of the knee(s) with failure of conservative treatments including lifestyle modifications and rehabilitation exercises. Oral analgesics including NSAIDs have not adequately controlled the patient's symptoms. There is radiographic evidence of Kellgren-Mark grade II (or greater) osteoarthritic (OA) changes, or if lack of radiographic evidence, there is arthroscopic or other evidence of chondrosis of the knee(s).     I made the decision to obtain old records of the patient including previous notes and imaging. I independently reviewed and interpreted lab results today as well as prior imaging.      Pre-authorization placed for Orthovisc series injections.

## 2024-11-20 ENCOUNTER — OFFICE VISIT (OUTPATIENT)
Dept: HEPATOLOGY | Facility: CLINIC | Age: 54
End: 2024-11-20
Payer: COMMERCIAL

## 2024-11-20 DIAGNOSIS — E27.9 ADRENAL NODULE: ICD-10-CM

## 2024-11-20 DIAGNOSIS — D35.00 ADRENAL ADENOMA, UNSPECIFIED LATERALITY: ICD-10-CM

## 2024-11-20 DIAGNOSIS — K76.89 FOCAL NODULAR HYPERPLASIA OF LIVER: Primary | ICD-10-CM

## 2024-11-20 PROCEDURE — 99213 OFFICE O/P EST LOW 20 MIN: CPT | Mod: 95,,, | Performed by: INTERNAL MEDICINE

## 2024-11-20 PROCEDURE — 3044F HG A1C LEVEL LT 7.0%: CPT | Mod: CPTII,95,, | Performed by: INTERNAL MEDICINE

## 2024-11-20 PROCEDURE — 1160F RVW MEDS BY RX/DR IN RCRD: CPT | Mod: CPTII,95,, | Performed by: INTERNAL MEDICINE

## 2024-11-20 PROCEDURE — 1159F MED LIST DOCD IN RCRD: CPT | Mod: CPTII,95,, | Performed by: INTERNAL MEDICINE

## 2024-11-20 NOTE — H&P
[facility] - Ob Gyn  History & Physical  Gynecology     SUBJECTIVE:      Chief Complaint/Reason for Procedure: PMB     History of Present Illness:  Patient is a 54 y.o.  with persistent PMB. She had no bleeding between January and 2023, but in September starting having spotting. FSH from 2023 was 77, E2 <10. She was started on HRT for vasomotor symptoms and sleep disturbance, dosages were increased for improved symptom control but she began to have an increase in vaginal bleeding. EMBx done 2024 showed a benign endometrial polyp, but no hyperplasia, atypia, or malignancy. She has continued to have intermittent bleeding since the EMBx. PMH includes hypothyroidism on Synthroid.      Of note, she was diagnosed with possible focular nodular hyperplasia while on hormonal contraception, stopped in . MRI of liver in  showed 1.9 x 1.4 cm wedge-shaped region of early arterial enhancement in the posterior right hepatic lobe segment 6 with nonspecific imaging characteristics. This could represent an adenoma or vascular malformation leading to transient hyperperfusion. Liver function tests were normal and patient counseled on risks and benefits of HRT with this questionable diagnosis and elected to proceed. She has upcoming MRI and has been following with hepatology.      TVUS (2024): Uterus 7.5 x 3.5 x 5.1 cm, EMS 5 mm, BOV not visualized  Pap (2019): NILM/HPV neg, needs  MMG (2023): BIRADS-1, scheduled  Colonoscopy: Scheduled on            Review of patient's allergies indicates:   Allergen Reactions    Adhesive Blisters    Anucort-hc [hydrocortisone acetate] Hives and Nausea And Vomiting    Demerol [meperidine] Nausea And Vomiting    Sulfa (sulfonamide antibiotics) Hives    Amoxicillin Nausea And Vomiting and Rash    Macrobid [nitrofurantoin monohyd/m-cryst] Nausea Only       dizziness                 OB History    Para Term  AB Living   0   0         SAB IAB  Ectopic Multiple Live Births                         Past Medical History:   Diagnosis Date    Allergy      Anxiety      Asthma       resolved    Focal nodular hyperplasia of liver      GERD (gastroesophageal reflux disease)      Hyperlipidemia      Thyroid disease       hypothyroidism            Past Surgical History:   Procedure Laterality Date    ARTHROSCOPIC CHONDROPLASTY OF KNEE JOINT Right 2023     Procedure: ARTHROSCOPY, KNEE, WITH CHONDROPLASTY;  Surgeon: Fidel Michelle MD;  Location: [facility] OR;  Service: Orthopedics;  Laterality: Right;    CHOLECYSTECTOMY        FOOT SURGERY         arc    KNEE ARTHROSCOPY W/ MENISCECTOMY Right 2023     Procedure: ARTHROSCOPY, KNEE, WITH MENISCECTOMY;  Surgeon: Fidel Michelle MD;  Location: [facility] OR;  Service: Orthopedics;  Laterality: Right;  partial and medial     WISDOM TOOTH EXTRACTION                 Family History   Problem Relation Name Age of Onset    Arthritis Mother Mother      Asthma Mother Mother      Hearing loss Mother Mother      Hyperlipidemia Mother Mother      Vision loss Mother Mother      Early death Father Father           42yrs old    Heart disease Father Father            of MI at 43yo    Hyperlipidemia Father Father      Kidney disease Father Father      Alcohol abuse Brother Brother      Diabetes Maternal Grandmother Maternal grandmother      Breast cancer Neg Hx        Colon cancer Neg Hx        Ovarian cancer Neg Hx        Melanoma Neg Hx          Social History   Social History            Tobacco Use    Smoking status: Never    Smokeless tobacco: Never   Substance Use Topics    Alcohol use: Yes       Alcohol/week: 4.0 standard drinks of alcohol       Types: 2 Glasses of wine, 2 Cans of beer per week       Comment: weekend/social drinker    Drug use: Not Currently       Types: Other-see comments       Comment: edibles a few months ago         No outpatient medications have been marked as taking for the 24 encounter  (Office Visit) with Yvrose Thompson MD.         Review of Systems:  Constitutional: no fever or chills  Respiratory: no cough or shortness of breath  Cardiovascular: no chest pain or palpitations  Gastrointestinal: no nausea or vomiting, tolerating diet  Genitourinary: no hematuria or dysuria  Musculoskeletal: no arthralgias or myalgias  Neurological: no seizures or tremors  Behavioral/Psych: no auditory or visual hallucinations     OBJECTIVE:      Vital Signs (Most Recent): Virtual visit     Physical Exam:  Deferred due to virtual visit     Laboratory:        Lab Results   Component Value Date     WBC 5.06 10/12/2024     WBC 5.06 10/12/2024     HGB 15.3 10/12/2024     HGB 15.3 10/12/2024     HCT 44.6 10/12/2024     HCT 44.6 10/12/2024     MCV 91 10/12/2024     MCV 91 10/12/2024      10/12/2024      10/12/2024      CMP        Sodium   Date Value Ref Range Status   10/12/2024 138 136 - 145 mmol/L Final   10/12/2024 138 136 - 145 mmol/L Final            Potassium   Date Value Ref Range Status   10/12/2024 3.9 3.5 - 5.1 mmol/L Final   10/12/2024 3.9 3.5 - 5.1 mmol/L Final            Chloride   Date Value Ref Range Status   10/12/2024 101 95 - 110 mmol/L Final   10/12/2024 101 95 - 110 mmol/L Final            CO2   Date Value Ref Range Status   10/12/2024 25 23 - 29 mmol/L Final   10/12/2024 25 23 - 29 mmol/L Final            Glucose   Date Value Ref Range Status   10/12/2024 88 70 - 110 mg/dL Final   10/12/2024 88 70 - 110 mg/dL Final            BUN   Date Value Ref Range Status   10/12/2024 10 6 - 20 mg/dL Final   10/12/2024 10 6 - 20 mg/dL Final            Creatinine   Date Value Ref Range Status   10/12/2024 0.8 0.5 - 1.4 mg/dL Final   10/12/2024 0.8 0.5 - 1.4 mg/dL Final            Calcium   Date Value Ref Range Status   10/12/2024 10.0 8.7 - 10.5 mg/dL Final   10/12/2024 10.0 8.7 - 10.5 mg/dL Final            Total Protein   Date Value Ref Range Status   10/12/2024 7.6 6.0 - 8.4 g/dL Final    10/12/2024 7.6 6.0 - 8.4 g/dL Final            Albumin   Date Value Ref Range Status   10/12/2024 4.2 3.5 - 5.2 g/dL Final   10/12/2024 4.2 3.5 - 5.2 g/dL Final              Total Bilirubin   Date Value Ref Range Status   10/12/2024 0.5 0.1 - 1.0 mg/dL Final       Comment:       For infants and newborns, interpretation of results should be based  on gestational age, weight and in agreement with clinical  observations.     Premature Infant recommended reference ranges:  Up to 24 hours.............<8.0 mg/dL  Up to 48 hours............<12.0 mg/dL  3-5 days..................<15.0 mg/dL  6-29 days.................<15.0 mg/dL      10/12/2024 0.5 0.1 - 1.0 mg/dL Final       Comment:       For infants and newborns, interpretation of results should be based  on gestational age, weight and in agreement with clinical  observations.     Premature Infant recommended reference ranges:  Up to 24 hours.............<8.0 mg/dL  Up to 48 hours............<12.0 mg/dL  3-5 days..................<15.0 mg/dL  6-29 days.................<15.0 mg/dL               Alkaline Phosphatase   Date Value Ref Range Status   10/12/2024 77 55 - 135 U/L Final   10/12/2024 77 55 - 135 U/L Final            AST   Date Value Ref Range Status   10/12/2024 36 10 - 40 U/L Final   10/12/2024 36 10 - 40 U/L Final            ALT   Date Value Ref Range Status   10/12/2024 33 10 - 44 U/L Final   10/12/2024 33 10 - 44 U/L Final            Anion Gap   Date Value Ref Range Status   10/12/2024 12 8 - 16 mmol/L Final   10/12/2024 12 8 - 16 mmol/L Final            eGFR if    Date Value Ref Range Status   2021 >60.0 >60 mL/min/1.73 m^2 Final              eGFR if non    Date Value Ref Range Status   2021 >60.0 >60 mL/min/1.73 m^2 Final       Comment:       Calculation used to obtain the estimated glomerular filtration  rate (eGFR) is the CKD-EPI equation.             ASSESSMENT/PLAN:   54 y.o.  with postmenopausal  bleeding.      Patient counseled on risks of surgery including but not limited to pain, bleeding, infection, damage to surrounding pelvic or abdominal structures such as bowel or bladder, damage to pelvic/abdominal nerves and vasculature, anesthesia complications, and death. Counseled on planned hysteroscopic nature of procedure including risks of uterine perforation, volume overload, electrolyte abnormalities, and creation of intrauterine synechiae. Patient consents to a blood transfusion if one becomes medically necessary. Consents to be signed AM of surgery, all questions answered to the patient's apparent satisfaction. To OR 11/22/24 for HSC D&C, and other necessary interventions.      UPT and T&S to be ordered at preadmit.     RTC 4 weeks for postop visit.        Yvrose Thompson MD  Department of Obstetrics & Gynecology  [facility]

## 2024-11-20 NOTE — PROGRESS NOTES
The patient location is: home  The chief complaint leading to consultation is: f/u abnormal liver imaging    Visit type: audiovisual    Face to Face time with patient: 15 minutes  30 minutes of total time spent on the encounter, which includes face to face time and non-face to face time preparing to see the patient (eg, review of tests), Obtaining and/or reviewing separately obtained history, Documenting clinical information in the electronic or other health record, Independently interpreting results (not separately reported) and communicating results to the patient/family/caregiver, or Care coordination (not separately reported).         Each patient to whom he or she provides medical services by telemedicine is:  (1) informed of the relationship between the physician and patient and the respective role of any other health care provider with respect to management of the patient; and (2) notified that he or she may decline to receive medical services by telemedicine and may withdraw from such care at any time.    Notes: HEPATOLOGY FOLLOW UP    Referring Physician: Audrey Moulton MD   Current Corresponding Physician: Audrey Moulton MD     Shawna Mayers is here for follow up of Abnormal Abdominal/Liver Imaging      HPI  Shawna Mayers is a 54 y.o. female who presented by video visit 10/8/24 for evaluation of a hx of abnormal liver imaging     --note from [facility] 12/21/2012 (stopped BCP in 2010):  42-year-old woman who had an MRI in 2010, to followup the finding of a liver mass seen on ultrasound when she developed some gallbladder issues. She had a 7 cm right hepatic lobe with imaging characteristics consistent with focal nodular hyperplasia. She had a followup MRI later that year, which showed no interval change in the appearance or size. She is asymptomatic. She has been taken off birth control pills.   Discussed 1/7: single 1.8 cm lesion, T2 bright, suggestive of adenoma. Get prior studies for  comparison     --feeling well  --no abdo pain, N/V  BMI: 28  Alcohol: social     Labs 3/28/24: ALT 25, AST 30, ALKP 78, Tbil 0.4    Interval History  Impression at time of consultation: hx of abnormal liver imaging. I recommended an MRI w wo eovist to further evaluate liver lesions. Eovist is helpful when trying to distinguish between adenomas and FNHs. Check AFP. I am also recommending an MRE to screen for fibrosis.     Since Shawna Mayers's last visit:    MRI w wo eovist 11/15/24: LIVER: No global hepatic signal abnormality. No focal hepatic lesions. 1.5 cm adrenal nodule    MRE 11/15/24: no signfiicant fat, iron or fibrosis    Outpatient Encounter Medications as of 11/20/2024   Medication Sig Dispense Refill    b complex vitamins capsule Take 1 capsule by mouth once daily.      estradioL (ESTRACE) 0.01 % (0.1 mg/gram) vaginal cream 0.5 grams with applicator or dime-sized amount with finger in vagina nightly x 2 weeks, then three times a week thereafter 42.5 g 3    estradioL (ESTRACE) 2 MG tablet Take 1 tablet (2 mg total) by mouth once daily. (Patient taking differently: Take 2 mg by mouth nightly.) 30 tablet 11    fluticasone propionate (FLONASE) 50 mcg/actuation nasal spray SPRAY 2 SPRAYS BY EACH NOSTRIL ROUTE ONCE DAILY. 48 g 3    Lactobacillus rhamnosus GG (CULTURELLE) 10 billion cell capsule Take 1 capsule by mouth once daily.      levothyroxine (SYNTHROID) 50 MCG tablet Take 1 tablet (50 mcg total) by mouth once daily. 90 tablet 3    multivitamin (THERAGRAN) per tablet Take 1 tablet by mouth once daily.      omega-3 fatty acids/fish oil (FISH OIL-OMEGA-3 FATTY ACIDS) 300-1,000 mg capsule Take by mouth once daily.      progesterone (PROMETRIUM) 100 MG capsule Take 3 capsules (300 mg total) by mouth once daily. 90 capsule 11     No facility-administered encounter medications on file as of 11/20/2024.     Review of patient's allergies indicates:   Allergen Reactions    Adhesive Blisters    Anucort-hc  [hydrocortisone acetate] Hives and Nausea And Vomiting    Demerol [meperidine] Nausea And Vomiting    Sulfa (sulfonamide antibiotics) Hives    Amoxicillin Nausea And Vomiting and Rash    Macrobid [nitrofurantoin monohyd/m-cryst] Nausea Only     dizziness     Past Medical History:   Diagnosis Date    Allergy     Anxiety     Asthma     resolved    Focal nodular hyperplasia of liver     GERD (gastroesophageal reflux disease)     Hyperlipidemia     Hypotension, iatrogenic     Thyroid disease     hypothyroidism       Review of Systems   Constitutional: Negative.    HENT: Negative.     Eyes: Negative.    Respiratory: Negative.     Cardiovascular: Negative.    Gastrointestinal: Negative.    Genitourinary: Negative.    Musculoskeletal: Negative.    Skin: Negative.    Neurological: Negative.    Psychiatric/Behavioral: Negative.       There were no vitals filed for this visit.    Physical Exam    MELD 3.0: 7 at 10/12/2024  9:01 AM  MELD-Na: 6 at 10/12/2024  9:01 AM  Calculated from:  Serum Creatinine: 0.8 mg/dL (Using min of 1 mg/dL) at 10/12/2024  9:01 AM  Serum Sodium: 138 mmol/L (Using max of 137 mmol/L) at 10/12/2024  9:01 AM  Total Bilirubin: 0.5 mg/dL (Using min of 1 mg/dL) at 10/12/2024  9:01 AM  Serum Albumin: 4.2 g/dL (Using max of 3.5 g/dL) at 10/12/2024  9:01 AM  INR(ratio): 0.9 (Using min of 1) at 10/12/2024  9:01 AM  Age at listing (hypothetical): 54 years  Sex: Female at 10/12/2024  9:01 AM      Lab Results   Component Value Date    GLU 88 10/12/2024    GLU 88 10/12/2024    BUN 10 10/12/2024    BUN 10 10/12/2024    CREATININE 0.8 10/12/2024    CREATININE 0.8 10/12/2024    CALCIUM 10.0 10/12/2024    CALCIUM 10.0 10/12/2024     10/12/2024     10/12/2024    K 3.9 10/12/2024    K 3.9 10/12/2024     10/12/2024     10/12/2024    PROT 7.6 10/12/2024    PROT 7.6 10/12/2024    CO2 25 10/12/2024    CO2 25 10/12/2024    ANIONGAP 12 10/12/2024    ANIONGAP 12 10/12/2024    WBC 5.06 10/12/2024    WBC  5.06 10/12/2024    RBC 4.91 10/12/2024    RBC 4.91 10/12/2024    HGB 15.3 10/12/2024    HGB 15.3 10/12/2024    HCT 44.6 10/12/2024    HCT 44.6 10/12/2024    MCV 91 10/12/2024    MCV 91 10/12/2024    MCH 31.2 (H) 10/12/2024    MCH 31.2 (H) 10/12/2024    MCHC 34.3 10/12/2024    MCHC 34.3 10/12/2024     Lab Results   Component Value Date    RDW 12.0 10/12/2024    RDW 12.0 10/12/2024     10/12/2024     10/12/2024    MPV 11.1 10/12/2024    MPV 11.1 10/12/2024    GRAN 2.0 10/12/2024    GRAN 39.2 10/12/2024    GRAN 2.0 10/12/2024    GRAN 39.2 10/12/2024    LYMPH 2.4 10/12/2024    LYMPH 48.2 (H) 10/12/2024    LYMPH 2.4 10/12/2024    LYMPH 48.2 (H) 10/12/2024    MONO 0.4 10/12/2024    MONO 6.9 10/12/2024    MONO 0.4 10/12/2024    MONO 6.9 10/12/2024    EOSINOPHIL 4.7 10/12/2024    EOSINOPHIL 4.7 10/12/2024    BASOPHIL 0.8 10/12/2024    BASOPHIL 0.8 10/12/2024    EOS 0.2 10/12/2024    EOS 0.2 10/12/2024    BASO 0.04 10/12/2024    BASO 0.04 10/12/2024    CHOL 223 (H) 10/12/2024    TRIG 131 10/12/2024    HDL 58 10/12/2024    CHOLHDL 26.0 10/12/2024    TOTALCHOLEST 3.8 10/12/2024    ALBUMIN 4.2 10/12/2024    ALBUMIN 4.2 10/12/2024    BILIDIR 0.2 10/12/2024    AST 36 10/12/2024    AST 36 10/12/2024    ALT 33 10/12/2024    ALT 33 10/12/2024    ALKPHOS 77 10/12/2024    ALKPHOS 77 10/12/2024    MG 2.1 10/12/2024    LABPROT 10.3 10/12/2024    INR 0.9 10/12/2024       Assessment and Plan:    Shawna Mayers is a 54 y.o. female with a hx of a liver lesion. Current MRI w wo eovsit shows no lesion in the liver (?regression of adenoma with d/c of OCP?). MRE shows no steatosis, iron or fibrosis. Essentially the patient has no issues with the liver at the present time. Recommend return prn. Will refer to endocrinology for evaluation of adrenal adenoma.

## 2024-11-21 ENCOUNTER — TELEPHONE (OUTPATIENT)
Dept: OBSTETRICS AND GYNECOLOGY | Facility: CLINIC | Age: 54
End: 2024-11-21
Payer: COMMERCIAL

## 2024-11-21 NOTE — TELEPHONE ENCOUNTER
Called pt in regards to sx tomorrow.     Informed that her arrival time is at 5:00am on 1st floor of the [facility] tomorrow morning, 11/22. Informed not to consume any food after 9:00pm tonight. However, water and Gatorade are okay. On the way to the hospital, please stop water and Gatorade intake. Reiterated 5:00am arrival time.     Pt verbalized understanding and had no questions.

## 2024-11-22 ENCOUNTER — ANESTHESIA (OUTPATIENT)
Dept: SURGERY | Facility: OTHER | Age: 54
End: 2024-11-22
Payer: COMMERCIAL

## 2024-11-22 ENCOUNTER — HOSPITAL ENCOUNTER (OUTPATIENT)
Facility: OTHER | Age: 54
Discharge: HOME | End: 2024-11-22
Attending: OBSTETRICS & GYNECOLOGY | Admitting: OBSTETRICS & GYNECOLOGY
Payer: COMMERCIAL

## 2024-11-22 DIAGNOSIS — Z01.818 PREOP TESTING: Primary | ICD-10-CM

## 2024-11-22 DIAGNOSIS — Z98.890 STATUS POST HYSTEROSCOPY: ICD-10-CM

## 2024-11-22 DIAGNOSIS — N95.0 POSTMENOPAUSAL BLEEDING: ICD-10-CM

## 2024-11-22 LAB
ABO + RH BLD: NORMAL
BASOPHILS # BLD AUTO: 0.04 K/UL (ref 0–0.2)
BASOPHILS NFR BLD: 0.8 % (ref 0–1.9)
BLD GP AB SCN CELLS X3 SERPL QL: NORMAL
DIFFERENTIAL METHOD BLD: ABNORMAL
EOSINOPHIL # BLD AUTO: 0.2 K/UL (ref 0–0.5)
EOSINOPHIL NFR BLD: 3.9 % (ref 0–8)
ERYTHROCYTE [DISTWIDTH] IN BLOOD BY AUTOMATED COUNT: 11.9 % (ref 11.5–14.5)
HCT VFR BLD AUTO: 41 % (ref 37–48.5)
HGB BLD-MCNC: 14.4 G/DL (ref 12–16)
IMM GRANULOCYTES # BLD AUTO: 0 K/UL (ref 0–0.04)
IMM GRANULOCYTES NFR BLD AUTO: 0 % (ref 0–0.5)
LYMPHOCYTES # BLD AUTO: 1.9 K/UL (ref 1–4.8)
LYMPHOCYTES NFR BLD: 37.8 % (ref 18–48)
MCH RBC QN AUTO: 31.4 PG (ref 27–31)
MCHC RBC AUTO-ENTMCNC: 35.1 G/DL (ref 32–36)
MCV RBC AUTO: 89 FL (ref 82–98)
MONOCYTES # BLD AUTO: 0.4 K/UL (ref 0.3–1)
MONOCYTES NFR BLD: 7.8 % (ref 4–15)
NEUTROPHILS # BLD AUTO: 2.5 K/UL (ref 1.8–7.7)
NEUTROPHILS NFR BLD: 49.7 % (ref 38–73)
NRBC BLD-RTO: 0 /100 WBC
PLATELET # BLD AUTO: 347 K/UL (ref 150–450)
PMV BLD AUTO: 9.8 FL (ref 9.2–12.9)
RBC # BLD AUTO: 4.59 M/UL (ref 4–5.4)
SPECIMEN OUTDATE: NORMAL
WBC # BLD AUTO: 5.1 K/UL (ref 3.9–12.7)

## 2024-11-22 PROCEDURE — 85025 COMPLETE CBC W/AUTO DIFF WBC: CPT | Performed by: ANESTHESIOLOGY

## 2024-11-22 PROCEDURE — 71000016 HC POSTOP RECOV ADDL HR: Performed by: OBSTETRICS & GYNECOLOGY

## 2024-11-22 PROCEDURE — 71000033 HC RECOVERY, INTIAL HOUR: Performed by: OBSTETRICS & GYNECOLOGY

## 2024-11-22 PROCEDURE — 25000003 PHARM REV CODE 250: Performed by: ANESTHESIOLOGY

## 2024-11-22 PROCEDURE — 25000003 PHARM REV CODE 250: Performed by: OBSTETRICS & GYNECOLOGY

## 2024-11-22 PROCEDURE — 88305 TISSUE EXAM BY PATHOLOGIST: CPT | Mod: 26,,, | Performed by: PATHOLOGY

## 2024-11-22 PROCEDURE — 37000008 HC ANESTHESIA 1ST 15 MINUTES: Performed by: OBSTETRICS & GYNECOLOGY

## 2024-11-22 PROCEDURE — 58558 HYSTEROSCOPY BIOPSY: CPT | Mod: ,,, | Performed by: OBSTETRICS & GYNECOLOGY

## 2024-11-22 PROCEDURE — 63600175 PHARM REV CODE 636 W HCPCS: Performed by: ANESTHESIOLOGY

## 2024-11-22 PROCEDURE — 63600175 PHARM REV CODE 636 W HCPCS: Performed by: STUDENT IN AN ORGANIZED HEALTH CARE EDUCATION/TRAINING PROGRAM

## 2024-11-22 PROCEDURE — 36000706: Performed by: OBSTETRICS & GYNECOLOGY

## 2024-11-22 PROCEDURE — 27201423 OPTIME MED/SURG SUP & DEVICES STERILE SUPPLY: Performed by: OBSTETRICS & GYNECOLOGY

## 2024-11-22 PROCEDURE — 37000009 HC ANESTHESIA EA ADD 15 MINS: Performed by: OBSTETRICS & GYNECOLOGY

## 2024-11-22 PROCEDURE — 88305 TISSUE EXAM BY PATHOLOGIST: CPT | Performed by: PATHOLOGY

## 2024-11-22 PROCEDURE — 86900 BLOOD TYPING SEROLOGIC ABO: CPT | Performed by: ANESTHESIOLOGY

## 2024-11-22 PROCEDURE — C1782 MORCELLATOR: HCPCS | Performed by: OBSTETRICS & GYNECOLOGY

## 2024-11-22 PROCEDURE — 36000707: Performed by: OBSTETRICS & GYNECOLOGY

## 2024-11-22 PROCEDURE — 71000015 HC POSTOP RECOV 1ST HR: Performed by: OBSTETRICS & GYNECOLOGY

## 2024-11-22 RX ORDER — PROPOFOL 10 MG/ML
VIAL (ML) INTRAVENOUS
Status: DISCONTINUED | OUTPATIENT
Start: 2024-11-22 | End: 2024-11-22

## 2024-11-22 RX ORDER — MEPERIDINE HYDROCHLORIDE 25 MG/ML
12.5 INJECTION INTRAMUSCULAR; INTRAVENOUS; SUBCUTANEOUS ONCE AS NEEDED
Status: DISCONTINUED | OUTPATIENT
Start: 2024-11-22 | End: 2024-11-22 | Stop reason: HOSPADM

## 2024-11-22 RX ORDER — SODIUM CHLORIDE, SODIUM LACTATE, POTASSIUM CHLORIDE, CALCIUM CHLORIDE 600; 310; 30; 20 MG/100ML; MG/100ML; MG/100ML; MG/100ML
INJECTION, SOLUTION INTRAVENOUS CONTINUOUS
Status: DISCONTINUED | OUTPATIENT
Start: 2024-11-22 | End: 2024-11-22 | Stop reason: HOSPADM

## 2024-11-22 RX ORDER — DEXTROMETHORPHAN HYDROBROMIDE, GUAIFENESIN 5; 100 MG/5ML; MG/5ML
650 LIQUID ORAL EVERY 6 HOURS
Qty: 30 TABLET | Refills: 0 | Status: SHIPPED | OUTPATIENT
Start: 2024-11-22

## 2024-11-22 RX ORDER — FAMOTIDINE 20 MG/1
20 TABLET, FILM COATED ORAL
Status: COMPLETED | OUTPATIENT
Start: 2024-11-22 | End: 2024-11-22

## 2024-11-22 RX ORDER — DIPHENHYDRAMINE HYDROCHLORIDE 50 MG/ML
INJECTION INTRAMUSCULAR; INTRAVENOUS
Status: DISCONTINUED | OUTPATIENT
Start: 2024-11-22 | End: 2024-11-22

## 2024-11-22 RX ORDER — DEXAMETHASONE SODIUM PHOSPHATE 4 MG/ML
INJECTION, SOLUTION INTRA-ARTICULAR; INTRALESIONAL; INTRAMUSCULAR; INTRAVENOUS; SOFT TISSUE
Status: DISCONTINUED | OUTPATIENT
Start: 2024-11-22 | End: 2024-11-22

## 2024-11-22 RX ORDER — SODIUM CHLORIDE 0.9 % (FLUSH) 0.9 %
3 SYRINGE (ML) INJECTION
Status: DISCONTINUED | OUTPATIENT
Start: 2024-11-22 | End: 2024-11-22 | Stop reason: HOSPADM

## 2024-11-22 RX ORDER — IBUPROFEN 600 MG/1
600 TABLET ORAL EVERY 6 HOURS
Qty: 30 TABLET | Refills: 0 | Status: SHIPPED | OUTPATIENT
Start: 2024-11-22

## 2024-11-22 RX ORDER — GLUCAGON 1 MG
1 KIT INJECTION
Status: DISCONTINUED | OUTPATIENT
Start: 2024-11-22 | End: 2024-11-22 | Stop reason: HOSPADM

## 2024-11-22 RX ORDER — PHENYLEPHRINE HYDROCHLORIDE 10 MG/ML
INJECTION INTRAVENOUS
Status: DISCONTINUED | OUTPATIENT
Start: 2024-11-22 | End: 2024-11-22

## 2024-11-22 RX ORDER — ONDANSETRON HYDROCHLORIDE 2 MG/ML
INJECTION, SOLUTION INTRAVENOUS
Status: DISCONTINUED | OUTPATIENT
Start: 2024-11-22 | End: 2024-11-22

## 2024-11-22 RX ORDER — HYDROMORPHONE HYDROCHLORIDE 2 MG/ML
0.4 INJECTION, SOLUTION INTRAMUSCULAR; INTRAVENOUS; SUBCUTANEOUS EVERY 5 MIN PRN
Status: DISCONTINUED | OUTPATIENT
Start: 2024-11-22 | End: 2024-11-22 | Stop reason: HOSPADM

## 2024-11-22 RX ORDER — FENTANYL CITRATE 50 UG/ML
INJECTION, SOLUTION INTRAMUSCULAR; INTRAVENOUS
Status: DISCONTINUED | OUTPATIENT
Start: 2024-11-22 | End: 2024-11-22

## 2024-11-22 RX ORDER — LIDOCAINE HYDROCHLORIDE 20 MG/ML
INJECTION INTRAVENOUS
Status: DISCONTINUED | OUTPATIENT
Start: 2024-11-22 | End: 2024-11-22

## 2024-11-22 RX ORDER — ACETAMINOPHEN 500 MG
1000 TABLET ORAL
Status: COMPLETED | OUTPATIENT
Start: 2024-11-22 | End: 2024-11-22

## 2024-11-22 RX ORDER — OXYCODONE HYDROCHLORIDE 5 MG/1
5 TABLET ORAL
Status: DISCONTINUED | OUTPATIENT
Start: 2024-11-22 | End: 2024-11-22 | Stop reason: HOSPADM

## 2024-11-22 RX ORDER — ONDANSETRON HYDROCHLORIDE 2 MG/ML
4 INJECTION, SOLUTION INTRAVENOUS DAILY PRN
Status: DISCONTINUED | OUTPATIENT
Start: 2024-11-22 | End: 2024-11-22 | Stop reason: HOSPADM

## 2024-11-22 RX ORDER — LIDOCAINE HYDROCHLORIDE 10 MG/ML
0.5 INJECTION, SOLUTION EPIDURAL; INFILTRATION; INTRACAUDAL; PERINEURAL ONCE
Status: DISCONTINUED | OUTPATIENT
Start: 2024-11-22 | End: 2024-11-22 | Stop reason: HOSPADM

## 2024-11-22 RX ORDER — KETOROLAC TROMETHAMINE 30 MG/ML
INJECTION, SOLUTION INTRAMUSCULAR; INTRAVENOUS
Status: DISCONTINUED | OUTPATIENT
Start: 2024-11-22 | End: 2024-11-22

## 2024-11-22 RX ADMIN — DIPHENHYDRAMINE HYDROCHLORIDE 25 MG: 50 INJECTION, SOLUTION INTRAMUSCULAR; INTRAVENOUS at 06:11

## 2024-11-22 RX ADMIN — PROPOFOL 200 MG: 10 INJECTION, EMULSION INTRAVENOUS at 07:11

## 2024-11-22 RX ADMIN — FAMOTIDINE 20 MG: 20 TABLET, FILM COATED ORAL at 05:11

## 2024-11-22 RX ADMIN — ONDANSETRON HYDROCHLORIDE 4 MG: 2 INJECTION INTRAMUSCULAR; INTRAVENOUS at 06:11

## 2024-11-22 RX ADMIN — FENTANYL CITRATE 100 MCG: 50 INJECTION, SOLUTION INTRAMUSCULAR; INTRAVENOUS at 06:11

## 2024-11-22 RX ADMIN — SODIUM CHLORIDE, SODIUM LACTATE, POTASSIUM CHLORIDE, AND CALCIUM CHLORIDE: .6; .31; .03; .02 INJECTION, SOLUTION INTRAVENOUS at 07:11

## 2024-11-22 RX ADMIN — KETOROLAC TROMETHAMINE 30 MG: 30 INJECTION, SOLUTION INTRAMUSCULAR; INTRAVENOUS at 07:11

## 2024-11-22 RX ADMIN — HYDROMORPHONE HYDROCHLORIDE 0.4 MG: 2 INJECTION INTRAMUSCULAR; INTRAVENOUS; SUBCUTANEOUS at 08:11

## 2024-11-22 RX ADMIN — DEXAMETHASONE SODIUM PHOSPHATE 8 MG: 4 INJECTION, SOLUTION INTRAMUSCULAR; INTRAVENOUS at 07:11

## 2024-11-22 RX ADMIN — OXYCODONE 5 MG: 5 TABLET ORAL at 08:11

## 2024-11-22 RX ADMIN — GLYCOPYRROLATE 0.2 MG: 0.2 INJECTION, SOLUTION INTRAMUSCULAR; INTRAVITREAL at 07:11

## 2024-11-22 RX ADMIN — LIDOCAINE HYDROCHLORIDE 100 MG: 20 INJECTION, SOLUTION INTRAVENOUS at 07:11

## 2024-11-22 RX ADMIN — PHENYLEPHRINE HYDROCHLORIDE 50 MCG: 10 INJECTION INTRAVENOUS at 07:11

## 2024-11-22 RX ADMIN — ACETAMINOPHEN 1000 MG: 500 TABLET, FILM COATED ORAL at 05:11

## 2024-11-22 NOTE — OP NOTE
OPERATIVE REPORT FOR HYSTEROSCOPE, D&C    DATE OF PROCEDURE: 11/22/24     PREOPERATIVE DIAGNOSIS:   1. PMB     POSTOPERATIVE DIAGNOSIS:   1. S/P Hysteroscopy/ D&C  2. S/p Polypectomy     PROCEDURE:  1. Hysteroscopy  2. Dilation and curettage   3. Polypectomy     SURGEON: Yvrose Thompson MD    ASSISTANT: Lisa Grace MD PGY1     ANESTHESIA: General    COMPLICATIONS: None    EBL: 5 cc    IV FLUIDS: see anesthesia note    URINE OUTPUT: 30 cc drained via in-and-out catheterization prior to procedure    HYSTEROSCOPY FLUID DEFICIT: 170cc    FINDINGS:   1. Uterus sounded to 7.5 cm  2. Cervix did not descend to the vaginal introitus with gentle traction on the single-tooth tenaculum, would not be a good future TVH candidate  3. Cervical os dilated to 21 Slov[place] by the Ulisses dilators  4. Hysteroscopic findings include: polypoid hypervascular endocervical canal, normal tubal ostia bilaterally,  uterine cavity with evidence of increased vascularity, posterior wall uterine polyp.  5. Sharp Curettage performed and  specimens obtained and sent to pathology   6. ECC performed and  specimens obtained and sent to pathology   7. Polypectomy performed via Myosure Reach   8. Hemostasis obtained at the end of the procedure  9. Fluid deficit 170cc    SPECIMENS:  1. Endometrial curettage  2. Endocervical Curettage   3. Polypoid and Endometrial sampling         PROCEDURE:   Patient was taken to the operating room where general anesthesia was administered and found to be adequate.  She was placed in the dorsal lithotomy position using Yellofin stirrups, then prepped and draped in the usual sterile fashion. Bladder was drained via in-and-out catheterization prior to procedure.  A surgical timeout was performed with patient's name, date of birth, procedure to be performed, and allergies verbalized. All OR staff in agreement. No preoperative antibiotics were administered as none were indicated.    Attention was then turned to the vagina where 2  right angles were inserted to visualize the cervix. The anterior lip of the cervix was grasped with a single tooth tenaculum. The cervix did not descend into the vaginal introitus with gentle traction on the single-tooth tenaculum. The uterus was sounded to 7.5 cm with the uterine sound, and tan and hegar dilators were used to dilate the cervix to 21 fr. The 5mm hysteroscope was then inserted into the cervical os and and advanced through the cervical canal until the uterine cavity was directly visualized. The uterus was distended with normal saline. The aforementioned findings were noted. The tubal ostia were identified without difficulty, and appeared normal. The Myosure Reach was introduced, and polypectomy was performed with additional endometrial sampling from all four uterine walls. The hysteroscope was withdrawn without difficulty.     An ECC was performed, and specimen collected. The uterus was then curetted in a clockwise fashion in all aspects of the uterus. The endometrial scrapings were sent to pathology. The single tooth tenaculum was removed and hemostasis was noted at the puncture sites in the cervix.     Sponge and instrument counts were correct x 2. The patient tolerated the procedure well and was awakened without difficulty. She was taken to the recovery room in stable condition.     Nory Grace MD (Mary)   Obstetrics and Gynecology, PGY1    ATTESTATION:    I was present and scrubbed for the entire procedure and performed key portions of the case.  I agree with the procedure description as documented.     Yvrose Thompson MD  Department of Obstetrics & Gynecology  [facility]

## 2024-11-22 NOTE — ANESTHESIA PREPROCEDURE EVALUATION
11/22/2024  Shawna Mayers is a 54 y.o., female.      Pre-op Assessment    I have reviewed the Patient Summary Reports.     I have reviewed the Nursing Notes. I have reviewed the NPO Status.   I have reviewed the Medications.     Review of Systems  Anesthesia Hx:             Denies Family Hx of Anesthesia complications.   Personal Hx of Anesthesia complications (Hypotension), Post-Operative Nausea/Vomiting                    Social:  Non-Smoker       Hematology/Oncology:  Hematology Normal   Oncology Normal                                   Cardiovascular:                hyperlipidemia                               Pulmonary:     Denies Asthma.                    Hepatic/GI:     GERD Liver Disease,               Musculoskeletal:  Arthritis               Neurological:  Neurology Normal                                      Endocrine:   Hypothyroidism          Psych:   anxiety                 Physical Exam  General: Well nourished, Cooperative, Alert and Oriented    Airway:  Mallampati: III   Mouth Opening: Normal  TM Distance: Normal  Neck ROM: Normal ROM    Dental:  Intact        Anesthesia Plan  Type of Anesthesia, risks & benefits discussed:    Anesthesia Type: Gen Supraglottic Airway  Intra-op Monitoring Plan: Standard ASA Monitors  Post Op Pain Control Plan: multimodal analgesia  Induction:  IV  Informed Consent: Informed consent signed with the Patient and all parties understand the risks and agree with anesthesia plan.  All questions answered.   ASA Score: 2  Anesthesia Plan Notes: Hb 14.4    Ready For Surgery From Anesthesia Perspective.     .

## 2024-11-22 NOTE — ANESTHESIA POSTPROCEDURE EVALUATION
Anesthesia Post Evaluation    Patient: Shawna Mayers    Procedure(s) Performed: Procedure(s) (LRB):  MRI (Magnetic Resonance Imagine) (N/A)    Final Anesthesia Type: general      Patient location during evaluation: PACU  Patient participation: Yes- Able to Participate  Level of consciousness: awake and alert  Post-procedure vital signs: reviewed and stable  Pain management: adequate  Airway patency: patent    PONV status at discharge: No PONV  Anesthetic complications: no      Cardiovascular status: blood pressure returned to baseline  Respiratory status: unassisted  Hydration status: euvolemic  Follow-up not needed.          Vitals Value Taken Time   /89 11/15/24 1715   Temp 36.4 °C (97.5 °F) 11/15/24 1715   Pulse 62 11/15/24 1715   Resp 16 11/15/24 1715   SpO2 95 % 11/15/24 1715         No case tracking events are documented in the log.      Pain/Ewa Score: Pain Rating Prior to Med Admin: 0 (11/22/2024  5:46 AM)

## 2024-11-22 NOTE — ANESTHESIA POSTPROCEDURE EVALUATION
Anesthesia Post Evaluation    Patient: Shawna Mayers    Procedure(s) Performed: Procedure(s) (LRB):  HYSTEROSCOPY, WITH DILATION AND CURETTAGE OF UTERUS (N/A)    Final Anesthesia Type: general      Patient location during evaluation: PACU  Patient participation: Yes- Able to Participate  Level of consciousness: awake and alert  Post-procedure vital signs: reviewed and stable  Pain management: adequate  Airway patency: patent    PONV status at discharge: No PONV  Anesthetic complications: no      Cardiovascular status: blood pressure returned to baseline  Respiratory status: spontaneous ventilation  Hydration status: euvolemic  Follow-up not needed.          Vitals Value Taken Time   /84 11/22/24 0855   Temp 36.4 °C (97.6 °F) 11/22/24 0825   Pulse 61 11/22/24 0855   Resp 17 11/22/24 0855   SpO2 99 % 11/22/24 0855         Event Time   Out of Recovery 08:19:06         Pain/Ewa Score: Pain Rating Prior to Med Admin: 7 (11/22/2024  8:06 AM)  Ewa Score: 10 (11/22/2024  8:55 AM)

## 2024-11-22 NOTE — TRANSFER OF CARE
"Anesthesia Transfer of Care Note    Patient: Shawna Mayers    Procedure(s) Performed: Procedure(s) (LRB):  HYSTEROSCOPY, WITH DILATION AND CURETTAGE OF UTERUS (N/A)    Patient location: PACU    Anesthesia Type: general    Transport from OR: Transported from OR on 6-10 L/min O2 by face mask with adequate spontaneous ventilation    Post pain: adequate analgesia    Post assessment: no apparent anesthetic complications and tolerated procedure well    Post vital signs: stable    Level of consciousness: sedated    Nausea/Vomiting: no nausea/vomiting    Complications: none    Transfer of care protocol was followed      Last vitals: Visit Vitals  BP (!) 141/84 (BP Location: Right arm)   Pulse 67   Temp 37.1 °C (98.7 °F) (Temporal)   Resp 16   Ht 5' 2" (1.575 m)   Wt 68 kg (150 lb)   LMP 09/12/2023 (Approximate)   SpO2 99%   Breastfeeding No   BMI 27.44 kg/m²     "

## 2024-11-22 NOTE — DISCHARGE SUMMARY
Discharge Summary  Gynecology      Admit Date: 11/22/2024    Discharge Date and Time: 11/22/2024     Attending Physician: Yvrose Thompson MD    Principal Diagnoses: Status post hysteroscopy    Active Hospital Problems    Diagnosis  POA    *Status post hysteroscopy [Z98.890]  Not Applicable      Resolved Hospital Problems    Diagnosis Date Resolved POA    History of hysteroscopy [Z98.890] 11/22/2024 Not Applicable       Procedures: Procedure(s) (LRB):  HYSTEROSCOPY, WITH DILATION AND CURETTAGE OF UTERUS (N/A)    Discharged Condition: good    Hospital Course: Patient presented for scheduled procedure. Patient was passed back to OR for Hysteroscopy D&C and polypectomy. Please see OP note for further details. Tolerated procedure well and patient was taken to recovery in a stable condition. Prior to discharge patient was able to void, ambulate, tolerate PO and pain was well controlled with PO meds. Patient was given routine post-op instructions for which patient voiced understanding. Patient was subsequently discharged home.     Consults: None    Significant Diagnostic Studies:  Recent Labs   Lab 11/22/24  0542   WBC 5.10   HGB 14.4   HCT 41.0   MCV 89           Treatments:   1. Surgery as above    Disposition: Home or Self Care    Patient Instructions:   Current Discharge Medication List        START taking these medications    Details   acetaminophen (TYLENOL) 650 MG TbSR Take 1 tablet (650 mg total) by mouth every 6 (six) hours. Alternate between ibuprofen and tylenol every 3 hours. For example: @0800: ibuprofen 600mg @1100: tylenol 650mg @1400: ibuprofen 600mg @1700: tylenol 650 mg @2000: ibuprofen 600mg  Qty: 30 tablet, Refills: 0      ibuprofen (ADVIL,MOTRIN) 600 MG tablet Take 1 tablet (600 mg total) by mouth every 6 (six) hours. Alternate between ibuprofen and tylenol every 3 hours. For example: @0800: ibuprofen 600mg @1100: tylenol 650mg @1400: ibuprofen 600mg @1700: tylenol 650 mg @2000: ibuprofen  600mg  Qty: 30 tablet, Refills: 0           CONTINUE these medications which have NOT CHANGED    Details   b complex vitamins capsule Take 1 capsule by mouth once daily.      estradioL (ESTRACE) 2 MG tablet Take 1 tablet (2 mg total) by mouth once daily.  Qty: 30 tablet, Refills: 11    Associated Diagnoses: Hormone replacement therapy (HRT)      fluticasone propionate (FLONASE) 50 mcg/actuation nasal spray SPRAY 2 SPRAYS BY EACH NOSTRIL ROUTE ONCE DAILY.  Qty: 48 g, Refills: 3      Lactobacillus rhamnosus GG (CULTURELLE) 10 billion cell capsule Take 1 capsule by mouth once daily.      levothyroxine (SYNTHROID) 50 MCG tablet Take 1 tablet (50 mcg total) by mouth once daily.  Qty: 90 tablet, Refills: 3    Associated Diagnoses: Acquired hypothyroidism      multivitamin (THERAGRAN) per tablet Take 1 tablet by mouth once daily.      omega-3 fatty acids/fish oil (FISH OIL-OMEGA-3 FATTY ACIDS) 300-1,000 mg capsule Take by mouth once daily.      progesterone (PROMETRIUM) 100 MG capsule Take 3 capsules (300 mg total) by mouth once daily.  Qty: 90 capsule, Refills: 11    Associated Diagnoses: Hormone replacement therapy (HRT)      estradioL (ESTRACE) 0.01 % (0.1 mg/gram) vaginal cream 0.5 grams with applicator or dime-sized amount with finger in vagina nightly x 2 weeks, then three times a week thereafter  Qty: 42.5 g, Refills: 3    Associated Diagnoses: Vaginal atrophy             Discharge Procedure Orders   Diet Adult Regular     Pelvic Rest   Order Comments: Pelvic rest until follow up visit. Nothing in vagina -no sex, tampons, douching, etc.     No dressing needed     Notify your health care provider if you experience any of the following:  temperature >100.4     Notify your health care provider if you experience any of the following:  persistent nausea and vomiting or diarrhea     Notify your health care provider if you experience any of the following:  severe uncontrolled pain     Notify your health care provider if  you experience any of the following:  redness, tenderness, or signs of infection (pain, swelling, redness, odor or green/yellow discharge around incision site)     Notify your health care provider if you experience any of the following:  difficulty breathing or increased cough     Notify your health care provider if you experience any of the following:  severe persistent headache     Notify your health care provider if you experience any of the following:  worsening rash     Notify your health care provider if you experience any of the following:  persistent dizziness, light-headedness, or visual disturbances     Notify your health care provider if you experience any of the following:  increased confusion or weakness     Notify your health care provider if you experience any of the following:   Order Comments: Vaginal bleeding greater than 2 pads per 1 hour for 2 consecutive hours     Activity as tolerated        Follow-up Information       Yvrose Thompson MD. Schedule an appointment as soon as possible for a visit in 1 month(s).    Specialty: Obstetrics and Gynecology  Why: for post-op visit  Contact information:  [address] [place] [address] [id number] [phone]                           Nory Grace MD (Mary)   Obstetrics and Gynecology, PGY1

## 2024-11-22 NOTE — INTERVAL H&P NOTE
Shawna Mayers is 54 y.o.  presenting for scheduled  hysteroscopy D&C.    Pt reports feeling well this morning. Denies any medication use this morning. Denies any changes in medical history since original H&P by Dr. Thompson.     Temp:  [98.1 °F (36.7 °C)] 98.1 °F (36.7 °C)  Pulse:  [76] 76  Resp:  [18] 18  SpO2:  [97 %] 97 %  BP: (147)/(67) 147/67    General: NAD, alert, oriented, cooperative  HEENT: NCAT, EOM grossly intact  Lungs: Normal WOB  Heart: regular rate  Abdomen: soft, nondistended, nontender, no rebound or guarding    Plan:  Consents in chart.   All questions answered and concerns addressed.   To OR for planned procedure.    Nory Grace MD (Mary)   Obstetrics and Gynecology, PGY1

## 2024-11-22 NOTE — OR NURSING
Myosure   Deficit-170 ml  Total Volume Collected-406ml  Final Pressure-90mmhg  Cutting time : 00:11

## 2024-11-22 NOTE — ANESTHESIA PROCEDURE NOTES
Intubation    Date/Time: 11/22/2024 7:06 AM    Performed by: Juan Love CRNA  Authorized by: Silvestre Ansari MD    Intubation:     Induction:  Intravenous    Intubated:  Postinduction    Mask Ventilation:  Not attempted    Attempts:  1    Attempted By:  Student    Difficult Airway Encountered?: No      Complications:  None    Airway Device:  Supraglottic airway/LMA    Airway Device Size:  3.0    Style/Cuff Inflation:  Uncuffed    Placement Verified By:  Capnometry    Complicating Factors:  None    Findings Post-Intubation:  Atraumatic/condition of teeth unchanged and BS equal bilateral

## 2024-11-22 NOTE — PLAN OF CARE
Shawna Mayers has met all discharge criteria from Phase II. Vital Signs are stable, ambulating  without difficulty. Discharge instructions given, patient verbalized understanding. Discharged from facility via wheelchair in stable condition.

## 2024-11-23 NOTE — TELEPHONE ENCOUNTER
----- Message from Emily Riggs RN sent at 11/18/2023  9:43 AM CST -----  Contact: Breonna  8/26/2023 she has labs done. Prior patient of Dr. Braun. I'm not sure who to send this to for extra medical diagnoses. The central pricing office? Covering provider?    ----- Message -----  From: Gabe Irizarry  Sent: 11/14/2023   2:17 PM CST  To: [facility] Internal Medicine Providers    Type:  Patient Call          Who Called: [facility]-  Breonna         Does the patient know what this is regarding?: Requesting a call back about having extra medical diagnosis codes for the appt that was on 8/26/23 ; please advise             Best Call Back Number:[phone]            Additional Information:           Communicable/Infectious (Pediatric)

## 2024-11-25 ENCOUNTER — HOSPITAL ENCOUNTER (OUTPATIENT)
Dept: RADIOLOGY | Facility: HOSPITAL | Age: 54
Discharge: HOME | End: 2024-11-25
Attending: STUDENT IN AN ORGANIZED HEALTH CARE EDUCATION/TRAINING PROGRAM
Payer: COMMERCIAL

## 2024-11-25 VITALS
HEIGHT: 62 IN | TEMPERATURE: 98 F | SYSTOLIC BLOOD PRESSURE: 146 MMHG | BODY MASS INDEX: 27.6 KG/M2 | RESPIRATION RATE: 17 BRPM | DIASTOLIC BLOOD PRESSURE: 79 MMHG | OXYGEN SATURATION: 98 % | HEART RATE: 70 BPM | WEIGHT: 150 LBS

## 2024-11-25 VITALS — HEIGHT: 62 IN | WEIGHT: 150 LBS | BODY MASS INDEX: 27.6 KG/M2

## 2024-11-25 DIAGNOSIS — Z12.31 ENCOUNTER FOR SCREENING MAMMOGRAM FOR MALIGNANT NEOPLASM OF BREAST: ICD-10-CM

## 2024-11-25 PROCEDURE — 77063 BREAST TOMOSYNTHESIS BI: CPT | Mod: TC

## 2024-11-25 PROCEDURE — 77063 BREAST TOMOSYNTHESIS BI: CPT | Mod: 26,,, | Performed by: RADIOLOGY

## 2024-11-25 PROCEDURE — 77067 SCR MAMMO BI INCL CAD: CPT | Mod: 26,,, | Performed by: RADIOLOGY

## 2024-11-26 ENCOUNTER — TELEPHONE (OUTPATIENT)
Dept: OBSTETRICS AND GYNECOLOGY | Facility: CLINIC | Age: 54
End: 2024-11-26
Payer: COMMERCIAL

## 2024-11-26 LAB
FINAL PATHOLOGIC DIAGNOSIS: NORMAL
GROSS: NORMAL
Lab: NORMAL

## 2024-11-26 NOTE — TELEPHONE ENCOUNTER
Called patient to discuss surgery findings. Doing well postop with a small amount of spotting, no cramping or pain. Continue HRT for now, will discuss dose adjustments at upcoming appointment.

## 2024-11-27 ENCOUNTER — OFFICE VISIT (OUTPATIENT)
Dept: ENDOCRINOLOGY | Facility: CLINIC | Age: 54
End: 2024-11-27
Payer: COMMERCIAL

## 2024-11-27 VITALS
HEART RATE: 96 BPM | WEIGHT: 150.88 LBS | BODY MASS INDEX: 27.77 KG/M2 | DIASTOLIC BLOOD PRESSURE: 80 MMHG | SYSTOLIC BLOOD PRESSURE: 130 MMHG | HEIGHT: 62 IN | OXYGEN SATURATION: 98 %

## 2024-11-27 DIAGNOSIS — D35.00 ADRENAL ADENOMA, UNSPECIFIED LATERALITY: ICD-10-CM

## 2024-11-27 DIAGNOSIS — Z79.890 HORMONE REPLACEMENT THERAPY (HRT): ICD-10-CM

## 2024-11-27 DIAGNOSIS — E27.9 ADRENAL NODULE: Primary | ICD-10-CM

## 2024-11-27 DIAGNOSIS — R03.0 ELEVATED BLOOD PRESSURE READING: ICD-10-CM

## 2024-11-27 PROCEDURE — G2211 COMPLEX E/M VISIT ADD ON: HCPCS | Mod: S$GLB,,, | Performed by: INTERNAL MEDICINE

## 2024-11-27 PROCEDURE — 1160F RVW MEDS BY RX/DR IN RCRD: CPT | Mod: CPTII,S$GLB,, | Performed by: INTERNAL MEDICINE

## 2024-11-27 PROCEDURE — 3075F SYST BP GE 130 - 139MM HG: CPT | Mod: CPTII,S$GLB,, | Performed by: INTERNAL MEDICINE

## 2024-11-27 PROCEDURE — 3079F DIAST BP 80-89 MM HG: CPT | Mod: CPTII,S$GLB,, | Performed by: INTERNAL MEDICINE

## 2024-11-27 PROCEDURE — 3044F HG A1C LEVEL LT 7.0%: CPT | Mod: CPTII,S$GLB,, | Performed by: INTERNAL MEDICINE

## 2024-11-27 PROCEDURE — 1159F MED LIST DOCD IN RCRD: CPT | Mod: CPTII,S$GLB,, | Performed by: INTERNAL MEDICINE

## 2024-11-27 PROCEDURE — 99999 PR PBB SHADOW E&M-EST. PATIENT-LVL V: CPT | Mod: PBBFAC,,, | Performed by: INTERNAL MEDICINE

## 2024-11-27 PROCEDURE — 99204 OFFICE O/P NEW MOD 45 MIN: CPT | Mod: S$GLB,,, | Performed by: INTERNAL MEDICINE

## 2024-11-27 PROCEDURE — 3008F BODY MASS INDEX DOCD: CPT | Mod: CPTII,S$GLB,, | Performed by: INTERNAL MEDICINE

## 2024-11-27 RX ORDER — DEXAMETHASONE 1 MG/1
1 TABLET ORAL ONCE
Qty: 1 TABLET | Refills: 0 | Status: SHIPPED | OUTPATIENT
Start: 2024-11-27 | End: 2024-11-27

## 2024-11-27 NOTE — PATIENT INSTRUCTIONS
Here are the instructions for the dexamethasone suppression test.    Please take 1 mg dexamethasone between 11 PM-12 AM. Then have your blood drawn at 8-9 AM the next morning.    I have sent the prescription of dexamethasone to your pharmacy and entered the blood tests for you.     CT in 6 months

## 2024-11-27 NOTE — PROGRESS NOTES
"Subjective:      Patient ID: Shawna Mayers is a 54 y.o. female.    Chief Complaint:  Adrenal nodule and new patient    History of Present Illness  Shawna Mayers is here for evaluation of Right sided Adrenal Incidentaloma.      Adrenal Nodule  Found 1.5cm right adrenal incidentaloma on MRI of Abdomen w/wo contrast on 11/15/2024.    Current symptoms:  Abdominal Pain: Denies  Headache:Denies  Sweating: Denies  Tachycardia/Palpitations: Possible tachycardia upon wakening.  Patient does not check heart rate.  Weakness: Denies  Panic attacks: Denies  Pallor: Denies  Orthostatic hypotension: Denies  Lightheadedness/Dizziness: Denies  Weight loss: Denies  Known history of paroxysmal elevations in blood pressure or heart rate during diagnostic procedures/induction of anesthesia/surgery: Denies    Denied supraclavicular/dorsocervical fat pads, facial edema/erythema, facial rounding, truncal obesity, Denies hirsutism/acne/voice deepening, Denies menstrual irregularity, purple striae, skin atrophy, proximal muscle weakness, depression/mood changes, kidney stones/hematuria.    H/o HTN: Denies - Patient states BP elevated at clinic visits.  Has home BP cuff and checks frequently.  Runs 110/79.  H/o bone disease/fracture: Denies  H/o DM/glucose intolerance: Denies    Review of Systems - As above    Objective:   Physical Exam    Visit Vitals  /80 (BP Location: Left arm, Patient Position: Sitting)   Pulse 96   Ht 5' 2" (1.575 m)   Wt 68.5 kg (150 lb 14.5 oz)   LMP 09/12/2023 (Approximate)   SpO2 98%   BMI 27.60 kg/m²       Body mass index is 27.6 kg/m².    Lab Review:   Lab Results   Component Value Date    HGBA1C 5.0 10/12/2024    HGBA1C 5.2 03/20/2024    HGBA1C 5.3 04/25/2015       Lab Results   Component Value Date    CHOL 223 (H) 10/12/2024    HDL 58 10/12/2024    LDLCALC 138.8 10/12/2024    TRIG 131 10/12/2024    CHOLHDL 26.0 10/12/2024     Lab Results   Component Value Date     10/12/2024     " 10/12/2024    K 3.9 10/12/2024    K 3.9 10/12/2024     10/12/2024     10/12/2024    CO2 25 10/12/2024    CO2 25 10/12/2024    GLU 88 10/12/2024    GLU 88 10/12/2024    BUN 10 10/12/2024    BUN 10 10/12/2024    CREATININE 0.8 10/12/2024    CREATININE 0.8 10/12/2024    CALCIUM 10.0 10/12/2024    CALCIUM 10.0 10/12/2024    PROT 7.6 10/12/2024    PROT 7.6 10/12/2024    ALBUMIN 4.2 10/12/2024    ALBUMIN 4.2 10/12/2024    BILITOT 0.5 10/12/2024    BILITOT 0.5 10/12/2024    ALKPHOS 77 10/12/2024    ALKPHOS 77 10/12/2024    AST 36 10/12/2024    AST 36 10/12/2024    ALT 33 10/12/2024    ALT 33 10/12/2024    ANIONGAP 12 10/12/2024    ANIONGAP 12 10/12/2024    ESTGFRAFRICA >60.0 08/14/2021    EGFRNONAA >60.0 08/14/2021    TSH 1.632 10/12/2024     Vit D, 25-Hydroxy   Date Value Ref Range Status   10/12/2024 45 30 - 96 ng/mL Final     Comment:     Vitamin D deficiency.........<10 ng/mL                              Vitamin D insufficiency......10-29 ng/mL       Vitamin D sufficiency........> or equal to 30 ng/mL  Vitamin D toxicity............>100 ng/mL       Assessment and Plan     1. Adrenal nodule  Aldosterone    Renin    Cortisol, 8AM    CT Abdomen Pelvis W Wo Contrast    dexAMETHasone (DECADRON) 1 MG Tab    Basic Metabolic Panel    Dexamethasone    Ambulatory referral/consult to Endocrinology    Cortisol, Saliva    Cortisol, Saliva      2. Adrenal adenoma, unspecified laterality  Aldosterone    Renin    Cortisol, 8AM    CT Abdomen Pelvis W Wo Contrast    dexAMETHasone (DECADRON) 1 MG Tab    Basic Metabolic Panel    Dexamethasone    Cortisol, Saliva    Cortisol, Saliva      3. Elevated blood pressure reading        4. Hormone replacement therapy (HRT)            Adrenal adenoma  -Adrenal incidentaloma- reviewed incidence   -Follow-up CT in 6 months to evaluate pre and post contrast Hounsfield units (HU)   -Labs ordered for hormonal evaluation  -Patient currently on HRT.  If DST elevated will proceed with salivary  cortisol testing.              Elevated blood pressure reading  Elevated BP in clinic today  No interventions as /70-80s per home reading.  Patient instructed to continue monitoring.    Hormone replacement therapy (HRT)  HRT may interfere with DST.  Weighed risk vs benefit.  Patient to continue on HRT.  If DST elevated will proceed with salivary cortisol testing.      Follow up in about 1 year (around 11/27/2025).    Visit today included increased complexity associated with the care of the problems addressed and managing the longitudinal care of the patient due to the serious and/or complex managed problems      RAFIQ Pulido    Case discussed with Dr. Anderson.  Recommendations were discussed with the patient in detail.  The patient verbalized understanding and agrees with the plan outlined as above.     I have reviewed and concur with Shelby Wilson's history, physical, assessment, and plan.  I have personally interviewed and examined the patient.      Lorena Anderson MD

## 2024-11-27 NOTE — ASSESSMENT & PLAN NOTE
-Adrenal incidentaloma- reviewed incidence   -Follow-up CT in 6 months to evaluate pre and post contrast Hounsfield units (HU)   -Labs ordered for hormonal evaluation  -Patient currently on HRT.  If DST elevated will proceed with salivary cortisol testing.

## 2024-11-27 NOTE — ASSESSMENT & PLAN NOTE
Elevated BP in clinic today  No interventions as /70-80s per home reading.  Patient instructed to continue monitoring.

## 2024-11-29 PROBLEM — E27.9 ADRENAL NODULE: Status: ACTIVE | Noted: 2024-11-29

## 2024-11-29 NOTE — ASSESSMENT & PLAN NOTE
HRT may interfere with DST.  Weighed risk vs benefit.  Patient to continue on HRT.  If DST elevated will proceed with salivary cortisol testing.

## 2024-12-02 ENCOUNTER — LAB VISIT (OUTPATIENT)
Dept: LAB | Facility: HOSPITAL | Age: 54
End: 2024-12-02
Payer: COMMERCIAL

## 2024-12-02 DIAGNOSIS — E27.9 ADRENAL NODULE: ICD-10-CM

## 2024-12-02 DIAGNOSIS — D35.00 ADRENAL ADENOMA, UNSPECIFIED LATERALITY: ICD-10-CM

## 2024-12-02 LAB
ANION GAP SERPL CALC-SCNC: 14 MMOL/L (ref 8–16)
BUN SERPL-MCNC: 11 MG/DL (ref 6–20)
CALCIUM SERPL-MCNC: 9.7 MG/DL (ref 8.7–10.5)
CHLORIDE SERPL-SCNC: 107 MMOL/L (ref 95–110)
CO2 SERPL-SCNC: 20 MMOL/L (ref 23–29)
CORTIS SERPL-MCNC: 1.1 UG/DL (ref 4.3–22.4)
CREAT SERPL-MCNC: 0.7 MG/DL (ref 0.5–1.4)
EST. GFR  (NO RACE VARIABLE): >60 ML/MIN/1.73 M^2
GLUCOSE SERPL-MCNC: 106 MG/DL (ref 70–110)
POTASSIUM SERPL-SCNC: 4.8 MMOL/L (ref 3.5–5.1)
SODIUM SERPL-SCNC: 141 MMOL/L (ref 136–145)

## 2024-12-02 PROCEDURE — 80048 BASIC METABOLIC PNL TOTAL CA: CPT

## 2024-12-02 PROCEDURE — 82542 COL CHROMOTOGRAPHY QUAL/QUAN: CPT

## 2024-12-02 PROCEDURE — 82088 ASSAY OF ALDOSTERONE: CPT

## 2024-12-02 PROCEDURE — 84244 ASSAY OF RENIN: CPT

## 2024-12-02 PROCEDURE — 82533 TOTAL CORTISOL: CPT

## 2024-12-05 ENCOUNTER — OFFICE VISIT (OUTPATIENT)
Dept: SPORTS MEDICINE | Facility: CLINIC | Age: 54
End: 2024-12-05
Payer: COMMERCIAL

## 2024-12-05 VITALS
DIASTOLIC BLOOD PRESSURE: 86 MMHG | HEIGHT: 62 IN | WEIGHT: 152.69 LBS | SYSTOLIC BLOOD PRESSURE: 128 MMHG | BODY MASS INDEX: 28.1 KG/M2 | HEART RATE: 85 BPM

## 2024-12-05 DIAGNOSIS — M17.11 PRIMARY OSTEOARTHRITIS OF RIGHT KNEE: Primary | ICD-10-CM

## 2024-12-05 LAB
ALDOST SERPL-MCNC: 13.3 NG/DL
RENIN PLAS-CCNC: 1.7 NG/ML/H

## 2024-12-05 PROCEDURE — 99999 PR PBB SHADOW E&M-EST. PATIENT-LVL III: CPT | Mod: PBBFAC,,, | Performed by: ORTHOPAEDIC SURGERY

## 2024-12-05 NOTE — PROGRESS NOTES
Patient is here for follow up of right knee arthritis. Pt is requesting Orthovisc injection #1.    Patient has painful DJD of right knee and has failed other conservative modalities including NSAIDS, activity modification, weight loss and rehabilitation exercises.      Patient has received good relief with these injections in the past.      The prior shots were tolerated well.  RADIOGRAPHIC IMAGING:   Kellgren-Mark Grade 2-3.   PHYSICAL EXAMINATION:      General: The patient is alert and oriented x 3. Mood is pleasant.   Observation of ears, eyes and nose reveals no gross abnormalities. No   labored breathing observed.      No signs of infection or adverse reaction to Right knee.      Orthovisc Injection Procedure #1     A time out was performed, including verification of patient ID, procedure, site and side, availability of information and equipment, review of safety issues, and agreement with consent, the procedure site was marked.     The patient was prepped aseptically with povidone-iodine swabsticks. A diagnostic and therapeutic injection of 2cc Orthovisc   was given under sterile technique using a 21.5g x 1.5 needle from the superolateral aspect of the right knee joint in the supine position.       she had no adverse reactions to the medication. Pain decreased. she   was instructed to apply ice to the joint for 20 minutes and avoid strenuous activities for 24-36 hours following the injection. she   was warned of possible blood pressure changes during that time. Following that time, she can resume activities as prior to the injection.     she was reminded to call the clinic immediately for any adverse side effects as explained in clinic today.     Lot: [id number]  Exp: 09/20/2026

## 2024-12-05 NOTE — PROCEDURES
Large Joint Aspiration/Injection: R knee    Date/Time: 12/5/2024 10:30 AM    Performed by: Fidel Michelle MD  Authorized by: Fidel Michelle MD    Consent Done?:  Yes (Verbal)  Indications:  Pain  Site marked: the procedure site was marked    Timeout: prior to procedure the correct patient, procedure, and site was verified    Prep: patient was prepped and draped in usual sterile fashion      Details:  Needle Size:  22 G  Ultrasonic Guidance for needle placement?: No    Approach:  Superior  Location:  Knee  Site:  R knee  Medications:  30 mg sodium hyaluronate (orthovisc) 30 mg/2 mL  Patient tolerance:  Patient tolerated the procedure well with no immediate complications

## 2024-12-10 ENCOUNTER — OFFICE VISIT (OUTPATIENT)
Dept: SPORTS MEDICINE | Facility: CLINIC | Age: 54
End: 2024-12-10
Payer: COMMERCIAL

## 2024-12-10 VITALS
HEIGHT: 62 IN | DIASTOLIC BLOOD PRESSURE: 77 MMHG | BODY MASS INDEX: 27.99 KG/M2 | WEIGHT: 152.13 LBS | SYSTOLIC BLOOD PRESSURE: 114 MMHG | HEART RATE: 69 BPM

## 2024-12-10 DIAGNOSIS — M17.11 PRIMARY OSTEOARTHRITIS OF RIGHT KNEE: Primary | ICD-10-CM

## 2024-12-10 PROCEDURE — 20610 DRAIN/INJ JOINT/BURSA W/O US: CPT | Mod: RT,S$GLB,, | Performed by: ORTHOPAEDIC SURGERY

## 2024-12-10 PROCEDURE — 99499 UNLISTED E&M SERVICE: CPT | Mod: S$GLB,,, | Performed by: ORTHOPAEDIC SURGERY

## 2024-12-10 PROCEDURE — 99999 PR PBB SHADOW E&M-EST. PATIENT-LVL III: CPT | Mod: PBBFAC,,, | Performed by: ORTHOPAEDIC SURGERY

## 2024-12-10 NOTE — PROCEDURES
Large Joint Aspiration/Injection: R knee    Date/Time: 12/10/2024 1:15 PM    Performed by: Fidel Michelle MD  Authorized by: Fidel Michelle MD    Consent Done?:  Yes (Verbal)  Indications:  Pain  Site marked: the procedure site was marked    Timeout: prior to procedure the correct patient, procedure, and site was verified    Prep: patient was prepped and draped in usual sterile fashion      Details:  Needle Size:  22 G  Ultrasonic Guidance for needle placement?: No    Approach:  Superior  Location:  Knee  Site:  R knee  Medications:  30 mg sodium hyaluronate (orthovisc) 30 mg/2 mL  Patient tolerance:  Patient tolerated the procedure well with no immediate complications

## 2024-12-10 NOTE — PROGRESS NOTES
Patient is here for follow up of right knee arthritis. Pt is requesting Orthovisc injection #2.    Patient has painful DJD of right knee and has failed other conservative modalities including NSAIDS, activity modification, weight loss and rehabilitation exercises.      Patient has received good relief with these injections in the past.      The prior shots were tolerated well.  RADIOGRAPHIC IMAGING:   Kellgren-Mark Grade 2-3.   PHYSICAL EXAMINATION:      General: The patient is alert and oriented x 3. Mood is pleasant.   Observation of ears, eyes and nose reveals no gross abnormalities. No   labored breathing observed.      No signs of infection or adverse reaction to Right knee.      Orthovisc Injection Procedure #2     A time out was performed, including verification of patient ID, procedure, site and side, availability of information and equipment, review of safety issues, and agreement with consent, the procedure site was marked.     The patient was prepped aseptically with povidone-iodine swabsticks. A diagnostic and therapeutic injection of 2cc Orthovisc   was given under sterile technique using a 21.5g x 1.5 needle from the superolateral aspect of the right knee joint in the supine position.       she had no adverse reactions to the medication. Pain decreased. she   was instructed to apply ice to the joint for 20 minutes and avoid strenuous activities for 24-36 hours following the injection. she   was warned of possible blood pressure changes during that time. Following that time, she can resume activities as prior to the injection.     she was reminded to call the clinic immediately for any adverse side effects as explained in clinic today.     Lot: [id number]  Exp: 09/20/2026

## 2024-12-17 ENCOUNTER — OFFICE VISIT (OUTPATIENT)
Dept: OBSTETRICS AND GYNECOLOGY | Facility: CLINIC | Age: 54
End: 2024-12-17
Payer: COMMERCIAL

## 2024-12-17 ENCOUNTER — LAB VISIT (OUTPATIENT)
Dept: LAB | Facility: HOSPITAL | Age: 54
End: 2024-12-17
Attending: OBSTETRICS & GYNECOLOGY
Payer: COMMERCIAL

## 2024-12-17 VITALS — SYSTOLIC BLOOD PRESSURE: 130 MMHG | BODY MASS INDEX: 27.6 KG/M2 | DIASTOLIC BLOOD PRESSURE: 88 MMHG | WEIGHT: 150.88 LBS

## 2024-12-17 DIAGNOSIS — Z12.4 CERVICAL CANCER SCREENING: ICD-10-CM

## 2024-12-17 DIAGNOSIS — Z79.890 HORMONE REPLACEMENT THERAPY (HRT): ICD-10-CM

## 2024-12-17 DIAGNOSIS — Z01.419 ENCOUNTER FOR WELL WOMAN EXAM WITH ROUTINE GYNECOLOGICAL EXAM: ICD-10-CM

## 2024-12-17 DIAGNOSIS — Z79.890 HORMONE REPLACEMENT THERAPY (HRT): Primary | ICD-10-CM

## 2024-12-17 LAB — ESTRADIOL SERPL-MCNC: 52 PG/ML

## 2024-12-17 PROCEDURE — 87624 HPV HI-RISK TYP POOLED RSLT: CPT | Performed by: OBSTETRICS & GYNECOLOGY

## 2024-12-17 PROCEDURE — 82670 ASSAY OF TOTAL ESTRADIOL: CPT | Performed by: OBSTETRICS & GYNECOLOGY

## 2024-12-17 PROCEDURE — 36415 COLL VENOUS BLD VENIPUNCTURE: CPT | Mod: PN | Performed by: OBSTETRICS & GYNECOLOGY

## 2024-12-17 PROCEDURE — 88175 CYTOPATH C/V AUTO FLUID REDO: CPT | Performed by: OBSTETRICS & GYNECOLOGY

## 2024-12-17 PROCEDURE — 99999 PR PBB SHADOW E&M-EST. PATIENT-LVL III: CPT | Mod: PBBFAC,,, | Performed by: OBSTETRICS & GYNECOLOGY

## 2024-12-17 NOTE — PROGRESS NOTES
Chief Complaint: Annual exam    Chief Complaint   Patient presents with    Well Woman       HPI:   54 y.o.  here today for annual exam. Denies any changes to health history since last visit. Denies abnormal breast symptoms. Denies FH of breast, uterine, ovarian, or colon cancer. Patient is doing well today with no complaints or concerns. She is currently sexually active. She has been vaccinated against COVID-19.    In the last month has had an increase in hot flashes, still with difficulty sleeping. Initially noticed an improvement in symptoms. Was increased from 1 to 2 mg estrace and was feeling like her symptoms were better controlled. Still having some bleeding since HSC. Has been exercising daily.     LMP Dates from Last 1 Encounters:   LMP: 2023       Labs / Significant Studies    Pap (2019): NILM/HPV neg  MMG (2025): BIRADS-1  Colonoscopy (2024): Repeat 3 years      Past Medical History:   Diagnosis Date    Adrenal adenoma 2024    Allergy     Anxiety     Asthma     resolved    Focal nodular hyperplasia of liver     GERD (gastroesophageal reflux disease)     Hyperlipidemia     Hypotension, iatrogenic     Thyroid disease     hypothyroidism     Past Surgical History:   Procedure Laterality Date    ARTHROSCOPIC CHONDROPLASTY OF KNEE JOINT Right 2023    Procedure: ARTHROSCOPY, KNEE, WITH CHONDROPLASTY;  Surgeon: Fidel Michelle MD;  Location: [facility];  Service: Orthopedics;  Laterality: Right;    CHOLECYSTECTOMY      COLONOSCOPY N/A 2024    Procedure: COLONOSCOPY;  Surgeon: Edison Hawkins MD;  Location: [address]);  Service: Endoscopy;  Laterality: N/A;  referral Yvrose Thompson. Suprep instr. to portal.EC   r/s suprep instructions to pt portal.cf  10/31/24- precall complete - [facility]    FOOT SURGERY      arc    HYSTEROSCOPY WITH DILATION AND CURETTAGE OF UTERUS N/A 2024    Procedure: HYSTEROSCOPY, WITH DILATION AND CURETTAGE OF UTERUS;  Surgeon: Jay  Yvrose REYES MD;  Location: [facility] OR;  Service: OB/GYN;  Laterality: N/A;    KNEE ARTHROSCOPY W/ MENISCECTOMY Right 7/26/2023    Procedure: ARTHROSCOPY, KNEE, WITH MENISCECTOMY;  Surgeon: Fidel Michelle MD;  Location: [facility] OR;  Service: Orthopedics;  Laterality: Right;  partial and medial     MAGNETIC RESONANCE IMAGING N/A 11/15/2024    Procedure: MRI (Magnetic Resonance Imagine);  Surgeon: Miguelina Ellis;  Location: [facility];  Service: Anesthesiology;  Laterality: N/A;  MRI ABD W WO EOVIST AND MRE    WISDOM TOOTH EXTRACTION         Current Outpatient Medications:     acetaminophen (TYLENOL) 650 MG TbSR, Take 1 tablet (650 mg total) by mouth every 6 (six) hours. Alternate between ibuprofen and tylenol every 3 hours. For example: @0800: ibuprofen 600mg @1100: tylenol 650mg @1400: ibuprofen 600mg @1700: tylenol 650 mg @2000: ibuprofen 600mg, Disp: 30 tablet, Rfl: 0    b complex vitamins capsule, Take 1 capsule by mouth once daily., Disp: , Rfl:     estradioL (ESTRACE) 0.01 % (0.1 mg/gram) vaginal cream, 0.5 grams with applicator or dime-sized amount with finger in vagina nightly x 2 weeks, then three times a week thereafter, Disp: 42.5 g, Rfl: 3    estradioL (ESTRACE) 2 MG tablet, Take 1 tablet (2 mg total) by mouth once daily. (Patient taking differently: Take 2 mg by mouth nightly.), Disp: 30 tablet, Rfl: 11    fluticasone propionate (FLONASE) 50 mcg/actuation nasal spray, SPRAY 2 SPRAYS BY EACH NOSTRIL ROUTE ONCE DAILY., Disp: 48 g, Rfl: 3    ibuprofen (ADVIL,MOTRIN) 600 MG tablet, Take 1 tablet (600 mg total) by mouth every 6 (six) hours. Alternate between ibuprofen and tylenol every 3 hours. For example: @0800: ibuprofen 600mg @1100: tylenol 650mg @1400: ibuprofen 600mg @1700: tylenol 650 mg @2000: ibuprofen 600mg, Disp: 30 tablet, Rfl: 0    Lactobacillus rhamnosus GG (CULTURELLE) 10 billion cell capsule, Take 1 capsule by mouth once daily., Disp: , Rfl:     levothyroxine (SYNTHROID) 50 MCG tablet, Take 1  tablet (50 mcg total) by mouth once daily., Disp: 90 tablet, Rfl: 3    multivitamin (THERAGRAN) per tablet, Take 1 tablet by mouth once daily., Disp: , Rfl:     omega-3 fatty acids/fish oil (FISH OIL-OMEGA-3 FATTY ACIDS) 300-1,000 mg capsule, Take by mouth once daily., Disp: , Rfl:     progesterone (PROMETRIUM) 100 MG capsule, Take 3 capsules (300 mg total) by mouth once daily., Disp: 90 capsule, Rfl: 11  Review of patient's allergies indicates:   Allergen Reactions    Adhesive Blisters    Anucort-hc [hydrocortisone acetate] Hives and Nausea And Vomiting    Demerol [meperidine] Nausea And Vomiting    Sulfa (sulfonamide antibiotics) Hives    Amoxicillin Nausea And Vomiting and Rash    Macrobid [nitrofurantoin monohyd/m-cryst] Nausea Only     dizziness     OB History    Para Term  AB Living   0   0         SAB IAB Ectopic Multiple Live Births                 Social History     Tobacco Use    Smoking status: Never    Smokeless tobacco: Never   Substance Use Topics    Alcohol use: Yes     Alcohol/week: 4.0 standard drinks of alcohol     Types: 2 Glasses of wine, 2 Cans of beer per week     Comment: weekend/social drinker    Drug use: Not Currently     Types: Other-see comments     Comment: edibles a few months ago     Family History   Problem Relation Name Age of Onset    Arthritis Mother Mother     Asthma Mother Mother     Hearing loss Mother Mother     Hyperlipidemia Mother Mother     Vision loss Mother Mother     Early death Father Father         42yrs old    Heart disease Father Father          of MI at 43yo    Hyperlipidemia Father Father     Kidney disease Father Father     Alcohol abuse Brother Brother     Diabetes Maternal Grandmother Maternal grandmother     Breast cancer Neg Hx      Colon cancer Neg Hx      Ovarian cancer Neg Hx      Melanoma Neg Hx         Review of Systems   Negative except as in HPI     Physical Exam   Vitals:    24 1344   BP: 130/88     Body mass index is 27.6  kg/m².    Physical Exam  Constitutional:       General: She is not in acute distress.     Appearance: Normal appearance.     Genitourinary:    Vulva, vagina, uterus and urethral meatus normal.   The external female genitalia was normal.   No external genitalia lesions identified,Genitalia hair distrobution normal .   No vaginal discharge or bleeding in the vagina.    No vaginal prolapse present.     No vaginal atrophy present.  Right adnexum displays no mass, no tenderness and no fullness. Left adnexum displays no mass, no tenderness and no fullness. Cervix is normal. Cervix exhibits no motion tenderness and no lesion. Uerus contour normal  Uterus is not tender and no mass.    Uterus is anteverted.   Breasts:     Right: No inverted nipple, mass, nipple discharge or skin change.      Left: No inverted nipple, mass, nipple discharge or skin change.     HENT:      Head: Normocephalic and atraumatic.   Eyes:      Extraocular Movements: Extraocular movements intact.      Pupils: Pupils are equal, round, and reactive to light.   Neck:      Thyroid: No thyroid mass, thyromegaly or thyroid tenderness.   Pulmonary:      Effort: Pulmonary effort is normal.   Abdominal:      General: Abdomen is flat. There is no distension.      Palpations: Abdomen is soft.      Tenderness: There is no abdominal tenderness. There is no guarding or rebound.   Musculoskeletal:         General: Normal range of motion.      Cervical back: Normal range of motion.   Lymphadenopathy:      Cervical: No cervical adenopathy.      Upper Body:      Right upper body: No supraclavicular or axillary adenopathy.      Left upper body: No supraclavicular or axillary adenopathy.   Neurological:      General: No focal deficit present.      Mental Status: She is alert and oriented to person, place, and time.   Skin:     General: Skin is warm and dry.   Psychiatric:         Mood and Affect: Mood normal.         Behavior: Behavior normal.   Vitals reviewed.           ASSESSMENT:   Annual Well Women Exam  Patient Active Problem List   Diagnosis    Acquired hypothyroidism    Focal nodular hyperplasia of liver    Old tear of medial meniscus of right knee    Basal cell carcinoma (BCC) of left side of neck    Family history of cardiovascular disease    Status post right knee surgery    Other screening mammogram    Elevated blood pressure reading    PVC (premature ventricular contraction)    Encounter for well woman exam with routine gynecological exam    Urinary frequency    Perimenopause    Hormone replacement therapy (HRT)    Benign paroxysmal positional vertigo    Chronic pain of right knee    Primary osteoarthritis of right knee    Decreased range of motion (ROM) of right knee    Postmenopausal bleeding    Pelvic floor dysfunction    Adrenal adenoma    Status post hysteroscopy    Adrenal nodule     Health Maintenance Due   Topic Date Due    Pneumococcal Vaccines (Age 0-64) (1 of 2 - PCV) Never done    Cervical Cancer Screening  06/13/2024     Health Maintenance Topics with due status: Not Due       Topic Last Completion Date    TETANUS VACCINE 05/16/2017    Hemoglobin A1c (Diabetic Prevention Screening) 10/12/2024    Lipid Panel 10/12/2024    Colorectal Cancer Screening 11/06/2024    Mammogram 11/25/2024    RSV Vaccine (Age 60+ and Pregnant patients) Not Due       PLAN:  Problem List Items Addressed This Visit          Endocrine    Hormone replacement therapy (HRT) - Primary    Relevant Orders    Estradiol       No follow-ups on file.       Yvrose Thompson MD  Department of Obstetrics & Gynecology  [facility]         days of the week), adequate nutrition (protein, calcium, and vitamin D), continued smoking cessation, no or moderate alcohol intake, and fall prevention. Follow up with PCP as scheduled for routine health maintenance.             Endocrine    Hormone replacement therapy (HRT) - Primary    Current Assessment & Plan     Recent uptick in vasomotor symptoms at 2 mg estrace dosage. Will get estradiol levels today. Concern about increasing oral estrace dose as she is already on a significantly high dose. If levels are not significantly elevated, consider changing to Divigel and will refer to [facility] for second opinion/hormone consult for possible estrogen injections or pellets as she may not be absorbing well.          Relevant Orders    Estradiol (Completed)     Other Visit Diagnoses       Cervical cancer screening        Relevant Orders    Liquid-Based Pap Smear, Screening (Completed)    HPV High Risk Genotypes, PCR            Follow up if symptoms worsen or fail to improve.       Yvrose Thompson MD  Department of Obstetrics & Gynecology  [facility]

## 2024-12-18 ENCOUNTER — CLINICAL SUPPORT (OUTPATIENT)
Dept: REHABILITATION | Facility: HOSPITAL | Age: 54
End: 2024-12-18
Payer: COMMERCIAL

## 2024-12-18 DIAGNOSIS — M62.89 PELVIC FLOOR DYSFUNCTION: Primary | ICD-10-CM

## 2024-12-18 PROCEDURE — 97112 NEUROMUSCULAR REEDUCATION: CPT | Mod: PO

## 2024-12-18 NOTE — PATIENT INSTRUCTIONS
DO THE ABOVE IN STANDING    CONTROLLING URINARY / FECAL URGENCY      WHEN YOU EXPERIENCE A STRONG URGE TO URINATE OR DEFECATE:    FIRST Stop activity, stand quietly or sit down. Try to stay very still to maintain control. Avoid rushing to the toilet.    SECOND Begin Quick Flicks (1 second LIFT of pelvic floor muscles, 4 second DROP). Pelvic floor contractions send a message to the bladder to relax and hold urine.     THIRD Relax. Do not rush to the toilet. Take a deep belly or diaphragmatic breath and let it out slowly. Let the urge to urinate pass by using distraction techniques and positive thoughts. Try not to think about going to the bathroom.    FINALLY If the urge returns, repeat the above steps to regain control. When you feel the urge subside, walk normally to the bathroom. You can urinate once the urge has subsided.

## 2024-12-18 NOTE — PROGRESS NOTES
Pelvic Health Physical Therapy   Treatment Note     Name: Shawna Woodson [facility]  Clinic Number: [id number]    Therapy Diagnosis:   Encounter Diagnosis   Name Primary?    Pelvic floor dysfunction Yes     Physician: Vera Francois MD    Visit Date: 12/18/2024     Physician Orders: PT Eval and Treat    Medical Diagnosis from Referral: Urinary frequency [R35.0], Urge incontinence of urine [N39.41], Stress incontinence [N39.3]   Evaluation Date: 9/26/2024  Authorization Period Expiration: 12/31/2024  Plan of Care Expiration: 12/26/2024  Visit # / Visits authorized: 4/20  Cancelled Visits: 0  No Show Visits: 0    Time In: 2:30  Time Out: 3:15  Total Billable Time: 45 minutes    Precautions: Standard    Subjective     Pt reports: hasn't leaked since last session.   Still voiding at night at least twice.  Has limited her fluid intake at night, and has reduced her use of bladder irritants.  Would still like to be able to wait longer between voids.  She was compliant with home exercise program.  Response to previous treatment: no adverse effects  Functional change: I with double voiding; improved bladder emptying less urinary leakage.      Pain: none    Objective     Shawna participated in neuromuscular re-education activities to develop Down training for 45 minutes including: pelvic floor muscle downtraining with assist of SEMG.  We worked in supine and standing with external lead wires.  She demonstrated normal baseline resting,  improved WR rise, and fair/good holding in 10 sec Kegels.  Derecruitment was complete and timely.  In standing, her WR rise and holding were fair and good, improved from last session.     Session concluded with instruction in urge suppression/bladder retraining techniques per handout issued.       Home Exercises Provided and Patient Education Provided     Education provided:   - reverse Kegels in standing  Discussed progression of plan of care with patient; educated pt in activity modification;  reviewed HEP with pt. Pt demonstrated and verbalized understanding of all instruction and was provided with a handout of HEP (see Patient Instructions).    Written Home Exercises Provided: yes.  Exercises were reviewed and Shawna was able to demonstrate them prior to the end of the session.  Shawna demonstrated good  understanding of the education provided.     See EMR under Patient Instructions for exercises provided 12/18/2024.    Assessment     Pt with improved use of muscle length- will have her work on bladder retraining and reassess via the portal after she returns to [facility].      Shawna Is progressing well towards her goals.   Pt prognosis is Good.     Pt will continue to benefit from skilled outpatient physical therapy to address the deficits listed in the problem list box on initial evaluation, provide pt/family education and to maximize pt's level of independence in the home and community environment.     Pt's spiritual, cultural and educational needs considered and pt agreeable to plan of care and goals.     Anticipated barriers to physical therapy: none    Goals: Short Term Goals: 2 weeks  Pt to report increased awareness of PFM lift/drop during exercises and functional activities.MET  Pt to be I with double voiding techniques.MET  Pt to be I with diaphragmatic breathing. MET      Long Term Goals: 12 weeks   Pt will report improved ability to perform ADLs (ie. dressing, bathing, functional transfers) with little (drops) to no urinary leakage 7/7 days per week.MET  Pt will report improved ability to perform iADLs (ie. driving, home chores, grocery shopping) with little (drops) to no urinary leakage 7/7 days per week. MET  Pt will report improved ability to cough/sneeze/laugh/yell with little (drops) to no urinary leakage 7/7 days per week. MET  Pt will report improved ability to manage bladder spasms appropriately 100% of the time for an improvement in urinary frequency/urgency. PROGRESSING  Pt will  report improved ability to sleep uninterrupted by excessive nocturia 5/7 days per week. PROGRESSING  Pt will maintain a voiding interval of > or = 3 hours for improved activity tolerance (ie. prolonged driving, work meeting, watching a movie). PROGRESSING  Pt will report < or = 2/10 pain with urination/defecation 80% of the time. MET  Pt/family will be independent with HEP for continued self-management of symptoms. PROGRESSING    Plan     Hold PT pending return to MD Jdae Anglin, PT, BCB-PMD

## 2024-12-19 ENCOUNTER — PATIENT MESSAGE (OUTPATIENT)
Dept: OBSTETRICS AND GYNECOLOGY | Facility: CLINIC | Age: 54
End: 2024-12-19
Payer: COMMERCIAL

## 2024-12-19 ENCOUNTER — OFFICE VISIT (OUTPATIENT)
Dept: SPORTS MEDICINE | Facility: CLINIC | Age: 54
End: 2024-12-19
Payer: COMMERCIAL

## 2024-12-19 VITALS
SYSTOLIC BLOOD PRESSURE: 129 MMHG | BODY MASS INDEX: 27.79 KG/M2 | DIASTOLIC BLOOD PRESSURE: 77 MMHG | HEIGHT: 62 IN | HEART RATE: 63 BPM | WEIGHT: 151 LBS

## 2024-12-19 DIAGNOSIS — Z79.890 HORMONE REPLACEMENT THERAPY (HRT): Primary | ICD-10-CM

## 2024-12-19 DIAGNOSIS — M17.11 PRIMARY OSTEOARTHRITIS OF RIGHT KNEE: Primary | ICD-10-CM

## 2024-12-19 PROCEDURE — 99999 PR PBB SHADOW E&M-EST. PATIENT-LVL III: CPT | Mod: PBBFAC,,, | Performed by: ORTHOPAEDIC SURGERY

## 2024-12-19 NOTE — PROCEDURES
Large Joint Aspiration/Injection: R knee    Date/Time: 12/19/2024 3:30 PM    Performed by: Fidel Michelle MD  Authorized by: Fidel Michelle MD    Consent Done?:  Yes (Verbal)  Indications:  Pain  Site marked: the procedure site was marked    Timeout: prior to procedure the correct patient, procedure, and site was verified    Prep: patient was prepped and draped in usual sterile fashion      Details:  Needle Size:  22 G  Ultrasonic Guidance for needle placement?: No    Approach:  Superior  Location:  Knee  Site:  R knee  Medications:  30 mg sodium hyaluronate (orthovisc) 30 mg/2 mL  Patient tolerance:  Patient tolerated the procedure well with no immediate complications

## 2024-12-19 NOTE — PROGRESS NOTES
Patient is here for follow up of right knee arthritis. Pt is requesting Orthovisc injection #3.    Patient has painful DJD of right knee and has failed other conservative modalities including NSAIDS, activity modification, weight loss and rehabilitation exercises.      Patient has received good relief with these injections in the past.      The prior shots were tolerated well.  RADIOGRAPHIC IMAGING:   Kellgren-Mark Grade 2-3.   PHYSICAL EXAMINATION:      General: The patient is alert and oriented x 3. Mood is pleasant.   Observation of ears, eyes and nose reveals no gross abnormalities. No   labored breathing observed.      No signs of infection or adverse reaction to Right knee.      Orthovisc Injection Procedure #3     A time out was performed, including verification of patient ID, procedure, site and side, availability of information and equipment, review of safety issues, and agreement with consent, the procedure site was marked.     The patient was prepped aseptically with povidone-iodine swabsticks. A diagnostic and therapeutic injection of 2cc Orthovisc   was given under sterile technique using a 21.5g x 1.5 needle from the superolateral aspect of the right knee joint in the supine position.       she had no adverse reactions to the medication. Pain decreased. she   was instructed to apply ice to the joint for 20 minutes and avoid strenuous activities for 24-36 hours following the injection. she   was warned of possible blood pressure changes during that time. Following that time, she can resume activities as prior to the injection.     she was reminded to call the clinic immediately for any adverse side effects as explained in clinic today.     Lot: [id number]  Exp: 09/20/2026

## 2024-12-23 RX ORDER — ESTRADIOL 1 MG/G
1 GEL TOPICAL DAILY
Qty: 30 G | Refills: 11 | Status: SHIPPED | OUTPATIENT
Start: 2024-12-23 | End: 2025-12-23

## 2024-12-23 RX ORDER — PROGESTERONE 200 MG/1
200 CAPSULE ORAL NIGHTLY
Qty: 30 CAPSULE | Refills: 11 | Status: SHIPPED | OUTPATIENT
Start: 2024-12-23 | End: 2025-12-23

## 2024-12-24 ENCOUNTER — PATIENT MESSAGE (OUTPATIENT)
Dept: OBSTETRICS AND GYNECOLOGY | Facility: CLINIC | Age: 54
End: 2024-12-24
Payer: COMMERCIAL

## 2024-12-24 LAB
FINAL PATHOLOGIC DIAGNOSIS: NORMAL
Lab: NORMAL

## 2024-12-26 ENCOUNTER — PATIENT MESSAGE (OUTPATIENT)
Dept: OBSTETRICS AND GYNECOLOGY | Facility: CLINIC | Age: 54
End: 2024-12-26
Payer: COMMERCIAL

## 2024-12-30 ENCOUNTER — CLINICAL SUPPORT (OUTPATIENT)
Dept: OBSTETRICS AND GYNECOLOGY | Facility: CLINIC | Age: 54
End: 2024-12-30
Payer: COMMERCIAL

## 2024-12-30 DIAGNOSIS — N95.1 MENOPAUSAL SYMPTOMS: Primary | ICD-10-CM

## 2024-12-30 NOTE — PROGRESS NOTES
.Personal Information    Full Name: Shawna Mayers 1970    Medical History    Do you have a chronic medical condition? (e.g., diabetes, hypertension, thyroid issues)   If yes, please specify:   Yes - Hypothyroid     2.   Do you have a history of hormone- related conditions? (e.g., endometriosis, breast cancer)   If yes, please explain:   No    3.   Have you previously or are you currently taking hormone replacement therapy?   If yes, please list:   Yes - Current use - 200 mg progesterone daily and 1 mg Divigel topical gel twice a week. Previous use 300 mg Progesterone and Oral estrogen 2 mg daily     Menstrual History    At what age did you begin menstruating?   11    2.   Have you experienced any changes in your menstrual cycle in the last year?   If yes, please describe:   No    3.   When was your last menstrual period or age of last period?   12/2024    4.   Have you had a hysterectomy?   If yes, at what age?  No    Symptoms Assesments      Are you experiencing any of the following symptoms:  Hot Flashes: Yes   Night Sweats: Yes   Vaginal Dryness or Pain with [place]: No   Mood Swings: Yes   Anxiety or Depression: Yes   Sleep Disturbances: Yes   Fatigue: No   Weight Gain: No   Joint or Muscle Pain: No   Memory Issues or Brain Fog: Yes   Decreased Interest in Sex (Low Libido): Yes     Lifestyle Factors    How would you describe your diet?  Balanced    2.   How often do you exercise?   Regularly    3.   Do you smoke or consume alcohol?   If yes, please specify frequency:   Yes - Social Drinker     4.   How would you rate your stress levels?   Low    Family History    Is there a family history of menopause-related conditions? (e.g., osteoporosis, breast cancer)  If yes, please specify  Yes - Mother Osteoporosis     2.   Is there a family history of heart disease?   If yes, please specify   Yes - Father Heart Disease       **Colonoscopy 11/2024**  **PCP- Dr. Moulton

## 2024-12-31 NOTE — ASSESSMENT & PLAN NOTE
Normal breast and pelvic exams except as noted. Pap/cotesting collected. Continue breast self awareness. MMG and colonoscopy up to date. Recommend 30 minutes of exercise 5 times weekly. Counseled patient on promoting a healthy lifestyle including regular weightbearing physical activity (30 minutes on most days of the week), adequate nutrition (protein, calcium, and vitamin D), continued smoking cessation, no or moderate alcohol intake, and fall prevention. Follow up with PCP as scheduled for routine health maintenance.

## 2024-12-31 NOTE — ASSESSMENT & PLAN NOTE
Recent uptick in vasomotor symptoms at 2 mg estrace dosage. Will get estradiol levels today. Concern about increasing oral estrace dose as she is already on a significantly high dose. If levels are not significantly elevated, consider changing to Divigel and will refer to [facility] for second opinion/hormone consult for possible estrogen injections or pellets as she may not be absorbing well.

## 2025-01-07 ENCOUNTER — PATIENT MESSAGE (OUTPATIENT)
Dept: OBSTETRICS AND GYNECOLOGY | Facility: CLINIC | Age: 55
End: 2025-01-07
Payer: COMMERCIAL

## 2025-01-07 ENCOUNTER — PATIENT MESSAGE (OUTPATIENT)
Dept: PRIMARY CARE CLINIC | Facility: CLINIC | Age: 55
End: 2025-01-07
Payer: COMMERCIAL

## 2025-01-07 ENCOUNTER — OFFICE VISIT (OUTPATIENT)
Dept: UROGYNECOLOGY | Facility: CLINIC | Age: 55
End: 2025-01-07
Payer: COMMERCIAL

## 2025-01-07 VITALS
HEART RATE: 78 BPM | WEIGHT: 154.31 LBS | DIASTOLIC BLOOD PRESSURE: 67 MMHG | SYSTOLIC BLOOD PRESSURE: 131 MMHG | HEIGHT: 62 IN | BODY MASS INDEX: 28.39 KG/M2

## 2025-01-07 DIAGNOSIS — R39.15 URINARY URGENCY: Primary | ICD-10-CM

## 2025-01-07 DIAGNOSIS — R35.0 URINARY FREQUENCY: ICD-10-CM

## 2025-01-07 DIAGNOSIS — N95.2 VAGINAL ATROPHY: ICD-10-CM

## 2025-01-07 DIAGNOSIS — Z79.890 HORMONE REPLACEMENT THERAPY (HRT): Primary | ICD-10-CM

## 2025-01-07 PROCEDURE — 1160F RVW MEDS BY RX/DR IN RCRD: CPT | Mod: CPTII,S$GLB,, | Performed by: OBSTETRICS & GYNECOLOGY

## 2025-01-07 PROCEDURE — 3075F SYST BP GE 130 - 139MM HG: CPT | Mod: CPTII,S$GLB,, | Performed by: OBSTETRICS & GYNECOLOGY

## 2025-01-07 PROCEDURE — 3008F BODY MASS INDEX DOCD: CPT | Mod: CPTII,S$GLB,, | Performed by: OBSTETRICS & GYNECOLOGY

## 2025-01-07 PROCEDURE — 1159F MED LIST DOCD IN RCRD: CPT | Mod: CPTII,S$GLB,, | Performed by: OBSTETRICS & GYNECOLOGY

## 2025-01-07 PROCEDURE — 3078F DIAST BP <80 MM HG: CPT | Mod: CPTII,S$GLB,, | Performed by: OBSTETRICS & GYNECOLOGY

## 2025-01-07 PROCEDURE — 99999 PR PBB SHADOW E&M-EST. PATIENT-LVL III: CPT | Mod: PBBFAC,,, | Performed by: OBSTETRICS & GYNECOLOGY

## 2025-01-07 PROCEDURE — 99212 OFFICE O/P EST SF 10 MIN: CPT | Mod: S$GLB,,, | Performed by: OBSTETRICS & GYNECOLOGY

## 2025-01-07 RX ORDER — PROGESTERONE 100 MG/1
300 CAPSULE ORAL NIGHTLY
Qty: 90 CAPSULE | Refills: 11 | Status: SHIPPED | OUTPATIENT
Start: 2025-01-07 | End: 2026-01-07

## 2025-01-07 NOTE — PROGRESS NOTES
"Chief Complaint   Patient presents with    Follow-up        HPI: Patient is a 54 y.o. female  presents for a follow up visit for OAB symptoms and mixed incontinence. Patient was last seen 10/25/24 and referred to PFPT and started on vaginal estrogen. Had been working with Jade Anglin DPT. Had done about 6 visit. Has a HEP which has has been doing.     She reports that symptoms are better. Feels that some of it is behavioral. She tends to use the bathroom as soon as she has the urge. Voiding about every 2.5 hours. She has still urgency but she feels that it is better. She has adjusted her fluid intake and has done fluid titration. She is waking 2-3 times overnight but it used to be 4-5 times. There have some a couple of nights when she has woken up once.     Feels that her urinary incontinence is better. She reports that she may leak urine "every so often." No longer experiencing it even once per week.       REVIEW OF SYSTEMS:  A full 14-point ROS was performed and was significant for those  mentioned in the HPI.    The following portions of the patient's history were reviewed and updated as appropriate: allergies, current medications, past medical history, past surgical history and problem list.    PHYSICAL EXAMINATION    Vitals:    25 1308   BP: 131/67   Pulse: 78     General: Healthy in appearance, Well nourished, Affect Normal, NAD.    No visits with results within 1 Day(s) from this visit.   Latest known visit with results is:   Lab Visit on 2024   Component Date Value Ref Range Status    Estradiol 2024 52  See Text pg/mL Final        ASSESSMENT & PLAN:    Urinary urgency    Urinary frequency    Vaginal atrophy         53 yo with mixed incontinence presents today for a follow up visit. She feels that symptoms have gotten better. We reviewed bladder retraining and she will work on this to try to reach 3 hour intervals. She has not been using the vaginal estrogen cream and we reviewed " NIKOLAI of Menopause. Encouraged her to resume the vaginal estrogen cream. She is not ready yet for medication but may consider it. Will return idania bout 4 months for re-evaluation. All questions were answered today. The patient was encouraged to contact the office as needed with any additional questions or concerns.     Total time spent on visit was 11 minutes.  This includes face to face time and non-face to face time preparing to see the patient (eg, review of tests), Obtaining and/or reviewing separately obtained history, Documenting clinical information in the electronic or other health record, Independently interpreting resultsand communicating results to the patient/family/caregiver, or Care coordination.    Vera Francois MD

## 2025-01-10 ENCOUNTER — PATIENT MESSAGE (OUTPATIENT)
Dept: REHABILITATION | Facility: HOSPITAL | Age: 55
End: 2025-01-10
Payer: COMMERCIAL

## 2025-01-24 DIAGNOSIS — Z79.890 HORMONE REPLACEMENT THERAPY (HRT): ICD-10-CM

## 2025-01-24 RX ORDER — ESTRADIOL 2 MG/1
2 TABLET ORAL NIGHTLY
Qty: 30 TABLET | Refills: 11 | Status: SHIPPED | OUTPATIENT
Start: 2025-01-24

## 2025-01-30 ENCOUNTER — OFFICE VISIT (OUTPATIENT)
Dept: PRIMARY CARE CLINIC | Facility: CLINIC | Age: 55
End: 2025-01-30
Payer: COMMERCIAL

## 2025-01-30 VITALS
WEIGHT: 152.13 LBS | SYSTOLIC BLOOD PRESSURE: 140 MMHG | HEART RATE: 103 BPM | TEMPERATURE: 99 F | DIASTOLIC BLOOD PRESSURE: 94 MMHG | OXYGEN SATURATION: 97 % | BODY MASS INDEX: 27.99 KG/M2 | HEIGHT: 62 IN

## 2025-01-30 DIAGNOSIS — R68.89 FLU-LIKE SYMPTOMS: Primary | ICD-10-CM

## 2025-01-30 DIAGNOSIS — B96.89 BACTERIAL SINUSITIS: ICD-10-CM

## 2025-01-30 DIAGNOSIS — J32.9 BACTERIAL SINUSITIS: ICD-10-CM

## 2025-01-30 LAB
CTP QC/QA: YES
CTP QC/QA: YES
FLUAV AG NPH QL: NEGATIVE
FLUBV AG NPH QL: NEGATIVE
SARS-COV-2 RDRP RESP QL NAA+PROBE: NEGATIVE

## 2025-01-30 PROCEDURE — 99999 PR PBB SHADOW E&M-EST. PATIENT-LVL IV: CPT | Mod: PBBFAC,,, | Performed by: STUDENT IN AN ORGANIZED HEALTH CARE EDUCATION/TRAINING PROGRAM

## 2025-01-30 RX ORDER — DOXYCYCLINE HYCLATE 100 MG
100 TABLET ORAL 2 TIMES DAILY
Qty: 10 TABLET | Refills: 0 | Status: SHIPPED | OUTPATIENT
Start: 2025-01-30 | End: 2025-02-04

## 2025-01-30 NOTE — PROGRESS NOTES
Primary Care  Office Visit - In Person  1/30/2025      HPI    Patient is a 54 y.o.   Shawna Mayers  has a past medical history of Adrenal adenoma (11/20/2024), Allergy, Anxiety, Asthma, Focal nodular hyperplasia of liver, GERD (gastroesophageal reflux disease), Hyperlipidemia, Hypotension, iatrogenic, and Thyroid disease.    History of Present Illness    CHIEF COMPLAINT:  Patient presents today for sinus symptoms    HISTORY OF PRESENT ILLNESS:  She reports sinus tenderness. Symptoms started on Saturday with a low-grade fever yesterday morning. She currently denies fever or chills.    MEDICATIONS:  She is taking DayQuil, NyQuil, and Sudafed for symptom management.        Active Medications:  Current Outpatient Medications   Medication Instructions    acetaminophen (TYLENOL) 650 mg, Oral, Every 6 hours, Alternate between ibuprofen and tylenol every 3 hours. For example: @0800: ibuprofen 600mg @1100: tylenol 650mg @1400: ibuprofen 600mg @1700: tylenol 650 mg @2000: ibuprofen 600mg    b complex vitamins capsule 1 capsule, Daily    doxycycline (VIBRA-TABS) 100 mg, Oral, 2 times daily    estradioL (ESTRACE) 0.01 % (0.1 mg/gram) vaginal cream 0.5 grams with applicator or dime-sized amount with finger in vagina nightly x 2 weeks, then three times a week thereafter    estradioL (ESTRACE) 2 mg, Oral, Nightly    fluticasone propionate (FLONASE) 50 mcg/actuation nasal spray SPRAY 2 SPRAYS BY EACH NOSTRIL ROUTE ONCE DAILY.    ibuprofen (ADVIL,MOTRIN) 600 mg, Oral, Every 6 hours, Alternate between ibuprofen and tylenol every 3 hours. For example: @0800: ibuprofen 600mg @1100: tylenol 650mg @1400: ibuprofen 600mg @1700: tylenol 650 mg @2000: ibuprofen 600mg    Lactobacillus rhamnosus GG (CULTURELLE) 10 billion cell capsule 1 capsule, Daily    levothyroxine (SYNTHROID) 50 mcg, Oral, Daily    multivitamin (THERAGRAN) per tablet 1 tablet, Daily    omega-3 fatty acids/fish oil (FISH OIL-OMEGA-3 FATTY ACIDS) 300-1,000 mg  "capsule Daily    progesterone (PROMETRIUM) 300 mg, Oral, Nightly       Vitals:    01/30/25 1004   BP: (!) 140/94   BP Location: Right arm   Patient Position: Sitting   Pulse: 103   Temp: 99.2 °F (37.3 °C)   SpO2: 97%   Weight: 69 kg (152 lb 1.9 oz)   Height: 5' 2" (1.575 m)       Physical Exam  Vitals reviewed.   HENT:      Right Ear: Tympanic membrane normal.      Left Ear: Tympanic membrane normal.      Nose:      Right Sinus: No maxillary sinus tenderness or frontal sinus tenderness.      Left Sinus: Maxillary sinus tenderness and frontal sinus tenderness present.      Mouth/Throat:      Mouth: Mucous membranes are moist.      Pharynx: Posterior oropharyngeal erythema present.   Cardiovascular:      Rate and Rhythm: Normal rate and regular rhythm.   Pulmonary:      Effort: Pulmonary effort is normal.      Breath sounds: Normal breath sounds.            Assessment & Plan      BACTERIAL SINUSITIS:  Assessed the patient's condition as bacterial sinusitis based on symptoms present since Saturday, including sinus tenderness.  Prescribed doxycycline              Orders Placed This Encounter    POCT COVID-19 Rapid Screening    POCT Influenza A/B    doxycycline (VIBRA-TABS) 100 MG tablet         Previous labs, records, and notes reviewed and considered for their impact on clinical decision making today.                Upcoming Scheduled Appointments and Follow Up:    Future Appointments   Date Time Provider Department Center   2/18/2025  8:00 AM Rayna Vital MD [facility] WOM WEL [facility]   5/7/2025  1:00 PM Vera Francois MD [facility] UROGYN [facility]   6/4/2025  1:00 PM [facility] OIC-CT1 500 LB LIMIT [facility]       Follow Up [facility] (with who? when?): No follow-ups on file.      Extended Emergency Contact Information  Primary Emergency Contact: Jose Enrique Mayers  Address: [address] [place] [address] of [place]  Mobile Phone: [phone]  Relation: Spouse      Tommy " MD Brian   Internal Medicine  1/30/2025 - 12:35 PM    I spent a total of 15 minutes on the day of the visit.This includes face to face time and non-face to face time preparing to see the patient (eg, review of tests), obtaining and/or reviewing separately obtained history, documenting clinical information in the electronic or other health record, independently interpreting results and communicating results to the patient/family/caregiver, or care coordinator.    This note was generated with the assistance of ambient listening technology. Verbal consent was obtained by the patient and accompanying visitor(s) for the recording of patient appointment to facilitate this note. I attest to having reviewed and edited the generated note for accuracy, though some syntax or spelling errors may persist. Please contact the author of this note for any clarification.      While patients have the right to access their medical record, it is essential to recognize that progress notes primarily serve as a means of communication among healthcare professionals.

## 2025-02-19 ENCOUNTER — PATIENT MESSAGE (OUTPATIENT)
Dept: PRIMARY CARE CLINIC | Facility: CLINIC | Age: 55
End: 2025-02-19
Payer: COMMERCIAL

## 2025-02-19 ENCOUNTER — PATIENT MESSAGE (OUTPATIENT)
Dept: PRIMARY CARE CLINIC | Facility: CLINIC | Age: 55
End: 2025-02-19

## 2025-02-19 ENCOUNTER — TELEPHONE (OUTPATIENT)
Dept: PRIMARY CARE CLINIC | Facility: CLINIC | Age: 55
End: 2025-02-19

## 2025-02-19 DIAGNOSIS — Z79.899 MEDICATION MANAGEMENT: Primary | ICD-10-CM

## 2025-02-19 NOTE — TELEPHONE ENCOUNTER
Don't believe Pt can do Evisit with You since your out of office today   Pt said she try to select You didn't work      Please Advised

## 2025-02-19 NOTE — TELEPHONE ENCOUNTER
LOV 9/30/24    Pt send a EVisit To Dr. Ko     Spoke With Advised her to send A Message  Scopolamine patch for motion sickness/travel

## 2025-02-19 NOTE — TELEPHONE ENCOUNTER
Dr. Ko canceled E-visit, I do not see patch in current med list. Are you comfortable with prescribing scopolamine for motion sickness or is a visit required? LOV 01/25/25 w Dr. Torres. Last scopolamine patch prescribed by Dr. Louie Rojo in [facility] 07/26/23.

## 2025-02-20 ENCOUNTER — E-VISIT (OUTPATIENT)
Dept: PRIMARY CARE CLINIC | Facility: CLINIC | Age: 55
End: 2025-02-20
Payer: COMMERCIAL

## 2025-02-20 VITALS — SYSTOLIC BLOOD PRESSURE: 110 MMHG | DIASTOLIC BLOOD PRESSURE: 66 MMHG

## 2025-02-20 DIAGNOSIS — T75.3XXD MOTION SICKNESS, SUBSEQUENT ENCOUNTER: Primary | ICD-10-CM

## 2025-02-20 DIAGNOSIS — Z71.84 TRAVEL ADVICE ENCOUNTER: ICD-10-CM

## 2025-02-20 RX ORDER — SCOPOLAMINE 1 MG/3D
1 PATCH, EXTENDED RELEASE TRANSDERMAL
Qty: 10 PATCH | Refills: 0 | Status: SHIPPED | OUTPATIENT
Start: 2025-02-20

## 2025-02-20 NOTE — PROGRESS NOTES
Patient ID: Shawna Mayers is a 54 y.o. female.    Chief Complaint: General Illness (Entered automatically based on patient selection in [employer].)    The patient initiated a request through [employer] on 2/20/2025 for evaluation and management with a chief complaint of General Illness (Entered automatically based on patient selection in [employer].)     I evaluated the questionnaire submission on 2/20/25.    [facility] P[facility] Evisit [facility]-Medication    2/20/2025  6:12 AM CST - Filed by Patient   What do you need help with? Medication Request   Do you agree to participate in an E-Visit? Yes   If you have any of the following symptoms, please present to your local emergency room or call 911:  I acknowledge   Medication requests for narcotics will not be addressed via an E-Visit.  Please schedule an appointment. I acknowledge   Do you have any of the following pregnancy-related conditions? None   Do you want to address a new or existing medication? I would like to start a new medication that I do not already take   What is the main issue you would like addressed today? Scopolamine Patch for motion sickness due to traveling- plane, ferries, carriage rides.  I have had a lot of success with the patch and it works better than otc meds and i am less drowsy.   What is the name of the medication that you would like to start? Scopolamine Patch   Have you taken a similar medication in the past? Yes   What was the name of the similar medication?    Why are you no longer on that medication? No specific reason    What medical condition is the  medication intended to treat? Extreme motion sickness   Provide any additional information you feel is important. I have used the patch in the past for travel and have used it recently with surgeries due to nausea from anesthesia.  This has been very effective.  I would like to be able to enjoy my first trip to [place] and not be nauseated!   Please attach any relevant images or files    Are you  able to take your vital signs? Yes   Systolic Blood Pressure: 110   Diastolic Blood Pressure: 66   Weight: 150   Height: 63   Pulse: 69   Temperature:    Respiration rate:    Pulse Oxygen:          No diagnosis found.     No orders of the defined types were placed in this encounter.           No follow-ups on file.      E-Visit Time Tracking:    Day 1 Time (in minutes): 11    Total Time (in minutes): 11

## 2025-04-02 ENCOUNTER — OFFICE VISIT (OUTPATIENT)
Dept: URGENT CARE | Facility: CLINIC | Age: 55
End: 2025-04-02
Payer: COMMERCIAL

## 2025-04-02 VITALS
OXYGEN SATURATION: 98 % | HEIGHT: 62 IN | TEMPERATURE: 99 F | RESPIRATION RATE: 16 BRPM | HEART RATE: 100 BPM | SYSTOLIC BLOOD PRESSURE: 145 MMHG | DIASTOLIC BLOOD PRESSURE: 93 MMHG | BODY MASS INDEX: 27.97 KG/M2 | WEIGHT: 152 LBS

## 2025-04-02 DIAGNOSIS — J02.9 SORE THROAT: Primary | ICD-10-CM

## 2025-04-02 LAB
CTP QC/QA: YES
MOLECULAR STREP A: NEGATIVE
POC MOLECULAR INFLUENZA A AGN: NEGATIVE
POC MOLECULAR INFLUENZA B AGN: NEGATIVE
SARS CORONAVIRUS 2 ANTIGEN: NEGATIVE

## 2025-04-02 PROCEDURE — 87811 SARS-COV-2 COVID19 W/OPTIC: CPT | Mod: QW,S$GLB,, | Performed by: NURSE PRACTITIONER

## 2025-04-02 PROCEDURE — 87651 STREP A DNA AMP PROBE: CPT | Mod: QW,S$GLB,, | Performed by: NURSE PRACTITIONER

## 2025-04-02 PROCEDURE — 99214 OFFICE O/P EST MOD 30 MIN: CPT | Mod: S$GLB,,, | Performed by: NURSE PRACTITIONER

## 2025-04-02 PROCEDURE — 87502 INFLUENZA DNA AMP PROBE: CPT | Mod: QW,S$GLB,, | Performed by: NURSE PRACTITIONER

## 2025-04-02 RX ORDER — PROMETHAZINE HYDROCHLORIDE AND DEXTROMETHORPHAN HYDROBROMIDE 6.25; 15 MG/5ML; MG/5ML
5 SYRUP ORAL EVERY 4 HOURS PRN
Qty: 180 ML | Refills: 0 | Status: SHIPPED | OUTPATIENT
Start: 2025-04-02 | End: 2025-04-12

## 2025-04-02 NOTE — PROGRESS NOTES
"Subjective:      Patient ID: Shawna Mayers is a 54 y.o. female.    Vitals:  height is 5' 2" (1.575 m) and weight is 68.9 kg (152 lb). Her oral temperature is 99 °F (37.2 °C). Her blood pressure is 145/93 (abnormal) and her pulse is 100. Her respiration is 16 and oxygen saturation is 98%.     Chief Complaint: Sore Throat (Chest congestion, discolored phlegm, cough - Entered by patient)    Patient is a 53 yo female presenting sore throat, chest congestion, PND, and cough with brown and green sputum.  Onset of symptoms was yesterday.  Patient took Nyquil for her symptoms.  Patient reports recent plan travel and sick sister who she traveled with.    Sore Throat   This is a new problem. The current episode started yesterday. The problem has been unchanged. Neither side of throat is experiencing more pain than the other. Maximum temperature: 99 today. The pain is at a severity of 8/10. Associated symptoms include congestion (chest), coughing (a sometimes productive cough of brown and green) and trouble swallowing. Pertinent negatives include no diarrhea, ear pain, headaches, shortness of breath or vomiting. She has had no exposure to strep or mono. Treatments tried: Nyquil. The treatment provided mild relief.       Constitution: Positive for fever (99 today). Negative for chills and fatigue.   HENT:  Positive for congestion (chest), postnasal drip, sore throat and trouble swallowing. Negative for ear pain, sinus pain and sinus pressure.    Cardiovascular:  Negative for chest pain.   Respiratory:  Positive for chest tightness, cough (a sometimes productive cough of brown and green) and sputum production. Negative for shortness of breath.    Gastrointestinal:  Negative for nausea, vomiting and diarrhea.   Musculoskeletal:  Negative for muscle ache.   Neurological:  Negative for headaches.      Objective:     Physical Exam   Constitutional: She is oriented to person, place, and time.   HENT:   Head: Normocephalic. "   Ears:   Right Ear: Hearing and external ear normal. No no drainage, swelling or tenderness. Tympanic membrane is erythematous. Tympanic membrane is not retracted and not bulging. No middle ear effusion.   Left Ear: Hearing and external ear normal. No no drainage, swelling or tenderness. Tympanic membrane is erythematous. Tympanic membrane is not retracted and not bulging.  No middle ear effusion.   Nose: Nose normal. No mucosal edema, rhinorrhea or purulent discharge. Right sinus exhibits no maxillary sinus tenderness and no frontal sinus tenderness. Left sinus exhibits no maxillary sinus tenderness and no frontal sinus tenderness.   Mouth/Throat: Uvula is midline and mucous membranes are normal. No trismus in the jaw. No uvula swelling. Posterior oropharyngeal edema and posterior oropharyngeal erythema present. No oropharyngeal exudate. Tonsils are 3+ on the right. Tonsils are 3+ on the left. No tonsillar exudate.   Cardiovascular: Normal rate and regular rhythm.   Pulmonary/Chest: Effort normal and breath sounds normal. No accessory muscle usage or stridor. No respiratory distress. She has no decreased breath sounds. She has no wheezes. She has no rhonchi. She has no rales.   Neurological: She is alert and oriented to person, place, and time.   Skin: Skin is warm and dry.   Nursing note and vitals reviewed.      Assessment:     1. Sore throat        Plan:       Sore throat  -     SARS Coronavirus 2 Antigen, POCT Manual Read  -     POCT Influenza A/B MOLECULAR  -     POCT Strep A, Molecular  -     promethazine-dextromethorphan (PROMETHAZINE-DM) 6.25-15 mg/5 mL Syrp; Take 5 mLs by mouth every 4 (four) hours as needed (cough).  Dispense: 180 mL; Refill: 0      Patient Instructions   Sore throat  -     SARS Coronavirus 2 Antigen, POCT Manual Read  -     POCT Influenza A/B MOLECULAR  -     POCT Strep A, Molecular      Viral URI (upper respiratory infection):    Your symptoms are viral in nature.  Viral upper  "respiratory infections typically run their course in 7-10 days.     - Rest at home.     - Drink plenty of fluids so you won't get dehydrated.      Avoid taking Decongestants such as pseudoephedrine (ex. Sudafed) or phenylephrine (ex. Mucinex FastMax, Dayquil, Nyquil, or any combo cold meds that say "cold," "sinus" or "-D").        - Cough recommendations:   Warm tea with honey can help with cough. Honey is a natural cough suppressant.     NIGHTTIME:  -     (PROMETHAZINE-DM) promethazine-dextromethorphan 6.25-15 mg/5 mL Syrp; Take 5 mLs by mouth every 4 (four) hours as needed (cough).    +  Mucinex (guaifenesin) twice a day (or as directed) to help loosen mucous.       OR    DAYTIME:   Mucinex DM (guaifenesin + dextromethorphan).        - Fever/Pain recommendations:  Alternate Tylenol or Ibuprofen as directed for fever/pain.   - Motrin/Ibuprofen every 6-8 hours for pain and inflammation. Do not take ibuprofen if you have a history of GI bleeding, kidney disease, or if you take blood thinners.    - Tylenol/acetaminophen every 6-8 hours for added pain relief.  Avoid tylenol if you have a history of liver disease.       -Sore throat recommendations: Warm fluids, warm salt water gargles, throat lozenges, tea, honey, soup, or drinking something cold or frozen.  Throat lozenges or sprays help reduce pain. Gargling with warm saltwater (1/4 teaspoon of salt in 1/2 cup of warm water) or an OTC anesthetic gargle may be useful for irritation.    When to seek medical advice  Call your healthcare provider right away if any of these occur:  Fever that is poorly controlled with OTC fever reducing medication  New or worsening ear pain, sinus pain, or headache  Stiff neck  You can't swallow liquids or you can't open your mouth wide because of throat pain  Signs of dehydration. These include very dark urine or no urine, sunken eyes, and dizziness.  Trouble breathing or noisy breathing  Muffled voice  Rash     If your symptoms worsen " or fail to improve you should go to Emergency Department.

## 2025-04-02 NOTE — PATIENT INSTRUCTIONS
"Sore throat  -     SARS Coronavirus 2 Antigen, POCT Manual Read  -     POCT Influenza A/B MOLECULAR  -     POCT Strep A, Molecular      Viral URI (upper respiratory infection):    Your symptoms are viral in nature.  Viral upper respiratory infections typically run their course in 7-10 days.     - Rest at home.     - Drink plenty of fluids so you won't get dehydrated.      Avoid taking Decongestants such as pseudoephedrine (ex. Sudafed) or phenylephrine (ex. Mucinex FastMax, Dayquil, Nyquil, or any combo cold meds that say "cold," "sinus" or "-D").        - Cough recommendations:   Warm tea with honey can help with cough. Honey is a natural cough suppressant.     NIGHTTIME:  -     (PROMETHAZINE-DM) promethazine-dextromethorphan 6.25-15 mg/5 mL Syrp; Take 5 mLs by mouth every 4 (four) hours as needed (cough).    +  Mucinex (guaifenesin) twice a day (or as directed) to help loosen mucous.       OR    DAYTIME:   Mucinex DM (guaifenesin + dextromethorphan).        - Fever/Pain recommendations:  Alternate Tylenol or Ibuprofen as directed for fever/pain.   - Motrin/Ibuprofen every 6-8 hours for pain and inflammation. Do not take ibuprofen if you have a history of GI bleeding, kidney disease, or if you take blood thinners.    - Tylenol/acetaminophen every 6-8 hours for added pain relief.  Avoid tylenol if you have a history of liver disease.       -Sore throat recommendations: Warm fluids, warm salt water gargles, throat lozenges, tea, honey, soup, or drinking something cold or frozen.  Throat lozenges or sprays help reduce pain. Gargling with warm saltwater (1/4 teaspoon of salt in 1/2 cup of warm water) or an OTC anesthetic gargle may be useful for irritation.    When to seek medical advice  Call your healthcare provider right away if any of these occur:  Fever that is poorly controlled with OTC fever reducing medication  New or worsening ear pain, sinus pain, or headache  Stiff neck  You can't swallow liquids or you " can't open your mouth wide because of throat pain  Signs of dehydration. These include very dark urine or no urine, sunken eyes, and dizziness.  Trouble breathing or noisy breathing  Muffled voice  Rash     If your symptoms worsen or fail to improve you should go to Emergency Department.

## 2025-04-13 ENCOUNTER — OFFICE VISIT (OUTPATIENT)
Dept: URGENT CARE | Facility: CLINIC | Age: 55
End: 2025-04-13
Payer: COMMERCIAL

## 2025-04-13 VITALS
BODY MASS INDEX: 27.97 KG/M2 | DIASTOLIC BLOOD PRESSURE: 88 MMHG | HEIGHT: 62 IN | TEMPERATURE: 98 F | OXYGEN SATURATION: 98 % | RESPIRATION RATE: 18 BRPM | WEIGHT: 152 LBS | SYSTOLIC BLOOD PRESSURE: 148 MMHG | HEART RATE: 88 BPM

## 2025-04-13 DIAGNOSIS — J20.8 ACUTE BACTERIAL BRONCHITIS: ICD-10-CM

## 2025-04-13 DIAGNOSIS — B96.89 BACTERIAL SINUSITIS: ICD-10-CM

## 2025-04-13 DIAGNOSIS — B96.89 ACUTE BACTERIAL BRONCHITIS: ICD-10-CM

## 2025-04-13 DIAGNOSIS — J32.9 BACTERIAL SINUSITIS: ICD-10-CM

## 2025-04-13 DIAGNOSIS — R05.2 SUBACUTE COUGH: Primary | ICD-10-CM

## 2025-04-13 PROCEDURE — 99213 OFFICE O/P EST LOW 20 MIN: CPT | Mod: S$GLB,,, | Performed by: FAMILY MEDICINE

## 2025-04-13 PROCEDURE — 71046 X-RAY EXAM CHEST 2 VIEWS: CPT | Mod: S$GLB,,, | Performed by: RADIOLOGY

## 2025-04-13 RX ORDER — BENZONATATE 200 MG/1
200 CAPSULE ORAL 3 TIMES DAILY PRN
Qty: 30 CAPSULE | Refills: 0 | Status: SHIPPED | OUTPATIENT
Start: 2025-04-13 | End: 2025-04-23

## 2025-04-13 RX ORDER — AZITHROMYCIN 250 MG/1
TABLET, FILM COATED ORAL
Qty: 6 TABLET | Refills: 0 | Status: SHIPPED | OUTPATIENT
Start: 2025-04-13 | End: 2025-04-18

## 2025-04-13 RX ORDER — METHYLPREDNISOLONE 4 MG/1
TABLET ORAL
Qty: 1 EACH | Refills: 0 | Status: SHIPPED | OUTPATIENT
Start: 2025-04-13

## 2025-04-13 NOTE — PROGRESS NOTES
"Subjective:      Patient ID: Shawna Mayers is a 54 y.o. female.    Vitals:  height is 5' 2" (1.575 m) and weight is 68.9 kg (152 lb). Her oral temperature is 98.1 °F (36.7 °C). Her blood pressure is 148/88 (abnormal) and her pulse is 88. Her respiration is 18 and oxygen saturation is 98%.     Chief Complaint: Cough (Still have bad cough and green phlegm 13 days later since last visit.  Not sleeping due to cough.  Symptoms not really improving. - Entered by patient)    53 yo female c/o productive cough for 13 days since last visit at urgent care. No chest pain. No SOB.      Cough  This is a new problem. The current episode started in the past 7 days. The problem has been gradually worsening. The problem occurs constantly. The cough is Productive of sputum. Associated symptoms include ear congestion, ear pain, headaches, nasal congestion, postnasal drip and a sore throat. Pertinent negatives include no fever, rhinorrhea or wheezing. The symptoms are aggravated by lying down and other. She has tried OTC cough suppressant, rest and prescription cough suppressant for the symptoms. The treatment provided no relief. There is no history of bronchitis or pneumonia.       Constitution: Negative for fever.   HENT:  Positive for ear pain, postnasal drip and sore throat.    Respiratory:  Positive for cough. Negative for wheezing.    Neurological:  Positive for headaches.      Objective:     Vitals:    04/13/25 1047   BP: (!) 148/88  Comment: pt stated she gets nervous when she comes into see any dr   BP Location: Left arm   Patient Position: Sitting   Pulse: 88   Resp: 18   Temp: 98.1 °F (36.7 °C)   TempSrc: Oral   SpO2: 98%   Weight: 68.9 kg (152 lb)   Height: 5' 2" (1.575 m)      Physical Exam   Constitutional: She is oriented to person, place, and time. She appears well-developed. She is cooperative.  Non-toxic appearance. She does not appear ill. No distress.   HENT:   Head: Normocephalic and atraumatic.   Ears: "   Right Ear: Hearing, tympanic membrane, external ear and ear canal normal.   Left Ear: Hearing, tympanic membrane, external ear and ear canal normal.   Nose: Congestion present. No mucosal edema, rhinorrhea or nasal deformity. No epistaxis. Right sinus exhibits no maxillary sinus tenderness and no frontal sinus tenderness. Left sinus exhibits no maxillary sinus tenderness and no frontal sinus tenderness.   Mouth/Throat: Uvula is midline, oropharynx is clear and moist and mucous membranes are normal. No trismus in the jaw. Normal dentition. No uvula swelling. No oropharyngeal exudate, posterior oropharyngeal edema or posterior oropharyngeal erythema.   Eyes: Conjunctivae and lids are normal. No scleral icterus.   Neck: Trachea normal and phonation normal. Neck supple. No edema present. No erythema present.   Cardiovascular: Normal rate, regular rhythm, normal heart sounds and normal pulses.   Pulmonary/Chest: Effort normal and breath sounds normal. No respiratory distress. She has no decreased breath sounds. She has no wheezes. She has no rhonchi. She has no rales.   Abdominal: Normal appearance. There is no rebound.   Neurological: She is alert and oriented to person, place, and time. She exhibits normal muscle tone.   Skin: Skin is warm, intact, not diaphoretic and no rash.   Psychiatric: Her speech is normal and behavior is normal. Judgment and thought content normal.   Nursing note and vitals reviewed.      Assessment:     1. Subacute cough    2. Bacterial sinusitis    3. Acute bacterial bronchitis        Plan:       Subacute cough  -     XR CHEST PA AND LATERAL; Future; Expected date: 04/13/2025    2. Bacterial sinusitis  3. Acute bacterial bronchitis  -     azithromycin (Z-SHEREE) 250 MG tablet; Take 2 tablets by mouth on day 1; Take 1 tablet by mouth on days 2-5  Dispense: 6 tablet; Refill: 0  -     methylPREDNISolone (MEDROL DOSEPACK) 4 mg tablet; use as directed. Has used this medication in the past without  any adverse event.  Dispense: 1 each; Refill: 0  -     benzonatate (TESSALON) 200 MG capsule; Take 1 capsule (200 mg total) by mouth 3 (three) times daily as needed for Cough.  Dispense: 30 capsule; Refill: 0        Patient Instructions   Ok to continue promethazine-dextromethorphan. Follow up in 1-2 weeks with primary care provider. May return to urgent care if needed.  Seek immediate care in the emergency room in the event of severe chest pain, respiratory distress, fever unresponsive to antipyretic, dehydration, loss of consciousness, seizure.

## 2025-04-13 NOTE — PATIENT INSTRUCTIONS
Ok to continue promethazine-dextromethorphan. Follow up in 1-2 weeks with primary care provider. May return to urgent care if needed.  Seek immediate care in the emergency room in the event of severe chest pain, respiratory distress, fever unresponsive to antipyretic, dehydration, loss of consciousness, seizure.

## 2025-04-24 ENCOUNTER — OFFICE VISIT (OUTPATIENT)
Dept: OBSTETRICS AND GYNECOLOGY | Facility: CLINIC | Age: 55
End: 2025-04-24
Payer: COMMERCIAL

## 2025-04-24 VITALS
WEIGHT: 156.63 LBS | HEART RATE: 86 BPM | SYSTOLIC BLOOD PRESSURE: 146 MMHG | BODY MASS INDEX: 28.64 KG/M2 | DIASTOLIC BLOOD PRESSURE: 87 MMHG

## 2025-04-24 DIAGNOSIS — Z79.890 HORMONE REPLACEMENT THERAPY (HRT): ICD-10-CM

## 2025-04-24 DIAGNOSIS — R06.83 HABITUAL SNORING: Primary | ICD-10-CM

## 2025-04-24 PROCEDURE — 1159F MED LIST DOCD IN RCRD: CPT | Mod: CPTII,S$GLB,, | Performed by: OBSTETRICS & GYNECOLOGY

## 2025-04-24 PROCEDURE — 3079F DIAST BP 80-89 MM HG: CPT | Mod: CPTII,S$GLB,, | Performed by: OBSTETRICS & GYNECOLOGY

## 2025-04-24 PROCEDURE — 3008F BODY MASS INDEX DOCD: CPT | Mod: CPTII,S$GLB,, | Performed by: OBSTETRICS & GYNECOLOGY

## 2025-04-24 PROCEDURE — 99999 PR PBB SHADOW E&M-EST. PATIENT-LVL IV: CPT | Mod: PBBFAC,,, | Performed by: OBSTETRICS & GYNECOLOGY

## 2025-04-24 PROCEDURE — 99215 OFFICE O/P EST HI 40 MIN: CPT | Mod: S$GLB,,, | Performed by: OBSTETRICS & GYNECOLOGY

## 2025-04-24 PROCEDURE — 3077F SYST BP >= 140 MM HG: CPT | Mod: CPTII,S$GLB,, | Performed by: OBSTETRICS & GYNECOLOGY

## 2025-04-24 NOTE — PROGRESS NOTES
[facility] Hormone Consult Preparation Sheet    Patient Name: Shawna Mayers 54 y.o.perimenopausal female who has irregular menses.  The longest period without menses was 9 months.  Patient states that she is having menses every 5-6 weeks and they last 3 days.  Many of her symptoms occur 5 days prior to her menses.  She states that her hot flashes and night sweats occure 5 days prior.  She states that her night sweats do keep her up at night.  She reports sleep disturbances.  She goes to bed at 8:30PM and wakes up at 4AM.  She has trouble falling asleep prior to her period only.  She does have trouble staying asleep due to night sweats and nocturia.  She has had pelvic PT in the past.  She state that she does limit her water intake and is mindful how much fluids she drinks at  night.  She does snore but has never had a sleep study.  She uses electronics (iPAD) in bed prior to bedtime.  She denies daytime fatigue.  She reports chronic anxiety and mood swings but is good about setting boundaries with people and stressors in her life.  She states that she does have brain fog which she describes as forgetfulness, difficulty concentrating and wtrouble with word recall.  She describes her low libido as decreased desire.  She has no issues with arousal, orgasm or pain with intercourse.  She does exercise regularly which consists of 30 minutes of weight training and walks 5-7 miles 5 times per week.  She eats a well balanced diet of lean protein, fruits, vegetables and fiber.    Provider: Rayna Vital  CC and Symptoms:   Chief Complaint   Patient presents with    Hormone Consultation       Patient History:  Problem List[1]    Patient Medications:  Current Outpatient Medications on File Prior to Visit   Medication Sig    b complex vitamins capsule Take 1 capsule by mouth once daily.    estradioL (ESTRACE) 0.01 % (0.1 mg/gram) vaginal cream 0.5 grams with applicator or dime-sized amount with  "finger in vagina nightly x 2 weeks, then three times a week thereafter    estradioL (ESTRACE) 2 MG tablet Take 1 tablet (2 mg total) by mouth nightly.    fluticasone propionate (FLONASE) 50 mcg/actuation nasal spray SPRAY 2 SPRAYS BY EACH NOSTRIL ROUTE ONCE DAILY.    ibuprofen (ADVIL,MOTRIN) 600 MG tablet Take 1 tablet (600 mg total) by mouth every 6 (six) hours. Alternate between ibuprofen and tylenol every 3 hours. For example: @0800: ibuprofen 600mg @1100: tylenol 650mg @1400: ibuprofen 600mg @1700: tylenol 650 mg @2000: ibuprofen 600mg    Lactobacillus rhamnosus GG (CULTURELLE) 10 billion cell capsule Take 1 capsule by mouth once daily.    levothyroxine (SYNTHROID) 50 MCG tablet Take 1 tablet (50 mcg total) by mouth once daily.    methylPREDNISolone (MEDROL DOSEPACK) 4 mg tablet use as directed. Has used this medication in the past without any adverse event.    multivitamin (THERAGRAN) per tablet Take 1 tablet by mouth once daily.    omega-3 fatty acids/fish oil (FISH OIL-OMEGA-3 FATTY ACIDS) 300-1,000 mg capsule Take by mouth once daily.    progesterone (PROMETRIUM) 100 MG capsule Take 3 capsules (300 mg total) by mouth nightly.    scopolamine (TRANSDERM-SCOP) 1.3-1.5 mg (1 mg over 3 days) Place 1 patch onto the skin every 72 hours.    [] benzonatate (TESSALON) 200 MG capsule Take 1 capsule (200 mg total) by mouth 3 (three) times daily as needed for Cough.     No current facility-administered medications on file prior to visit.       Patient Imaging and Procedures  LMP: Patient's last menstrual period was 2023 (approximate).  PAP: 2024  HPV: No results found for: "HPV"  Mammo: 2024  Colonoscopy: Last Colonoscopy completed on 2024  BMI: Body mass index is 28.64 kg/m².    Recent Labs:  Estradiol:   Estradiol   Date Value Ref Range Status   2024 52 See Text pg/mL Final     Comment:     Estradiol Reference Ranges (Female):  Follicular phase:  pg/mL  Midcycle:          " pg/mL  Luteal phase:      pg/mL  Post-menopausal(Not on HRT): <10-28 pg/mL  Post-menopausal(On HRT): < pg/mL  Males: 11-44 pg/mL    The drug Fulvestrant (Faslodex) may interfere with the   assay leading to falsely elevated Estradiol results.    Patients treated with Mifepristone should not be tested with  the  or Alinity I Estradiol assay for up to two   weeks due to the interference of the drug in this assay.       Testosterone:   Lab Results   Component Value Date    TESTOSTERONE 0.6 11/30/2023     FSH:   Follicle Stimulating Hormone   Date Value Ref Range Status   11/30/2023 77.65 See Text mIU/mL Final     Comment:     Female Reference Ranges:  Follicular Phase.................3.03-8.08 mIU/mL  Midcycle Peak....................2.55-16.69 mIU/mL  Luteal Phase.....................1.38-5.47 mIU/mL  Postmenopausal...................26..41 mIU/mL  Male Reference Range:............0.95-11.95 mIU/mL       CBC:   Lab Results   Component Value Date    WBC 5.10 11/22/2024    HGB 14.4 11/22/2024    HCT 41.0 11/22/2024    MCV 89 11/22/2024     11/22/2024       CMP:  Sodium   Date Value Ref Range Status   12/02/2024 141 136 - 145 mmol/L Final     Potassium   Date Value Ref Range Status   12/02/2024 4.8 3.5 - 5.1 mmol/L Final     Chloride   Date Value Ref Range Status   12/02/2024 107 95 - 110 mmol/L Final     CO2   Date Value Ref Range Status   12/02/2024 20 (L) 23 - 29 mmol/L Final     Glucose   Date Value Ref Range Status   12/02/2024 106 70 - 110 mg/dL Final     BUN   Date Value Ref Range Status   12/02/2024 11 6 - 20 mg/dL Final     Creatinine   Date Value Ref Range Status   12/02/2024 0.7 0.5 - 1.4 mg/dL Final     Calcium   Date Value Ref Range Status   12/02/2024 9.7 8.7 - 10.5 mg/dL Final     Total Protein   Date Value Ref Range Status   10/12/2024 7.6 6.0 - 8.4 g/dL Final   10/12/2024 7.6 6.0 - 8.4 g/dL Final     Albumin   Date Value Ref Range Status   10/12/2024 4.2 3.5 - 5.2 g/dL  Final   10/12/2024 4.2 3.5 - 5.2 g/dL Final     Total Bilirubin   Date Value Ref Range Status   10/12/2024 0.5 0.1 - 1.0 mg/dL Final     Comment:     For infants and newborns, interpretation of results should be based  on gestational age, weight and in agreement with clinical  observations.    Premature Infant recommended reference ranges:  Up to 24 hours.............<8.0 mg/dL  Up to 48 hours............<12.0 mg/dL  3-5 days..................<15.0 mg/dL  6-29 days.................<15.0 mg/dL     10/12/2024 0.5 0.1 - 1.0 mg/dL Final     Comment:     For infants and newborns, interpretation of results should be based  on gestational age, weight and in agreement with clinical  observations.    Premature Infant recommended reference ranges:  Up to 24 hours.............<8.0 mg/dL  Up to 48 hours............<12.0 mg/dL  3-5 days..................<15.0 mg/dL  6-29 days.................<15.0 mg/dL       Alkaline Phosphatase   Date Value Ref Range Status   10/12/2024 77 55 - 135 U/L Final   10/12/2024 77 55 - 135 U/L Final     AST   Date Value Ref Range Status   10/12/2024 36 10 - 40 U/L Final   10/12/2024 36 10 - 40 U/L Final     ALT   Date Value Ref Range Status   10/12/2024 33 10 - 44 U/L Final   10/12/2024 33 10 - 44 U/L Final     Anion Gap   Date Value Ref Range Status   12/02/2024 14 8 - 16 mmol/L Final     eGFR if    Date Value Ref Range Status   08/14/2021 >60.0 >60 mL/min/1.73 m^2 Final     eGFR if non    Date Value Ref Range Status   08/14/2021 >60.0 >60 mL/min/1.73 m^2 Final     Comment:     Calculation used to obtain the estimated glomerular filtration  rate (eGFR) is the CKD-EPI equation.        Lipids:   Cholesterol   Date Value Ref Range Status   10/12/2024 223 (H) 120 - 199 mg/dL Final     Comment:     The National Cholesterol Education Program (NCEP) has set the  following guidelines (reference ranges) for Cholesterol:  Optimal.....................<200 mg/dL  Borderline  High.............200-239 mg/dL  High........................> or = 240 mg/dL       Triglycerides   Date Value Ref Range Status   10/12/2024 131 30 - 150 mg/dL Final     Comment:     The National Cholesterol Education Program (NCEP) has set the  following guidelines (reference values) for triglycerides:  Normal......................<150 mg/dL  Borderline High.............150-199 mg/dL  High........................200-499 mg/dL       HDL   Date Value Ref Range Status   10/12/2024 58 40 - 75 mg/dL Final     Comment:     The National Cholesterol Education Program (NCEP) has set the  following guidelines (reference values) for HDL Cholesterol:  Low...............<40 mg/dL  Optimal...........>60 mg/dL       LDL Cholesterol   Date Value Ref Range Status   10/12/2024 138.8 63.0 - 159.0 mg/dL Final     Comment:     The National Cholesterol Education Program (NCEP) has set the  following guidelines (reference values) for LDL Cholesterol:  Optimal.......................<130 mg/dL  Borderline High...............130-159 mg/dL  High..........................160-189 mg/dL  Very High.....................>190 mg/dL       HDL/Cholesterol Ratio   Date Value Ref Range Status   10/12/2024 26.0 20.0 - 50.0 % Final     Total Cholesterol/HDL Ratio   Date Value Ref Range Status   10/12/2024 3.8 2.0 - 5.0 Final     Non-HDL Cholesterol   Date Value Ref Range Status   10/12/2024 165 mg/dL Final     Comment:     Risk category and Non-HDL cholesterol goals:  Coronary heart disease (CHD)or equivalent (10-year risk of CHD >20%):  Non-HDL cholesterol goal     <130 mg/dL  Two or more CHD risk factors and 10-year risk of CHD <= 20%:  Non-HDL cholesterol goal     <160 mg/dL  0 to 1 CHD risk factor:  Non-HDL cholesterol goal     <190 mg/dL       A1C:   Lab Results   Component Value Date    HGBA1C 5.0 10/12/2024       Assessment & Plan    Vasomotor Symptoms   ? Discussed estrogen therapy for hot flashes: delivery methods (oral, transdermal, topical),  risks (e.g., VTE, breast cancer, stroke), and benefits (relief of vasomotor symptoms, bone health).   Discussed other options to control hot flashes including Veozah, however, it will not have the added benefit that is associated with estrogen  ? Continue Estradiol 2mg po QD  Patient had side effects with Divigel (HA) and she is allergic to adhesives.  She has not used the spray Evamist  Patient is aware that oral estradiol is associated with an increase in VTE  ? Discussed progesterone's dual role in maintaining uterine lining (if uterus intact) and addressing night sweats/sleep issues. Stressed the importance of consistent use to avoid breakthrough symptoms or endometrial issues.   Continue Prometrium 300mg qHS    Libido   ? Reviewed testosterone therapy as an option for hypoactive sexual desire disorder (HSDD): ? Delivery methods (gel, injection, pellets) and appropriate dosing for women as well as risks and benefits of each method   ? Risks (e.g., acne, hair growth, hair loss, deepening of the voice) and off-label status (not FDA-approved for women).   ? Patient states that she had elevated levels, mood swings and facial hair when she took the testosterone gel  ? Discussed Bonafide Ristella which is plant based, explained it may take 4 weeks to see a difference, explained how to take and where to order it from, 1 week sample was given  -All questions were answered    Sleep   ? Provided education on sleep hygiene: consistent sleep/wake times, limiting caffeine/alcohol, and avoiding screens before bed.   ? Encouraged stimulus control (bed for sleep and sex only) and relaxation techniques   ? Highlighted the impact of poor sleep on physical and mental health, including mood, memory, and overall quality of life.   -Recommended adding magnesium glycinate 500mg qHS in addition to Prometrium 300mg qHS  -Patient does have urinary frequency, unclear if she has OAB, however, she is followed by Urogyn and I suggested  asking if she is a candidate for Oxybutynin which also may help alleviate her vasomotor symptoms  She reports habitual snoring, will send for a sleep study to rule out SOULEYMANE    Exercise   ? Reinforced importance of 150 minutes of moderate aerobic activity per week and resistance training twice weekly to counteract bone loss and maintain lean muscle mass.   ? Discussed the accelerated loss of bone and muscle during keysha- and post menopause and its role in reducing fracture risk and preserving physical function.     Diet   ? Reviewed protein intake recommendation (at least 0.8g/kg/day) for muscle maintenance, with a focus on high-quality protein sources   ? Recommended 1200mg calcium and 800-1000IU vitamin D daily to support bone health.   ? Encouraged hydration (at least 2L/day) to improve fatigue, cognition, and overall metabolic function.   ? Suggested a Mediterranean-style diet for cardiometabolic health, with an emphasis on whole grains, healthy fats, fruits, and vegetables.     Additional Recommendations   ? Discussed routine follow-up to assess symptom management and adjust hormone therapy or lifestyle interventions as needed.   RTO 3 months           [1]   Patient Active Problem List  Diagnosis    Acquired hypothyroidism    Focal nodular hyperplasia of liver    Old tear of medial meniscus of right knee    Basal cell carcinoma (BCC) of left side of neck    Family history of cardiovascular disease    Status post right knee surgery    Other screening mammogram    Elevated blood pressure reading    PVC (premature ventricular contraction)    Encounter for well woman exam with routine gynecological exam    Urinary frequency    Perimenopause    Hormone replacement therapy (HRT)    Benign paroxysmal positional vertigo    Chronic pain of right knee    Primary osteoarthritis of right knee    Decreased range of motion (ROM) of right knee    Postmenopausal bleeding    Pelvic floor dysfunction    Adrenal adenoma    Status  post hysteroscopy    Adrenal nodule

## 2025-06-04 ENCOUNTER — HOSPITAL ENCOUNTER (OUTPATIENT)
Dept: RADIOLOGY | Facility: HOSPITAL | Age: 55
Discharge: HOME | End: 2025-06-04
Payer: COMMERCIAL

## 2025-06-04 DIAGNOSIS — E27.9 ADRENAL NODULE: ICD-10-CM

## 2025-06-04 DIAGNOSIS — D35.00 ADRENAL ADENOMA, UNSPECIFIED LATERALITY: ICD-10-CM

## 2025-06-04 PROCEDURE — 74178 CT ABD&PLV WO CNTR FLWD CNTR: CPT | Mod: 26,,, | Performed by: RADIOLOGY

## 2025-06-04 PROCEDURE — 74178 CT ABD&PLV WO CNTR FLWD CNTR: CPT | Mod: TC

## 2025-06-04 PROCEDURE — 25500020 PHARM REV CODE 255

## 2025-06-04 RX ADMIN — IOHEXOL 100 ML: 350 INJECTION, SOLUTION INTRAVENOUS at 01:06

## 2025-06-05 ENCOUNTER — OFFICE VISIT (OUTPATIENT)
Dept: PRIMARY CARE CLINIC | Facility: CLINIC | Age: 55
End: 2025-06-05
Payer: COMMERCIAL

## 2025-06-05 ENCOUNTER — LAB VISIT (OUTPATIENT)
Dept: LAB | Facility: HOSPITAL | Age: 55
End: 2025-06-05
Attending: STUDENT IN AN ORGANIZED HEALTH CARE EDUCATION/TRAINING PROGRAM
Payer: COMMERCIAL

## 2025-06-05 VITALS
WEIGHT: 160.5 LBS | BODY MASS INDEX: 29.53 KG/M2 | OXYGEN SATURATION: 98 % | HEART RATE: 101 BPM | DIASTOLIC BLOOD PRESSURE: 77 MMHG | SYSTOLIC BLOOD PRESSURE: 118 MMHG | HEIGHT: 62 IN

## 2025-06-05 DIAGNOSIS — K76.89 FOCAL NODULAR HYPERPLASIA OF LIVER: ICD-10-CM

## 2025-06-05 DIAGNOSIS — D35.01 ADENOMA OF RIGHT ADRENAL GLAND: ICD-10-CM

## 2025-06-05 DIAGNOSIS — Z79.890 HORMONE REPLACEMENT THERAPY (HRT): ICD-10-CM

## 2025-06-05 DIAGNOSIS — K21.9 GASTROESOPHAGEAL REFLUX DISEASE, UNSPECIFIED WHETHER ESOPHAGITIS PRESENT: ICD-10-CM

## 2025-06-05 DIAGNOSIS — R53.83 FATIGUE, UNSPECIFIED TYPE: ICD-10-CM

## 2025-06-05 DIAGNOSIS — R53.83 FATIGUE, UNSPECIFIED TYPE: Primary | ICD-10-CM

## 2025-06-05 DIAGNOSIS — E03.9 ACQUIRED HYPOTHYROIDISM: ICD-10-CM

## 2025-06-05 DIAGNOSIS — E78.2 MIXED HYPERLIPIDEMIA: ICD-10-CM

## 2025-06-05 LAB
25(OH)D3+25(OH)D2 SERPL-MCNC: 40 NG/ML (ref 30–96)
ABSOLUTE EOSINOPHIL (OHS): 0.24 K/UL
ABSOLUTE MONOCYTE (OHS): 0.42 K/UL (ref 0.3–1)
ABSOLUTE NEUTROPHIL COUNT (OHS): 4.64 K/UL (ref 1.8–7.7)
ALBUMIN SERPL BCP-MCNC: 4.3 G/DL (ref 3.5–5.2)
ALP SERPL-CCNC: 74 UNIT/L (ref 40–150)
ALT SERPL W/O P-5'-P-CCNC: 26 UNIT/L (ref 10–44)
ANION GAP (OHS): 11 MMOL/L (ref 8–16)
AST SERPL-CCNC: 26 UNIT/L (ref 11–45)
BASOPHILS # BLD AUTO: 0.07 K/UL
BASOPHILS NFR BLD AUTO: 0.9 %
BILIRUB SERPL-MCNC: 0.3 MG/DL (ref 0.1–1)
BUN SERPL-MCNC: 16 MG/DL (ref 6–20)
CALCIUM SERPL-MCNC: 10 MG/DL (ref 8.7–10.5)
CHLORIDE SERPL-SCNC: 103 MMOL/L (ref 95–110)
CHOLEST SERPL-MCNC: 245 MG/DL (ref 120–199)
CHOLEST/HDLC SERPL: 3.1 {RATIO} (ref 2–5)
CO2 SERPL-SCNC: 24 MMOL/L (ref 23–29)
CREAT SERPL-MCNC: 0.6 MG/DL (ref 0.5–1.4)
EAG (OHS): 100 MG/DL (ref 68–131)
ERYTHROCYTE [DISTWIDTH] IN BLOOD BY AUTOMATED COUNT: 12.3 % (ref 11.5–14.5)
ESTRADIOL SERPL HS-MCNC: 45 PG/ML
FERRITIN SERPL-MCNC: 50 NG/ML (ref 20–300)
GFR SERPLBLD CREATININE-BSD FMLA CKD-EPI: >60 ML/MIN/1.73/M2
GLUCOSE SERPL-MCNC: 91 MG/DL (ref 70–110)
HBA1C MFR BLD: 5.1 % (ref 4–5.6)
HCT VFR BLD AUTO: 43.3 % (ref 37–48.5)
HDLC SERPL-MCNC: 78 MG/DL (ref 40–75)
HDLC SERPL: 31.8 % (ref 20–50)
HGB BLD-MCNC: 14.8 GM/DL (ref 12–16)
IMM GRANULOCYTES # BLD AUTO: 0.03 K/UL (ref 0–0.04)
IMM GRANULOCYTES NFR BLD AUTO: 0.4 % (ref 0–0.5)
IRON SATN MFR SERPL: 23 % (ref 20–50)
IRON SERPL-MCNC: 91 UG/DL (ref 30–160)
LDLC SERPL CALC-MCNC: 148 MG/DL (ref 63–159)
LYMPHOCYTES # BLD AUTO: 2.15 K/UL (ref 1–4.8)
MCH RBC QN AUTO: 31 PG (ref 27–31)
MCHC RBC AUTO-ENTMCNC: 34.2 G/DL (ref 32–36)
MCV RBC AUTO: 91 FL (ref 82–98)
NONHDLC SERPL-MCNC: 167 MG/DL
NUCLEATED RBC (/100WBC) (OHS): 0 /100 WBC
PLATELET # BLD AUTO: 339 K/UL (ref 150–450)
PMV BLD AUTO: 10 FL (ref 9.2–12.9)
POTASSIUM SERPL-SCNC: 4.4 MMOL/L (ref 3.5–5.1)
PROT SERPL-MCNC: 7.5 GM/DL (ref 6–8.4)
RBC # BLD AUTO: 4.78 M/UL (ref 4–5.4)
RELATIVE EOSINOPHIL (OHS): 3.2 %
RELATIVE LYMPHOCYTE (OHS): 28.5 % (ref 18–48)
RELATIVE MONOCYTE (OHS): 5.6 % (ref 4–15)
RELATIVE NEUTROPHIL (OHS): 61.4 % (ref 38–73)
SODIUM SERPL-SCNC: 138 MMOL/L (ref 136–145)
TIBC SERPL-MCNC: 400 UG/DL (ref 250–450)
TRANSFERRIN SERPL-MCNC: 270 MG/DL (ref 200–375)
TRIGL SERPL-MCNC: 95 MG/DL (ref 30–150)
TSH SERPL-ACNC: 2.41 UIU/ML (ref 0.4–4)
VIT B12 SERPL-MCNC: 517 PG/ML (ref 210–950)
WBC # BLD AUTO: 7.55 K/UL (ref 3.9–12.7)

## 2025-06-05 PROCEDURE — 80061 LIPID PANEL: CPT

## 2025-06-05 PROCEDURE — 82670 ASSAY OF TOTAL ESTRADIOL: CPT

## 2025-06-05 PROCEDURE — 82728 ASSAY OF FERRITIN: CPT

## 2025-06-05 PROCEDURE — 82607 VITAMIN B-12: CPT

## 2025-06-05 PROCEDURE — 84402 ASSAY OF FREE TESTOSTERONE: CPT

## 2025-06-05 PROCEDURE — 84443 ASSAY THYROID STIM HORMONE: CPT

## 2025-06-05 PROCEDURE — 85025 COMPLETE CBC W/AUTO DIFF WBC: CPT

## 2025-06-05 PROCEDURE — 83036 HEMOGLOBIN GLYCOSYLATED A1C: CPT

## 2025-06-05 PROCEDURE — 84466 ASSAY OF TRANSFERRIN: CPT

## 2025-06-05 PROCEDURE — 80053 COMPREHEN METABOLIC PANEL: CPT

## 2025-06-05 PROCEDURE — 99999 PR PBB SHADOW E&M-EST. PATIENT-LVL IV: CPT | Mod: PBBFAC,,, | Performed by: STUDENT IN AN ORGANIZED HEALTH CARE EDUCATION/TRAINING PROGRAM

## 2025-06-05 PROCEDURE — 86677 HELICOBACTER PYLORI ANTIBODY: CPT

## 2025-06-05 PROCEDURE — 82306 VITAMIN D 25 HYDROXY: CPT

## 2025-06-05 PROCEDURE — 36415 COLL VENOUS BLD VENIPUNCTURE: CPT | Mod: PN

## 2025-06-05 RX ORDER — LANOLIN ALCOHOL/MO/W.PET/CERES
400 CREAM (GRAM) TOPICAL DAILY
COMMUNITY

## 2025-06-06 ENCOUNTER — RESULTS FOLLOW-UP (OUTPATIENT)
Dept: PRIMARY CARE CLINIC | Facility: CLINIC | Age: 55
End: 2025-06-06

## 2025-06-06 LAB — H PYLORI IGG SERPL QL IA: NEGATIVE

## 2025-06-09 ENCOUNTER — PATIENT MESSAGE (OUTPATIENT)
Dept: PRIMARY CARE CLINIC | Facility: CLINIC | Age: 55
End: 2025-06-09
Payer: COMMERCIAL

## 2025-06-09 DIAGNOSIS — E78.2 MIXED HYPERLIPIDEMIA: Primary | ICD-10-CM

## 2025-06-09 LAB — W FREE TESTOSTERONE: 0.5 PG/ML

## 2025-06-09 RX ORDER — ROSUVASTATIN CALCIUM 5 MG/1
5 TABLET, COATED ORAL DAILY
Qty: 90 TABLET | Refills: 3 | Status: SHIPPED | OUTPATIENT
Start: 2025-06-09 | End: 2026-06-09

## 2025-06-09 NOTE — TELEPHONE ENCOUNTER
We can hold on a virtual for now.  Dietitian order sent.  Low-dose rosuvastatin sent.  Can do CMP in 4-6 weeks to check on LFTs.  Lipid panel in 3 months.

## 2025-06-10 ENCOUNTER — RESULTS FOLLOW-UP (OUTPATIENT)
Dept: ENDOCRINOLOGY | Facility: CLINIC | Age: 55
End: 2025-06-10

## 2025-06-12 ENCOUNTER — OFFICE VISIT (OUTPATIENT)
Dept: OBSTETRICS AND GYNECOLOGY | Facility: CLINIC | Age: 55
End: 2025-06-12
Payer: COMMERCIAL

## 2025-06-12 VITALS
WEIGHT: 159.38 LBS | HEART RATE: 91 BPM | SYSTOLIC BLOOD PRESSURE: 163 MMHG | BODY MASS INDEX: 29.15 KG/M2 | DIASTOLIC BLOOD PRESSURE: 92 MMHG

## 2025-06-12 DIAGNOSIS — Z78.0 MENOPAUSE: ICD-10-CM

## 2025-06-12 DIAGNOSIS — N95.1 VASOMOTOR SYMPTOMS DUE TO MENOPAUSE: Primary | ICD-10-CM

## 2025-06-12 DIAGNOSIS — R63.5 WEIGHT GAIN: ICD-10-CM

## 2025-06-12 DIAGNOSIS — R03.0 WHITE COAT SYNDROME WITH HIGH BLOOD PRESSURE BUT WITHOUT HYPERTENSION: ICD-10-CM

## 2025-06-12 PROCEDURE — 99999 PR PBB SHADOW E&M-EST. PATIENT-LVL III: CPT | Mod: PBBFAC,,, | Performed by: OBSTETRICS & GYNECOLOGY

## 2025-06-12 RX ORDER — ESTRADIOL 0.1 MG/D
1 FILM, EXTENDED RELEASE TRANSDERMAL
Qty: 8 PATCH | Refills: 11 | Status: SHIPPED | OUTPATIENT
Start: 2025-06-12 | End: 2026-06-12

## 2025-06-12 NOTE — PROGRESS NOTES
CC: Hormone Follow up    Shawna Mayers is a 54 y.o. female  presents for hormone follow up visit.  Patient is currently on Estradiol 2mg po qD and Prometrium 300mg qHS.  She states that she started having increased hot flashes and night sweats despite taking the medication as directed.  She has also noted increased fatigue and irritability.  She states that she is sleeping well.  She has practiced good sleep hygiene as well.  She states that the magnesium has really helped her get better sleep. She also noted increased heart rate in the morning which is new.  She was seen by her PCP for increased fatigue and her labs were normal.  She did note that her Estrogen levels decreased from 52 to 45.  She states that she did have some leftover Divigel which helped relieve all her symptoms, however, she gets headaches when she uses Divigel.  She states that she has noticed increased in reflux.  When asked about her gut health, she states that she eats a healthy well balanced diet, get enough fiber, drinks >2L of water and is taking a probiotic.  She has never tried the patch due to some adhesives cause a rash but is willing to try.  She also has questions about weight gain.  BMI 29.  Blood pressure is elevated but has white coat syndrome.  Her Bps at home are normal.  BP today was 118/77.  LMP: Patient's last menstrual period was 2023 (approximate)..    OB History          0    Para        Term   0            AB        Living             SAB        IAB        Ectopic        Multiple        Live Births                   Gynecology   Past Medical History:   Diagnosis Date    Adrenal adenoma 2024    Allergy childhood    contact dermatitis    Anxiety     Asthma     resolved    Basal cell carcinoma 2023    Focal nodular hyperplasia of liver     GERD (gastroesophageal reflux disease)     Hyperlipidemia     Hypotension, iatrogenic     Thyroid disease     hypothyroidism     Past  Surgical History:   Procedure Laterality Date    ARTHROSCOPIC CHONDROPLASTY OF KNEE JOINT Right 2023    Procedure: ARTHROSCOPY, KNEE, WITH CHONDROPLASTY;  Surgeon: Fidel Michelle MD;  Location: [facility];  Service: Orthopedics;  Laterality: Right;    CHOLECYSTECTOMY      COLONOSCOPY N/A 2024    Procedure: COLONOSCOPY;  Surgeon: Edison Hawkins MD;  [facility]  Service: Endoscopy;  Laterality: N/A;  referral Yvrose Thompson. Suprep instr. to portal.EC   r/s suprep instructions to pt portal.cf  10/31/24- precall complete - ERW    FOOT SURGERY      arc    HYSTEROSCOPY WITH DILATION AND CURETTAGE OF UTERUS N/A 2024    Procedure: HYSTEROSCOPY, WITH DILATION AND CURETTAGE OF UTERUS;  Surgeon: Yvrose Thompson MD;  Location: [facility];  Service: OB/GYN;  Laterality: N/A;    KNEE ARTHROSCOPY W/ MENISCECTOMY Right 2023    Procedure: ARTHROSCOPY, KNEE, WITH MENISCECTOMY;  Surgeon: Fidel Michelle MD;  Location: [facility];  Service: Orthopedics;  Laterality: Right;  partial and medial     MAGNETIC RESONANCE IMAGING N/A 11/15/2024    Procedure: MRI (Magnetic Resonance Imagine);  Surgeon: Miguelina Ellis;  Location: [facility];  Service: Anesthesiology;  Laterality: N/A;  MRI ABD W WO EOVIST AND MRE    SKIN BIOPSY  mid 20's    usually during yearly exam    WISDOM TOOTH EXTRACTION       Social History[1]  Family History   Problem Relation Name Age of Onset    Arthritis Mother Mother     Asthma Mother Mother     Hearing loss Mother Mother     Hyperlipidemia Mother Mother     Vision loss Mother Mother     Diabetes Mother Mother     Early death Father Father         42yrs old    Heart disease Father Father          of MI at 41yo    Hyperlipidemia Father Father     Kidney disease Father Father     Alcohol abuse Brother Brother     Diabetes Maternal Grandmother Maternal grandmother     Breast cancer Neg Hx      Colon cancer Neg Hx      Ovarian cancer Neg Hx      Melanoma Neg Hx            BP (!) 163/92 (Patient Position: Sitting)   Pulse 91   Wt 72.3 kg (159 lb 6.4 oz)   LMP 09/12/2023 (Approximate)   BMI 29.15 kg/m²       ROS  Review of Systems   Constitutional:  Negative for chills, fever and weight loss.   Gastrointestinal:  Positive for heartburn. Negative for abdominal pain, constipation, diarrhea, nausea and vomiting.   Genitourinary: Negative.    Psychiatric/Behavioral: Negative.            PHYSICAL EXAM:  Physical Exam  Vitals reviewed.   Constitutional:       General: She is not in acute distress.     Appearance: Normal appearance. She is well-developed and well-groomed.   Neurological:      Mental Status: She is alert.            Vasomotor symptoms due to menopause  -     estradioL (VIVELLE-DOT) 0.1 mg/24 hr PTSW; Place 1 patch onto the skin twice a week.  Dispense: 8 patch; Refill: 11    White coat syndrome with high blood pressure but without hypertension    Weight gain    Menopause      Switch Estradiol pill 2mg to Estradiol 0.1mg patch twice a week  Recommend cortisone spray to area of placement, allow to dry and then place the patch  Place the patch twice a week on the lower abdomen  Continue Prometrium 300mg qHS    Weight loss  BMI 29 with white coat syndrome  Patient is exercising and eating a well balanced diet  Interested in GLP-1  Refer to Yvrose Meza for GLP-1      Patient was counseled today on A.C.S. Pap guidelines and recommendations for yearly pelvic exams, mammograms and monthly self breast exams; to see her PCP for other health maintenance.     No follow-ups on file.         [1]   Social History  Socioeconomic History    Marital status:    Occupational History     Employer: [employer]    Tobacco Use    Smoking status: Never    Smokeless tobacco: Never   Substance and Sexual Activity    Alcohol use: Yes     Alcohol/week: 4.0 standard drinks of alcohol     Types: 2 Glasses of wine, 2 Cans of beer per week     Comment: weekend/social drinker     Drug use: Not Currently     Types: Other-see comments     Comment: edibles a few months ago    Sexual activity: Yes     Partners: Male     Birth control/protection: Partner-Vasectomy, None   Other Topics Concern    Are you pregnant or think you may be? No   Social History Narrative    Lives with spouse. Feels safe in her home.      Social Drivers of Health     Financial Resource Strain: Low Risk  (6/5/2025)    Overall Financial Resource Strain (CARDIA)     Difficulty of Paying Living Expenses: Not hard at all   Food Insecurity: No Food Insecurity (6/5/2025)    Hunger Vital Sign     Worried About Running Out of Food in the Last Year: Never true     Ran Out of Food in the Last Year: Never true   Transportation Needs: No Transportation Needs (6/5/2025)    PRAPARE - Transportation     Lack of Transportation (Medical): No     Lack of Transportation (Non-Medical): No   Physical Activity: Sufficiently Active (6/5/2025)    Exercise Vital Sign     Days of Exercise per Week: 6 days     Minutes of Exercise per Session: 120 min   Stress: Stress Concern Present (6/5/2025)    [employer] - Occupational Stress Questionnaire     Feeling of Stress : To some extent   Housing Stability: Low Risk  (6/5/2025)    Housing Stability Vital Sign     Unable to Pay for Housing in the Last Year: No     Number of Times Moved in the Last Year: 0     Homeless in the Last Year: No

## 2025-07-09 ENCOUNTER — PATIENT MESSAGE (OUTPATIENT)
Dept: OBSTETRICS AND GYNECOLOGY | Facility: CLINIC | Age: 55
End: 2025-07-09
Payer: COMMERCIAL

## 2025-07-09 DIAGNOSIS — N95.2 VAGINAL ATROPHY: ICD-10-CM

## 2025-07-09 RX ORDER — ESTRADIOL 0.1 MG/G
CREAM VAGINAL
Qty: 42.5 G | Refills: 3 | OUTPATIENT
Start: 2025-07-09

## 2025-07-09 RX ORDER — ESTRADIOL 0.1 MG/G
0.5 CREAM VAGINAL
Qty: 42.5 G | Refills: 1 | Status: SHIPPED | OUTPATIENT
Start: 2025-07-09

## 2025-07-09 NOTE — TELEPHONE ENCOUNTER
Refill Routing Note   Medication(s) are not appropriate for processing by [facility] for the following reason(s):        No active prescription written by provider    [facility] action(s):  Defer        Medication Therapy Plan: discontinued on 6/5/2025 by Audrey Moulton MD.; updated directions to current usage and pended for your approval      Appointments  past 12m or future 3m with PCP    Date Provider   Last Visit   1/7/2025 Vera Francois MD   Next Visit   8/12/2025 Vera Francois MD   ED visits in past 90 days: 0        Note composed:4:02 PM 07/09/2025

## 2025-07-09 NOTE — TELEPHONE ENCOUNTER
Refill Decision Note   Shawnalauri Reedmigel  is requesting a refill authorization.    Brief Assessment and Rationale for Refill:   Quick Discontinue       Medication Therapy Plan:   Duplicate      Comments:     Note composed:4:00 PM 07/09/2025

## 2025-07-14 ENCOUNTER — OFFICE VISIT (OUTPATIENT)
Dept: SLEEP MEDICINE | Facility: CLINIC | Age: 55
End: 2025-07-14
Payer: COMMERCIAL

## 2025-07-14 VITALS
SYSTOLIC BLOOD PRESSURE: 152 MMHG | BODY MASS INDEX: 29.62 KG/M2 | HEIGHT: 62 IN | HEART RATE: 78 BPM | WEIGHT: 160.94 LBS | DIASTOLIC BLOOD PRESSURE: 90 MMHG

## 2025-07-14 DIAGNOSIS — G47.10 HYPERSOMNOLENCE: Primary | ICD-10-CM

## 2025-07-14 DIAGNOSIS — R06.83 SNORING: ICD-10-CM

## 2025-07-14 DIAGNOSIS — F51.09 OTHER INSOMNIA NOT DUE TO A SUBSTANCE OR KNOWN PHYSIOLOGICAL CONDITION: ICD-10-CM

## 2025-07-14 DIAGNOSIS — R35.1 NOCTURIA: ICD-10-CM

## 2025-07-14 PROCEDURE — 3044F HG A1C LEVEL LT 7.0%: CPT | Mod: CPTII,S$GLB,, | Performed by: PHYSICIAN ASSISTANT

## 2025-07-14 PROCEDURE — 3080F DIAST BP >= 90 MM HG: CPT | Mod: CPTII,S$GLB,, | Performed by: PHYSICIAN ASSISTANT

## 2025-07-14 PROCEDURE — 1159F MED LIST DOCD IN RCRD: CPT | Mod: CPTII,S$GLB,, | Performed by: PHYSICIAN ASSISTANT

## 2025-07-14 PROCEDURE — 3077F SYST BP >= 140 MM HG: CPT | Mod: CPTII,S$GLB,, | Performed by: PHYSICIAN ASSISTANT

## 2025-07-14 PROCEDURE — 1160F RVW MEDS BY RX/DR IN RCRD: CPT | Mod: CPTII,S$GLB,, | Performed by: PHYSICIAN ASSISTANT

## 2025-07-14 PROCEDURE — 3008F BODY MASS INDEX DOCD: CPT | Mod: CPTII,S$GLB,, | Performed by: PHYSICIAN ASSISTANT

## 2025-07-14 PROCEDURE — 99204 OFFICE O/P NEW MOD 45 MIN: CPT | Mod: S$GLB,,, | Performed by: PHYSICIAN ASSISTANT

## 2025-07-14 PROCEDURE — 99999 PR PBB SHADOW E&M-EST. PATIENT-LVL III: CPT | Mod: PBBFAC,,, | Performed by: PHYSICIAN ASSISTANT

## 2025-07-14 NOTE — PROGRESS NOTES
Referred by Rayna Vital MD     NEW PATIENT VISIT    Shawna Mayers  is a pleasant 54 y.o. female  with PMH significant for elevated BP reading, HLD, BPPV, hx BCC of neck, acquired hypothyroidism, GERD, OA right knee, BMI 29+ who presents for sleep evaluation following referral from PCP and OB/GYN      C/o snoring, poor disrupted and un-refreshing sleep, difficulties with sleep onset and maintenance, and excessive daytime sleepiness and fatigue.  Denies drowsiness when driving. Denies H/H hallucinations, sleep paralysis or cataplexy. Denies sleep walking/talking. Denies dream enactment behaviors. Does endorse rare sx of RLS, describes as an urge to move legs/discomfort in legs. Occurs only maybe 1-2 x monthly, typically related to workouts or increased activities. States her PCP and OB/GYN recommended evaluation for SOULEYMANE, which is why she presents today.    SLEEP SCHEDULE   Environment    Bed Time 7:30-9PM   Sleep Latency 5mins-1hr   Arousals 3-4   Nocturia 2-4   Back to sleep 15mins-1hr   Wake time 4AM weekday  5-6AM weekend   Naps No intentional naps   Work            7/14/2025     2:21 PM   [facility] ROS    Difficulty breathing through the nose?  Yes   Sore throat or dry mouth in the morning? Yes   Irregular or very fast heart beat?  Yes   Shortness of breath?  No   Acid reflux? Yes   Body aches and pains?  Yes   Morning headaches? Yes   Dizziness? Yes   Mood changes?  Yes   Do you exercise?  Yes   Do you feel like moving your legs a lot?  Sometimes       Past Medical History:   Diagnosis Date    Adrenal adenoma 11/20/2024    Allergy childhood    contact dermatitis    Anxiety     Asthma     resolved    Basal cell carcinoma 8/2023    Focal nodular hyperplasia of liver     GERD (gastroesophageal reflux disease)     Hyperlipidemia     Hypotension, iatrogenic     Thyroid disease 1990    hypothyroidism     Problem List[1]  Current Medications[2]     There were no vitals filed for this visit.    Physical  Exam:    GEN:   Well-appearing  Psych:  Appropriate affect, demonstrates insight  SKIN:  No rash on the face or bridge of the nose      LABS:   Lab Results   Component Value Date    HGB 14.8 06/05/2025    CO2 24 06/05/2025         RECORDS REVIEWED:    No previous sleep study    ASSESSMENT        7/14/2025     2:17 PM   EPWORTH SLEEPINESS SCALE   Sitting and reading 3   Watching TV 3   Sitting, inactive in a public place (e.g. a theatre or a meeting) 1   As a passenger in a car for an hour without a break 3   Lying down to rest in the afternoon when circumstances permit 3   Sitting and talking to someone 0   Sitting quietly after a lunch without alcohol 3   In a car, while stopped for a few minutes in traffic 0   Total score 16        Patient-reported       PROBLEM DESCRIPTION/ Sx on Presentation  STATUS   Sx SOULEYMANE   + snoring, + rare snoring/gasping arousals  denies witnessed apneas  + wakes feeling un-refreshed   + wakes with headaches  + grandmother had SOULEYMANE  New   Daytime Sx   + sleepiness when inactive   Denies drowsiness when driving  ESS 16/24 on intake    H/H hallucinations: denies  Sleep paralysis: denies  Cataplexy: denies  Naps: never refreshing  New   Insomnia   Trouble falling asleep: 5mins-1hr  Arousals:         3-4  Hard to get back to sleep?: 15mins-1hr    Prior pertinent medications:  Current pertinent medications:   New   Nocturia   x 2-4 per sleep period  New   Other issues:       PLAN      -recommend sleep testing   -HST ordered  -discussed trial therapy if SOULEYMANE present and the patient is open to a trial of CPAP therapy  -discussed SOULEYMANE and PAP with patient in detail, including possible complications of untreated SOULEYMANE like heart attack/stroke  -advised on strict driving precautions; advised never to drive drowsy     Advised on plan of care. Answered all patient questions. Patient verbalized understanding and voiced agreement with plan of care.     RTC if dx of SOULEYMANE made and CPAP ordered, will need follow  up 31-90 days after receiving machine for compliance       The patient was given open opportunity to ask questions and/or express concerns about treatment plan. All questions/concerns were discussed.     Two patient identifiers used prior to evaluation.                  [1]   Patient Active Problem List  Diagnosis    Acquired hypothyroidism    Focal nodular hyperplasia of liver    Old tear of medial meniscus of right knee    Basal cell carcinoma (BCC) of left side of neck    Family history of cardiovascular disease    Status post right knee surgery    Other screening mammogram    Elevated blood pressure reading    PVC (premature ventricular contraction)    Encounter for well woman exam with routine gynecological exam    Urinary frequency    Perimenopause    Hormone replacement therapy (HRT)    Benign paroxysmal positional vertigo    Chronic pain of right knee    Primary osteoarthritis of right knee    Decreased range of motion (ROM) of right knee    Postmenopausal bleeding    Pelvic floor dysfunction    Adrenal adenoma    Status post hysteroscopy    Adrenal nodule    Mixed hyperlipidemia    Fatigue    Gastroesophageal reflux disease   [2]   Current Outpatient Medications:     b complex vitamins capsule, Take 1 capsule by mouth once daily., Disp: , Rfl:     estradioL (ESTRACE) 0.01 % (0.1 mg/gram) vaginal cream, Place 0.5 g vaginally 3 (three) times a week. APPLY WITH APPLICATOR OR DIME SIZE AMOUNT WITH FINGER IN VAGINA, Disp: 42.5 g, Rfl: 1    estradioL (VIVELLE-DOT) 0.1 mg/24 hr PTSW, Place 1 patch onto the skin twice a week., Disp: 8 patch, Rfl: 11    fluticasone propionate (FLONASE) 50 mcg/actuation nasal spray, SPRAY 2 SPRAYS BY EACH NOSTRIL ROUTE ONCE DAILY., Disp: 48 g, Rfl: 3    Lactobacillus rhamnosus GG (CULTURELLE) 10 billion cell capsule, Take 1 capsule by mouth once daily., Disp: , Rfl:     levothyroxine (SYNTHROID) 50 MCG tablet, Take 1 tablet (50 mcg total) by mouth once daily., Disp: 90 tablet, Rfl:  3    magnesium oxide (MAG-OX) 400 mg (241.3 mg magnesium) tablet, Take 400 mg by mouth once daily., Disp: , Rfl:     multivitamin (THERAGRAN) per tablet, Take 1 tablet by mouth once daily., Disp: , Rfl:     omega-3 fatty acids/fish oil (FISH OIL-OMEGA-3 FATTY ACIDS) 300-1,000 mg capsule, Take by mouth once daily., Disp: , Rfl:     progesterone (PROMETRIUM) 100 MG capsule, Take 3 capsules (300 mg total) by mouth nightly., Disp: 90 capsule, Rfl: 11    rosuvastatin (CRESTOR) 5 MG tablet, Take 1 tablet (5 mg total) by mouth once daily., Disp: 90 tablet, Rfl: 3    scopolamine (TRANSDERM-SCOP) 1.3-1.5 mg (1 mg over 3 days), Place 1 patch onto the skin every 72 hours. (Patient not taking: Reported on 6/12/2025), Disp: 10 patch, Rfl: 0

## 2025-07-24 ENCOUNTER — LAB VISIT (OUTPATIENT)
Dept: LAB | Facility: HOSPITAL | Age: 55
End: 2025-07-24
Attending: OBSTETRICS & GYNECOLOGY
Payer: COMMERCIAL

## 2025-07-24 ENCOUNTER — TELEPHONE (OUTPATIENT)
Dept: OBSTETRICS AND GYNECOLOGY | Facility: CLINIC | Age: 55
End: 2025-07-24
Payer: COMMERCIAL

## 2025-07-24 DIAGNOSIS — Z78.0 MENOPAUSE: ICD-10-CM

## 2025-07-24 DIAGNOSIS — Z78.0 MENOPAUSE: Primary | ICD-10-CM

## 2025-07-24 PROCEDURE — 36415 COLL VENOUS BLD VENIPUNCTURE: CPT | Mod: PN

## 2025-07-24 PROCEDURE — 84270 ASSAY OF SEX HORMONE GLOBUL: CPT

## 2025-07-28 ENCOUNTER — TELEPHONE (OUTPATIENT)
Dept: SLEEP MEDICINE | Facility: OTHER | Age: 55
End: 2025-07-28
Payer: COMMERCIAL

## 2025-07-29 ENCOUNTER — HOSPITAL ENCOUNTER (OUTPATIENT)
Dept: SLEEP MEDICINE | Facility: OTHER | Age: 55
Discharge: HOME | End: 2025-07-29
Attending: PHYSICIAN ASSISTANT
Payer: COMMERCIAL

## 2025-07-29 DIAGNOSIS — R06.83 SNORING: ICD-10-CM

## 2025-07-29 DIAGNOSIS — G47.10 HYPERSOMNOLENCE: ICD-10-CM

## 2025-07-29 DIAGNOSIS — R35.1 NOCTURIA: ICD-10-CM

## 2025-07-29 DIAGNOSIS — F51.09 OTHER INSOMNIA NOT DUE TO A SUBSTANCE OR KNOWN PHYSIOLOGICAL CONDITION: ICD-10-CM

## 2025-07-29 LAB
W ALBUMIN: 4.3 G/DL
W SEX HORMONE BINDING GLOBULIN: 125 NMOL/L
W TESTOSTERONE, BIOAVAIL: 1.2 NG/DL
W TESTOSTERONE, FREE: 0.6 PG/ML
W TESTOSTERONE, TOTAL, MS: 17 NG/DL

## 2025-07-29 PROCEDURE — 95800 SLP STDY UNATTENDED: CPT

## 2025-07-30 ENCOUNTER — PATIENT MESSAGE (OUTPATIENT)
Dept: OBSTETRICS AND GYNECOLOGY | Facility: CLINIC | Age: 55
End: 2025-07-30
Payer: COMMERCIAL

## 2025-07-30 PROBLEM — G47.10 HYPERSOMNOLENCE: Status: ACTIVE | Noted: 2025-07-30

## 2025-07-31 ENCOUNTER — PATIENT MESSAGE (OUTPATIENT)
Dept: SLEEP MEDICINE | Facility: CLINIC | Age: 55
End: 2025-07-31
Payer: COMMERCIAL

## 2025-07-31 DIAGNOSIS — G47.33 OSA (OBSTRUCTIVE SLEEP APNEA): Primary | ICD-10-CM

## 2025-07-31 PROCEDURE — 95800 SLP STDY UNATTENDED: CPT | Mod: 26,,, | Performed by: INTERNAL MEDICINE

## 2025-08-08 ENCOUNTER — OFFICE VISIT (OUTPATIENT)
Dept: PRIMARY CARE CLINIC | Facility: CLINIC | Age: 55
End: 2025-08-08
Payer: COMMERCIAL

## 2025-08-08 ENCOUNTER — PATIENT MESSAGE (OUTPATIENT)
Dept: PRIMARY CARE CLINIC | Facility: CLINIC | Age: 55
End: 2025-08-08

## 2025-08-08 ENCOUNTER — LAB VISIT (OUTPATIENT)
Dept: LAB | Facility: HOSPITAL | Age: 55
End: 2025-08-08
Attending: STUDENT IN AN ORGANIZED HEALTH CARE EDUCATION/TRAINING PROGRAM
Payer: COMMERCIAL

## 2025-08-08 VITALS
WEIGHT: 162.06 LBS | SYSTOLIC BLOOD PRESSURE: 136 MMHG | HEART RATE: 112 BPM | HEIGHT: 62 IN | BODY MASS INDEX: 29.82 KG/M2 | DIASTOLIC BLOOD PRESSURE: 82 MMHG | OXYGEN SATURATION: 98 %

## 2025-08-08 DIAGNOSIS — R51.9 CHRONIC INTRACTABLE HEADACHE, UNSPECIFIED HEADACHE TYPE: Primary | ICD-10-CM

## 2025-08-08 DIAGNOSIS — G47.33 OSA (OBSTRUCTIVE SLEEP APNEA): ICD-10-CM

## 2025-08-08 DIAGNOSIS — F40.240 CLAUSTROPHOBIA: ICD-10-CM

## 2025-08-08 DIAGNOSIS — E78.2 MIXED HYPERLIPIDEMIA: ICD-10-CM

## 2025-08-08 DIAGNOSIS — H93.13 TINNITUS OF BOTH EARS: ICD-10-CM

## 2025-08-08 DIAGNOSIS — G89.29 CHRONIC INTRACTABLE HEADACHE, UNSPECIFIED HEADACHE TYPE: ICD-10-CM

## 2025-08-08 DIAGNOSIS — Z79.890 HORMONE REPLACEMENT THERAPY (HRT): ICD-10-CM

## 2025-08-08 DIAGNOSIS — Z82.49 FAMILY HISTORY OF CAROTID ARTERY STENOSIS: ICD-10-CM

## 2025-08-08 DIAGNOSIS — R51.9 CHRONIC INTRACTABLE HEADACHE, UNSPECIFIED HEADACHE TYPE: ICD-10-CM

## 2025-08-08 DIAGNOSIS — G89.29 CHRONIC INTRACTABLE HEADACHE, UNSPECIFIED HEADACHE TYPE: Primary | ICD-10-CM

## 2025-08-08 DIAGNOSIS — D35.00 ADRENAL ADENOMA, UNSPECIFIED LATERALITY: ICD-10-CM

## 2025-08-08 LAB
25(OH)D3+25(OH)D2 SERPL-MCNC: 38 NG/ML (ref 30–96)
ABSOLUTE EOSINOPHIL (OHS): 0.16 K/UL
ABSOLUTE MONOCYTE (OHS): 0.34 K/UL (ref 0.3–1)
ABSOLUTE NEUTROPHIL COUNT (OHS): 3.78 K/UL (ref 1.8–7.7)
BASOPHILS # BLD AUTO: 0.05 K/UL
BASOPHILS NFR BLD AUTO: 0.8 %
ERYTHROCYTE [DISTWIDTH] IN BLOOD BY AUTOMATED COUNT: 11.6 % (ref 11.5–14.5)
FERRITIN SERPL-MCNC: 44 NG/ML (ref 20–300)
HCT VFR BLD AUTO: 46.7 % (ref 37–48.5)
HGB BLD-MCNC: 15.7 GM/DL (ref 12–16)
IMM GRANULOCYTES # BLD AUTO: 0.01 K/UL (ref 0–0.04)
IMM GRANULOCYTES NFR BLD AUTO: 0.2 % (ref 0–0.5)
IRON SATN MFR SERPL: 24 % (ref 20–50)
IRON SERPL-MCNC: 96 UG/DL (ref 30–160)
LYMPHOCYTES # BLD AUTO: 1.59 K/UL (ref 1–4.8)
MCH RBC QN AUTO: 30.5 PG (ref 27–31)
MCHC RBC AUTO-ENTMCNC: 33.6 G/DL (ref 32–36)
MCV RBC AUTO: 91 FL (ref 82–98)
NUCLEATED RBC (/100WBC) (OHS): 0 /100 WBC
PLATELET # BLD AUTO: 351 K/UL (ref 150–450)
PMV BLD AUTO: 10.1 FL (ref 9.2–12.9)
RBC # BLD AUTO: 5.15 M/UL (ref 4–5.4)
RELATIVE EOSINOPHIL (OHS): 2.7 %
RELATIVE LYMPHOCYTE (OHS): 26.8 % (ref 18–48)
RELATIVE MONOCYTE (OHS): 5.7 % (ref 4–15)
RELATIVE NEUTROPHIL (OHS): 63.8 % (ref 38–73)
TIBC SERPL-MCNC: 400 UG/DL (ref 250–450)
TRANSFERRIN SERPL-MCNC: 270 MG/DL (ref 200–375)
TSH SERPL-ACNC: 1.49 UIU/ML (ref 0.4–4)
VIT B12 SERPL-MCNC: 542 PG/ML (ref 210–950)
WBC # BLD AUTO: 5.93 K/UL (ref 3.9–12.7)

## 2025-08-08 PROCEDURE — 82728 ASSAY OF FERRITIN: CPT

## 2025-08-08 PROCEDURE — 85025 COMPLETE CBC W/AUTO DIFF WBC: CPT

## 2025-08-08 PROCEDURE — 99999 PR PBB SHADOW E&M-EST. PATIENT-LVL IV: CPT | Mod: PBBFAC,,, | Performed by: STUDENT IN AN ORGANIZED HEALTH CARE EDUCATION/TRAINING PROGRAM

## 2025-08-08 PROCEDURE — 36415 COLL VENOUS BLD VENIPUNCTURE: CPT | Mod: PN

## 2025-08-08 PROCEDURE — 82607 VITAMIN B-12: CPT

## 2025-08-08 PROCEDURE — 82306 VITAMIN D 25 HYDROXY: CPT

## 2025-08-08 PROCEDURE — 84466 ASSAY OF TRANSFERRIN: CPT

## 2025-08-08 PROCEDURE — 84443 ASSAY THYROID STIM HORMONE: CPT

## 2025-08-08 RX ORDER — METHYLPREDNISOLONE 4 MG/1
TABLET ORAL
Qty: 21 EACH | Refills: 0 | Status: SHIPPED | OUTPATIENT
Start: 2025-08-08 | End: 2025-08-29

## 2025-08-08 RX ORDER — LORAZEPAM 1 MG/1
1 TABLET ORAL ONCE AS NEEDED
Qty: 4 TABLET | Refills: 0 | Status: SHIPPED | OUTPATIENT
Start: 2025-08-08 | End: 2025-08-08

## 2025-08-08 NOTE — PROGRESS NOTES
Shawna Mayers  1970        Subjective     Chief Complaint: HA    History of Present Illness:  Ms. Shawna Mayers is a 54 y.o. female who presents to clinic for HA.    HA are new for her until she started estrogen HRT. No hx of migraines. Around July 1st started having frontal HA. Thought it was sinus-related.   Took some tylenol sinus which helped but only a little.   Throughout this time, was having HA intermittently. Sometimes severe but sometimes fully resolved. July 20th everything worsened. Felt like HA intensified, pain was severe, lasted a week. End of the week came with nausea and fatigue. No vomiting. Took Ibuprofen which was not helping.Also tried Tylenol.   Stopped magnesium supplements (had been on this already, not new).  Felt like next day was slightly better. Then HA worsened again.   Stopped the statin to see if it was related. No change.  Also stopped her COQ10.   Stopped estrogen cream, still on pill.   Nothing changed in terms of HA with these changes.    Around early June did have her HRT changed with her OBGYN.   Changed from estrogen tab to patch. When first switched she developed severe HA and palpitations so pulled the patch off and went back to the pill. That did help the HA but then they returned.     No vision changes. No double vision.  Some feelings of off-balance/vertigo. May have been related to dehydration though as she felt that day did not sleep well or drink enough water.  +tinnitus bilaterally, mild (not new).  No congestion.   No ear fullness.    Today has a slight HA but much better than before.    Dx of SOULEYMANE- working on getting CPAP this Monday.  Checked BP at home this /68.  BP logs reviewed.  BP Readings from Last 5 Encounters:   08/08/25 136/82   07/14/25 (!) 152/90   06/12/25 (!) 163/92   06/05/25 118/77   04/24/25 (!) 146/87       Recent labs in chart reviewed.  Recently had vitamins checked but wants them checked again.  Lab Results   Component  Value Date    HGBA1C 5.1 06/05/2025      Lab Results   Component Value Date    TSH 2.407 06/05/2025      Mom recently had 100% blockage of 1 carotid, 90% on other.   Was advised to have family members screened.     The 10-year ASCVD risk score (Robert JENSEN, et al., 2019) is: 1.8%    Values used to calculate the score:      Age: 54 years      Sex: Female      Is Non- : No      Diabetic: No      Tobacco smoker: No      Systolic Blood Pressure: 136 mmHg      Is BP treated: No      HDL Cholesterol: 78 mg/dL      Total Cholesterol: 245 mg/dL     Review of Systems   Constitutional:  Positive for malaise/fatigue. Negative for chills and fever.   Eyes:  Negative for blurred vision, double vision and photophobia.   Gastrointestinal:  Positive for nausea.   Neurological:  Positive for dizziness and headaches.   Psychiatric/Behavioral:  The patient is nervous/anxious.         PAST HISTORY:     Past Medical History:   Diagnosis Date    Adrenal adenoma 11/20/2024    Allergy childhood    contact dermatitis    Anxiety     Asthma     resolved    Basal cell carcinoma 8/2023    Focal nodular hyperplasia of liver     GERD (gastroesophageal reflux disease)     Hyperlipidemia     Hypotension, iatrogenic     Thyroid disease 1990    hypothyroidism       Past Surgical History:   Procedure Laterality Date    ARTHROSCOPIC CHONDROPLASTY OF KNEE JOINT Right 07/26/2023    Procedure: ARTHROSCOPY, KNEE, WITH CHONDROPLASTY;  Surgeon: Fidel Michelle MD;  Location: [facility];  Service: Orthopedics;  Laterality: Right;    CHOLECYSTECTOMY      COLONOSCOPY N/A 11/06/2024    Procedure: COLONOSCOPY;  Surgeon: Edison Hawkins MD;  Location: [address]);  Service: Endoscopy;  Laterality: N/A;  referral Yvrose Thompson. Suprep instr. to portal.EC  8/21 r/s suprep instructions to pt portal.cf  10/31/24- precall complete - ERW    FOOT SURGERY      arc    HYSTEROSCOPY WITH DILATION AND CURETTAGE OF UTERUS N/A 11/22/2024     Procedure: HYSTEROSCOPY, WITH DILATION AND CURETTAGE OF UTERUS;  Surgeon: Yvrose Thompson MD;  Location: [facility] OR;  Service: OB/GYN;  Laterality: N/A;    KNEE ARTHROSCOPY W/ MENISCECTOMY Right 2023    Procedure: ARTHROSCOPY, KNEE, WITH MENISCECTOMY;  Surgeon: Fidel Michelle MD;  Location: [facility] OR;  Service: Orthopedics;  Laterality: Right;  partial and medial     MAGNETIC RESONANCE IMAGING N/A 11/15/2024    Procedure: MRI (Magnetic Resonance Imagine);  Surgeon: Miguelina Ellis;  Location: [facility];  Service: Anesthesiology;  Laterality: N/A;  MRI ABD W WO EOVIST AND MRE    SKIN BIOPSY  mid 20's    usually during yearly exam    WISDOM TOOTH EXTRACTION         Family History   Problem Relation Name Age of Onset    Arthritis Mother Mother     Asthma Mother Mother     Hearing loss Mother Mother     Hyperlipidemia Mother Mother     Vision loss Mother Mother     Diabetes Mother Mother     Early death Father Father         42yrs old    Heart disease Father Father          of MI at 43yo    Hyperlipidemia Father Father     Kidney disease Father Father     Alcohol abuse Brother Brother     Diabetes Maternal Grandmother Maternal grandmother     Breast cancer Neg Hx      Colon cancer Neg Hx      Ovarian cancer Neg Hx      Melanoma Neg Hx           MEDICATIONS & ALLERGIES:     Current Outpatient Medications on File Prior to Visit   Medication Sig    b complex vitamins capsule Take 1 capsule by mouth once daily.    estradioL (VIVELLE-DOT) 0.1 mg/24 hr PTSW Place 1 patch onto the skin twice a week.    fluticasone propionate (FLONASE) 50 mcg/actuation nasal spray SPRAY 2 SPRAYS BY EACH NOSTRIL ROUTE ONCE DAILY.    Lactobacillus rhamnosus GG (CULTURELLE) 10 billion cell capsule Take 1 capsule by mouth once daily.    levothyroxine (SYNTHROID) 50 MCG tablet Take 1 tablet (50 mcg total) by mouth once daily.    magnesium oxide (MAG-OX) 400 mg (241.3 mg magnesium) tablet Take 400 mg by mouth once daily.    multivitamin  "(THERAGRAN) per tablet Take 1 tablet by mouth once daily.    omega-3 fatty acids/fish oil (FISH OIL-OMEGA-3 FATTY ACIDS) 300-1,000 mg capsule Take by mouth once daily.    progesterone (PROMETRIUM) 100 MG capsule Take 3 capsules (300 mg total) by mouth nightly.    rosuvastatin (CRESTOR) 5 MG tablet Take 1 tablet (5 mg total) by mouth once daily.    [DISCONTINUED] estradioL (ESTRACE) 0.01 % (0.1 mg/gram) vaginal cream Place 0.5 g vaginally 3 (three) times a week. APPLY WITH APPLICATOR OR DIME SIZE AMOUNT WITH FINGER IN VAGINA    [DISCONTINUED] scopolamine (TRANSDERM-SCOP) 1.3-1.5 mg (1 mg over 3 days) Place 1 patch onto the skin every 72 hours. (Patient not taking: Reported on 8/8/2025)     No current facility-administered medications on file prior to visit.       Review of patient's allergies indicates:   Allergen Reactions    Adhesive Blisters    Anucort-hc [hydrocortisone acetate] Hives and Nausea And Vomiting    Demerol [meperidine] Nausea And Vomiting    Sulfa (sulfonamide antibiotics) Hives    Amoxicillin Nausea And Vomiting and Rash    Macrobid [nitrofurantoin monohyd/m-cryst] Nausea Only     dizziness       OBJECTIVE:     Vital Signs:  Vitals:    08/08/25 0824   BP: 136/82   BP Location: Right arm   Patient Position: Sitting   Pulse: (!) 112   SpO2: 98%   Weight: 73.5 kg (162 lb 0.6 oz)   Height: 5' 2" (1.575 m)       Body mass index is 29.64 kg/m².     Physical Exam:  Physical Exam  Vitals and nursing note reviewed.   Constitutional:       General: She is not in acute distress.     Appearance: Normal appearance. She is not ill-appearing, toxic-appearing or diaphoretic.   HENT:      Head: Normocephalic and atraumatic.   Eyes:      General: No scleral icterus.        Right eye: No discharge.         Left eye: No discharge.      Conjunctiva/sclera: Conjunctivae normal.   Pulmonary:      Effort: Pulmonary effort is normal. No respiratory distress.   Neurological:      General: No focal deficit present.      " "Mental Status: She is alert and oriented to person, place, and time. Mental status is at baseline.      Gait: Gait normal.   Psychiatric:         Mood and Affect: Mood normal.         Behavior: Behavior normal.            Laboratory  Lab Results   Component Value Date    GLU 91 06/05/2025     06/05/2025    K 4.4 06/05/2025     06/05/2025    CO2 24 06/05/2025    BUN 16 06/05/2025    CREATININE 0.6 06/05/2025    CALCIUM 10.0 06/05/2025    MG 2.1 10/12/2024     Lab Results   Component Value Date    HGBA1C 5.1 06/05/2025     No results for input(s): "POCTGLUCOSE" in the last 72 hours.        ASSESSMENT & PLAN:   Ms. Shawna Mayers is a 54 y.o. female who was seen today in clinic for HA. Discussed broad ddx. Unclear if related to estrogen. Discussed DVST vs IIH. Will get CT head and MRV. ED precautions. Very claustrophobic, ativan sent. Will have someone drive her to scans.  Has OBGYN f/u in 2 days.   Medrol dosepack sent in case of intractable HA.  Discussed medication overuse HA.  Will re-check labs.   Re-start statin given fam hx, requested carotid US for screening, ordered.    1. Chronic intractable headache, unspecified headache type  -     MRV Brain Without Contrast; Future; Expected date: 08/08/2025  -     CT Head Without Contrast; Future; Expected date: 08/08/2025  -     CBC Auto Differential; Future; Expected date: 08/08/2025  -     Vitamin D; Future; Expected date: 08/08/2025  -     Vitamin B12; Future; Expected date: 08/08/2025  -     Ferritin; Future; Expected date: 09/04/2025  -     Iron and TIBC; Future; Expected date: 09/04/2025  -     Progesterone; Future; Expected date: 08/08/2025  -     Estradiol; Future; Expected date: 08/08/2025  -     TSH; Future; Expected date: 08/08/2025  -     LORazepam (ATIVAN) 1 MG tablet; Take 1 tablet (1 mg total) by mouth once as needed for Anxiety. For CT scan/MRI. Makes sure someone drives you to/from scan if taking.  Dispense: 4 tablet; Refill: 0  -     " methylPREDNISolone (MEDROL DOSEPACK) 4 mg tablet; use as directed  Dispense: 21 each; Refill: 0    2. Hormone replacement therapy (HRT)  -     MRV Brain Without Contrast; Future; Expected date: 08/08/2025  -     CT Head Without Contrast; Future; Expected date: 08/08/2025  -     CBC Auto Differential; Future; Expected date: 08/08/2025  -     Vitamin D; Future; Expected date: 08/08/2025  -     Vitamin B12; Future; Expected date: 08/08/2025  -     Ferritin; Future; Expected date: 09/04/2025  -     Iron and TIBC; Future; Expected date: 09/04/2025  -     Progesterone; Future; Expected date: 08/08/2025  -     Estradiol; Future; Expected date: 08/08/2025  -     TSH; Future; Expected date: 08/08/2025  -     LORazepam (ATIVAN) 1 MG tablet; Take 1 tablet (1 mg total) by mouth once as needed for Anxiety. For CT scan/MRI. Makes sure someone drives you to/from scan if taking.  Dispense: 4 tablet; Refill: 0  -     methylPREDNISolone (MEDROL DOSEPACK) 4 mg tablet; use as directed  Dispense: 21 each; Refill: 0  -     Estradiol; Future; Expected date: 08/08/2025  -     Progesterone; Future; Expected date: 08/08/2025    3. Tinnitus of both ears  -     MRV Brain Without Contrast; Future; Expected date: 08/08/2025  -     CT Head Without Contrast; Future; Expected date: 08/08/2025  -     CBC Auto Differential; Future; Expected date: 08/08/2025  -     Vitamin D; Future; Expected date: 08/08/2025  -     Vitamin B12; Future; Expected date: 08/08/2025  -     Ferritin; Future; Expected date: 09/04/2025  -     Iron and TIBC; Future; Expected date: 09/04/2025  -     Progesterone; Future; Expected date: 08/08/2025  -     Estradiol; Future; Expected date: 08/08/2025  -     TSH; Future; Expected date: 08/08/2025  -     LORazepam (ATIVAN) 1 MG tablet; Take 1 tablet (1 mg total) by mouth once as needed for Anxiety. For CT scan/MRI. Makes sure someone drives you to/from scan if taking.  Dispense: 4 tablet; Refill: 0  -     methylPREDNISolone (MEDROL  DOSEPACK) 4 mg tablet; use as directed  Dispense: 21 each; Refill: 0    4. Claustrophobia  -     LORazepam (ATIVAN) 1 MG tablet; Take 1 tablet (1 mg total) by mouth once as needed for Anxiety. For CT scan/MRI. Makes sure someone drives you to/from scan if taking.  Dispense: 4 tablet; Refill: 0    5. SOULEYMANE (obstructive sleep apnea)  -     CBC Auto Differential; Future; Expected date: 08/08/2025  -     Vitamin D; Future; Expected date: 08/08/2025  -     Vitamin B12; Future; Expected date: 08/08/2025  -     Ferritin; Future; Expected date: 09/04/2025  -     Iron and TIBC; Future; Expected date: 09/04/2025    6. Mixed hyperlipidemia  -     CBC Auto Differential; Future; Expected date: 08/08/2025  -     Vitamin D; Future; Expected date: 08/08/2025  -     Vitamin B12; Future; Expected date: 08/08/2025  -     Ferritin; Future; Expected date: 09/04/2025  -     Iron and TIBC; Future; Expected date: 09/04/2025  -     US Carotid Bilateral; Future; Expected date: 08/08/2025    7. Family history of carotid artery stenosis  -     US Carotid Bilateral; Future; Expected date: 08/08/2025    8. Adrenal adenoma, unspecified laterality           Audrey Moulton MD

## 2025-08-09 ENCOUNTER — PATIENT MESSAGE (OUTPATIENT)
Dept: PRIMARY CARE CLINIC | Facility: CLINIC | Age: 55
End: 2025-08-09
Payer: COMMERCIAL

## 2025-08-09 DIAGNOSIS — Z79.890 HORMONE REPLACEMENT THERAPY (HRT): ICD-10-CM

## 2025-08-11 ENCOUNTER — OFFICE VISIT (OUTPATIENT)
Facility: CLINIC | Age: 55
End: 2025-08-11
Payer: COMMERCIAL

## 2025-08-11 DIAGNOSIS — K30 INDIGESTION: ICD-10-CM

## 2025-08-11 DIAGNOSIS — N95.1 VASOMOTOR SYMPTOMS DUE TO MENOPAUSE: Primary | ICD-10-CM

## 2025-08-11 DIAGNOSIS — F41.9 ANXIETY: ICD-10-CM

## 2025-08-11 DIAGNOSIS — R51.9 FREQUENT HEADACHES: ICD-10-CM

## 2025-08-11 PROCEDURE — 3044F HG A1C LEVEL LT 7.0%: CPT | Mod: CPTII,95,, | Performed by: OBSTETRICS & GYNECOLOGY

## 2025-08-11 PROCEDURE — 98007 SYNCH AUDIO-VIDEO EST HI 40: CPT | Mod: 95,,, | Performed by: OBSTETRICS & GYNECOLOGY

## 2025-08-12 ENCOUNTER — LAB VISIT (OUTPATIENT)
Dept: LAB | Facility: OTHER | Age: 55
End: 2025-08-12
Attending: STUDENT IN AN ORGANIZED HEALTH CARE EDUCATION/TRAINING PROGRAM
Payer: COMMERCIAL

## 2025-08-12 ENCOUNTER — OFFICE VISIT (OUTPATIENT)
Dept: UROGYNECOLOGY | Facility: CLINIC | Age: 55
End: 2025-08-12
Payer: COMMERCIAL

## 2025-08-12 VITALS
DIASTOLIC BLOOD PRESSURE: 84 MMHG | BODY MASS INDEX: 29.98 KG/M2 | SYSTOLIC BLOOD PRESSURE: 160 MMHG | HEART RATE: 104 BPM | WEIGHT: 162.94 LBS | HEIGHT: 62 IN

## 2025-08-12 DIAGNOSIS — R35.0 URINARY FREQUENCY: ICD-10-CM

## 2025-08-12 DIAGNOSIS — R39.15 URINARY URGENCY: Primary | ICD-10-CM

## 2025-08-12 DIAGNOSIS — Z79.890 HORMONE REPLACEMENT THERAPY (HRT): ICD-10-CM

## 2025-08-12 LAB — ESTRADIOL SERPL HS-MCNC: 37 PG/ML

## 2025-08-12 PROCEDURE — 1159F MED LIST DOCD IN RCRD: CPT | Mod: CPTII,S$GLB,, | Performed by: OBSTETRICS & GYNECOLOGY

## 2025-08-12 PROCEDURE — 99999 PR PBB SHADOW E&M-EST. PATIENT-LVL IV: CPT | Mod: PBBFAC,,, | Performed by: OBSTETRICS & GYNECOLOGY

## 2025-08-12 PROCEDURE — 3079F DIAST BP 80-89 MM HG: CPT | Mod: CPTII,S$GLB,, | Performed by: OBSTETRICS & GYNECOLOGY

## 2025-08-12 PROCEDURE — 82670 ASSAY OF TOTAL ESTRADIOL: CPT

## 2025-08-12 PROCEDURE — 3008F BODY MASS INDEX DOCD: CPT | Mod: CPTII,S$GLB,, | Performed by: OBSTETRICS & GYNECOLOGY

## 2025-08-12 PROCEDURE — 3077F SYST BP >= 140 MM HG: CPT | Mod: CPTII,S$GLB,, | Performed by: OBSTETRICS & GYNECOLOGY

## 2025-08-12 PROCEDURE — 3044F HG A1C LEVEL LT 7.0%: CPT | Mod: CPTII,S$GLB,, | Performed by: OBSTETRICS & GYNECOLOGY

## 2025-08-12 PROCEDURE — 99213 OFFICE O/P EST LOW 20 MIN: CPT | Mod: S$GLB,,, | Performed by: OBSTETRICS & GYNECOLOGY

## 2025-08-12 PROCEDURE — 36415 COLL VENOUS BLD VENIPUNCTURE: CPT

## 2025-08-12 PROCEDURE — 1160F RVW MEDS BY RX/DR IN RCRD: CPT | Mod: CPTII,S$GLB,, | Performed by: OBSTETRICS & GYNECOLOGY

## 2025-08-12 RX ORDER — ESTRADIOL 2 MG/1
2 TABLET ORAL DAILY
COMMUNITY

## 2025-08-13 ENCOUNTER — HOSPITAL ENCOUNTER (OUTPATIENT)
Dept: RADIOLOGY | Facility: HOSPITAL | Age: 55
Discharge: HOME | End: 2025-08-13
Attending: STUDENT IN AN ORGANIZED HEALTH CARE EDUCATION/TRAINING PROGRAM
Payer: COMMERCIAL

## 2025-08-13 DIAGNOSIS — G89.29 CHRONIC INTRACTABLE HEADACHE, UNSPECIFIED HEADACHE TYPE: ICD-10-CM

## 2025-08-13 DIAGNOSIS — H93.13 TINNITUS OF BOTH EARS: ICD-10-CM

## 2025-08-13 DIAGNOSIS — R51.9 CHRONIC INTRACTABLE HEADACHE, UNSPECIFIED HEADACHE TYPE: ICD-10-CM

## 2025-08-13 DIAGNOSIS — Z79.890 HORMONE REPLACEMENT THERAPY (HRT): ICD-10-CM

## 2025-08-13 PROCEDURE — 70450 CT HEAD/BRAIN W/O DYE: CPT | Mod: TC

## 2025-08-13 PROCEDURE — 70450 CT HEAD/BRAIN W/O DYE: CPT | Mod: 26,,, | Performed by: RADIOLOGY

## 2025-08-19 ENCOUNTER — OFFICE VISIT (OUTPATIENT)
Dept: PRIMARY CARE CLINIC | Facility: CLINIC | Age: 55
End: 2025-08-19
Payer: COMMERCIAL

## 2025-08-19 VITALS — DIASTOLIC BLOOD PRESSURE: 84 MMHG | SYSTOLIC BLOOD PRESSURE: 118 MMHG

## 2025-08-19 DIAGNOSIS — E03.9 ACQUIRED HYPOTHYROIDISM: Chronic | ICD-10-CM

## 2025-08-19 DIAGNOSIS — G44.221 CHRONIC TENSION-TYPE HEADACHE, INTRACTABLE: Primary | ICD-10-CM

## 2025-08-19 DIAGNOSIS — R14.0 BLOATING SYMPTOM: ICD-10-CM

## 2025-08-19 DIAGNOSIS — Z79.890 HORMONE REPLACEMENT THERAPY (HRT): ICD-10-CM

## 2025-08-19 DIAGNOSIS — G47.33 OSA (OBSTRUCTIVE SLEEP APNEA): ICD-10-CM

## 2025-08-19 DIAGNOSIS — R00.2 PALPITATIONS: ICD-10-CM

## 2025-08-19 DIAGNOSIS — K21.9 GASTROESOPHAGEAL REFLUX DISEASE, UNSPECIFIED WHETHER ESOPHAGITIS PRESENT: ICD-10-CM

## 2025-08-19 DIAGNOSIS — D35.01 ADENOMA OF RIGHT ADRENAL GLAND: ICD-10-CM

## 2025-08-19 DIAGNOSIS — G47.00 INSOMNIA, UNSPECIFIED TYPE: ICD-10-CM

## 2025-08-19 PROCEDURE — 1160F RVW MEDS BY RX/DR IN RCRD: CPT | Mod: CPTII,95,, | Performed by: STUDENT IN AN ORGANIZED HEALTH CARE EDUCATION/TRAINING PROGRAM

## 2025-08-19 PROCEDURE — 3074F SYST BP LT 130 MM HG: CPT | Mod: CPTII,95,, | Performed by: STUDENT IN AN ORGANIZED HEALTH CARE EDUCATION/TRAINING PROGRAM

## 2025-08-19 PROCEDURE — 1159F MED LIST DOCD IN RCRD: CPT | Mod: CPTII,95,, | Performed by: STUDENT IN AN ORGANIZED HEALTH CARE EDUCATION/TRAINING PROGRAM

## 2025-08-19 PROCEDURE — 3079F DIAST BP 80-89 MM HG: CPT | Mod: CPTII,95,, | Performed by: STUDENT IN AN ORGANIZED HEALTH CARE EDUCATION/TRAINING PROGRAM

## 2025-08-19 PROCEDURE — 98006 SYNCH AUDIO-VIDEO EST MOD 30: CPT | Mod: 95,,, | Performed by: STUDENT IN AN ORGANIZED HEALTH CARE EDUCATION/TRAINING PROGRAM

## 2025-08-19 PROCEDURE — 3044F HG A1C LEVEL LT 7.0%: CPT | Mod: CPTII,95,, | Performed by: STUDENT IN AN ORGANIZED HEALTH CARE EDUCATION/TRAINING PROGRAM

## 2025-08-19 RX ORDER — PANTOPRAZOLE SODIUM 20 MG/1
20 TABLET, DELAYED RELEASE ORAL DAILY
Qty: 90 TABLET | Refills: 0 | Status: SHIPPED | OUTPATIENT
Start: 2025-08-19 | End: 2026-08-19

## 2025-08-19 RX ORDER — AMITRIPTYLINE HYDROCHLORIDE 10 MG/1
10 TABLET, FILM COATED ORAL NIGHTLY PRN
Qty: 30 TABLET | Refills: 0 | Status: SHIPPED | OUTPATIENT
Start: 2025-08-19

## 2025-08-20 ENCOUNTER — HOSPITAL ENCOUNTER (OUTPATIENT)
Dept: RADIOLOGY | Facility: HOSPITAL | Age: 55
Discharge: HOME | End: 2025-08-20
Attending: STUDENT IN AN ORGANIZED HEALTH CARE EDUCATION/TRAINING PROGRAM
Payer: COMMERCIAL

## 2025-08-20 DIAGNOSIS — E78.2 MIXED HYPERLIPIDEMIA: ICD-10-CM

## 2025-08-20 DIAGNOSIS — Z82.49 FAMILY HISTORY OF CAROTID ARTERY STENOSIS: ICD-10-CM

## 2025-08-20 PROCEDURE — 93880 EXTRACRANIAL BILAT STUDY: CPT | Mod: 26,,, | Performed by: RADIOLOGY

## 2025-08-20 PROCEDURE — 93880 EXTRACRANIAL BILAT STUDY: CPT | Mod: TC

## (undated) DEVICE — WRAP KNEE ACCU THERM GEL PACK

## (undated) DEVICE — PAD ELECTRODE STER 1.5X3

## (undated) DEVICE — STRIP MEDI WND CLSR 1/2X4IN

## (undated) DEVICE — GLOVE SENSICARE PI SURG 6

## (undated) DEVICE — TOURNIQUET SB QC DP 34X4IN

## (undated) DEVICE — GLOVE SENSICARE PI SURG 6.5

## (undated) DEVICE — DRESSING XEROFORM NONADH 1X8IN

## (undated) DEVICE — DEVICE MYOSURE REACH

## (undated) DEVICE — SOL NACL IRR 1000ML BTL

## (undated) DEVICE — SET BASIN 48X48IN 6000ML RING

## (undated) DEVICE — ADHESIVE MASTISOL VIAL 48/BX

## (undated) DEVICE — UNDERGLOVES BIOGEL PI SIZE 7.5

## (undated) DEVICE — SOL NACL IRR 3000ML

## (undated) DEVICE — SEAL LENS SCOPE MYOSURE

## (undated) DEVICE — SOL POVIDONE SCRUB IODINE 4 OZ

## (undated) DEVICE — PAD ABDOMINAL STERILE 8X10IN

## (undated) DEVICE — GLOVE SENSICARE PI SURG 8

## (undated) DEVICE — BLADE SHAVER LANZA 4.2X13CM

## (undated) DEVICE — PACK FLUENT DISPOSABLE

## (undated) DEVICE — TUBE SET INFLOW/OUTFLOW

## (undated) DEVICE — DRAPE ARTHSCP FLD CTRL POUCH

## (undated) DEVICE — GOWN ECLIPSE REINF LV4 XLNG XL

## (undated) DEVICE — GAUZE SPONGE 4X4 12PLY

## (undated) DEVICE — SOL POVIDONE PREP IODINE 4 OZ

## (undated) DEVICE — DRAPE STERI U-SHAPED 47X51IN

## (undated) DEVICE — GLOVE SENSICARE PI GRN 8

## (undated) DEVICE — Device

## (undated) DEVICE — GLOVE SENSICARE PI GRN 6.5

## (undated) DEVICE — SUT MONOCRYL 4-0 PS-2

## (undated) DEVICE — DRAPE TOP 53X102IN